# Patient Record
Sex: FEMALE | Race: WHITE | NOT HISPANIC OR LATINO | Employment: OTHER | URBAN - METROPOLITAN AREA
[De-identification: names, ages, dates, MRNs, and addresses within clinical notes are randomized per-mention and may not be internally consistent; named-entity substitution may affect disease eponyms.]

---

## 2017-11-16 ENCOUNTER — CONVERSION ENCOUNTER (OUTPATIENT)
Dept: GENERAL RADIOLOGY | Facility: HOSPITAL | Age: 82
End: 2017-11-16

## 2018-01-25 ENCOUNTER — PROCEDURE VISIT CONVERTED (OUTPATIENT)
Dept: PODIATRY | Facility: CLINIC | Age: 83
End: 2018-01-25
Attending: PODIATRIST

## 2018-03-15 ENCOUNTER — PROCEDURE VISIT CONVERTED (OUTPATIENT)
Dept: UROLOGY | Facility: CLINIC | Age: 83
End: 2018-03-15
Attending: UROLOGY

## 2018-04-12 ENCOUNTER — OFFICE VISIT CONVERTED (OUTPATIENT)
Dept: CARDIOLOGY | Facility: CLINIC | Age: 83
End: 2018-04-12
Attending: INTERNAL MEDICINE

## 2018-07-18 ENCOUNTER — PROCEDURE VISIT CONVERTED (OUTPATIENT)
Dept: UROLOGY | Facility: CLINIC | Age: 83
End: 2018-07-18
Attending: UROLOGY

## 2018-10-18 ENCOUNTER — CONVERSION ENCOUNTER (OUTPATIENT)
Dept: CARDIOLOGY | Facility: CLINIC | Age: 83
End: 2018-10-18
Attending: INTERNAL MEDICINE

## 2018-10-31 ENCOUNTER — OFFICE VISIT CONVERTED (OUTPATIENT)
Dept: UROLOGY | Facility: CLINIC | Age: 83
End: 2018-10-31
Attending: UROLOGY

## 2018-10-31 ENCOUNTER — CONVERSION ENCOUNTER (OUTPATIENT)
Dept: SURGERY | Facility: CLINIC | Age: 83
End: 2018-10-31

## 2019-01-16 ENCOUNTER — HOSPITAL ENCOUNTER (OUTPATIENT)
Dept: GENERAL RADIOLOGY | Facility: HOSPITAL | Age: 84
Discharge: HOME | End: 2019-01-16
Attending: INTERNAL MEDICINE

## 2019-03-13 ENCOUNTER — HOSPITAL ENCOUNTER (OUTPATIENT)
Dept: OTHER | Facility: HOSPITAL | Age: 84
Discharge: HOME | End: 2019-03-13
Attending: INTERNAL MEDICINE

## 2019-03-13 ENCOUNTER — OFFICE VISIT CONVERTED (OUTPATIENT)
Dept: OTHER | Facility: HOSPITAL | Age: 84
End: 2019-03-13
Attending: INTERNAL MEDICINE

## 2019-03-13 LAB
ALBUMIN SERPL-MCNC: 3.5 G/DL (ref 3.5–5)
ALBUMIN/GLOB SERPL: 0.9 {RATIO} (ref 1.4–2.6)
ALP SERPL-CCNC: 84 U/L (ref 43–160)
ALT SERPL-CCNC: 19 U/L (ref 10–40)
ANION GAP SERPL CALC-SCNC: 15 MMOL/L (ref 8–19)
AST SERPL-CCNC: 31 U/L (ref 15–50)
BASOPHILS # BLD AUTO: 0.03 10*3/UL (ref 0–0.2)
BASOPHILS NFR BLD AUTO: 0.5 % (ref 0–3)
BILIRUB SERPL-MCNC: 0.44 MG/DL (ref 0.2–1.3)
BUN SERPL-MCNC: 23 MG/DL (ref 5–25)
BUN/CREAT SERPL: 28 {RATIO} (ref 6–20)
CALCIUM SERPL-MCNC: 10.1 MG/DL (ref 8.7–10.4)
CHLORIDE SERPL-SCNC: 96 MMOL/L (ref 99–111)
CONV ABS IMM GRAN: 0.03 10*3/UL (ref 0–0.2)
CONV CO2: 31 MMOL/L (ref 22–32)
CONV IMMATURE GRAN: 0.5 % (ref 0–1.8)
CONV TOTAL PROTEIN: 7.3 G/DL (ref 6.3–8.2)
CREAT UR-MCNC: 0.83 MG/DL (ref 0.5–0.9)
DEPRECATED RDW RBC AUTO: 51.9 FL (ref 36.4–46.3)
EOSINOPHIL # BLD AUTO: 0.07 10*3/UL (ref 0–0.7)
EOSINOPHIL # BLD AUTO: 1.1 % (ref 0–7)
ERYTHROCYTE [DISTWIDTH] IN BLOOD BY AUTOMATED COUNT: 14.5 % (ref 11.7–14.4)
ERYTHROCYTE [SEDIMENTATION RATE] IN BLOOD: 44 MM/H (ref 0–30)
FOLATE SERPL-MCNC: >20 NG/ML (ref 4.8–20)
GFR SERPLBLD BASED ON 1.73 SQ M-ARVRAT: >60 ML/MIN/{1.73_M2}
GLOBULIN UR ELPH-MCNC: 3.8 G/DL (ref 2–3.5)
GLUCOSE SERPL-MCNC: 105 MG/DL (ref 65–99)
HBA1C MFR BLD: 11.2 G/DL (ref 12–16)
HCT VFR BLD AUTO: 35.3 % (ref 37–47)
IRON SATN MFR SERPL: 23 % (ref 20–55)
IRON SERPL-MCNC: 79 UG/DL (ref 60–170)
LDH SERPL-CCNC: 250 U/L (ref 120–240)
LYMPHOCYTES # BLD AUTO: 1.42 10*3/UL (ref 1–5)
MCH RBC QN AUTO: 31.1 PG (ref 27–31)
MCHC RBC AUTO-ENTMCNC: 31.7 G/DL (ref 33–37)
MCV RBC AUTO: 98.1 FL (ref 81–99)
MONOCYTES # BLD AUTO: 0.8 10*3/UL (ref 0.2–1.2)
MONOCYTES NFR BLD AUTO: 12.1 % (ref 3–10)
NEUTROPHILS # BLD AUTO: 4.27 10*3/UL (ref 2–8)
NEUTROPHILS NFR BLD AUTO: 64.3 % (ref 30–85)
NRBC CBCN: 0 % (ref 0–0.7)
OSMOLALITY SERPL CALC.SUM OF ELEC: 290 MOSM/KG (ref 273–304)
PLATELET # BLD AUTO: 274 10*3/UL (ref 130–400)
PMV BLD AUTO: 10.8 FL (ref 9.4–12.3)
POTASSIUM SERPL-SCNC: 3.7 MMOL/L (ref 3.5–5.3)
RBC # BLD AUTO: 3.6 10*6/UL (ref 4.2–5.4)
SODIUM SERPL-SCNC: 138 MMOL/L (ref 135–147)
TIBC SERPL-MCNC: 342 UG/DL (ref 245–450)
TRANSFERRIN SERPL-MCNC: 239 MG/DL (ref 250–380)
VARIANT LYMPHS NFR BLD MANUAL: 21.5 % (ref 20–45)
VIT B12 SERPL-MCNC: 1228 PG/ML (ref 211–911)
WBC # BLD AUTO: 6.62 10*3/UL (ref 4.8–10.8)

## 2019-03-15 ENCOUNTER — PROCEDURE VISIT CONVERTED (OUTPATIENT)
Dept: UROLOGY | Facility: CLINIC | Age: 84
End: 2019-03-15
Attending: UROLOGY

## 2019-03-22 ENCOUNTER — HOSPITAL ENCOUNTER (OUTPATIENT)
Dept: PET IMAGING | Facility: HOSPITAL | Age: 84
Discharge: HOME | End: 2019-03-22
Attending: INTERNAL MEDICINE

## 2019-03-27 ENCOUNTER — OFFICE VISIT CONVERTED (OUTPATIENT)
Dept: OTHER | Facility: HOSPITAL | Age: 84
End: 2019-03-27
Attending: INTERNAL MEDICINE

## 2019-04-25 ENCOUNTER — OFFICE VISIT CONVERTED (OUTPATIENT)
Dept: CARDIOLOGY | Facility: CLINIC | Age: 84
End: 2019-04-25
Attending: INTERNAL MEDICINE

## 2019-05-02 ENCOUNTER — HOSPITAL ENCOUNTER (OUTPATIENT)
Dept: OTHER | Facility: HOSPITAL | Age: 84
Discharge: HOME | End: 2019-05-02
Attending: INTERNAL MEDICINE

## 2019-05-02 LAB
25(OH)D3 SERPL-MCNC: 43.8 NG/ML (ref 30–100)
ALBUMIN SERPL-MCNC: 3.9 G/DL (ref 3.5–5)
ALBUMIN/GLOB SERPL: 1.1 {RATIO} (ref 1.4–2.6)
ALP SERPL-CCNC: 58 U/L (ref 43–160)
ALT SERPL-CCNC: 14 U/L (ref 10–40)
ANION GAP SERPL CALC-SCNC: 15 MMOL/L (ref 8–19)
AST SERPL-CCNC: 27 U/L (ref 15–50)
BASOPHILS # BLD AUTO: 0.03 10*3/UL (ref 0–0.2)
BASOPHILS NFR BLD AUTO: 0.5 % (ref 0–3)
BILIRUB SERPL-MCNC: 0.7 MG/DL (ref 0.2–1.3)
BNP SERPL-MCNC: 777 PG/ML (ref 0–1800)
BUN SERPL-MCNC: 22 MG/DL (ref 5–25)
BUN/CREAT SERPL: 23 {RATIO} (ref 6–20)
CALCIUM SERPL-MCNC: 9.7 MG/DL (ref 8.7–10.4)
CHLORIDE SERPL-SCNC: 98 MMOL/L (ref 99–111)
CHOLEST SERPL-MCNC: 135 MG/DL (ref 107–200)
CHOLEST/HDLC SERPL: 2 {RATIO} (ref 3–6)
CONV ABS IMM GRAN: 0.01 10*3/UL (ref 0–0.2)
CONV CO2: 30 MMOL/L (ref 22–32)
CONV IMMATURE GRAN: 0.2 % (ref 0–1.8)
CONV TOTAL PROTEIN: 7.3 G/DL (ref 6.3–8.2)
CREAT UR-MCNC: 0.94 MG/DL (ref 0.5–0.9)
DEPRECATED RDW RBC AUTO: 50.4 FL (ref 36.4–46.3)
EOSINOPHIL # BLD AUTO: 0.07 10*3/UL (ref 0–0.7)
EOSINOPHIL # BLD AUTO: 1.3 % (ref 0–7)
ERYTHROCYTE [DISTWIDTH] IN BLOOD BY AUTOMATED COUNT: 14.2 % (ref 11.7–14.4)
FOLATE SERPL-MCNC: >20 NG/ML (ref 4.8–20)
GFR SERPLBLD BASED ON 1.73 SQ M-ARVRAT: 53 ML/MIN/{1.73_M2}
GLOBULIN UR ELPH-MCNC: 3.4 G/DL (ref 2–3.5)
GLUCOSE SERPL-MCNC: 83 MG/DL (ref 65–99)
HBA1C MFR BLD: 12.5 G/DL (ref 12–16)
HCT VFR BLD AUTO: 38.6 % (ref 37–47)
HDLC SERPL-MCNC: 69 MG/DL (ref 40–60)
LDLC SERPL CALC-MCNC: 57 MG/DL (ref 70–100)
LYMPHOCYTES # BLD AUTO: 1.68 10*3/UL (ref 1–5)
MAGNESIUM SERPL-MCNC: 2.1 MG/DL (ref 1.6–2.3)
MCH RBC QN AUTO: 31.3 PG (ref 27–31)
MCHC RBC AUTO-ENTMCNC: 32.4 G/DL (ref 33–37)
MCV RBC AUTO: 96.7 FL (ref 81–99)
MONOCYTES # BLD AUTO: 0.55 10*3/UL (ref 0.2–1.2)
MONOCYTES NFR BLD AUTO: 9.8 % (ref 3–10)
NEUTROPHILS # BLD AUTO: 3.25 10*3/UL (ref 2–8)
NEUTROPHILS NFR BLD AUTO: 58.1 % (ref 30–85)
NRBC CBCN: 0 % (ref 0–0.7)
OSMOLALITY SERPL CALC.SUM OF ELEC: 290 MOSM/KG (ref 273–304)
PLATELET # BLD AUTO: 219 10*3/UL (ref 130–400)
PMV BLD AUTO: 10.8 FL (ref 9.4–12.3)
POTASSIUM SERPL-SCNC: 3.8 MMOL/L (ref 3.5–5.3)
RBC # BLD AUTO: 3.99 10*6/UL (ref 4.2–5.4)
SODIUM SERPL-SCNC: 139 MMOL/L (ref 135–147)
T4 FREE SERPL-MCNC: 1.1 NG/DL (ref 0.9–1.8)
TRIGL SERPL-MCNC: 46 MG/DL (ref 40–150)
TSH SERPL-ACNC: 1.33 M[IU]/L (ref 0.27–4.2)
VARIANT LYMPHS NFR BLD MANUAL: 30.1 % (ref 20–45)
VIT B12 SERPL-MCNC: 985 PG/ML (ref 211–911)
VLDLC SERPL-MCNC: 9 MG/DL (ref 5–37)
WBC # BLD AUTO: 5.59 10*3/UL (ref 4.8–10.8)

## 2019-06-20 ENCOUNTER — HOSPITAL ENCOUNTER (OUTPATIENT)
Dept: INFUSION THERAPY | Facility: HOSPITAL | Age: 84
Discharge: HOME | End: 2019-06-20
Attending: INTERNAL MEDICINE

## 2019-06-20 LAB
ALBUMIN SERPL-MCNC: 3.7 G/DL (ref 3.5–5)
ALBUMIN/GLOB SERPL: 1.1 {RATIO} (ref 1.4–2.6)
ALP SERPL-CCNC: 60 U/L (ref 43–160)
ALT SERPL-CCNC: 15 U/L (ref 10–40)
ANION GAP SERPL CALC-SCNC: 10 MMOL/L (ref 8–19)
AST SERPL-CCNC: 28 U/L (ref 15–50)
BASOPHILS # BLD AUTO: 0.04 10*3/UL (ref 0–0.2)
BASOPHILS NFR BLD AUTO: 0.7 % (ref 0–3)
BILIRUB SERPL-MCNC: 0.59 MG/DL (ref 0.2–1.3)
BUN SERPL-MCNC: 26 MG/DL (ref 5–25)
BUN/CREAT SERPL: 26 {RATIO} (ref 6–20)
CALCIUM SERPL-MCNC: 9.3 MG/DL (ref 8.7–10.4)
CHLORIDE SERPL-SCNC: 100 MMOL/L (ref 99–111)
CONV ABS IMM GRAN: 0.01 10*3/UL (ref 0–0.2)
CONV CO2: 33 MMOL/L (ref 22–32)
CONV IMMATURE GRAN: 0.2 % (ref 0–1.8)
CONV TOTAL PROTEIN: 7.1 G/DL (ref 6.3–8.2)
CREAT UR-MCNC: 1.01 MG/DL (ref 0.5–0.9)
DEPRECATED RDW RBC AUTO: 47.8 FL (ref 36.4–46.3)
EOSINOPHIL # BLD AUTO: 0.08 10*3/UL (ref 0–0.7)
EOSINOPHIL # BLD AUTO: 1.5 % (ref 0–7)
ERYTHROCYTE [DISTWIDTH] IN BLOOD BY AUTOMATED COUNT: 13.6 % (ref 11.7–14.4)
GFR SERPLBLD BASED ON 1.73 SQ M-ARVRAT: 49 ML/MIN/{1.73_M2}
GLOBULIN UR ELPH-MCNC: 3.4 G/DL (ref 2–3.5)
GLUCOSE SERPL-MCNC: 118 MG/DL (ref 65–99)
HBA1C MFR BLD: 12.1 G/DL (ref 12–16)
HCT VFR BLD AUTO: 38.1 % (ref 37–47)
LDH SERPL-CCNC: 255 U/L (ref 120–240)
LYMPHOCYTES # BLD AUTO: 1.22 10*3/UL (ref 1–5)
MCH RBC QN AUTO: 30.9 PG (ref 27–31)
MCHC RBC AUTO-ENTMCNC: 31.8 G/DL (ref 33–37)
MCV RBC AUTO: 97.4 FL (ref 81–99)
MONOCYTES # BLD AUTO: 0.69 10*3/UL (ref 0.2–1.2)
MONOCYTES NFR BLD AUTO: 12.5 % (ref 3–10)
NEUTROPHILS # BLD AUTO: 3.47 10*3/UL (ref 2–8)
NEUTROPHILS NFR BLD AUTO: 63 % (ref 30–85)
NRBC CBCN: 0 % (ref 0–0.7)
OSMOLALITY SERPL CALC.SUM OF ELEC: 294 MOSM/KG (ref 273–304)
PLATELET # BLD AUTO: 214 10*3/UL (ref 130–400)
PMV BLD AUTO: 10.1 FL (ref 9.4–12.3)
POTASSIUM SERPL-SCNC: 4 MMOL/L (ref 3.5–5.3)
RBC # BLD AUTO: 3.91 10*6/UL (ref 4.2–5.4)
SODIUM SERPL-SCNC: 139 MMOL/L (ref 135–147)
VARIANT LYMPHS NFR BLD MANUAL: 22.1 % (ref 20–45)
WBC # BLD AUTO: 5.51 10*3/UL (ref 4.8–10.8)

## 2019-06-26 ENCOUNTER — OFFICE VISIT CONVERTED (OUTPATIENT)
Dept: OTHER | Facility: HOSPITAL | Age: 84
End: 2019-06-26
Attending: INTERNAL MEDICINE

## 2019-07-25 ENCOUNTER — HOSPITAL ENCOUNTER (OUTPATIENT)
Dept: OTHER | Facility: HOSPITAL | Age: 84
Discharge: HOME | End: 2019-07-25
Attending: INTERNAL MEDICINE

## 2019-07-25 LAB
25(OH)D3 SERPL-MCNC: 46.7 NG/ML (ref 30–100)
ALBUMIN SERPL-MCNC: 4 G/DL (ref 3.5–5)
ALBUMIN/GLOB SERPL: 1.2 {RATIO} (ref 1.4–2.6)
ALP SERPL-CCNC: 61 U/L (ref 43–160)
ALT SERPL-CCNC: 18 U/L (ref 10–40)
ANION GAP SERPL CALC-SCNC: 17 MMOL/L (ref 8–19)
AST SERPL-CCNC: 30 U/L (ref 15–50)
BASOPHILS # BLD AUTO: 0.04 10*3/UL (ref 0–0.2)
BASOPHILS NFR BLD AUTO: 0.7 % (ref 0–3)
BILIRUB SERPL-MCNC: 0.71 MG/DL (ref 0.2–1.3)
BUN SERPL-MCNC: 27 MG/DL (ref 5–25)
BUN/CREAT SERPL: 29 {RATIO} (ref 6–20)
CALCIUM SERPL-MCNC: 9.6 MG/DL (ref 8.7–10.4)
CHLORIDE SERPL-SCNC: 101 MMOL/L (ref 99–111)
CHOLEST SERPL-MCNC: 136 MG/DL (ref 107–200)
CHOLEST/HDLC SERPL: 1.8 {RATIO} (ref 3–6)
CONV ABS IMM GRAN: 0.02 10*3/UL (ref 0–0.2)
CONV CO2: 28 MMOL/L (ref 22–32)
CONV IMMATURE GRAN: 0.3 % (ref 0–1.8)
CONV TOTAL PROTEIN: 7.4 G/DL (ref 6.3–8.2)
CREAT UR-MCNC: 0.94 MG/DL (ref 0.5–0.9)
DEPRECATED RDW RBC AUTO: 47.5 FL (ref 36.4–46.3)
EOSINOPHIL # BLD AUTO: 0.09 10*3/UL (ref 0–0.7)
EOSINOPHIL # BLD AUTO: 1.6 % (ref 0–7)
ERYTHROCYTE [DISTWIDTH] IN BLOOD BY AUTOMATED COUNT: 13.5 % (ref 11.7–14.4)
FOLATE SERPL-MCNC: >20 NG/ML (ref 4.8–20)
GFR SERPLBLD BASED ON 1.73 SQ M-ARVRAT: 53 ML/MIN/{1.73_M2}
GLOBULIN UR ELPH-MCNC: 3.4 G/DL (ref 2–3.5)
GLUCOSE SERPL-MCNC: 92 MG/DL (ref 65–99)
HBA1C MFR BLD: 12.6 G/DL (ref 12–16)
HCT VFR BLD AUTO: 38.9 % (ref 37–47)
HDLC SERPL-MCNC: 75 MG/DL (ref 40–60)
IRON SATN MFR SERPL: 22 % (ref 20–55)
IRON SERPL-MCNC: 82 UG/DL (ref 60–170)
LDLC SERPL CALC-MCNC: 52 MG/DL (ref 70–100)
LYMPHOCYTES # BLD AUTO: 1.47 10*3/UL (ref 1–5)
MAGNESIUM SERPL-MCNC: 2.13 MG/DL (ref 1.6–2.3)
MCH RBC QN AUTO: 30.8 PG (ref 27–31)
MCHC RBC AUTO-ENTMCNC: 32.4 G/DL (ref 33–37)
MCV RBC AUTO: 95.1 FL (ref 81–99)
MONOCYTES # BLD AUTO: 0.64 10*3/UL (ref 0.2–1.2)
MONOCYTES NFR BLD AUTO: 11.2 % (ref 3–10)
NEUTROPHILS # BLD AUTO: 3.46 10*3/UL (ref 2–8)
NEUTROPHILS NFR BLD AUTO: 60.5 % (ref 30–85)
NRBC CBCN: 0 % (ref 0–0.7)
OSMOLALITY SERPL CALC.SUM OF ELEC: 299 MOSM/KG (ref 273–304)
PLATELET # BLD AUTO: 223 10*3/UL (ref 130–400)
PMV BLD AUTO: 10.6 FL (ref 9.4–12.3)
POTASSIUM SERPL-SCNC: 4.2 MMOL/L (ref 3.5–5.3)
RBC # BLD AUTO: 4.09 10*6/UL (ref 4.2–5.4)
SODIUM SERPL-SCNC: 142 MMOL/L (ref 135–147)
T4 FREE SERPL-MCNC: 1.2 NG/DL (ref 0.9–1.8)
TIBC SERPL-MCNC: 376 UG/DL (ref 245–450)
TRANSFERRIN SERPL-MCNC: 263 MG/DL (ref 250–380)
TRIGL SERPL-MCNC: 45 MG/DL (ref 40–150)
TSH SERPL-ACNC: 1.36 M[IU]/L (ref 0.27–4.2)
VARIANT LYMPHS NFR BLD MANUAL: 25.7 % (ref 20–45)
VIT B12 SERPL-MCNC: 962 PG/ML (ref 211–911)
VLDLC SERPL-MCNC: 9 MG/DL (ref 5–37)
WBC # BLD AUTO: 5.72 10*3/UL (ref 4.8–10.8)

## 2019-09-12 ENCOUNTER — CONVERSION ENCOUNTER (OUTPATIENT)
Dept: SURGERY | Facility: CLINIC | Age: 84
End: 2019-09-12

## 2019-09-12 ENCOUNTER — PROCEDURE VISIT CONVERTED (OUTPATIENT)
Dept: UROLOGY | Facility: CLINIC | Age: 84
End: 2019-09-12
Attending: UROLOGY

## 2019-09-17 ENCOUNTER — HOSPITAL ENCOUNTER (OUTPATIENT)
Dept: PET IMAGING | Facility: HOSPITAL | Age: 84
Discharge: HOME | End: 2019-09-17
Attending: INTERNAL MEDICINE

## 2019-09-27 ENCOUNTER — HOSPITAL ENCOUNTER (OUTPATIENT)
Dept: OTHER | Facility: HOSPITAL | Age: 84
Discharge: HOME | End: 2019-09-27
Attending: INTERNAL MEDICINE

## 2019-09-27 LAB
ALBUMIN SERPL-MCNC: 4 G/DL (ref 3.5–5)
ALBUMIN/GLOB SERPL: 1.2 {RATIO} (ref 1.4–2.6)
ALP SERPL-CCNC: 55 U/L (ref 38–185)
ALT SERPL-CCNC: 18 U/L (ref 10–40)
ANION GAP SERPL CALC-SCNC: 18 MMOL/L (ref 8–19)
AST SERPL-CCNC: 29 U/L (ref 15–50)
BASOPHILS # BLD AUTO: 0.03 10*3/UL (ref 0–0.2)
BASOPHILS NFR BLD AUTO: 0.4 % (ref 0–3)
BILIRUB SERPL-MCNC: 0.75 MG/DL (ref 0.2–1.3)
BUN SERPL-MCNC: 26 MG/DL (ref 5–25)
BUN/CREAT SERPL: 27 {RATIO} (ref 6–20)
CALCIUM SERPL-MCNC: 9.6 MG/DL (ref 8.7–10.4)
CHLORIDE SERPL-SCNC: 104 MMOL/L (ref 99–111)
CONV ABS IMM GRAN: 0.01 10*3/UL (ref 0–0.2)
CONV CO2: 25 MMOL/L (ref 22–32)
CONV IMMATURE GRAN: 0.1 % (ref 0–1.8)
CONV TOTAL PROTEIN: 7.3 G/DL (ref 6.3–8.2)
CREAT UR-MCNC: 0.95 MG/DL (ref 0.5–0.9)
DEPRECATED RDW RBC AUTO: 51.1 FL (ref 36.4–46.3)
EOSINOPHIL # BLD AUTO: 0.08 10*3/UL (ref 0–0.7)
EOSINOPHIL # BLD AUTO: 1.1 % (ref 0–7)
ERYTHROCYTE [DISTWIDTH] IN BLOOD BY AUTOMATED COUNT: 14.1 % (ref 11.7–14.4)
ERYTHROCYTE [SEDIMENTATION RATE] IN BLOOD: 22 MM/H (ref 0–30)
GFR SERPLBLD BASED ON 1.73 SQ M-ARVRAT: 52 ML/MIN/{1.73_M2}
GLOBULIN UR ELPH-MCNC: 3.3 G/DL (ref 2–3.5)
GLUCOSE SERPL-MCNC: 90 MG/DL (ref 65–99)
HCT VFR BLD AUTO: 39.1 % (ref 37–47)
HGB BLD-MCNC: 12.7 G/DL (ref 12–16)
LDH SERPL-CCNC: 263 U/L (ref 120–240)
LYMPHOCYTES # BLD AUTO: 1.65 10*3/UL (ref 1–5)
LYMPHOCYTES NFR BLD AUTO: 23.6 % (ref 20–45)
MCH RBC QN AUTO: 31.8 PG (ref 27–31)
MCHC RBC AUTO-ENTMCNC: 32.5 G/DL (ref 33–37)
MCV RBC AUTO: 98 FL (ref 81–99)
MONOCYTES # BLD AUTO: 0.75 10*3/UL (ref 0.2–1.2)
MONOCYTES NFR BLD AUTO: 10.7 % (ref 3–10)
NEUTROPHILS # BLD AUTO: 4.47 10*3/UL (ref 2–8)
NEUTROPHILS NFR BLD AUTO: 64.1 % (ref 30–85)
NRBC CBCN: 0 % (ref 0–0.7)
OSMOLALITY SERPL CALC.SUM OF ELEC: 300 MOSM/KG (ref 273–304)
PLATELET # BLD AUTO: 202 10*3/UL (ref 130–400)
PMV BLD AUTO: 10.2 FL (ref 9.4–12.3)
POTASSIUM SERPL-SCNC: 4.2 MMOL/L (ref 3.5–5.3)
RBC # BLD AUTO: 3.99 10*6/UL (ref 4.2–5.4)
SODIUM SERPL-SCNC: 143 MMOL/L (ref 135–147)
WBC # BLD AUTO: 6.99 10*3/UL (ref 4.8–10.8)

## 2019-10-02 ENCOUNTER — OFFICE VISIT CONVERTED (OUTPATIENT)
Dept: OTHER | Facility: HOSPITAL | Age: 84
End: 2019-10-02
Attending: INTERNAL MEDICINE

## 2019-10-07 ENCOUNTER — HOSPITAL ENCOUNTER (OUTPATIENT)
Dept: OTHER | Facility: HOSPITAL | Age: 84
Setting detail: RECURRING SERIES
Discharge: HOME | End: 2019-10-31
Attending: INTERNAL MEDICINE

## 2019-10-07 ENCOUNTER — OFFICE VISIT CONVERTED (OUTPATIENT)
Dept: OTHER | Facility: HOSPITAL | Age: 84
End: 2019-10-07
Attending: INTERNAL MEDICINE

## 2019-10-07 LAB
ALBUMIN SERPL-MCNC: 4.1 G/DL (ref 3.5–5)
ALBUMIN/GLOB SERPL: 1.2 {RATIO} (ref 1.4–2.6)
ALP SERPL-CCNC: 58 U/L (ref 38–185)
ALT SERPL-CCNC: 19 U/L (ref 10–40)
ANION GAP SERPL CALC-SCNC: 16 MMOL/L (ref 8–19)
AST SERPL-CCNC: 31 U/L (ref 15–50)
BASOPHILS # BLD AUTO: 0.04 10*3/UL (ref 0–0.2)
BASOPHILS NFR BLD AUTO: 0.7 % (ref 0–3)
BILIRUB SERPL-MCNC: 0.66 MG/DL (ref 0.2–1.3)
BUN SERPL-MCNC: 27 MG/DL (ref 5–25)
BUN/CREAT SERPL: 24 {RATIO} (ref 6–20)
CALCIUM SERPL-MCNC: 10.3 MG/DL (ref 8.7–10.4)
CHLORIDE SERPL-SCNC: 100 MMOL/L (ref 99–111)
CONV ABS IMM GRAN: 0.01 10*3/UL (ref 0–0.2)
CONV CO2: 29 MMOL/L (ref 22–32)
CONV IMMATURE GRAN: 0.2 % (ref 0–1.8)
CONV TOTAL PROTEIN: 7.6 G/DL (ref 6.3–8.2)
CREAT UR-MCNC: 1.13 MG/DL (ref 0.5–0.9)
DEPRECATED RDW RBC AUTO: 49.6 FL (ref 36.4–46.3)
EOSINOPHIL # BLD AUTO: 0.04 10*3/UL (ref 0–0.7)
EOSINOPHIL # BLD AUTO: 0.7 % (ref 0–7)
ERYTHROCYTE [DISTWIDTH] IN BLOOD BY AUTOMATED COUNT: 13.9 % (ref 11.7–14.4)
GFR SERPLBLD BASED ON 1.73 SQ M-ARVRAT: 42 ML/MIN/{1.73_M2}
GLOBULIN UR ELPH-MCNC: 3.5 G/DL (ref 2–3.5)
GLUCOSE SERPL-MCNC: 120 MG/DL (ref 65–99)
HCT VFR BLD AUTO: 39.8 % (ref 37–47)
HGB BLD-MCNC: 12.8 G/DL (ref 12–16)
LDH SERPL-CCNC: 268 U/L (ref 120–240)
LYMPHOCYTES # BLD AUTO: 0.92 10*3/UL (ref 1–5)
LYMPHOCYTES NFR BLD AUTO: 15.4 % (ref 20–45)
MCH RBC QN AUTO: 31.1 PG (ref 27–31)
MCHC RBC AUTO-ENTMCNC: 32.2 G/DL (ref 33–37)
MCV RBC AUTO: 96.6 FL (ref 81–99)
MONOCYTES # BLD AUTO: 0.56 10*3/UL (ref 0.2–1.2)
MONOCYTES NFR BLD AUTO: 9.3 % (ref 3–10)
NEUTROPHILS # BLD AUTO: 4.42 10*3/UL (ref 2–8)
NEUTROPHILS NFR BLD AUTO: 73.7 % (ref 30–85)
NRBC CBCN: 0 % (ref 0–0.7)
OSMOLALITY SERPL CALC.SUM OF ELEC: 298 MOSM/KG (ref 273–304)
PLATELET # BLD AUTO: 222 10*3/UL (ref 130–400)
PMV BLD AUTO: 10.7 FL (ref 9.4–12.3)
POTASSIUM SERPL-SCNC: 4 MMOL/L (ref 3.5–5.3)
RBC # BLD AUTO: 4.12 10*6/UL (ref 4.2–5.4)
SODIUM SERPL-SCNC: 141 MMOL/L (ref 135–147)
URATE SERPL-MCNC: 6.6 MG/DL (ref 2.5–7.5)
WBC # BLD AUTO: 5.99 10*3/UL (ref 4.8–10.8)

## 2019-10-08 LAB
ALBUMIN SERPL-MCNC: 3.5 G/DL (ref 2.9–4.4)
ALBUMIN/GLOB SERPL: 1 {RATIO} (ref 0.7–1.7)
ALPHA2 GLOB SERPL ELPH-MCNC: 0.8 G/DL (ref 0.4–1)
BETA GLOBULIN: 1 G/DL (ref 0.7–1.3)
CONV ALPHA-1-GLOBULIN: 0.2 G/DL (ref 0–0.4)
CONV IMMUNOGLOBULIN G (IGG): 1324 MG/DL (ref 700–1600)
CONV IMMUNOGLOBULIN M (IGM): 181 MG/DL (ref 26–217)
CONV PE INTERPRETATION: NORMAL
CONV PE NOTE: NORMAL
CONV TOTAL PROTEIN: 6.9 G/DL (ref 6–8.5)
GAMMA GLOB SERPL ELPH-MCNC: 1.4 G/DL (ref 0.4–1.8)
GLOBULIN UR ELPH-MCNC: 3.4 G/DL (ref 2.2–3.9)
HAV IGM SERPL QL IA: NEGATIVE
HBV CORE IGM SERPL QL IA: NEGATIVE
HBV SURFACE AG SERPL QL IA: NEGATIVE
IGA SERPL-MCNC: 298 MG/DL (ref 64–422)
M-SPIKE: NORMAL G/DL
PROT PATTERN SERPL IFE-IMP: NORMAL

## 2019-10-16 ENCOUNTER — OFFICE VISIT CONVERTED (OUTPATIENT)
Dept: OTHER | Facility: HOSPITAL | Age: 84
End: 2019-10-16
Attending: INTERNAL MEDICINE

## 2019-10-16 ENCOUNTER — HOSPITAL ENCOUNTER (OUTPATIENT)
Dept: OTHER | Facility: HOSPITAL | Age: 84
Discharge: HOME | End: 2019-10-16
Attending: INTERNAL MEDICINE

## 2019-10-16 LAB
BASOPHILS # BLD AUTO: 0.04 10*3/UL (ref 0–0.2)
BASOPHILS NFR BLD AUTO: 0.6 % (ref 0–3)
CONV ABS IMM GRAN: 0.02 10*3/UL (ref 0–0.2)
CONV IMMATURE GRAN: 0.3 % (ref 0–1.8)
DEPRECATED RDW RBC AUTO: 48.4 FL (ref 36.4–46.3)
EOSINOPHIL # BLD AUTO: 0.07 10*3/UL (ref 0–0.7)
EOSINOPHIL # BLD AUTO: 1 % (ref 0–7)
ERYTHROCYTE [DISTWIDTH] IN BLOOD BY AUTOMATED COUNT: 13.6 % (ref 11.7–14.4)
HCT VFR BLD AUTO: 38.9 % (ref 37–47)
HGB BLD-MCNC: 12.7 G/DL (ref 12–16)
LYMPHOCYTES # BLD AUTO: 1.2 10*3/UL (ref 1–5)
LYMPHOCYTES NFR BLD AUTO: 17.4 % (ref 20–45)
MCH RBC QN AUTO: 31.4 PG (ref 27–31)
MCHC RBC AUTO-ENTMCNC: 32.6 G/DL (ref 33–37)
MCV RBC AUTO: 96.3 FL (ref 81–99)
MONOCYTES # BLD AUTO: 0.7 10*3/UL (ref 0.2–1.2)
MONOCYTES NFR BLD AUTO: 10.2 % (ref 3–10)
NEUTROPHILS # BLD AUTO: 4.86 10*3/UL (ref 2–8)
NEUTROPHILS NFR BLD AUTO: 70.5 % (ref 30–85)
NRBC CBCN: 0 % (ref 0–0.7)
PLATELET # BLD AUTO: 224 10*3/UL (ref 130–400)
PMV BLD AUTO: 10.9 FL (ref 9.4–12.3)
RBC # BLD AUTO: 4.04 10*6/UL (ref 4.2–5.4)
WBC # BLD AUTO: 6.89 10*3/UL (ref 4.8–10.8)

## 2019-10-23 ENCOUNTER — OFFICE VISIT CONVERTED (OUTPATIENT)
Dept: OTHER | Facility: HOSPITAL | Age: 84
End: 2019-10-23
Attending: INTERNAL MEDICINE

## 2019-10-23 ENCOUNTER — HOSPITAL ENCOUNTER (OUTPATIENT)
Dept: OTHER | Facility: HOSPITAL | Age: 84
Discharge: HOME | End: 2019-10-23
Attending: INTERNAL MEDICINE

## 2019-10-23 LAB
ALBUMIN SERPL-MCNC: 3.9 G/DL (ref 3.5–5)
ALBUMIN/GLOB SERPL: 1.2 {RATIO} (ref 1.4–2.6)
ALP SERPL-CCNC: 59 U/L (ref 38–185)
ALT SERPL-CCNC: 17 U/L (ref 10–40)
ANION GAP SERPL CALC-SCNC: 17 MMOL/L (ref 8–19)
AST SERPL-CCNC: 27 U/L (ref 15–50)
BASOPHILS # BLD AUTO: 0.04 10*3/UL (ref 0–0.2)
BASOPHILS NFR BLD AUTO: 0.6 % (ref 0–3)
BILIRUB SERPL-MCNC: 0.66 MG/DL (ref 0.2–1.3)
BUN SERPL-MCNC: 26 MG/DL (ref 5–25)
BUN/CREAT SERPL: 28 {RATIO} (ref 6–20)
CALCIUM SERPL-MCNC: 10.1 MG/DL (ref 8.7–10.4)
CHLORIDE SERPL-SCNC: 100 MMOL/L (ref 99–111)
CONV ABS IMM GRAN: 0.02 10*3/UL (ref 0–0.2)
CONV CO2: 28 MMOL/L (ref 22–32)
CONV IMMATURE GRAN: 0.3 % (ref 0–1.8)
CONV TOTAL PROTEIN: 7.2 G/DL (ref 6.3–8.2)
CREAT UR-MCNC: 0.93 MG/DL (ref 0.5–0.9)
DEPRECATED RDW RBC AUTO: 48.9 FL (ref 36.4–46.3)
EOSINOPHIL # BLD AUTO: 0.05 10*3/UL (ref 0–0.7)
EOSINOPHIL # BLD AUTO: 0.8 % (ref 0–7)
ERYTHROCYTE [DISTWIDTH] IN BLOOD BY AUTOMATED COUNT: 13.7 % (ref 11.7–14.4)
GFR SERPLBLD BASED ON 1.73 SQ M-ARVRAT: 54 ML/MIN/{1.73_M2}
GLOBULIN UR ELPH-MCNC: 3.3 G/DL (ref 2–3.5)
GLUCOSE SERPL-MCNC: 141 MG/DL (ref 65–99)
HCT VFR BLD AUTO: 39.9 % (ref 37–47)
HGB BLD-MCNC: 12.8 G/DL (ref 12–16)
LYMPHOCYTES # BLD AUTO: 1.15 10*3/UL (ref 1–5)
LYMPHOCYTES NFR BLD AUTO: 17.3 % (ref 20–45)
MCH RBC QN AUTO: 31 PG (ref 27–31)
MCHC RBC AUTO-ENTMCNC: 32.1 G/DL (ref 33–37)
MCV RBC AUTO: 96.6 FL (ref 81–99)
MONOCYTES # BLD AUTO: 0.7 10*3/UL (ref 0.2–1.2)
MONOCYTES NFR BLD AUTO: 10.5 % (ref 3–10)
NEUTROPHILS # BLD AUTO: 4.69 10*3/UL (ref 2–8)
NEUTROPHILS NFR BLD AUTO: 70.5 % (ref 30–85)
NRBC CBCN: 0 % (ref 0–0.7)
OSMOLALITY SERPL CALC.SUM OF ELEC: 299 MOSM/KG (ref 273–304)
PLATELET # BLD AUTO: 219 10*3/UL (ref 130–400)
PMV BLD AUTO: 11 FL (ref 9.4–12.3)
POTASSIUM SERPL-SCNC: 3.9 MMOL/L (ref 3.5–5.3)
RBC # BLD AUTO: 4.13 10*6/UL (ref 4.2–5.4)
SODIUM SERPL-SCNC: 141 MMOL/L (ref 135–147)
WBC # BLD AUTO: 6.65 10*3/UL (ref 4.8–10.8)

## 2019-10-30 ENCOUNTER — OFFICE VISIT CONVERTED (OUTPATIENT)
Dept: OTHER | Facility: HOSPITAL | Age: 84
End: 2019-10-30
Attending: INTERNAL MEDICINE

## 2019-10-30 ENCOUNTER — HOSPITAL ENCOUNTER (OUTPATIENT)
Dept: OTHER | Facility: HOSPITAL | Age: 84
Discharge: HOME | End: 2019-10-30
Attending: INTERNAL MEDICINE

## 2019-10-30 LAB
ALBUMIN SERPL-MCNC: 4.1 G/DL (ref 3.5–5)
ALBUMIN/GLOB SERPL: 1.2 {RATIO} (ref 1.4–2.6)
ALP SERPL-CCNC: 61 U/L (ref 38–185)
ALT SERPL-CCNC: 18 U/L (ref 10–40)
ANION GAP SERPL CALC-SCNC: 16 MMOL/L (ref 8–19)
AST SERPL-CCNC: 29 U/L (ref 15–50)
BASOPHILS # BLD AUTO: 0.03 10*3/UL (ref 0–0.2)
BASOPHILS NFR BLD AUTO: 0.6 % (ref 0–3)
BILIRUB SERPL-MCNC: 0.68 MG/DL (ref 0.2–1.3)
BUN SERPL-MCNC: 24 MG/DL (ref 5–25)
BUN/CREAT SERPL: 24 {RATIO} (ref 6–20)
CALCIUM SERPL-MCNC: 9.9 MG/DL (ref 8.7–10.4)
CHLORIDE SERPL-SCNC: 100 MMOL/L (ref 99–111)
CONV ABS IMM GRAN: 0.01 10*3/UL (ref 0–0.2)
CONV CO2: 30 MMOL/L (ref 22–32)
CONV IMMATURE GRAN: 0.2 % (ref 0–1.8)
CONV TOTAL PROTEIN: 7.6 G/DL (ref 6.3–8.2)
CREAT UR-MCNC: 1.02 MG/DL (ref 0.5–0.9)
DEPRECATED RDW RBC AUTO: 48.3 FL (ref 36.4–46.3)
EOSINOPHIL # BLD AUTO: 0.07 10*3/UL (ref 0–0.7)
EOSINOPHIL # BLD AUTO: 1.3 % (ref 0–7)
ERYTHROCYTE [DISTWIDTH] IN BLOOD BY AUTOMATED COUNT: 13.6 % (ref 11.7–14.4)
GFR SERPLBLD BASED ON 1.73 SQ M-ARVRAT: 48 ML/MIN/{1.73_M2}
GLOBULIN UR ELPH-MCNC: 3.5 G/DL (ref 2–3.5)
GLUCOSE SERPL-MCNC: 110 MG/DL (ref 65–99)
HCT VFR BLD AUTO: 39.9 % (ref 37–47)
HGB BLD-MCNC: 13.1 G/DL (ref 12–16)
LYMPHOCYTES # BLD AUTO: 1.03 10*3/UL (ref 1–5)
LYMPHOCYTES NFR BLD AUTO: 19.3 % (ref 20–45)
MCH RBC QN AUTO: 31.7 PG (ref 27–31)
MCHC RBC AUTO-ENTMCNC: 32.8 G/DL (ref 33–37)
MCV RBC AUTO: 96.6 FL (ref 81–99)
MONOCYTES # BLD AUTO: 0.49 10*3/UL (ref 0.2–1.2)
MONOCYTES NFR BLD AUTO: 9.2 % (ref 3–10)
NEUTROPHILS # BLD AUTO: 3.71 10*3/UL (ref 2–8)
NEUTROPHILS NFR BLD AUTO: 69.4 % (ref 30–85)
NRBC CBCN: 0 % (ref 0–0.7)
OSMOLALITY SERPL CALC.SUM OF ELEC: 299 MOSM/KG (ref 273–304)
PLATELET # BLD AUTO: 219 10*3/UL (ref 130–400)
PMV BLD AUTO: 10.8 FL (ref 9.4–12.3)
POTASSIUM SERPL-SCNC: 3.9 MMOL/L (ref 3.5–5.3)
RBC # BLD AUTO: 4.13 10*6/UL (ref 4.2–5.4)
SODIUM SERPL-SCNC: 142 MMOL/L (ref 135–147)
WBC # BLD AUTO: 5.34 10*3/UL (ref 4.8–10.8)

## 2019-11-14 ENCOUNTER — HOSPITAL ENCOUNTER (OUTPATIENT)
Dept: INFUSION THERAPY | Facility: HOSPITAL | Age: 84
Discharge: HOME | End: 2019-11-14
Attending: INTERNAL MEDICINE

## 2019-11-14 LAB
ALBUMIN SERPL-MCNC: 3.9 G/DL (ref 3.5–5)
ALBUMIN/GLOB SERPL: 1.1 {RATIO} (ref 1.4–2.6)
ALP SERPL-CCNC: 67 U/L (ref 38–185)
ALT SERPL-CCNC: 18 U/L (ref 10–40)
ANION GAP SERPL CALC-SCNC: 16 MMOL/L (ref 8–19)
APPEARANCE UR: CLEAR
AST SERPL-CCNC: 31 U/L (ref 15–50)
BASOPHILS # BLD AUTO: 0.04 10*3/UL (ref 0–0.2)
BASOPHILS NFR BLD AUTO: 0.6 % (ref 0–3)
BILIRUB SERPL-MCNC: 0.68 MG/DL (ref 0.2–1.3)
BILIRUB UR QL: NEGATIVE
BNP SERPL-MCNC: 785 PG/ML (ref 0–1800)
BUN SERPL-MCNC: 23 MG/DL (ref 5–25)
BUN/CREAT SERPL: 27 {RATIO} (ref 6–20)
CALCIUM SERPL-MCNC: 9.7 MG/DL (ref 8.7–10.4)
CHLORIDE SERPL-SCNC: 100 MMOL/L (ref 99–111)
CHOLEST SERPL-MCNC: 152 MG/DL (ref 107–200)
CHOLEST/HDLC SERPL: 2 {RATIO} (ref 3–6)
COLOR UR: YELLOW
CONV ABS IMM GRAN: 0.02 10*3/UL (ref 0–0.2)
CONV BACTERIA: NEGATIVE
CONV CO2: 28 MMOL/L (ref 22–32)
CONV COLLECTION SOURCE (UA): ABNORMAL
CONV IMMATURE GRAN: 0.3 % (ref 0–1.8)
CONV TOTAL PROTEIN: 7.5 G/DL (ref 6.3–8.2)
CONV UROBILINOGEN IN URINE BY AUTOMATED TEST STRIP: 0.2 {EHRLICHU}/DL (ref 0.1–1)
CREAT UR-MCNC: 0.85 MG/DL (ref 0.5–0.9)
DEPRECATED RDW RBC AUTO: 48.2 FL (ref 36.4–46.3)
EOSINOPHIL # BLD AUTO: 0.11 10*3/UL (ref 0–0.7)
EOSINOPHIL # BLD AUTO: 1.6 % (ref 0–7)
ERYTHROCYTE [DISTWIDTH] IN BLOOD BY AUTOMATED COUNT: 13.6 % (ref 11.7–14.4)
FOLATE SERPL-MCNC: >20 NG/ML (ref 4.8–20)
GFR SERPLBLD BASED ON 1.73 SQ M-ARVRAT: 60 ML/MIN/{1.73_M2}
GLOBULIN UR ELPH-MCNC: 3.6 G/DL (ref 2–3.5)
GLUCOSE SERPL-MCNC: 82 MG/DL (ref 65–99)
GLUCOSE UR QL: NEGATIVE MG/DL
HCT VFR BLD AUTO: 38.3 % (ref 37–47)
HDLC SERPL-MCNC: 75 MG/DL (ref 40–60)
HGB BLD-MCNC: 12.7 G/DL (ref 12–16)
HGB UR QL STRIP: NEGATIVE
IRON SATN MFR SERPL: 22 % (ref 20–55)
IRON SERPL-MCNC: 81 UG/DL (ref 60–170)
KETONES UR QL STRIP: NEGATIVE MG/DL
LDLC SERPL CALC-MCNC: 67 MG/DL (ref 70–100)
LEUKOCYTE ESTERASE UR QL STRIP: ABNORMAL
LYMPHOCYTES # BLD AUTO: 1.7 10*3/UL (ref 1–5)
LYMPHOCYTES NFR BLD AUTO: 24.3 % (ref 20–45)
MAGNESIUM SERPL-MCNC: 2.06 MG/DL (ref 1.6–2.3)
MCH RBC QN AUTO: 31.7 PG (ref 27–31)
MCHC RBC AUTO-ENTMCNC: 33.2 G/DL (ref 33–37)
MCV RBC AUTO: 95.5 FL (ref 81–99)
MONOCYTES # BLD AUTO: 0.74 10*3/UL (ref 0.2–1.2)
MONOCYTES NFR BLD AUTO: 10.6 % (ref 3–10)
NEUTROPHILS # BLD AUTO: 4.4 10*3/UL (ref 2–8)
NEUTROPHILS NFR BLD AUTO: 62.6 % (ref 30–85)
NITRITE UR QL STRIP: NEGATIVE
NRBC CBCN: 0 % (ref 0–0.7)
OSMOLALITY SERPL CALC.SUM OF ELEC: 293 MOSM/KG (ref 273–304)
PH UR STRIP.AUTO: 7 [PH] (ref 5–8)
PLATELET # BLD AUTO: 204 10*3/UL (ref 130–400)
PMV BLD AUTO: 10 FL (ref 9.4–12.3)
POTASSIUM SERPL-SCNC: 3.9 MMOL/L (ref 3.5–5.3)
PROT UR QL: NEGATIVE MG/DL
RBC # BLD AUTO: 4.01 10*6/UL (ref 4.2–5.4)
RBC #/AREA URNS HPF: ABNORMAL /[HPF]
SODIUM SERPL-SCNC: 140 MMOL/L (ref 135–147)
SP GR UR: 1.02 (ref 1–1.03)
T4 FREE SERPL-MCNC: 1.1 NG/DL (ref 0.9–1.8)
TIBC SERPL-MCNC: 372 UG/DL (ref 245–450)
TRANSFERRIN SERPL-MCNC: 260 MG/DL (ref 250–380)
TRIGL SERPL-MCNC: 50 MG/DL (ref 40–150)
TSH SERPL-ACNC: 1.23 M[IU]/L (ref 0.27–4.2)
VIT B12 SERPL-MCNC: 1081 PG/ML (ref 211–911)
VLDLC SERPL-MCNC: 10 MG/DL (ref 5–37)
WBC # BLD AUTO: 7.01 10*3/UL (ref 4.8–10.8)
WBC #/AREA URNS HPF: ABNORMAL /[HPF]

## 2019-11-27 ENCOUNTER — CONVERSION ENCOUNTER (OUTPATIENT)
Dept: OTHER | Facility: HOSPITAL | Age: 84
End: 2019-11-27

## 2019-11-27 ENCOUNTER — OFFICE VISIT CONVERTED (OUTPATIENT)
Dept: CARDIOLOGY | Facility: CLINIC | Age: 84
End: 2019-11-27
Attending: INTERNAL MEDICINE

## 2019-12-11 ENCOUNTER — HOSPITAL ENCOUNTER (OUTPATIENT)
Dept: PET IMAGING | Facility: HOSPITAL | Age: 84
Discharge: HOME | End: 2019-12-11
Attending: INTERNAL MEDICINE

## 2019-12-18 ENCOUNTER — OFFICE VISIT CONVERTED (OUTPATIENT)
Dept: OTHER | Facility: HOSPITAL | Age: 84
End: 2019-12-18
Attending: INTERNAL MEDICINE

## 2019-12-18 ENCOUNTER — HOSPITAL ENCOUNTER (OUTPATIENT)
Dept: OTHER | Facility: HOSPITAL | Age: 84
Discharge: HOME | End: 2019-12-18
Attending: INTERNAL MEDICINE

## 2019-12-18 LAB
ALBUMIN SERPL-MCNC: 3.9 G/DL (ref 3.5–5)
ALBUMIN/GLOB SERPL: 1.1 {RATIO} (ref 1.4–2.6)
ALP SERPL-CCNC: 63 U/L (ref 38–185)
ALT SERPL-CCNC: 20 U/L (ref 10–40)
ANION GAP SERPL CALC-SCNC: 15 MMOL/L (ref 8–19)
AST SERPL-CCNC: 31 U/L (ref 15–50)
BASOPHILS # BLD AUTO: 0.03 10*3/UL (ref 0–0.2)
BASOPHILS NFR BLD AUTO: 0.5 % (ref 0–3)
BILIRUB SERPL-MCNC: 0.75 MG/DL (ref 0.2–1.3)
BUN SERPL-MCNC: 29 MG/DL (ref 5–25)
BUN/CREAT SERPL: 28 {RATIO} (ref 6–20)
CALCIUM SERPL-MCNC: 9.8 MG/DL (ref 8.7–10.4)
CHLORIDE SERPL-SCNC: 98 MMOL/L (ref 99–111)
CONV ABS IMM GRAN: 0.02 10*3/UL (ref 0–0.2)
CONV CO2: 31 MMOL/L (ref 22–32)
CONV IMMATURE GRAN: 0.3 % (ref 0–1.8)
CONV TOTAL PROTEIN: 7.3 G/DL (ref 6.3–8.2)
CREAT UR-MCNC: 1.05 MG/DL (ref 0.5–0.9)
DEPRECATED RDW RBC AUTO: 49.1 FL (ref 36.4–46.3)
EOSINOPHIL # BLD AUTO: 0.06 10*3/UL (ref 0–0.7)
EOSINOPHIL # BLD AUTO: 0.9 % (ref 0–7)
ERYTHROCYTE [DISTWIDTH] IN BLOOD BY AUTOMATED COUNT: 13.6 % (ref 11.7–14.4)
GFR SERPLBLD BASED ON 1.73 SQ M-ARVRAT: 46 ML/MIN/{1.73_M2}
GLOBULIN UR ELPH-MCNC: 3.4 G/DL (ref 2–3.5)
GLUCOSE SERPL-MCNC: 97 MG/DL (ref 65–99)
HCT VFR BLD AUTO: 40.8 % (ref 37–47)
HGB BLD-MCNC: 13.1 G/DL (ref 12–16)
LDH SERPL-CCNC: 259 U/L (ref 120–240)
LYMPHOCYTES # BLD AUTO: 1.11 10*3/UL (ref 1–5)
LYMPHOCYTES NFR BLD AUTO: 17.5 % (ref 20–45)
MCH RBC QN AUTO: 31.3 PG (ref 27–31)
MCHC RBC AUTO-ENTMCNC: 32.1 G/DL (ref 33–37)
MCV RBC AUTO: 97.6 FL (ref 81–99)
MONOCYTES # BLD AUTO: 0.62 10*3/UL (ref 0.2–1.2)
MONOCYTES NFR BLD AUTO: 9.8 % (ref 3–10)
NEUTROPHILS # BLD AUTO: 4.5 10*3/UL (ref 2–8)
NEUTROPHILS NFR BLD AUTO: 71 % (ref 30–85)
NRBC CBCN: 0 % (ref 0–0.7)
OSMOLALITY SERPL CALC.SUM OF ELEC: 296 MOSM/KG (ref 273–304)
PLATELET # BLD AUTO: 222 10*3/UL (ref 130–400)
PMV BLD AUTO: 10.8 FL (ref 9.4–12.3)
POTASSIUM SERPL-SCNC: 3.8 MMOL/L (ref 3.5–5.3)
RBC # BLD AUTO: 4.18 10*6/UL (ref 4.2–5.4)
SODIUM SERPL-SCNC: 140 MMOL/L (ref 135–147)
WBC # BLD AUTO: 6.34 10*3/UL (ref 4.8–10.8)

## 2020-02-19 ENCOUNTER — HOSPITAL ENCOUNTER (OUTPATIENT)
Dept: INFUSION THERAPY | Facility: HOSPITAL | Age: 85
Discharge: HOME | End: 2020-02-19
Attending: INTERNAL MEDICINE

## 2020-02-19 LAB
25(OH)D3 SERPL-MCNC: 40.8 NG/ML (ref 30–100)
ALBUMIN SERPL-MCNC: 3.8 G/DL (ref 3.5–5)
ALBUMIN/GLOB SERPL: 1.1 {RATIO} (ref 1.4–2.6)
ALP SERPL-CCNC: 64 U/L (ref 38–185)
ALT SERPL-CCNC: 21 U/L (ref 10–40)
ANION GAP SERPL CALC-SCNC: 17 MMOL/L (ref 8–19)
AST SERPL-CCNC: 34 U/L (ref 15–50)
BASOPHILS # BLD AUTO: 0.03 10*3/UL (ref 0–0.2)
BASOPHILS NFR BLD AUTO: 0.5 % (ref 0–3)
BILIRUB SERPL-MCNC: 0.72 MG/DL (ref 0.2–1.3)
BUN SERPL-MCNC: 26 MG/DL (ref 5–25)
BUN/CREAT SERPL: 30 {RATIO} (ref 6–20)
CALCIUM SERPL-MCNC: 9.8 MG/DL (ref 8.7–10.4)
CHLORIDE SERPL-SCNC: 100 MMOL/L (ref 99–111)
CHOLEST SERPL-MCNC: 153 MG/DL (ref 107–200)
CHOLEST/HDLC SERPL: 2 {RATIO} (ref 3–6)
CONV ABS IMM GRAN: 0.02 10*3/UL (ref 0–0.2)
CONV CO2: 28 MMOL/L (ref 22–32)
CONV IMMATURE GRAN: 0.3 % (ref 0–1.8)
CONV TOTAL PROTEIN: 7.2 G/DL (ref 6.3–8.2)
CREAT UR-MCNC: 0.88 MG/DL (ref 0.5–0.9)
DEPRECATED RDW RBC AUTO: 47.6 FL (ref 36.4–46.3)
EOSINOPHIL # BLD AUTO: 0.09 10*3/UL (ref 0–0.7)
EOSINOPHIL # BLD AUTO: 1.6 % (ref 0–7)
ERYTHROCYTE [DISTWIDTH] IN BLOOD BY AUTOMATED COUNT: 13.4 % (ref 11.7–14.4)
FERRITIN SERPL-MCNC: 94 NG/ML (ref 10–200)
FOLATE SERPL-MCNC: >20 NG/ML (ref 4.8–20)
GFR SERPLBLD BASED ON 1.73 SQ M-ARVRAT: 57 ML/MIN/{1.73_M2}
GLOBULIN UR ELPH-MCNC: 3.4 G/DL (ref 2–3.5)
GLUCOSE SERPL-MCNC: 91 MG/DL (ref 65–99)
HCT VFR BLD AUTO: 39.9 % (ref 37–47)
HDLC SERPL-MCNC: 77 MG/DL (ref 40–60)
HGB BLD-MCNC: 12.8 G/DL (ref 12–16)
IRON SATN MFR SERPL: 27 % (ref 20–55)
IRON SERPL-MCNC: 105 UG/DL (ref 60–170)
LDLC SERPL CALC-MCNC: 64 MG/DL (ref 70–100)
LYMPHOCYTES # BLD AUTO: 1.4 10*3/UL (ref 1–5)
LYMPHOCYTES NFR BLD AUTO: 24.1 % (ref 20–45)
MAGNESIUM SERPL-MCNC: 2.16 MG/DL (ref 1.6–2.3)
MCH RBC QN AUTO: 30.8 PG (ref 27–31)
MCHC RBC AUTO-ENTMCNC: 32.1 G/DL (ref 33–37)
MCV RBC AUTO: 96.1 FL (ref 81–99)
MONOCYTES # BLD AUTO: 0.6 10*3/UL (ref 0.2–1.2)
MONOCYTES NFR BLD AUTO: 10.3 % (ref 3–10)
NEUTROPHILS # BLD AUTO: 3.66 10*3/UL (ref 2–8)
NEUTROPHILS NFR BLD AUTO: 63.2 % (ref 30–85)
NRBC CBCN: 0 % (ref 0–0.7)
OSMOLALITY SERPL CALC.SUM OF ELEC: 296 MOSM/KG (ref 273–304)
PLATELET # BLD AUTO: 214 10*3/UL (ref 130–400)
PMV BLD AUTO: 10.3 FL (ref 9.4–12.3)
POTASSIUM SERPL-SCNC: 4 MMOL/L (ref 3.5–5.3)
RBC # BLD AUTO: 4.15 10*6/UL (ref 4.2–5.4)
SODIUM SERPL-SCNC: 141 MMOL/L (ref 135–147)
T4 FREE SERPL-MCNC: 1 NG/DL (ref 0.9–1.8)
TIBC SERPL-MCNC: 389 UG/DL (ref 245–450)
TRANSFERRIN SERPL-MCNC: 272 MG/DL (ref 250–380)
TRIGL SERPL-MCNC: 58 MG/DL (ref 40–150)
TSH SERPL-ACNC: 1.56 M[IU]/L (ref 0.27–4.2)
VIT B12 SERPL-MCNC: 1049 PG/ML (ref 211–911)
VLDLC SERPL-MCNC: 12 MG/DL (ref 5–37)
WBC # BLD AUTO: 5.8 10*3/UL (ref 4.8–10.8)

## 2020-03-11 ENCOUNTER — HOSPITAL ENCOUNTER (OUTPATIENT)
Dept: LAB | Facility: HOSPITAL | Age: 85
Discharge: HOME | End: 2020-03-11
Attending: INTERNAL MEDICINE

## 2020-03-11 LAB
ALBUMIN SERPL-MCNC: 4.1 G/DL (ref 3.5–5)
ALBUMIN/GLOB SERPL: 1.3 {RATIO} (ref 1.4–2.6)
ALP SERPL-CCNC: 64 U/L (ref 38–185)
ALT SERPL-CCNC: 19 U/L (ref 10–40)
ANION GAP SERPL CALC-SCNC: 16 MMOL/L (ref 8–19)
AST SERPL-CCNC: 32 U/L (ref 15–50)
BASOPHILS # BLD AUTO: 0.04 10*3/UL (ref 0–0.2)
BASOPHILS NFR BLD AUTO: 0.6 % (ref 0–3)
BILIRUB SERPL-MCNC: 0.63 MG/DL (ref 0.2–1.3)
BUN SERPL-MCNC: 27 MG/DL (ref 5–25)
BUN/CREAT SERPL: 25 {RATIO} (ref 6–20)
CALCIUM SERPL-MCNC: 10 MG/DL (ref 8.7–10.4)
CHLORIDE SERPL-SCNC: 99 MMOL/L (ref 99–111)
CONV ABS IMM GRAN: 0.02 10*3/UL (ref 0–0.2)
CONV CO2: 30 MMOL/L (ref 22–32)
CONV IMMATURE GRAN: 0.3 % (ref 0–1.8)
CONV TOTAL PROTEIN: 7.3 G/DL (ref 6.3–8.2)
CREAT UR-MCNC: 1.07 MG/DL (ref 0.5–0.9)
DEPRECATED RDW RBC AUTO: 49.5 FL (ref 36.4–46.3)
EOSINOPHIL # BLD AUTO: 0.06 10*3/UL (ref 0–0.7)
EOSINOPHIL # BLD AUTO: 0.8 % (ref 0–7)
ERYTHROCYTE [DISTWIDTH] IN BLOOD BY AUTOMATED COUNT: 13.6 % (ref 11.7–14.4)
GFR SERPLBLD BASED ON 1.73 SQ M-ARVRAT: 45 ML/MIN/{1.73_M2}
GLOBULIN UR ELPH-MCNC: 3.2 G/DL (ref 2–3.5)
GLUCOSE SERPL-MCNC: 105 MG/DL (ref 65–99)
HCT VFR BLD AUTO: 40.6 % (ref 37–47)
HGB BLD-MCNC: 12.9 G/DL (ref 12–16)
LDH SERPL-CCNC: 271 U/L (ref 120–240)
LYMPHOCYTES # BLD AUTO: 1.54 10*3/UL (ref 1–5)
LYMPHOCYTES NFR BLD AUTO: 21.8 % (ref 20–45)
MCH RBC QN AUTO: 31.5 PG (ref 27–31)
MCHC RBC AUTO-ENTMCNC: 31.8 G/DL (ref 33–37)
MCV RBC AUTO: 99 FL (ref 81–99)
MONOCYTES # BLD AUTO: 0.69 10*3/UL (ref 0.2–1.2)
MONOCYTES NFR BLD AUTO: 9.7 % (ref 3–10)
NEUTROPHILS # BLD AUTO: 4.73 10*3/UL (ref 2–8)
NEUTROPHILS NFR BLD AUTO: 66.8 % (ref 30–85)
NRBC CBCN: 0 % (ref 0–0.7)
OSMOLALITY SERPL CALC.SUM OF ELEC: 297 MOSM/KG (ref 273–304)
PLATELET # BLD AUTO: 234 10*3/UL (ref 130–400)
PMV BLD AUTO: 10.9 FL (ref 9.4–12.3)
POTASSIUM SERPL-SCNC: 4.2 MMOL/L (ref 3.5–5.3)
RBC # BLD AUTO: 4.1 10*6/UL (ref 4.2–5.4)
SODIUM SERPL-SCNC: 141 MMOL/L (ref 135–147)
WBC # BLD AUTO: 7.08 10*3/UL (ref 4.8–10.8)

## 2020-06-11 ENCOUNTER — HOSPITAL ENCOUNTER (OUTPATIENT)
Dept: INFUSION THERAPY | Facility: HOSPITAL | Age: 85
Discharge: HOME | End: 2020-06-11
Attending: INTERNAL MEDICINE

## 2020-06-11 LAB
25(OH)D3 SERPL-MCNC: 44 NG/ML (ref 30–100)
ALBUMIN SERPL-MCNC: 4 G/DL (ref 3.5–5)
ALBUMIN/GLOB SERPL: 1.3 {RATIO} (ref 1.4–2.6)
ALP SERPL-CCNC: 61 U/L (ref 38–185)
ALT SERPL-CCNC: 18 U/L (ref 10–40)
ANION GAP SERPL CALC-SCNC: 18 MMOL/L (ref 8–19)
AST SERPL-CCNC: 30 U/L (ref 15–50)
BASOPHILS # BLD AUTO: 0.03 10*3/UL (ref 0–0.2)
BASOPHILS NFR BLD AUTO: 0.4 % (ref 0–3)
BILIRUB SERPL-MCNC: 0.8 MG/DL (ref 0.2–1.3)
BUN SERPL-MCNC: 27 MG/DL (ref 5–25)
BUN/CREAT SERPL: 28 {RATIO} (ref 6–20)
CALCIUM SERPL-MCNC: 9.4 MG/DL (ref 8.7–10.4)
CHLORIDE SERPL-SCNC: 106 MMOL/L (ref 99–111)
CHOLEST SERPL-MCNC: 139 MG/DL (ref 107–200)
CHOLEST/HDLC SERPL: 2 {RATIO} (ref 3–6)
CONV ABS IMM GRAN: 0.01 10*3/UL (ref 0–0.2)
CONV CO2: 24 MMOL/L (ref 22–32)
CONV IMMATURE GRAN: 0.1 % (ref 0–1.8)
CONV TOTAL PROTEIN: 7 G/DL (ref 6.3–8.2)
CREAT UR-MCNC: 0.95 MG/DL (ref 0.5–0.9)
DEPRECATED RDW RBC AUTO: 49.6 FL (ref 36.4–46.3)
EOSINOPHIL # BLD AUTO: 0.14 10*3/UL (ref 0–0.7)
EOSINOPHIL # BLD AUTO: 2 % (ref 0–7)
ERYTHROCYTE [DISTWIDTH] IN BLOOD BY AUTOMATED COUNT: 13.7 % (ref 11.7–14.4)
FOLATE SERPL-MCNC: >20 NG/ML (ref 4.8–20)
GFR SERPLBLD BASED ON 1.73 SQ M-ARVRAT: 52 ML/MIN/{1.73_M2}
GLOBULIN UR ELPH-MCNC: 3 G/DL (ref 2–3.5)
GLUCOSE SERPL-MCNC: 87 MG/DL (ref 65–99)
HCT VFR BLD AUTO: 37.3 % (ref 37–47)
HDLC SERPL-MCNC: 70 MG/DL (ref 40–60)
HGB BLD-MCNC: 12.1 G/DL (ref 12–16)
LDLC SERPL CALC-MCNC: 59 MG/DL (ref 70–100)
LYMPHOCYTES # BLD AUTO: 1.52 10*3/UL (ref 1–5)
LYMPHOCYTES NFR BLD AUTO: 21.6 % (ref 20–45)
MAGNESIUM SERPL-MCNC: 2.11 MG/DL (ref 1.6–2.3)
MCH RBC QN AUTO: 31.8 PG (ref 27–31)
MCHC RBC AUTO-ENTMCNC: 32.4 G/DL (ref 33–37)
MCV RBC AUTO: 98.2 FL (ref 81–99)
MONOCYTES # BLD AUTO: 0.79 10*3/UL (ref 0.2–1.2)
MONOCYTES NFR BLD AUTO: 11.2 % (ref 3–10)
NEUTROPHILS # BLD AUTO: 4.54 10*3/UL (ref 2–8)
NEUTROPHILS NFR BLD AUTO: 64.7 % (ref 30–85)
NRBC CBCN: 0 % (ref 0–0.7)
OSMOLALITY SERPL CALC.SUM OF ELEC: 302 MOSM/KG (ref 273–304)
PLATELET # BLD AUTO: 187 10*3/UL (ref 130–400)
PMV BLD AUTO: 10.3 FL (ref 9.4–12.3)
POTASSIUM SERPL-SCNC: 4 MMOL/L (ref 3.5–5.3)
RBC # BLD AUTO: 3.8 10*6/UL (ref 4.2–5.4)
SODIUM SERPL-SCNC: 144 MMOL/L (ref 135–147)
T4 FREE SERPL-MCNC: 1.1 NG/DL (ref 0.9–1.8)
TRIGL SERPL-MCNC: 51 MG/DL (ref 40–150)
TSH SERPL-ACNC: 1.46 M[IU]/L (ref 0.27–4.2)
VIT B12 SERPL-MCNC: 1317 PG/ML (ref 211–911)
VLDLC SERPL-MCNC: 10 MG/DL (ref 5–37)
WBC # BLD AUTO: 7.03 10*3/UL (ref 4.8–10.8)

## 2020-06-23 ENCOUNTER — OFFICE VISIT CONVERTED (OUTPATIENT)
Dept: OTHER | Facility: HOSPITAL | Age: 85
End: 2020-06-23
Attending: INTERNAL MEDICINE

## 2020-07-01 ENCOUNTER — HOSPITAL ENCOUNTER (OUTPATIENT)
Dept: PET IMAGING | Facility: HOSPITAL | Age: 85
Discharge: HOME | End: 2020-07-01
Attending: INTERNAL MEDICINE

## 2020-07-08 ENCOUNTER — OFFICE VISIT CONVERTED (OUTPATIENT)
Dept: OTHER | Facility: HOSPITAL | Age: 85
End: 2020-07-08
Attending: INTERNAL MEDICINE

## 2020-07-23 ENCOUNTER — OFFICE VISIT CONVERTED (OUTPATIENT)
Dept: CARDIOLOGY | Facility: CLINIC | Age: 85
End: 2020-07-23
Attending: INTERNAL MEDICINE

## 2020-07-23 ENCOUNTER — CONVERSION ENCOUNTER (OUTPATIENT)
Dept: SURGERY | Facility: CLINIC | Age: 85
End: 2020-07-23

## 2020-07-23 ENCOUNTER — PROCEDURE VISIT CONVERTED (OUTPATIENT)
Dept: UROLOGY | Facility: CLINIC | Age: 85
End: 2020-07-23
Attending: UROLOGY

## 2020-08-04 ENCOUNTER — HOSPITAL ENCOUNTER (OUTPATIENT)
Dept: OTHER | Facility: HOSPITAL | Age: 85
Discharge: HOME | End: 2020-08-04
Attending: INTERNAL MEDICINE

## 2020-08-04 ENCOUNTER — OFFICE VISIT CONVERTED (OUTPATIENT)
Dept: OTHER | Facility: HOSPITAL | Age: 85
End: 2020-08-04
Attending: INTERNAL MEDICINE

## 2020-08-04 LAB
ALBUMIN SERPL-MCNC: 3.8 G/DL (ref 3.5–5)
ALBUMIN/GLOB SERPL: 1.1 {RATIO} (ref 1.4–2.6)
ALP SERPL-CCNC: 71 U/L (ref 38–185)
ALT SERPL-CCNC: 24 U/L (ref 10–40)
ANION GAP SERPL CALC-SCNC: 18 MMOL/L (ref 8–19)
AST SERPL-CCNC: 37 U/L (ref 15–50)
BASOPHILS # BLD AUTO: 0.04 10*3/UL (ref 0–0.2)
BASOPHILS NFR BLD AUTO: 0.6 % (ref 0–3)
BILIRUB SERPL-MCNC: 0.56 MG/DL (ref 0.2–1.3)
BUN SERPL-MCNC: 26 MG/DL (ref 5–25)
BUN/CREAT SERPL: 24 {RATIO} (ref 6–20)
CALCIUM SERPL-MCNC: 10.5 MG/DL (ref 8.7–10.4)
CHLORIDE SERPL-SCNC: 99 MMOL/L (ref 99–111)
CONV ABS IMM GRAN: 0.02 10*3/UL (ref 0–0.2)
CONV CO2: 27 MMOL/L (ref 22–32)
CONV IMMATURE GRAN: 0.3 % (ref 0–1.8)
CONV TOTAL PROTEIN: 7.3 G/DL (ref 6.3–8.2)
CREAT UR-MCNC: 1.09 MG/DL (ref 0.5–0.9)
DEPRECATED RDW RBC AUTO: 46.7 FL (ref 36.4–46.3)
EOSINOPHIL # BLD AUTO: 0.73 10*3/UL (ref 0–0.7)
EOSINOPHIL # BLD AUTO: 10.2 % (ref 0–7)
ERYTHROCYTE [DISTWIDTH] IN BLOOD BY AUTOMATED COUNT: 13.1 % (ref 11.7–14.4)
GFR SERPLBLD BASED ON 1.73 SQ M-ARVRAT: 44 ML/MIN/{1.73_M2}
GLOBULIN UR ELPH-MCNC: 3.5 G/DL (ref 2–3.5)
GLUCOSE SERPL-MCNC: 124 MG/DL (ref 65–99)
HCT VFR BLD AUTO: 39.3 % (ref 37–47)
HGB BLD-MCNC: 12.5 G/DL (ref 12–16)
LYMPHOCYTES # BLD AUTO: 1.09 10*3/UL (ref 1–5)
LYMPHOCYTES NFR BLD AUTO: 15.3 % (ref 20–45)
MCH RBC QN AUTO: 30.8 PG (ref 27–31)
MCHC RBC AUTO-ENTMCNC: 31.8 G/DL (ref 33–37)
MCV RBC AUTO: 96.8 FL (ref 81–99)
MONOCYTES # BLD AUTO: 0.78 10*3/UL (ref 0.2–1.2)
MONOCYTES NFR BLD AUTO: 10.9 % (ref 3–10)
NEUTROPHILS # BLD AUTO: 4.47 10*3/UL (ref 2–8)
NEUTROPHILS NFR BLD AUTO: 62.7 % (ref 30–85)
NRBC CBCN: 0 % (ref 0–0.7)
OSMOLALITY SERPL CALC.SUM OF ELEC: 296 MOSM/KG (ref 273–304)
PLATELET # BLD AUTO: 241 10*3/UL (ref 130–400)
PMV BLD AUTO: 10.3 FL (ref 9.4–12.3)
POTASSIUM SERPL-SCNC: 3.9 MMOL/L (ref 3.5–5.3)
RBC # BLD AUTO: 4.06 10*6/UL (ref 4.2–5.4)
SODIUM SERPL-SCNC: 140 MMOL/L (ref 135–147)
WBC # BLD AUTO: 7.13 10*3/UL (ref 4.8–10.8)

## 2020-08-05 ENCOUNTER — HOSPITAL ENCOUNTER (OUTPATIENT)
Dept: OTHER | Facility: HOSPITAL | Age: 85
Setting detail: RECURRING SERIES
Discharge: STILL ADMITTED OR ADMINISTRATIVE | End: 2020-11-02
Attending: INTERNAL MEDICINE

## 2020-08-11 ENCOUNTER — HOSPITAL ENCOUNTER (OUTPATIENT)
Dept: OTHER | Facility: HOSPITAL | Age: 85
Discharge: HOME | End: 2020-08-11
Attending: INTERNAL MEDICINE

## 2020-08-11 LAB
ALBUMIN SERPL-MCNC: 3.9 G/DL (ref 3.5–5)
ALBUMIN/GLOB SERPL: 1.1 {RATIO} (ref 1.4–2.6)
ALP SERPL-CCNC: 75 U/L (ref 38–185)
ALT SERPL-CCNC: 23 U/L (ref 10–40)
ANION GAP SERPL CALC-SCNC: 20 MMOL/L (ref 8–19)
AST SERPL-CCNC: 34 U/L (ref 15–50)
BASOPHILS # BLD AUTO: 0.06 10*3/UL (ref 0–0.2)
BASOPHILS NFR BLD AUTO: 0.7 % (ref 0–3)
BILIRUB SERPL-MCNC: 0.63 MG/DL (ref 0.2–1.3)
BUN SERPL-MCNC: 31 MG/DL (ref 5–25)
BUN/CREAT SERPL: 29 {RATIO} (ref 6–20)
CALCIUM SERPL-MCNC: 10.8 MG/DL (ref 8.7–10.4)
CHLORIDE SERPL-SCNC: 99 MMOL/L (ref 99–111)
CONV ABS IMM GRAN: 0.03 10*3/UL (ref 0–0.2)
CONV CO2: 26 MMOL/L (ref 22–32)
CONV IMMATURE GRAN: 0.4 % (ref 0–1.8)
CONV TOTAL PROTEIN: 7.4 G/DL (ref 6.3–8.2)
CREAT UR-MCNC: 1.07 MG/DL (ref 0.5–0.9)
DEPRECATED RDW RBC AUTO: 46.9 FL (ref 36.4–46.3)
EOSINOPHIL # BLD AUTO: 0.59 10*3/UL (ref 0–0.7)
EOSINOPHIL # BLD AUTO: 7.4 % (ref 0–7)
ERYTHROCYTE [DISTWIDTH] IN BLOOD BY AUTOMATED COUNT: 13.2 % (ref 11.7–14.4)
GFR SERPLBLD BASED ON 1.73 SQ M-ARVRAT: 45 ML/MIN/{1.73_M2}
GLOBULIN UR ELPH-MCNC: 3.5 G/DL (ref 2–3.5)
GLUCOSE SERPL-MCNC: 102 MG/DL (ref 65–99)
HCT VFR BLD AUTO: 40.3 % (ref 37–47)
HGB BLD-MCNC: 12.7 G/DL (ref 12–16)
LYMPHOCYTES # BLD AUTO: 1.19 10*3/UL (ref 1–5)
LYMPHOCYTES NFR BLD AUTO: 14.9 % (ref 20–45)
MCH RBC QN AUTO: 30.8 PG (ref 27–31)
MCHC RBC AUTO-ENTMCNC: 31.5 G/DL (ref 33–37)
MCV RBC AUTO: 97.6 FL (ref 81–99)
MONOCYTES # BLD AUTO: 0.74 10*3/UL (ref 0.2–1.2)
MONOCYTES NFR BLD AUTO: 9.2 % (ref 3–10)
NEUTROPHILS # BLD AUTO: 5.4 10*3/UL (ref 2–8)
NEUTROPHILS NFR BLD AUTO: 67.4 % (ref 30–85)
NRBC CBCN: 0 % (ref 0–0.7)
OSMOLALITY SERPL CALC.SUM OF ELEC: 299 MOSM/KG (ref 273–304)
PLATELET # BLD AUTO: 244 10*3/UL (ref 130–400)
PMV BLD AUTO: 10.7 FL (ref 9.4–12.3)
POTASSIUM SERPL-SCNC: 3.9 MMOL/L (ref 3.5–5.3)
RBC # BLD AUTO: 4.13 10*6/UL (ref 4.2–5.4)
SODIUM SERPL-SCNC: 141 MMOL/L (ref 135–147)
WBC # BLD AUTO: 8.01 10*3/UL (ref 4.8–10.8)

## 2020-08-18 ENCOUNTER — HOSPITAL ENCOUNTER (OUTPATIENT)
Dept: OTHER | Facility: HOSPITAL | Age: 85
Discharge: HOME | End: 2020-08-18
Attending: INTERNAL MEDICINE

## 2020-08-18 ENCOUNTER — OFFICE VISIT CONVERTED (OUTPATIENT)
Dept: OTHER | Facility: HOSPITAL | Age: 85
End: 2020-08-18
Attending: INTERNAL MEDICINE

## 2020-08-18 LAB
BASOPHILS # BLD AUTO: 0.05 10*3/UL (ref 0–0.2)
BASOPHILS NFR BLD AUTO: 0.7 % (ref 0–3)
CONV ABS IMM GRAN: 0.02 10*3/UL (ref 0–0.2)
CONV IMMATURE GRAN: 0.3 % (ref 0–1.8)
DEPRECATED RDW RBC AUTO: 48.5 FL (ref 36.4–46.3)
EOSINOPHIL # BLD AUTO: 0.49 10*3/UL (ref 0–0.7)
EOSINOPHIL # BLD AUTO: 6.4 % (ref 0–7)
ERYTHROCYTE [DISTWIDTH] IN BLOOD BY AUTOMATED COUNT: 13.4 % (ref 11.7–14.4)
HCT VFR BLD AUTO: 39.2 % (ref 37–47)
HGB BLD-MCNC: 12.2 G/DL (ref 12–16)
LYMPHOCYTES # BLD AUTO: 1.27 10*3/UL (ref 1–5)
LYMPHOCYTES NFR BLD AUTO: 16.5 % (ref 20–45)
MCH RBC QN AUTO: 30.5 PG (ref 27–31)
MCHC RBC AUTO-ENTMCNC: 31.1 G/DL (ref 33–37)
MCV RBC AUTO: 98 FL (ref 81–99)
MONOCYTES # BLD AUTO: 0.92 10*3/UL (ref 0.2–1.2)
MONOCYTES NFR BLD AUTO: 12 % (ref 3–10)
NEUTROPHILS # BLD AUTO: 4.93 10*3/UL (ref 2–8)
NEUTROPHILS NFR BLD AUTO: 64.1 % (ref 30–85)
NRBC CBCN: 0 % (ref 0–0.7)
PLATELET # BLD AUTO: 248 10*3/UL (ref 130–400)
PMV BLD AUTO: 10.9 FL (ref 9.4–12.3)
RBC # BLD AUTO: 4 10*6/UL (ref 4.2–5.4)
WBC # BLD AUTO: 7.68 10*3/UL (ref 4.8–10.8)

## 2020-08-19 LAB
ALBUMIN SERPL-MCNC: 4 G/DL (ref 3.5–5)
ALBUMIN/GLOB SERPL: 1.3 {RATIO} (ref 1.4–2.6)
ALP SERPL-CCNC: 70 U/L (ref 38–185)
ALT SERPL-CCNC: 18 U/L (ref 10–40)
ANION GAP SERPL CALC-SCNC: 15 MMOL/L (ref 8–19)
AST SERPL-CCNC: 30 U/L (ref 15–50)
BILIRUB SERPL-MCNC: 0.55 MG/DL (ref 0.2–1.3)
BUN SERPL-MCNC: 29 MG/DL (ref 5–25)
BUN/CREAT SERPL: 27 {RATIO} (ref 6–20)
CALCIUM SERPL-MCNC: 10.6 MG/DL (ref 8.7–10.4)
CHLORIDE SERPL-SCNC: 99 MMOL/L (ref 99–111)
CONV CO2: 30 MMOL/L (ref 22–32)
CONV TOTAL PROTEIN: 7.2 G/DL (ref 6.3–8.2)
CREAT UR-MCNC: 1.09 MG/DL (ref 0.5–0.9)
GFR SERPLBLD BASED ON 1.73 SQ M-ARVRAT: 44 ML/MIN/{1.73_M2}
GLOBULIN UR ELPH-MCNC: 3.2 G/DL (ref 2–3.5)
GLUCOSE SERPL-MCNC: 96 MG/DL (ref 65–99)
OSMOLALITY SERPL CALC.SUM OF ELEC: 296 MOSM/KG (ref 273–304)
POTASSIUM SERPL-SCNC: 4.2 MMOL/L (ref 3.5–5.3)
SODIUM SERPL-SCNC: 140 MMOL/L (ref 135–147)

## 2020-08-25 ENCOUNTER — HOSPITAL ENCOUNTER (OUTPATIENT)
Dept: OTHER | Facility: HOSPITAL | Age: 85
Discharge: HOME | End: 2020-08-25
Attending: INTERNAL MEDICINE

## 2020-08-25 ENCOUNTER — OFFICE VISIT CONVERTED (OUTPATIENT)
Dept: OTHER | Facility: HOSPITAL | Age: 85
End: 2020-08-25
Attending: INTERNAL MEDICINE

## 2020-08-25 LAB
ALBUMIN SERPL-MCNC: 4 G/DL (ref 3.5–5)
ALBUMIN/GLOB SERPL: 1.3 {RATIO} (ref 1.4–2.6)
ALP SERPL-CCNC: 69 U/L (ref 38–185)
ALT SERPL-CCNC: 20 U/L (ref 10–40)
ANION GAP SERPL CALC-SCNC: 17 MMOL/L (ref 8–19)
AST SERPL-CCNC: 32 U/L (ref 15–50)
BASOPHILS # BLD AUTO: 0.04 10*3/UL (ref 0–0.2)
BASOPHILS NFR BLD AUTO: 0.6 % (ref 0–3)
BILIRUB SERPL-MCNC: 0.68 MG/DL (ref 0.2–1.3)
BUN SERPL-MCNC: 30 MG/DL (ref 5–25)
BUN/CREAT SERPL: 26 {RATIO} (ref 6–20)
CALCIUM SERPL-MCNC: 11 MG/DL (ref 8.7–10.4)
CHLORIDE SERPL-SCNC: 96 MMOL/L (ref 99–111)
CONV ABS IMM GRAN: 0.02 10*3/UL (ref 0–0.2)
CONV CO2: 31 MMOL/L (ref 22–32)
CONV IMMATURE GRAN: 0.3 % (ref 0–1.8)
CONV TOTAL PROTEIN: 7.2 G/DL (ref 6.3–8.2)
CREAT UR-MCNC: 1.15 MG/DL (ref 0.5–0.9)
DEPRECATED RDW RBC AUTO: 48.2 FL (ref 36.4–46.3)
EOSINOPHIL # BLD AUTO: 0.26 10*3/UL (ref 0–0.7)
EOSINOPHIL # BLD AUTO: 3.7 % (ref 0–7)
ERYTHROCYTE [DISTWIDTH] IN BLOOD BY AUTOMATED COUNT: 13.5 % (ref 11.7–14.4)
GFR SERPLBLD BASED ON 1.73 SQ M-ARVRAT: 41 ML/MIN/{1.73_M2}
GLOBULIN UR ELPH-MCNC: 3.2 G/DL (ref 2–3.5)
GLUCOSE SERPL-MCNC: 117 MG/DL (ref 65–99)
HCT VFR BLD AUTO: 38.8 % (ref 37–47)
HGB BLD-MCNC: 12.3 G/DL (ref 12–16)
LYMPHOCYTES # BLD AUTO: 1.23 10*3/UL (ref 1–5)
LYMPHOCYTES NFR BLD AUTO: 17.6 % (ref 20–45)
MCH RBC QN AUTO: 30.8 PG (ref 27–31)
MCHC RBC AUTO-ENTMCNC: 31.7 G/DL (ref 33–37)
MCV RBC AUTO: 97.2 FL (ref 81–99)
MONOCYTES # BLD AUTO: 0.74 10*3/UL (ref 0.2–1.2)
MONOCYTES NFR BLD AUTO: 10.6 % (ref 3–10)
NEUTROPHILS # BLD AUTO: 4.68 10*3/UL (ref 2–8)
NEUTROPHILS NFR BLD AUTO: 67.2 % (ref 30–85)
NRBC CBCN: 0 % (ref 0–0.7)
OSMOLALITY SERPL CALC.SUM OF ELEC: 297 MOSM/KG (ref 273–304)
PLATELET # BLD AUTO: 246 10*3/UL (ref 130–400)
PMV BLD AUTO: 10.4 FL (ref 9.4–12.3)
POTASSIUM SERPL-SCNC: 4.4 MMOL/L (ref 3.5–5.3)
RBC # BLD AUTO: 3.99 10*6/UL (ref 4.2–5.4)
SODIUM SERPL-SCNC: 140 MMOL/L (ref 135–147)
WBC # BLD AUTO: 6.97 10*3/UL (ref 4.8–10.8)

## 2020-08-26 ENCOUNTER — HOSPITAL ENCOUNTER (OUTPATIENT)
Dept: OTHER | Facility: HOSPITAL | Age: 85
Discharge: HOME | End: 2020-08-26
Attending: INTERNAL MEDICINE

## 2020-08-26 LAB
CHOLEST SERPL-MCNC: 158 MG/DL (ref 107–200)
CHOLEST/HDLC SERPL: 2.4 {RATIO} (ref 3–6)
FERRITIN SERPL-MCNC: 112 NG/ML (ref 10–200)
HDLC SERPL-MCNC: 67 MG/DL (ref 40–60)
IRON SATN MFR SERPL: 24 % (ref 20–55)
IRON SERPL-MCNC: 89 UG/DL (ref 60–170)
LDLC SERPL CALC-MCNC: 78 MG/DL (ref 70–100)
MAGNESIUM SERPL-MCNC: 2.92 MG/DL (ref 1.6–2.3)
TIBC SERPL-MCNC: 369 UG/DL (ref 245–450)
TRANSFERRIN SERPL-MCNC: 258 MG/DL (ref 250–380)
TRIGL SERPL-MCNC: 63 MG/DL (ref 40–150)
VIT B12 SERPL-MCNC: 1535 PG/ML (ref 211–911)
VLDLC SERPL-MCNC: 13 MG/DL (ref 5–37)

## 2020-08-28 LAB — 1,25(OH)2D3 SERPL-MCNC: 39.7 PG/ML (ref 19.9–79.3)

## 2020-10-20 ENCOUNTER — HOSPITAL ENCOUNTER (OUTPATIENT)
Dept: OTHER | Facility: HOSPITAL | Age: 85
Discharge: HOME | End: 2020-10-20
Attending: INTERNAL MEDICINE

## 2020-10-20 ENCOUNTER — OFFICE VISIT CONVERTED (OUTPATIENT)
Dept: ONCOLOGY | Facility: HOSPITAL | Age: 85
End: 2020-10-20
Attending: INTERNAL MEDICINE

## 2020-10-27 ENCOUNTER — HOSPITAL ENCOUNTER (OUTPATIENT)
Dept: OTHER | Facility: HOSPITAL | Age: 85
Setting detail: RECURRING SERIES
Discharge: HOME | End: 2021-01-25
Attending: INTERNAL MEDICINE

## 2020-10-27 LAB
ALBUMIN SERPL-MCNC: 3.8 G/DL (ref 3.5–5)
ALBUMIN/GLOB SERPL: 1.4 {RATIO} (ref 1.4–2.6)
ALP SERPL-CCNC: 64 U/L (ref 38–185)
ALT SERPL-CCNC: 17 U/L (ref 10–40)
ANION GAP SERPL CALC-SCNC: 11 MMOL/L (ref 8–19)
AST SERPL-CCNC: 26 U/L (ref 15–50)
BASOPHILS # BLD AUTO: 0.03 10*3/UL (ref 0–0.2)
BASOPHILS NFR BLD AUTO: 0.4 % (ref 0–3)
BILIRUB SERPL-MCNC: 0.51 MG/DL (ref 0.2–1.3)
BUN SERPL-MCNC: 25 MG/DL (ref 5–25)
BUN/CREAT SERPL: 27 {RATIO} (ref 6–20)
CALCIUM SERPL-MCNC: 9.6 MG/DL (ref 8.7–10.4)
CHLORIDE SERPL-SCNC: 101 MMOL/L (ref 99–111)
CONV ABS IMM GRAN: 0.01 10*3/UL (ref 0–0.54)
CONV CO2: 31 MMOL/L (ref 22–32)
CONV EOSINOPHILS PERCENT BY MANUAL COUNT: 2.6 % (ref 0–7)
CONV IMMATURE GRAN: 0.1 % (ref 0–0.4)
CONV TOTAL PROTEIN: 6.6 G/DL (ref 6.3–8.2)
CREAT UR-MCNC: 0.94 MG/DL (ref 0.5–0.9)
EOSINOPHIL # BLD MANUAL: 0.18 10*3/UL (ref 0–0.7)
ERYTHROCYTE [DISTWIDTH] IN BLOOD BY AUTOMATED COUNT: 13.9 % (ref 11.5–14.5)
ERYTHROCYTE [DISTWIDTH] IN BLOOD BY AUTOMATED COUNT: 49.7 FL
GFR SERPLBLD BASED ON 1.73 SQ M-ARVRAT: 53 ML/MIN/{1.73_M2}
GLOBULIN UR ELPH-MCNC: 2.8 G/DL (ref 2–3.5)
GLUCOSE SERPL-MCNC: 157 MG/DL (ref 65–99)
HBA1C MFR BLD: 12.1 G/DL (ref 12–16)
HCT VFR BLD AUTO: 37.8 % (ref 37–47)
LYMPHOCYTES # BLD AUTO: 1.23 10*3/UL (ref 1–5)
LYMPHOCYTES NFR BLD AUTO: 17.5 % (ref 20–45)
MCH RBC QN AUTO: 31.1 PG (ref 27–31)
MCHC RBC AUTO-ENTMCNC: 32 G/DL (ref 33–37)
MCV RBC AUTO: 97.2 FL (ref 81–99)
MONOCYTES # BLD AUTO: 0.81 10*3/UL (ref 0.2–1.2)
MONOCYTES NFR BLD MANUAL: 11.6 % (ref 3–10)
NEUTROPHILS # BLD AUTO: 4.75 10*3/UL (ref 2–8)
NEUTROPHILS NFR BLD MANUAL: 67.8 % (ref 30–85)
OSMOLALITY SERPL CALC.SUM OF ELEC: 296 MOSM/KG (ref 273–304)
PLATELET # BLD AUTO: 217 10*3/UL (ref 130–400)
PMV BLD AUTO: 9.9 FL (ref 7.4–10.4)
POTASSIUM SERPL-SCNC: 4.1 MMOL/L (ref 3.5–5.3)
RBC MORPH BLD: 3.89 10*6/UL (ref 4.2–5.4)
SODIUM SERPL-SCNC: 139 MMOL/L (ref 135–147)
WBC # BLD AUTO: 7.01 10*3/UL (ref 4.8–10.8)

## 2020-12-03 ENCOUNTER — HOSPITAL ENCOUNTER (OUTPATIENT)
Dept: LAB | Facility: HOSPITAL | Age: 85
Discharge: HOME | End: 2020-12-03
Attending: INTERNAL MEDICINE

## 2020-12-03 LAB
ALBUMIN SERPL-MCNC: 4.2 G/DL (ref 3.5–5)
ALBUMIN/GLOB SERPL: 1.2 {RATIO} (ref 1.4–2.6)
ALP SERPL-CCNC: 81 U/L (ref 38–185)
ALT SERPL-CCNC: 18 U/L (ref 10–40)
ANION GAP SERPL CALC-SCNC: 14 MMOL/L (ref 8–19)
AST SERPL-CCNC: 38 U/L (ref 15–50)
BASOPHILS # BLD AUTO: 0.05 10*3/UL (ref 0–0.2)
BASOPHILS NFR BLD AUTO: 0.7 % (ref 0–3)
BILIRUB SERPL-MCNC: 0.63 MG/DL (ref 0.2–1.3)
BUN SERPL-MCNC: 29 MG/DL (ref 5–25)
BUN/CREAT SERPL: 25 {RATIO} (ref 6–20)
CALCIUM SERPL-MCNC: 10.1 MG/DL (ref 8.7–10.4)
CHLORIDE SERPL-SCNC: 103 MMOL/L (ref 99–111)
CHOLEST SERPL-MCNC: 160 MG/DL (ref 107–200)
CHOLEST/HDLC SERPL: 1.9 {RATIO} (ref 3–6)
CONV ABS IMM GRAN: 0.01 10*3/UL (ref 0–0.2)
CONV CO2: 27 MMOL/L (ref 22–32)
CONV IMMATURE GRAN: 0.1 % (ref 0–1.8)
CONV TOTAL PROTEIN: 7.6 G/DL (ref 6.3–8.2)
CREAT UR-MCNC: 1.14 MG/DL (ref 0.5–0.9)
DEPRECATED RDW RBC AUTO: 49.7 FL (ref 36.4–46.3)
EOSINOPHIL # BLD AUTO: 0.12 10*3/UL (ref 0–0.7)
EOSINOPHIL # BLD AUTO: 1.6 % (ref 0–7)
ERYTHROCYTE [DISTWIDTH] IN BLOOD BY AUTOMATED COUNT: 13.9 % (ref 11.7–14.4)
GFR SERPLBLD BASED ON 1.73 SQ M-ARVRAT: 42 ML/MIN/{1.73_M2}
GLOBULIN UR ELPH-MCNC: 3.4 G/DL (ref 2–3.5)
GLUCOSE SERPL-MCNC: 92 MG/DL (ref 65–99)
HCT VFR BLD AUTO: 41.1 % (ref 37–47)
HDLC SERPL-MCNC: 84 MG/DL (ref 40–60)
HGB BLD-MCNC: 12.8 G/DL (ref 12–16)
LDLC SERPL CALC-MCNC: 64 MG/DL (ref 70–100)
LYMPHOCYTES # BLD AUTO: 2.39 10*3/UL (ref 1–5)
LYMPHOCYTES NFR BLD AUTO: 32.6 % (ref 20–45)
MAGNESIUM SERPL-MCNC: 2.14 MG/DL (ref 1.6–2.3)
MCH RBC QN AUTO: 29.9 PG (ref 27–31)
MCHC RBC AUTO-ENTMCNC: 31.1 G/DL (ref 33–37)
MCV RBC AUTO: 96 FL (ref 81–99)
MONOCYTES # BLD AUTO: 0.68 10*3/UL (ref 0.2–1.2)
MONOCYTES NFR BLD AUTO: 9.3 % (ref 3–10)
NEUTROPHILS # BLD AUTO: 4.09 10*3/UL (ref 2–8)
NEUTROPHILS NFR BLD AUTO: 55.7 % (ref 30–85)
NRBC CBCN: 0 % (ref 0–0.7)
OSMOLALITY SERPL CALC.SUM OF ELEC: 295 MOSM/KG (ref 273–304)
PLATELET # BLD AUTO: 250 10*3/UL (ref 130–400)
PMV BLD AUTO: 10.7 FL (ref 9.4–12.3)
POTASSIUM SERPL-SCNC: 4.3 MMOL/L (ref 3.5–5.3)
RBC # BLD AUTO: 4.28 10*6/UL (ref 4.2–5.4)
SODIUM SERPL-SCNC: 140 MMOL/L (ref 135–147)
TRIGL SERPL-MCNC: 59 MG/DL (ref 40–150)
VLDLC SERPL-MCNC: 12 MG/DL (ref 5–37)
WBC # BLD AUTO: 7.34 10*3/UL (ref 4.8–10.8)

## 2020-12-03 PROCEDURE — 82746 ASSAY OF FOLIC ACID SERUM: CPT

## 2020-12-03 PROCEDURE — 82607 VITAMIN B-12: CPT

## 2020-12-04 ENCOUNTER — LAB REQUISITION (OUTPATIENT)
Dept: LAB | Facility: HOSPITAL | Age: 85
End: 2020-12-04

## 2020-12-04 DIAGNOSIS — Z00.00 ROUTINE GENERAL MEDICAL EXAMINATION AT A HEALTH CARE FACILITY: ICD-10-CM

## 2020-12-04 LAB
25(OH)D3 SERPL-MCNC: 47.2 NG/ML (ref 30–100)
FERRITIN SERPL-MCNC: 85 NG/ML (ref 10–200)
FOLATE SERPL-MCNC: >20 NG/ML (ref 4.78–24.2)
IRON SATN MFR SERPL: 20 % (ref 20–55)
IRON SERPL-MCNC: 87 UG/DL (ref 60–170)
T4 FREE SERPL-MCNC: 1 NG/DL (ref 0.9–1.8)
TIBC SERPL-MCNC: 433 UG/DL (ref 245–450)
TRANSFERRIN SERPL-MCNC: 303 MG/DL (ref 250–380)
TSH SERPL-ACNC: 1.63 M[IU]/L (ref 0.27–4.2)
VIT B12 BLD-MCNC: 1504 PG/ML (ref 211–946)

## 2020-12-16 ENCOUNTER — HOSPITAL ENCOUNTER (OUTPATIENT)
Dept: GENERAL RADIOLOGY | Facility: HOSPITAL | Age: 85
Discharge: HOME | End: 2020-12-16
Attending: INTERNAL MEDICINE

## 2020-12-29 ENCOUNTER — OFFICE VISIT CONVERTED (OUTPATIENT)
Dept: ONCOLOGY | Facility: HOSPITAL | Age: 85
End: 2020-12-29
Attending: INTERNAL MEDICINE

## 2020-12-29 ENCOUNTER — HOSPITAL ENCOUNTER (OUTPATIENT)
Dept: OTHER | Facility: HOSPITAL | Age: 85
Discharge: HOME | End: 2020-12-29
Attending: INTERNAL MEDICINE

## 2020-12-31 LAB
ALBUMIN SERPL-MCNC: 3.8 G/DL (ref 3.5–5)
ALBUMIN/GLOB SERPL: 1.2 {RATIO} (ref 1.4–2.6)
ALP SERPL-CCNC: 69 U/L (ref 38–185)
ALT SERPL-CCNC: 18 U/L (ref 10–40)
ANION GAP SERPL CALC-SCNC: 9 MMOL/L (ref 8–19)
AST SERPL-CCNC: 28 U/L (ref 15–50)
BASOPHILS # BLD AUTO: 0.02 10*3/UL (ref 0–0.2)
BASOPHILS NFR BLD AUTO: 0.3 % (ref 0–3)
BILIRUB SERPL-MCNC: 0.6 MG/DL (ref 0.2–1.3)
BUN SERPL-MCNC: 29 MG/DL (ref 5–25)
BUN/CREAT SERPL: 31 {RATIO} (ref 6–20)
CALCIUM SERPL-MCNC: 9.8 MG/DL (ref 8.7–10.4)
CHLORIDE SERPL-SCNC: 101 MMOL/L (ref 99–111)
CONV ABS IMM GRAN: 0.01 10*3/UL (ref 0–0.54)
CONV CO2: 32 MMOL/L (ref 22–32)
CONV EOSINOPHILS PERCENT BY MANUAL COUNT: 2.2 % (ref 0–7)
CONV IMMATURE GRAN: 0.1 % (ref 0–0.4)
CONV TOTAL PROTEIN: 6.9 G/DL (ref 6.3–8.2)
CREAT UR-MCNC: 0.93 MG/DL (ref 0.5–0.9)
EOSINOPHIL # BLD MANUAL: 0.16 10*3/UL (ref 0–0.7)
ERYTHROCYTE [DISTWIDTH] IN BLOOD BY AUTOMATED COUNT: 13.7 % (ref 11.5–14.5)
ERYTHROCYTE [DISTWIDTH] IN BLOOD BY AUTOMATED COUNT: 48.7 FL
GFR SERPLBLD BASED ON 1.73 SQ M-ARVRAT: 53 ML/MIN/{1.73_M2}
GLOBULIN UR ELPH-MCNC: 3.1 G/DL (ref 2–3.5)
GLUCOSE SERPL-MCNC: 108 MG/DL (ref 65–99)
HBA1C MFR BLD: 12.3 G/DL (ref 12–16)
HCT VFR BLD AUTO: 39.1 % (ref 37–47)
LYMPHOCYTES # BLD AUTO: 1.76 10*3/UL (ref 1–5)
LYMPHOCYTES NFR BLD AUTO: 24.1 % (ref 20–45)
MCH RBC QN AUTO: 30.4 PG (ref 27–31)
MCHC RBC AUTO-ENTMCNC: 31.5 G/DL (ref 33–37)
MCV RBC AUTO: 96.5 FL (ref 81–99)
MONOCYTES # BLD AUTO: 0.72 10*3/UL (ref 0.2–1.2)
MONOCYTES NFR BLD MANUAL: 9.9 % (ref 3–10)
NEUTROPHILS # BLD AUTO: 4.62 10*3/UL (ref 2–8)
NEUTROPHILS NFR BLD MANUAL: 63.4 % (ref 30–85)
OSMOLALITY SERPL CALC.SUM OF ELEC: 292 MOSM/KG (ref 273–304)
PLATELET # BLD AUTO: 234 10*3/UL (ref 130–400)
PMV BLD AUTO: 9.5 FL (ref 7.4–10.4)
POTASSIUM SERPL-SCNC: 3.8 MMOL/L (ref 3.5–5.3)
RBC MORPH BLD: 4.05 10*6/UL (ref 4.2–5.4)
SODIUM SERPL-SCNC: 138 MMOL/L (ref 135–147)
WBC # BLD AUTO: 7.29 10*3/UL (ref 4.8–10.8)

## 2021-02-22 ENCOUNTER — OFFICE VISIT CONVERTED (OUTPATIENT)
Dept: ONCOLOGY | Facility: HOSPITAL | Age: 86
End: 2021-02-22
Attending: INTERNAL MEDICINE

## 2021-02-22 ENCOUNTER — HOSPITAL ENCOUNTER (OUTPATIENT)
Dept: OTHER | Facility: HOSPITAL | Age: 86
Discharge: HOME | End: 2021-02-22
Attending: INTERNAL MEDICINE

## 2021-02-22 LAB
ALBUMIN SERPL-MCNC: 4 G/DL (ref 3.5–5)
ALBUMIN/GLOB SERPL: 1.2 {RATIO} (ref 1.4–2.6)
ALP SERPL-CCNC: 82 U/L (ref 38–185)
ALT SERPL-CCNC: 22 U/L (ref 10–40)
ANION GAP SERPL CALC-SCNC: 14 MMOL/L (ref 8–19)
AST SERPL-CCNC: 39 U/L (ref 15–50)
BASOPHILS # BLD AUTO: 0.04 10*3/UL (ref 0–0.2)
BASOPHILS NFR BLD AUTO: 0.5 % (ref 0–3)
BILIRUB SERPL-MCNC: 0.6 MG/DL (ref 0.2–1.3)
BUN SERPL-MCNC: 32 MG/DL (ref 5–25)
BUN/CREAT SERPL: 27 {RATIO} (ref 6–20)
CALCIUM SERPL-MCNC: 9.4 MG/DL (ref 8.7–10.4)
CHLORIDE SERPL-SCNC: 100 MMOL/L (ref 99–111)
CONV ABS IMM GRAN: 0.01 10*3/UL (ref 0–0.2)
CONV CO2: 31 MMOL/L (ref 22–32)
CONV IMMATURE GRAN: 0.1 % (ref 0–1.8)
CONV TOTAL PROTEIN: 7.3 G/DL (ref 6.3–8.2)
CREAT UR-MCNC: 1.18 MG/DL (ref 0.5–0.9)
DEPRECATED RDW RBC AUTO: 49 FL (ref 36.4–46.3)
EOSINOPHIL # BLD AUTO: 0.16 10*3/UL (ref 0–0.7)
EOSINOPHIL # BLD AUTO: 2 % (ref 0–7)
ERYTHROCYTE [DISTWIDTH] IN BLOOD BY AUTOMATED COUNT: 13.8 % (ref 11.7–14.4)
GFR SERPLBLD BASED ON 1.73 SQ M-ARVRAT: 40 ML/MIN/{1.73_M2}
GLOBULIN UR ELPH-MCNC: 3.3 G/DL (ref 2–3.5)
GLUCOSE SERPL-MCNC: 131 MG/DL (ref 65–99)
HCT VFR BLD AUTO: 37.9 % (ref 37–47)
HGB BLD-MCNC: 12.2 G/DL (ref 12–16)
LYMPHOCYTES # BLD AUTO: 2.34 10*3/UL (ref 1–5)
LYMPHOCYTES NFR BLD AUTO: 28.8 % (ref 20–45)
MCH RBC QN AUTO: 30.9 PG (ref 27–31)
MCHC RBC AUTO-ENTMCNC: 32.2 G/DL (ref 33–37)
MCV RBC AUTO: 95.9 FL (ref 81–99)
MONOCYTES # BLD AUTO: 1.05 10*3/UL (ref 0.2–1.2)
MONOCYTES NFR BLD AUTO: 12.9 % (ref 3–10)
NEUTROPHILS # BLD AUTO: 4.53 10*3/UL (ref 2–8)
NEUTROPHILS NFR BLD AUTO: 55.7 % (ref 30–85)
NRBC CBCN: 0 % (ref 0–0.7)
OSMOLALITY SERPL CALC.SUM OF ELEC: 301 MOSM/KG (ref 273–304)
PLATELET # BLD AUTO: 213 10*3/UL (ref 130–400)
PMV BLD AUTO: 10.4 FL (ref 9.4–12.3)
POTASSIUM SERPL-SCNC: 4.1 MMOL/L (ref 3.5–5.3)
RBC # BLD AUTO: 3.95 10*6/UL (ref 4.2–5.4)
SODIUM SERPL-SCNC: 141 MMOL/L (ref 135–147)
WBC # BLD AUTO: 8.13 10*3/UL (ref 4.8–10.8)

## 2021-02-25 ENCOUNTER — HOSPITAL ENCOUNTER (OUTPATIENT)
Dept: OTHER | Facility: HOSPITAL | Age: 86
Setting detail: RECURRING SERIES
Discharge: HOME | End: 2021-02-25
Attending: INTERNAL MEDICINE

## 2021-03-03 ENCOUNTER — HOSPITAL ENCOUNTER (OUTPATIENT)
Dept: LAB | Facility: HOSPITAL | Age: 86
Discharge: HOME | End: 2021-03-03
Attending: INTERNAL MEDICINE

## 2021-03-03 LAB
ALBUMIN SERPL-MCNC: 4.3 G/DL (ref 3.5–5)
ALBUMIN/GLOB SERPL: 1.5 {RATIO} (ref 1.4–2.6)
ALP SERPL-CCNC: 72 U/L (ref 38–185)
ALT SERPL-CCNC: 23 U/L (ref 10–40)
ANION GAP SERPL CALC-SCNC: 16 MMOL/L (ref 8–19)
APPEARANCE UR: ABNORMAL
AST SERPL-CCNC: 38 U/L (ref 15–50)
BASOPHILS # BLD AUTO: 0.04 10*3/UL (ref 0–0.2)
BASOPHILS NFR BLD AUTO: 0.5 % (ref 0–3)
BILIRUB SERPL-MCNC: 0.8 MG/DL (ref 0.2–1.3)
BILIRUB UR QL: NEGATIVE
BUN SERPL-MCNC: 25 MG/DL (ref 5–25)
BUN/CREAT SERPL: 24 {RATIO} (ref 6–20)
CALCIUM SERPL-MCNC: 10 MG/DL (ref 8.7–10.4)
CHLORIDE SERPL-SCNC: 102 MMOL/L (ref 99–111)
COLOR UR: YELLOW
CONV ABS IMM GRAN: 0.02 10*3/UL (ref 0–0.2)
CONV BACTERIA: ABNORMAL
CONV CO2: 27 MMOL/L (ref 22–32)
CONV COLLECTION SOURCE (UA): ABNORMAL
CONV IMMATURE GRAN: 0.2 % (ref 0–1.8)
CONV TOTAL PROTEIN: 7.1 G/DL (ref 6.3–8.2)
CONV UROBILINOGEN IN URINE BY AUTOMATED TEST STRIP: 0.2 {EHRLICHU}/DL (ref 0.1–1)
CREAT UR-MCNC: 1.04 MG/DL (ref 0.5–0.9)
DEPRECATED RDW RBC AUTO: 48.3 FL (ref 36.4–46.3)
EOSINOPHIL # BLD AUTO: 0.08 10*3/UL (ref 0–0.7)
EOSINOPHIL # BLD AUTO: 1 % (ref 0–7)
ERYTHROCYTE [DISTWIDTH] IN BLOOD BY AUTOMATED COUNT: 13.8 % (ref 11.7–14.4)
FOLATE SERPL-MCNC: >20 NG/ML (ref 4.8–20)
GFR SERPLBLD BASED ON 1.73 SQ M-ARVRAT: 46 ML/MIN/{1.73_M2}
GLOBULIN UR ELPH-MCNC: 2.8 G/DL (ref 2–3.5)
GLUCOSE SERPL-MCNC: 86 MG/DL (ref 65–99)
GLUCOSE UR QL: NEGATIVE MG/DL
HCT VFR BLD AUTO: 40.4 % (ref 37–47)
HGB BLD-MCNC: 13 G/DL (ref 12–16)
HGB UR QL STRIP: ABNORMAL
KETONES UR QL STRIP: NEGATIVE MG/DL
LEUKOCYTE ESTERASE UR QL STRIP: ABNORMAL
LYMPHOCYTES # BLD AUTO: 2.45 10*3/UL (ref 1–5)
LYMPHOCYTES NFR BLD AUTO: 29.9 % (ref 20–45)
MAGNESIUM SERPL-MCNC: 2.17 MG/DL (ref 1.6–2.3)
MCH RBC QN AUTO: 30.4 PG (ref 27–31)
MCHC RBC AUTO-ENTMCNC: 32.2 G/DL (ref 33–37)
MCV RBC AUTO: 94.6 FL (ref 81–99)
MONOCYTES # BLD AUTO: 0.66 10*3/UL (ref 0.2–1.2)
MONOCYTES NFR BLD AUTO: 8 % (ref 3–10)
NEUTROPHILS # BLD AUTO: 4.95 10*3/UL (ref 2–8)
NEUTROPHILS NFR BLD AUTO: 60.4 % (ref 30–85)
NITRITE UR QL STRIP: NEGATIVE
NRBC CBCN: 0 % (ref 0–0.7)
OSMOLALITY SERPL CALC.SUM OF ELEC: 296 MOSM/KG (ref 273–304)
PH UR STRIP.AUTO: 7 [PH] (ref 5–8)
PLATELET # BLD AUTO: 218 10*3/UL (ref 130–400)
PMV BLD AUTO: 10.7 FL (ref 9.4–12.3)
POTASSIUM SERPL-SCNC: 4 MMOL/L (ref 3.5–5.3)
PROT UR QL: 30 MG/DL
RBC # BLD AUTO: 4.27 10*6/UL (ref 4.2–5.4)
RBC #/AREA URNS HPF: ABNORMAL /[HPF]
SODIUM SERPL-SCNC: 141 MMOL/L (ref 135–147)
SP GR UR: 1.02 (ref 1–1.03)
T4 FREE SERPL-MCNC: 1.1 NG/DL (ref 0.9–1.8)
TSH SERPL-ACNC: 1.35 M[IU]/L (ref 0.27–4.2)
VIT B12 SERPL-MCNC: 1758 PG/ML (ref 211–911)
WBC # BLD AUTO: 8.2 10*3/UL (ref 4.8–10.8)
WBC #/AREA URNS HPF: ABNORMAL /[HPF]

## 2021-03-04 LAB
25(OH)D3 SERPL-MCNC: 38.3 NG/ML (ref 30–100)
IRON SATN MFR SERPL: 19 % (ref 20–55)
IRON SERPL-MCNC: 80 UG/DL (ref 60–170)
TIBC SERPL-MCNC: 425 UG/DL (ref 245–450)
TRANSFERRIN SERPL-MCNC: 297 MG/DL (ref 250–380)

## 2021-03-05 ENCOUNTER — HOSPITAL ENCOUNTER (OUTPATIENT)
Dept: PREADMISSION TESTING | Facility: HOSPITAL | Age: 86
Discharge: HOME | End: 2021-03-05
Attending: UROLOGY

## 2021-03-06 LAB — SARS-COV-2 RNA SPEC QL NAA+PROBE: NOT DETECTED

## 2021-03-11 ENCOUNTER — HOSPITAL ENCOUNTER (OUTPATIENT)
Dept: PERIOP | Facility: HOSPITAL | Age: 86
Setting detail: HOSPITAL OUTPATIENT SURGERY
Discharge: HOME | End: 2021-03-11
Attending: UROLOGY

## 2021-03-11 LAB
GLUCOSE BLD-MCNC: 111 MG/DL (ref 65–99)
GLUCOSE BLD-MCNC: 99 MG/DL (ref 65–99)

## 2021-03-17 ENCOUNTER — TELEPHONE CONVERTED (OUTPATIENT)
Dept: UROLOGY | Facility: CLINIC | Age: 86
End: 2021-03-17
Attending: UROLOGY

## 2021-03-23 ENCOUNTER — HOSPITAL ENCOUNTER (OUTPATIENT)
Dept: VACCINE CLINIC | Facility: HOSPITAL | Age: 86
Discharge: HOME | End: 2021-03-23
Attending: INTERNAL MEDICINE

## 2021-04-13 ENCOUNTER — HOSPITAL ENCOUNTER (OUTPATIENT)
Dept: VACCINE CLINIC | Facility: HOSPITAL | Age: 86
Discharge: HOME | End: 2021-04-13
Attending: INTERNAL MEDICINE

## 2021-04-22 ENCOUNTER — HOSPITAL ENCOUNTER (OUTPATIENT)
Dept: OTHER | Facility: HOSPITAL | Age: 86
Setting detail: RECURRING SERIES
Discharge: HOME | End: 2021-04-22
Attending: INTERNAL MEDICINE

## 2021-04-22 ENCOUNTER — CONVERSION ENCOUNTER (OUTPATIENT)
Dept: ONCOLOGY | Facility: HOSPITAL | Age: 86
End: 2021-04-22

## 2021-04-22 LAB
ALBUMIN SERPL-MCNC: 4 G/DL (ref 3.5–5)
ALBUMIN/GLOB SERPL: 1.3 {RATIO} (ref 1.4–2.6)
ALP SERPL-CCNC: 72 U/L (ref 38–185)
ALT SERPL-CCNC: 16 U/L (ref 10–40)
ANION GAP SERPL CALC-SCNC: 9 MMOL/L (ref 8–19)
AST SERPL-CCNC: 28 U/L (ref 15–50)
BASOPHILS # BLD AUTO: 0.02 10*3/UL (ref 0–0.2)
BASOPHILS NFR BLD AUTO: 0.3 % (ref 0–3)
BILIRUB SERPL-MCNC: 0.74 MG/DL (ref 0.2–1.3)
BUN SERPL-MCNC: 34 MG/DL (ref 5–25)
BUN/CREAT SERPL: 35 {RATIO} (ref 6–20)
CALCIUM SERPL-MCNC: 9.6 MG/DL (ref 8.7–10.4)
CHLORIDE SERPL-SCNC: 103 MMOL/L (ref 99–111)
CONV ABS IMM GRAN: 0 10*3/UL (ref 0–0.54)
CONV CO2: 31 MMOL/L (ref 22–32)
CONV EOSINOPHILS PERCENT BY MANUAL COUNT: 3 % (ref 0–7)
CONV IMMATURE GRAN: 0 % (ref 0–0.4)
CONV TOTAL PROTEIN: 7 G/DL (ref 6.3–8.2)
CREAT UR-MCNC: 0.96 MG/DL (ref 0.5–0.9)
EOSINOPHIL # BLD MANUAL: 0.18 10*3/UL (ref 0–0.7)
ERYTHROCYTE [DISTWIDTH] IN BLOOD BY AUTOMATED COUNT: 13.7 % (ref 11.5–14.5)
ERYTHROCYTE [DISTWIDTH] IN BLOOD BY AUTOMATED COUNT: 48.4 FL
GFR SERPLBLD BASED ON 1.73 SQ M-ARVRAT: 51 ML/MIN/{1.73_M2}
GLOBULIN UR ELPH-MCNC: 3 G/DL (ref 2–3.5)
GLUCOSE SERPL-MCNC: 98 MG/DL (ref 65–99)
HBA1C MFR BLD: 12.6 G/DL (ref 12–16)
HCT VFR BLD AUTO: 39.2 % (ref 37–47)
LYMPHOCYTES # BLD AUTO: 1.97 10*3/UL (ref 1–5)
LYMPHOCYTES NFR BLD AUTO: 32.4 % (ref 20–45)
MCH RBC QN AUTO: 30.7 PG (ref 27–31)
MCHC RBC AUTO-ENTMCNC: 32.1 G/DL (ref 33–37)
MCV RBC AUTO: 95.6 FL (ref 81–99)
MONOCYTES # BLD AUTO: 0.67 10*3/UL (ref 0.2–1.2)
MONOCYTES NFR BLD MANUAL: 11 % (ref 3–10)
NEUTROPHILS # BLD AUTO: 3.24 10*3/UL (ref 2–8)
NEUTROPHILS NFR BLD MANUAL: 53.3 % (ref 30–85)
OSMOLALITY SERPL CALC.SUM OF ELEC: 296 MOSM/KG (ref 273–304)
PLATELET # BLD AUTO: 186 10*3/UL (ref 130–400)
PMV BLD AUTO: 9.7 FL (ref 7.4–10.4)
POTASSIUM SERPL-SCNC: 4.2 MMOL/L (ref 3.5–5.3)
RBC MORPH BLD: 4.1 10*6/UL (ref 4.2–5.4)
SODIUM SERPL-SCNC: 139 MMOL/L (ref 135–147)
WBC # BLD AUTO: 6.08 10*3/UL (ref 4.8–10.8)

## 2021-05-05 ENCOUNTER — HOSPITAL ENCOUNTER (OUTPATIENT)
Dept: PET IMAGING | Facility: HOSPITAL | Age: 86
Discharge: HOME | End: 2021-05-05
Attending: INTERNAL MEDICINE

## 2021-05-10 NOTE — PROCEDURES
Procedure Note      Patient Name: Radha Aparicio   Patient ID: [id number]   Sex: Female   YOB: 1928    Primary Care Provider: Gala Rao MD   Referring Provider: Gala Rao MD    Visit Date: July 23, 2020    Provider: Dariana Grubbs MD   Location[facility]   Location Address: [address] [place] [address]  [id number]   Location Phone: [phone]          The patient presents for follow-up of superficial bladder cancer.   The patient was last seen six months ago . The patient is scheduled for repeat cysto and u/a . Cystoscopy on the last visit showed normal findings.   Pathology:  The original tumor pathology was papillary transitional cell carcinoma, Grade II/III, Stage 0 is: Tis N0 M0 diagnosed in Feb 2017. 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   Cystoscopy Procedure:  PROCEDURE: Flexible cystoscope was passed per urethra into the bladder without difficulty after proper consent. The bladder was inspected in a systematic meridian fashion. She does have a large wide mouthed bladder diverticulum. Of note, there was no increased vascularity as well. Both ureteral orifices were identified and were normal in appearance. The flexible cystoscope was removed. The patient tolerated the procedure well.           Assessment  · Cancer of overlapping sites of bladder     188.8/C67.8  · History of bladder cancer     V10.51/Z85.51      Plan  · Orders  o Cystoscopy (43729) - 188.8/C67.8, V10.51/Z85.51 - 07/23/2020  · Medications  o Medications have been Reconciled  o Transition of Care or Provider Policy  · Instructions  o Make a 6 month follow-up for an in cystoscopy and bilateral retrogrades for continued screening.  o TIME OUT PROCEDURE: Correct patient and birth date; Correct procedure; Correct Physician; Consent signed            Electronically Signed by: Dariana Grubbs MD -Author on July 23, 2020  05:23:21 PM

## 2021-05-13 NOTE — PROGRESS NOTES
Progress Note      Patient Name: Radha Aparicio   Patient ID: [id number]   Sex: Female   YOB: 1928    Primary Care Provider: Gala Rao MD   Referring Provider: Gala Rao MD    Visit Date: July 23, 2020    Provider: Paulie Harden MD   Location: [facility]   Location Address: [address] [place] [address] A   [place], [place]  [id number]   Location Phone: [phone]          Chief Complaint     Atrial fibrillation.       History Of Present Illness  REFERRING CARE PROVIDER: Gala Rao MD   Radha Aparicio is a 89 year old /White female with a history of chronic atrial fibrillation, diabetes, hypertension, dyslipidemia, and severe aortic stenosis who has had some chronic lower extremity edema issues and stable shortness of breath. She does not report any anginal symptoms and no syncope. The patient has not had any limiting issues. She does report some issues with her memory, though, at times and becoming more forgetful.   PAST MEDICAL HISTORY: Aortic stenosis; Chronic atrial fibrillation; Diabetes mellitus; Hyperlipidemia; Hypertension.   FAMILY HISTORY: Positive for diabetes mellitus and hypertension. Negative for heart disease.   PSYCHOSOCIAL HISTORY: Denies alcohol or tobacco use.   CURRENT MEDICATIONS: Pradaxa 75 mg b.i.d.; atorvastatin 40 mg daily; carvedilol 25 mg b.i.d.; calcium 600 mg b.i.d.; aspirin 81 mg daily; spironolactone 25 mg 1/2 daily; raloxifene 60 mg daily; poly iron 150 mg daily; losartan 25 mg daily; furosemide 40 mg 4x a week and 60 mg 3x a week alternating; montelukast 10 mg daily; vitamin D 50,000 units every other week.       Review of Systems  · Cardiovascular  o Admits  o : swelling (feet, ankles, hands), shortness of breath while walking or lying flat, chest pain or angina pectoris   o Denies  o : palpitations (fast, fluttering, or skipping beats)  · Respiratory  o Admits  o : asthma or wheezing  o Denies  o : chronic or frequent  "cough      Vitals  Date Time BP Position Site L\R Cuff Size HR RR TEMP (F) WT  HT  BMI kg/m2 BSA m2 O2 Sat HC       07/23/2020 02:21 /48 Sitting    52 - R   149lbs 0oz 5'  4\" 25.58 1.75     07/23/2020 04:04 /46 Sitting       148lbs 8oz 5'  4\" 25.49 1.74           Physical Examination  · Constitutional  o Appearance  o : Awake, alert, in no acute distress.   · Eyes  o Conjunctivae  o : Normal.  · Ears, Nose, Mouth and Throat  o Oral Cavity  o :   § Oral Mucosa  § : Normal.  · Neck  o Inspection/Palpation  o : No JVD. Good carotid upstroke. No thyromegaly.  · Respiratory  o Respiratory  o : Good respiratory effort. Clear to percussion and auscultation.  · Cardiovascular  o Heart  o :   § Auscultation of Heart  § : Irregularly irregular with a late-peaking systolic murmur.  o Peripheral Vascular System  o :   § Extremities  § : +1 lower extremity edema.  · Gastrointestinal  o Abdominal Examination  o : Soft. No tenderness or masses felt. No hepatosplenomegaly. Abdominal aorta is not palpable.          Assessment     ASSESSMENT & PLAN:    1.  Aortic stenosis, severe.  Discussed with her again the possibility of percutaneous valve replacement.  The        patient feels that she, symptom-wise, is doing fine and given her age, is not interested in pursuing, just        prefers conservative medical management.    2.  Atrial fibrillation, chronic, rate controlled.  Continue with Pradaxa 75 mg b.i.d., renally-adjusted.  3.  Hypertension.                   Electronically Signed by: Payton Hernandez-, Other -Author on July 28, 2020 06:22:37 AM  Electronically Co-signed by: Paulie Harden MD -Reviewer on August 10, 2020 02:20:38 PM  "

## 2021-05-15 VITALS
DIASTOLIC BLOOD PRESSURE: 48 MMHG | BODY MASS INDEX: 25.44 KG/M2 | WEIGHT: 149 LBS | HEIGHT: 64 IN | SYSTOLIC BLOOD PRESSURE: 132 MMHG | HEART RATE: 52 BPM

## 2021-05-15 VITALS
HEIGHT: 64 IN | HEART RATE: 66 BPM | SYSTOLIC BLOOD PRESSURE: 122 MMHG | WEIGHT: 146 LBS | DIASTOLIC BLOOD PRESSURE: 76 MMHG | BODY MASS INDEX: 24.92 KG/M2

## 2021-05-15 VITALS
SYSTOLIC BLOOD PRESSURE: 118 MMHG | HEIGHT: 64 IN | DIASTOLIC BLOOD PRESSURE: 46 MMHG | WEIGHT: 148.5 LBS | BODY MASS INDEX: 25.35 KG/M2

## 2021-05-15 VITALS
BODY MASS INDEX: 24.75 KG/M2 | SYSTOLIC BLOOD PRESSURE: 115 MMHG | DIASTOLIC BLOOD PRESSURE: 66 MMHG | WEIGHT: 145 LBS | HEIGHT: 64 IN

## 2021-05-15 VITALS
BODY MASS INDEX: 25.1 KG/M2 | WEIGHT: 147 LBS | SYSTOLIC BLOOD PRESSURE: 124 MMHG | HEART RATE: 70 BPM | DIASTOLIC BLOOD PRESSURE: 70 MMHG | HEIGHT: 64 IN

## 2021-05-16 VITALS
BODY MASS INDEX: 25.95 KG/M2 | HEIGHT: 64 IN | WEIGHT: 152 LBS | SYSTOLIC BLOOD PRESSURE: 120 MMHG | HEART RATE: 70 BPM | DIASTOLIC BLOOD PRESSURE: 66 MMHG

## 2021-05-16 VITALS — HEIGHT: 64 IN | WEIGHT: 147 LBS | BODY MASS INDEX: 25.1 KG/M2 | OXYGEN SATURATION: 98 % | HEART RATE: 77 BPM

## 2021-05-16 VITALS
BODY MASS INDEX: 25.95 KG/M2 | SYSTOLIC BLOOD PRESSURE: 108 MMHG | HEART RATE: 52 BPM | WEIGHT: 152 LBS | HEIGHT: 64 IN | DIASTOLIC BLOOD PRESSURE: 50 MMHG

## 2021-05-16 VITALS — RESPIRATION RATE: 12 BRPM | HEIGHT: 64 IN | BODY MASS INDEX: 25.78 KG/M2 | WEIGHT: 151 LBS

## 2021-05-19 VITALS — HEIGHT: 62 IN | BODY MASS INDEX: 27.3 KG/M2 | WEIGHT: 148.37 LBS

## 2021-05-19 PROBLEM — C82.00 FOLLICULAR LYMPHOMA GRADE I: Status: ACTIVE | Noted: 2021-05-19

## 2021-05-22 ENCOUNTER — TRANSCRIBE ORDERS (OUTPATIENT)
Dept: ADMINISTRATIVE | Facility: HOSPITAL | Age: 86
End: 2021-05-22

## 2021-05-22 DIAGNOSIS — Z78.0 POST-MENOPAUSAL: Primary | ICD-10-CM

## 2021-05-22 DIAGNOSIS — Z12.31 VISIT FOR SCREENING MAMMOGRAM: Primary | ICD-10-CM

## 2021-05-28 VITALS
OXYGEN SATURATION: 90 % | HEART RATE: 60 BPM | BODY MASS INDEX: 24.89 KG/M2 | WEIGHT: 145.8 LBS | RESPIRATION RATE: 24 BRPM | DIASTOLIC BLOOD PRESSURE: 48 MMHG | HEIGHT: 62 IN | RESPIRATION RATE: 18 BRPM | RESPIRATION RATE: 18 BRPM | BODY MASS INDEX: 26.57 KG/M2 | HEART RATE: 73 BPM | WEIGHT: 149 LBS | DIASTOLIC BLOOD PRESSURE: 76 MMHG | BODY MASS INDEX: 24.6 KG/M2 | HEART RATE: 52 BPM | TEMPERATURE: 97.8 F | HEART RATE: 52 BPM | OXYGEN SATURATION: 96 % | TEMPERATURE: 97.8 F | DIASTOLIC BLOOD PRESSURE: 44 MMHG | SYSTOLIC BLOOD PRESSURE: 117 MMHG | HEART RATE: 71 BPM | HEIGHT: 62 IN | OXYGEN SATURATION: 97 % | HEIGHT: 64 IN | TEMPERATURE: 97.1 F | BODY MASS INDEX: 27.31 KG/M2 | WEIGHT: 152.2 LBS | WEIGHT: 145.8 LBS | DIASTOLIC BLOOD PRESSURE: 49 MMHG | WEIGHT: 151.6 LBS | RESPIRATION RATE: 12 BRPM | SYSTOLIC BLOOD PRESSURE: 116 MMHG | OXYGEN SATURATION: 96 % | HEIGHT: 64 IN | SYSTOLIC BLOOD PRESSURE: 122 MMHG | WEIGHT: 145.8 LBS | TEMPERATURE: 97.8 F | HEIGHT: 62 IN | HEART RATE: 42 BPM | DIASTOLIC BLOOD PRESSURE: 48 MMHG | BODY MASS INDEX: 26.83 KG/M2 | BODY MASS INDEX: 26.83 KG/M2 | RESPIRATION RATE: 18 BRPM | SYSTOLIC BLOOD PRESSURE: 126 MMHG | BODY MASS INDEX: 27.34 KG/M2 | SYSTOLIC BLOOD PRESSURE: 118 MMHG | RESPIRATION RATE: 15 BRPM | RESPIRATION RATE: 16 BRPM | HEIGHT: 62 IN | TEMPERATURE: 97.9 F | WEIGHT: 145.8 LBS | BODY MASS INDEX: 27.42 KG/M2 | DIASTOLIC BLOOD PRESSURE: 53 MMHG | WEIGHT: 145.6 LBS | SYSTOLIC BLOOD PRESSURE: 123 MMHG | SYSTOLIC BLOOD PRESSURE: 119 MMHG | OXYGEN SATURATION: 97 % | OXYGEN SATURATION: 93 % | BODY MASS INDEX: 27.9 KG/M2 | OXYGEN SATURATION: 95 % | OXYGEN SATURATION: 95 % | BODY MASS INDEX: 27.53 KG/M2 | OXYGEN SATURATION: 93 % | WEIGHT: 144.12 LBS | DIASTOLIC BLOOD PRESSURE: 48 MMHG | TEMPERATURE: 98 F | DIASTOLIC BLOOD PRESSURE: 50 MMHG | SYSTOLIC BLOOD PRESSURE: 120 MMHG | HEART RATE: 59 BPM | WEIGHT: 148.4 LBS | HEART RATE: 57 BPM | HEIGHT: 62 IN | OXYGEN SATURATION: 98 % | BODY MASS INDEX: 28.01 KG/M2 | BODY MASS INDEX: 25.27 KG/M2 | TEMPERATURE: 97.7 F | SYSTOLIC BLOOD PRESSURE: 125 MMHG | TEMPERATURE: 97.8 F | TEMPERATURE: 98.2 F | HEART RATE: 62 BPM | BODY MASS INDEX: 26.79 KG/M2 | HEIGHT: 62 IN | DIASTOLIC BLOOD PRESSURE: 47 MMHG | DIASTOLIC BLOOD PRESSURE: 65 MMHG | RESPIRATION RATE: 18 BRPM | HEIGHT: 62 IN | HEIGHT: 64 IN | HEART RATE: 61 BPM | SYSTOLIC BLOOD PRESSURE: 126 MMHG | HEART RATE: 62 BPM | HEART RATE: 48 BPM | WEIGHT: 148.6 LBS | OXYGEN SATURATION: 96 % | OXYGEN SATURATION: 97 % | HEIGHT: 62 IN | DIASTOLIC BLOOD PRESSURE: 52 MMHG | TEMPERATURE: 98.1 F | OXYGEN SATURATION: 95 % | SYSTOLIC BLOOD PRESSURE: 112 MMHG | WEIGHT: 144.4 LBS | BODY MASS INDEX: 26.83 KG/M2 | SYSTOLIC BLOOD PRESSURE: 129 MMHG | HEIGHT: 62 IN | TEMPERATURE: 98.2 F | HEIGHT: 62 IN | WEIGHT: 149.6 LBS | DIASTOLIC BLOOD PRESSURE: 60 MMHG | SYSTOLIC BLOOD PRESSURE: 117 MMHG | DIASTOLIC BLOOD PRESSURE: 51 MMHG | SYSTOLIC BLOOD PRESSURE: 116 MMHG | DIASTOLIC BLOOD PRESSURE: 85 MMHG | WEIGHT: 148 LBS | TEMPERATURE: 98 F | TEMPERATURE: 98.2 F | HEIGHT: 62 IN | HEART RATE: 60 BPM | HEART RATE: 64 BPM | TEMPERATURE: 98.3 F

## 2021-05-28 VITALS
TEMPERATURE: 97 F | HEIGHT: 63 IN | SYSTOLIC BLOOD PRESSURE: 137 MMHG | DIASTOLIC BLOOD PRESSURE: 53 MMHG | TEMPERATURE: 97.2 F | WEIGHT: 151.24 LBS | WEIGHT: 153.22 LBS | DIASTOLIC BLOOD PRESSURE: 44 MMHG | SYSTOLIC BLOOD PRESSURE: 134 MMHG | OXYGEN SATURATION: 96 % | BODY MASS INDEX: 26.64 KG/M2 | RESPIRATION RATE: 20 BRPM | BODY MASS INDEX: 27.49 KG/M2 | WEIGHT: 150.35 LBS | TEMPERATURE: 98.6 F | OXYGEN SATURATION: 98 % | HEART RATE: 67 BPM | DIASTOLIC BLOOD PRESSURE: 94 MMHG | BODY MASS INDEX: 26.62 KG/M2 | RESPIRATION RATE: 18 BRPM | DIASTOLIC BLOOD PRESSURE: 40 MMHG | WEIGHT: 148.37 LBS | SYSTOLIC BLOOD PRESSURE: 132 MMHG | OXYGEN SATURATION: 97 % | RESPIRATION RATE: 20 BRPM | RESPIRATION RATE: 20 BRPM | HEART RATE: 77 BPM | SYSTOLIC BLOOD PRESSURE: 133 MMHG | TEMPERATURE: 98.3 F | BODY MASS INDEX: 27.14 KG/M2 | OXYGEN SATURATION: 96 % | HEART RATE: 92 BPM | HEART RATE: 64 BPM

## 2021-05-28 NOTE — PROGRESS NOTES
Patient: QUEENIE MURRIETA     Acct: [id number]     Report: #[id number]  UNIT #: [id number]     : 1928    Encounter Date:2020  PRIMARY CARE: Gala Rao  ***Signed***  --------------------------------------------------------------------------------------------------------------------  Progress Note      DATE: 20      Primary Care Provider:  Gala Rao      Referring Provider:  Gala Rao      Chief Complaint      FU NHL, RIC            Subjective      89-year-old white female with history of follicular lymphoma diagnosed in 2019.     She had been complaining of abdominal pain and a CAT scan of the abdomen and     pelvis that was done in 2019 showed retroperitoneal adenopathy     concerning for lymphoma or metastatic disease.  She also has new small to     moderate right and small left pleural effusions with partial bilateral lower     lobe atelectasis.  A retroperitoneal core biopsy was obtained which showed     follicular lymphoma.,  FLIPI 2, stage III.  Patient was observed and on     2019 patient had a repeat PET CT scan that showed interval progression    of disease with increasing size in the lymph nodes in the left lower neck/left     axilla and periaortic spaces.  The left periaortic lymph node mass is     compressing not only the left renal vein but resulting in mild left     hydronephrosis.  Ptient received  Rituxan monotherapy x4 weeks last October.      Repeat PET CT scan showed improvement from previous study the areas of     hypermetabolic lymphadenopathy supraclavicular on the left and left paraortic h    ave diminished both in size and degree of metabolic activity.            Patient did not offer for maintenance Rituxan.      On 2020 repeat PET scan on this patient showed enlargement of left     infraclavicular's/subpectoral lymph node measuring up to 1.8 cm with maximum SUV    of 5.7.  Slight increase in size and metabolic activity of the left  axillary     lymph node.  Small left pleural effusion slightly larger.  Left para-aortic     lymph node conglomerate slightly large and slight increase in metabolic activity    compared to previous exam.            Patient opted to get Rituxan again.            Past Med/Surg History            Past Med/Surg History:   Hypertension             Diabetes Mellitus             Heart Disease             No Blood Clots             Cancer (Diffuse large B-cell lymphoma in 2008)             No Lung Disease             No Kidney Disease             No Other            Social History      Social History:  No Tobacco Use, No Alcohol Use, No Recreational Drug use, No     Other            Allergies      Coded Allergies:             IODINATED CONTRAST MEDIA (Verified  Allergy, Unknown, HIVES,SNEEZING,ITCHY     EYES, 6/11/20)           PENICILLINS (Verified  Allergy, Unknown, RASH, 6/11/20)           SPINACH (Verified  Allergy, Unknown, 6/11/20)                  SHOWED UP ON ALLERGY TESTING                    SULFA (SULFONAMIDE ANTIBIOTICS) (Verified  Allergy, Unknown, RASH, 6/11/20)            Medications      Medications    Last Reconciled on 8/4/20 18:34 by RAHEL ZARAGOZA MD      Lactobacillus Acidophilus (Florajen) 460 Mg Capsule      460 MG PO QDAY, CAP         Reported         8/4/20       Carvedilol (Carvedilol) 6.25 Mg Tablet      6.25 MG PO BID for 30 Days, #60 TAB         Prov: Rhoda Zaragoza         8/4/20       Montelukast Sodium (Singulair*) 10 Mg Tab      10 MG PO QDAY, #30 TAB 0 Refills         Reported         12/18/19       Aspirin Chew (Aspirin Baby) 81 Mg Tab.chew      81 MG PO QDAY, #30 TAB.CHEW 0 Refills         Reported         3/13/19       Furosemide* (Lasix*) 40 Mg Tablet      60 MG PO MOWEFR, #30 TAB 0 Refills         Reported         3/13/19       Calcium Carb/Vit D3 (CALCIUM 600-VIT D3 400 TABLET) 1 Each Tablet      1 EACH PO BID, #120 TAB 0 Refills         Reported         3/13/19       Losartan Potassium  (Losartan*) 25 Mg Tablet      25 MG PO QDAY, #30 TAB 0 Refills         Reported         2/21/19       Dabigatran (Pradaxa) 75 Mg Capsule      75 MG PO BID, #60 CAP         Reported         2/21/19       Raloxifene HCl (Evista) 60 Mg Tablet      60 MG PO QDAY, TAB         Reported         2/21/19       Atorvastatin (Atorvastatin) 40 Mg Tablet      40 MG PO HS, #30 TAB 0 Refills         Reported         2/21/19       Ergocalciferol (Vitamin D2) (Vitamin D2) 50,000 Units Capsule      78525 UNITS PO Q14D, #4 CAP 0 Refills         Reported         10/22/18       Spironolactone (Aldactone) 25 Mg Tablet      12.5 MG PO QDAY, #30 TAB 0 Refills         Reported         8/12/14       Furosemide (Furosemide) 40 Mg Tablet      40 MG PO, #30 TAB 0 Refills         Reported         8/12/14       Polysaccharide Iron Complex (Niferex-150*) 150 Mg Cap      150 MG PO QDAY         Reported         1/28/09      Current Medications      Current Medications Reviewed 8/4/20            Pain Assessment      Pain Intensity:  0      Description:  None            Review of Systems      General:  No Anxiety; Fatigue Scale: (4), Pain Scale: (0)      HEENT:  No Dysphagia, No Hearing Changes      Respiratory:  No Cough      Cardiovascular:  No Chest Pain; Pedal Edema      Gastrointestinal:  No Nausea, No Vomiting; Appetite Good (Good)      Genitourinary:  No Nocturia      Musculoskeletal:  No Joint Effusions      Endocrine:  No Heat Intolerance, No Cold Intolerance      Hematologic:  No Bleeding      Allergic/Immunologic:  No Hives; Nasal drip (Sinus drainage)      Psychological:  No Anxiety, No Depression      Neurological:  No Headaches; Weakness      Skin:  No Rash, No Open Wounds            Vitals      Height 5 ft 1.75 in / 156.85 cm      Weight 149 lbs 9.6 oz / 67.916876 kg      BSA 1.68 m2      BMI 27.6 kg/m2      Temperature 97.1 F / 36.17 C      Pulse 42      Respirations 18      Blood Pressure 125/52      Pulse Oximetry 97%            Exam       Constitutional:  No acute distress, Conversant, Pleasant, Other (Friend present     since the patient cannot hear)      Eyes:  Anicteric sclerae, Palpebral Conjunctivae (Pink), WILLIE      HENT:  Other (Hard of hearing)      Cardiovascular:  RRR; No Murmurs; Normal PMI; No Peripheral Edema      Lungs:  Clear to Ausculation, Normal Respiratory Effort      Abdomen:  Soft, NABS; No Masses, No Tenderness      Chest:  Other (Symmetrical and equal)      Lymphatic:  No Neck, No Axillae      Extremities:  No digital cyanosis, No digital ischemia, Normal gait, Other (No     deformity)      Neurological:  Cranial Nerve II-XII (Intact)      Psychological:  Appropriate affect, Appropriate mood, Intact judgement, Alert      Skin:  Normal temperature, Other (No dermatome)            Lab Results      Laboratory Tests      8/4/20 13:40            Laboratory Tests            Test       4/20/20      21:56 6/11/20      13:38 8/4/20      13:40             Lab Scanned Report       LAB FINAL      CUMULATIVES -                           Magnesium Level              2.11 mg/dL      (1.60-2.30)                    Triglycerides Level              51 mg/dL      ()                    Cholesterol Level              139 mg/dL      (107-200)                    LDL Cholesterol              59 mg/dL      ()                    VLDL Cholesterol              10 mg/dL      (5-37)                    HDL Cholesterol              70 mg/dL      (40-60)                    Cholesterol/HDL Ratio              2.0 NA      (3.0-6.0)                    Vitamin B12 Level              1317 pg/mL      (211-911)                    Vitamin D 25-Hydroxy              44.0 ng/mL      (30.0-100.0)                    Folate              >20.0 ng/mL      (4.8-20.0)                    Thyroid Stimulating Hormone      (TSH)        1.460 m(iU)/L      (0.270-4.200)                    Free Thyroxine              1.1 ng/dL      (0.9-1.8)                    White  Blood Count              7.13 10*3/uL      (4.80-10.80)             Red Blood Count              4.06 10*6/uL      (4.20-5.40)             Hemoglobin              12.5 g/dL      (12.0-16.0)             Hematocrit              39.3 %      (37.0-47.0)             Mean Corpuscular Volume              96.8 fL      (81.0-99.0)             Mean Corpuscular Hemoglobin              30.8 pg      (27.0-31.0)             Mean Corpuscular Hemoglobin      Concent               31.8      (33.0-37.0)             Red Cell Distribution Width              13.1 %      (11.7-14.4)             RDW Standard Deviation              46.7 fL      (36.4-46.3)             Platelet Count              241 10*3/uL      (130-400)             Mean Platelet Volume              10.3 fL      (9.4-12.3)             Granulocytes (%)              62.7 %      (30.0-85.0)             Granulocytes #              4.47 10*3/uL      (2.00-8.00)             Lymphocytes # (Auto)              1.09 10*3/uL      (1.00-5.00)             Monocytes # (Auto)              0.78 10*3/uL      (0.20-1.20)             Eosinophils # (Auto)              0.73 10*3/uL      (0.00-0.70)             Basophils # (Auto)              0.04 10*3/uL      (0.00-0.20)             Immature Granulocytes %              0.3 %      (0.0-1.8)             Lymphocytes %              15.3 %      (20.0-45.0)             Monocytes %              10.9 %      (3.0-10.0)             Eosinophils %              10.2 %      (0.0-7.0)             Basophils %              0.6 %      (0.0-3.0)             Nucleated Red Blood Cells %              0.00 %      (0.00-0.70)             Immature Granulocytes #              0.02 10*3/uL      (0.00-0.20)             Sodium Level              140 mmol/L      (135-147)             Potassium Level              3.9 mmol/L      (3.5-5.3)             Chloride Level              99 mmol/L      ()             Carbon Dioxide Level              27 mmol/L      (22-32)              Anion Gap              18 mmol/L      (8-19)             Blood Urea Nitrogen              26 mg/dL      (5-25)             Creatinine              1.09 mg/dL      (0.50-0.90)             Glomerular Filtration Rate      Calc               44      mL/min/{1.73_m2}             BUN/Creatinine Ratio              24 {ratio}      (6-20)             Glucose Level              124 mg/dL      (65-99)             Serum Osmolality   296 (273-304)              Calcium Level              10.5 mg/dL      (8.7-10.4)             Total Bilirubin              0.56 mg/dL      (0.20-1.30)             Aspartate Amino Transf      (AST/SGOT)               37 U/L (15-50)                    Alanine Aminotransferase      (ALT/SGPT)               24 U/L (10-40)                    Alkaline Phosphatase              71 U/L      ()             Total Protein              7.3 g/dL      (6.3-8.2)             Albumin              3.8 g/dL      (3.5-5.0)             Globulin              3.5 g/dL      (2.0-3.5)             Albumin/Globulin Ratio              1.1 {ratio}      (1.4-2.6)            Impression/Problem List      Follicular lymphoma grade 1 stage III improved with 4 weekly courses of Rituxan.      Recent PET CT scan showed increase in size and metabolic activity of some of     her lymph nodes.      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      History of bladder cancer followed by Dr. Grubbs      Aortic stenosis      History of diastolic congestive heart failure      Atrial fibrillation      History of hypertension      Osteoporosis      Lung nodule right lower lobe      Notes      New Medications      * Lactobacillus Acidophilus (Florajen) 460 MG CAPSULE: 460 MG PO QDAY         Instructions: 1 CAP      Changed Medications      * Carvedilol 6.25 MG TABLET:         From: 12.5 MG PO BID 30 Days #120         To: 6.25 MG PO BID 30 Days #60      New  Diagnostics      * Comp Metabolic Panel, Stat         Dx: NHL (non-Hodgkin's lymphoma) - C85.90      * CBC With Auto Diff, Stat         Dx: NHL (non-Hodgkin's lymphoma) - C85.90      Problems:        (1) Essential hypertension      (2) Diabetes mellitus type 2 in nonobese      (3) Follicular non-Hodgkin's lymphoma      (4) History of diffuse large B-cell lymphoma            Plan      Patient agreed to get weekly Rituxan x4.      Hold mammogram and bone density test.  Patient is 89 years old.  Patient     requested to do breast self-exam.      Blood work next week see me in 2 weeks.            IV INVASIVE LINE CARE      IV ASSESSMENT      IV Line Location:  Chest      IV Location Modifier:  Right      IV Line Type:  Port-A-Cath      Insertion Date:  Jul 25, 2019      IV Catheter Gauge:  20      Site Observation:  Asymptomatic      IV Care:  Blood Return Present, Flushes Easily, Heparin Flush, Maintained per     Policy, Saline Flush, Site Care, Start per Policy      IV Discontinued Reason:  Patient Discharged      IV Infusion Comment:  Routine VAD Maintenance            POST INFUSION      Pt Tolerance to Procedure:  Good      Instructed on care of VAD/IV:  Yes            Patient Education:        DI for Non-Hodgkin's Lymphoma            PREVENTION      Hx Influenza Vaccination:  Yes      Date Influenza Vaccine Given:  Nov 11, 2019      Influenza Vaccine Declined:  No      2 or More Falls in Past Year?:  No      Fall Past Year with Injury?:  No      Hx Pneumococcal Vaccination:  No      Encouraged to follow-up with:  PCP regarding preventative exams.      Chart initiated by      TAI Anderson MA            Electronically signed by Rhoda Zaragoza  08/04/2020 18:34       Disclaimer: Converted document may not contain table formatting or lab diagrams. Please see [employer] for the authenticated document.

## 2021-05-28 NOTE — PROGRESS NOTES
Patient: QUEENIE MURRIETA     Acct: [id number]     Report: #[id number]  UNIT #: [id number]     : 1928    Encounter Date:2019  PRIMARY CARE: Gala Rao  ***Signed***  --------------------------------------------------------------------------------------------------------------------  Progress Note      DATE: 3/13/19      Primary Care Provider:  Gala Rao      Chief Complaint      New consult-follicular lymphoma            Subjective      89-year-old white female was referred for evaluation and treatment of follicular    lymphoma that was diagnosed on 3/19/2019.  Patient had CT-guided needle biopsy     of a retroperitoneal lymph node.  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      Review of her records revealed that in 2019 patient had CAT scan of     the abdomen and pelvis because of right lower quadrant pain.  At that time     periaortic adenopathy was noted.  There is a confluent mackenzie mass at the level     of the left kidney measuring 4 cm x 2 cm.  Same CAT scan showed a few stable 2-3    mm noncalcified nodules in the lower lobes.  Unfortunately the patient was     admitted in the hospital on 2019 because of acute influenza     complicated by acute respiratory failure requiring ventilator support.  Patient     was discharged on .  During that hospitalization patient had lymph     node biopsy.            Patient denies any loss of weight except that from the hospital, no fevers, no     night sweats, no pruritus and fatigue is 2 out of 10.  Her main complaint is     swelling in her legs.            Patient is here for evaluation and treatment.            It should be noted that this patient was treated by me in  for non-Hodgkin's    lymphoma large cell type presenting with left axillary and bone marrow     involvement.  Patient was in complete response and continued follow-up until     .  Sayre       Patient gives a history of bladder cancer diagnosed 2 years ago and followed up     by Dr. Grubbs.            Past Med/Surg History            Past Med/Surg History:   Hypertension             Diabetes Mellitus             Heart Disease             No Blood Clots             Cancer             No Lung Disease             No Kidney Disease             No Other            Social History      Social History:  No Tobacco Use, No Alcohol Use, No Recreational Drug use, No     Other            Allergies      Coded Allergies:             IODINATED CONTRAST- ORAL AND IV DYE (Verified  Allergy, Unknown,     HIVES,SNEEZING,ITCHY EYES, 2/20/19)           PENICILLINS (Verified  Allergy, Unknown, RASH, 2/20/19)           SPINACH (Verified  Allergy, Unknown, 2/20/19)                  SHOWED UP ON ALLERGY TESTING                    SULFA (SULFONAMIDE ANTIBIOTICS) (Verified  Allergy, Unknown, RASH, 2/20/19)            Medications      Medications    Last Reconciled on 3/13/19 19:34 by RAHEL WESLEY MD      Aspirin Chew (Aspirin Baby) 81 Mg Tab.chew      81 MG PO QDAY, #30 TAB.CHEW 0 Refills         Reported         3/13/19       Furosemide* (Lasix*) 40 Mg Tablet      60 MG PO MOWEFR, #30 TAB 0 Refills         Reported         3/13/19       Calcium Carb/Vit D3 (CALCIUM 600-VIT D3 400 TABLET) 1 Each Tablet      1 EACH PO BID, #120 TAB 0 Refills         Reported         3/13/19       Montelukast Sodium (Montelukast*) 10 Mg Tablet      10 MG PO HS for 30 Days, #30 TAB         Prov: Gala Rao         3/1/19       Losartan Potassium (Losartan*) 25 Mg Tablet      25 MG PO QDAY, #30 TAB 0 Refills         Reported         2/21/19       Dabigatran (Pradaxa) 75 Mg Capsule      75 MG PO BID, #60 CAP         Reported         2/21/19       Raloxifene HCl (Evista) 60 Mg Tablet      60 MG PO QDAY, TAB         Reported         2/21/19       Atorvastatin (Atorvastatin) 40 Mg Tablet      40 MG PO HS, #30 TAB 0 Refills         Reported          2/21/19       Carvedilol (Carvedilol) 6.25 Mg Tablet      12.5 MG PO BID for 30 Days, #120 TAB         Prov: Gala Rao         2/19/19       Ergocalciferol (Vitamin D2*) 50,000 Units Capsule      14280 UNITS PO Q14D, #4 CAP 0 Refills         Reported         10/22/18       Spironolactone (Aldactone) 25 Mg Tablet      12.5 MG PO QDAY, #30 TAB 0 Refills         Reported         8/12/14       Furosemide (Furosemide) 40 Mg Tablet      40 MG PO, #30 TAB 0 Refills         Reported         8/12/14       Polysaccharide Iron Complex (Niferex-150*) 150 Mg Cap      150 MG PO QDAY         Reported         1/28/09      Current Medications      Current Medications Reviewed 3/13/19            Review of Systems      General:  No Anxiety; Fatigue Scale: (2), Pain Scale: (0)      HEENT:  No Dysphagia, No Hearing Changes      Respiratory:  Cough, Shortness of Air      Cardiovascular:  No Chest Pain      Gastrointestinal:  No Nausea, No Vomiting; Constipation, Appetite Fair (fair)      Genitourinary:  Nocturia (2x); No Dysuria      Musculoskeletal:  No Joint Effusions      Endocrine:  No Heat Intolerance, No Cold Intolerance      Hematologic:  No Bleeding      Allergic/Immunologic:  No Hives, No Throat closing off      Psychological:  No Anxiety      Neurological:  No Headaches, No Dizziness      Skin:  No Rash, No Open Wounds            Vitals      Height 5 ft 4 in / 162.56 cm      Weight 145 lbs 12.8 oz / 66.393646 kg      BSA 1.71 m2      BMI 25.0 kg/m2      Temperature 98.2 F / 36.78 C      Pulse 52      Respirations 24      Blood Pressure 116/85      Pulse Oximetry 95%            Exam      Constitutional:  No acute distress, Conversant, Pleasant; No Weakness      Eyes:  Anicteric sclerae, Palpebral Conjunctivae (Pink), WILLIE      HENT:  Oropharynx clear; No Erythema, No Tonsils; Buccal mucosae (Pink)      Neck:  Supple, Full Range of Motion; No Masses      Cardiovascular:  RRR, Normal PMI, Peripheral Edema      Lungs:  Clear to  Ausculation, Normal Respiratory Effort; No Crackles, No Wheezes      Abdomen:  Soft, NABS; No Masses, No Tenderness      Chest:  Other (Symmetrical and equal)      Lymphatic:  No Neck, No Axillae      Extremities:  No digital cyanosis, No digital ischemia, Normal gait, Other (No     deformity)      Neurological:  Cranial Nerve II-XII; No Focal Sensory deficits      Psychological:  Appropriate affect, Appropriate mood, Intact judgement, Alert      Skin:  Other (No dermatosis)            Lab Results      Laboratory Tests      3/13/19 17:53            Laboratory Tests            Test       2/9/19      09:37 2/9/19      10:38 2/9/19      10:41 2/9/19      12:43             Lactic Acid Level       1.9 mmol/L      (0.7-2.1)                           Troponin T       <0.01 ng/mL      (0.00-0.10)                           Procalcitonin       0.22 ug/L      (0.00-4.00)                           Urine RBC              MANY(21-50)      /(HPF)                           Urine WBC              SMALL(6-10)      /(HPF)                           Urine Epithelial Cells  2-5 /(HPF)                Urine Bacteria  NEGATIVE NA                Urine Hyaline Casts  2-5 NA                Sodium (Blood Gas)              134.4 mmol/L      (136.0-146.0)                    Potassium (Blood Gas)              3.9 mmol/L      (3.5-5.0)                    Chloride (Blood Gas)              98 mmol/L      ()                    Glucose (Blood Gas)              196.00 mg/dL      (65.00-99.00)                    Ionized Calcium (Blood Gas)              1.16 mmol/L      (1.13-1.32)                    Lactic Acid (Blood Gas)              1.57 mmol/L      (0.50-2.00)                    Blood Gas Respiration Rate    18 NA              Blood Gas Tidal Volume    450 NA              Blood Gas PEEP    10              Test       2/9/19      13:50 2/9/19      16:59 2/9/19      20:32 2/10/19      04:51             Mycoplasma pneumoniae IgM      Antibody  NEGATIVE NA                           Bronchial Fluid Comment  Small NA                Bronchoalveo Lavage      Neutrophil Vol        99 NA                           Bronchoalveolar Lavage      Lymphocytes        1 NA                           Bronchoalveolar Lavage      Macrophages        0 /100{WBCs}                           Broncho Lavage Squamous      Epithelial        0 NA                           Atypical Lymphocytes %   1 % (0-5)               Anisocytosis   SLIGHT NA               Rouleau   SLIGHT NA               Segmented Neutrophils %    70 % (45-70)              Band Neutrophils %    23 % (1-5)              Lymphocytes % (Manual)    4 % (20-45)              Monocytes % (Manual)    3 % (3-10)              Eosinophils % (Manual)    0 % (0-7)              Basophils % (Manual)    0 % (0-3)              Nucleated Red Blood Cells    0 /100{WBCs}              Platelet Estimate    Adequate NA              Platelet Morphology Comment    NORMAL NA              Ovalocytes    SLIGHT NA              Madisyn Cells    SLIGHT NA              Test       2/11/19      03:10 2/17/19      08:14 2/18/19      08:23 2/19/19      09:55             Conjugated Bilirubin       <0.2 mg/dL      (0.0-0.6)                           Unconjugated Bilirubin       0.2 mg/dL      (0.0-1.1)                           Random Vancomycin Level 13 ug/mL                 Phosphorus Level              4.0 mg/dL      (2.4-4.5)                           Blood Gas Puncture Site   LEFT Radial NA               Blood Gas pH              7.50 NA      (7.35-7.45)                    Blood Gas PCO2              38.8 mm(Hg)      (35.0-45.0)                    Blood Gas PO2              65.9 mm(Hg)      (80.0-100.0)                    Blood Gas HCO3              29.3      (22.0-26.0)                    Blood Gas Base Excess   5.7 NA (+/- 2)               Blood Gas Oxygen Saturation              92.3 NA      (95.0-99.0)                    O2 Saturation #2               94.00 mm(Hg)      (21..00)                    Arterial Blood PO2/FiO2 Ratio   206 NA (0-500)               Bill Test   ACCEPTED NA               Hemoglobin Oxygen Saturation              92.1 %      (94.0-98.0)                    Carboxyhemoglobin              0.1 %      (0.0-1.5)                    Methemoglobin              0.1 %      (0.0-1.5)                    Reduced Hemoglobin              7.7 %      (0.0-5.0)                    Total Hemoglobin              12.3      (11.7-14.6)                    Oxygen Delivery Device              O2/nasal      Cannul NA                    Oxygen Liters/Minute   3.0 L/min               FiO2              32.00 %      (21..00)                    Blood Gas Comments   NONE NA               Prothrombin Time              10.4 s      (9.4-12.0)             International Ratio (Anticoag      Ther)               1.00 NA      (2.00-3.00)             Activated Partial      Thromboplast Time               22.0      (22.2-34.2)             Test       2/19/19      11:09 2/28/19      07:11 3/2/19      07:27 3/2/19      11:48             Lymphoma Panel SEE BELOW NA                 Magnesium Level              2.21 mg/dL      (1.60-2.30)                           NT-Pro-B-Type Natriuretic      Peptide               1123 pg/mL      (0-1800)                    Capillary Blood Glucose (Chem)              112 mg/dL      (65-99)             Test       3/2/19      12:45 3/2/19      21:56 3/13/19      17:53                    Urine Collection Type Clean Catch NA                 Urine Color YELLOW NA                 Urine Appearance CLEAR NA                 Urine pH       6.5 (pH)      (5.0-8.0)                           Urine Specific Gravity       1.016 NA      (1.005-1.030)                           Urine Protein       NEGATIVE mg/dL      (NEGATIVE)                           Urine Glucose (UA)       NEGATIVE mg/dL      (NEGATIVE)                           Urine Ketones        NEGATIVE mg/dL      (NEGATIVE)                           Urine Occult Blood       NEGATIVE NA      (NEGATIVE)                           Urine Nitrite       NEGATIVE NA      (NEGATIVE)                           Urine Bilirubin       NEGATIVE NA      (NEGATIVE)                           Urine Urobilinogen       0.2      {EhrlichU}/dL                           Urine Leukocyte Esterase       NEGATIVE NA      (NEGATIVE)                           Lab Scanned Report              LAB FINAL      CUMULATIVES -                           White Blood Count              6.62 10*3/uL      (4.80-10.80)                    Red Blood Count              3.60 10*6/uL      (4.20-5.40)                    Hemoglobin              11.20 g/dL      (12.00-16.00)                    Hematocrit              35.3 %      (37.0-47.0)                    Mean Corpuscular Volume              98.1 fL      (81.0-99.0)                    Mean Corpuscular Hemoglobin              31.1 pg      (27.0-31.0)                    Mean Corpuscular Hemoglobin      Concent               31.7 %      (33.0-37.0)                    Red Cell Distribution Width              14.5 %      (11.7-14.4)                    RDW Standard Deviation              51.9 fL      (36.4-46.3)                    Platelet Count              274.00 10*3/uL      (130..00)                    Mean Platelet Volume              10.8 fL      (9.4-12.3)                    Granulocytes (%)              64.3 %      (30.0-85.0)                    Lymphocytes (%) (Auto)              21.50 %      (20.00-45.00)                    Monocytes (%) (Auto)              12.10 %      (3.00-10.00)                    Eosinophils (%) (Auto)              1.10 %      (0.00-7.00)                    Basophils (%) (Auto)              0.50 %      (0.00-3.00)                    Granulocytes #              4.27 10*3/uL      (2.00-8.00)                    Lymphocytes # (Auto)              1.42 10*3/uL      (1.00-5.00)                     Monocytes # (Auto)              0.80 10*3/uL      (0.20-1.20)                    Eosinophils # (Auto)              0.07 10*3/uL      (0.00-0.70)                    Basophils # (Auto)              0.03 10*3/uL      (0.00-0.20)                    Immature Granulocytes %              0.5 %      (0.0-1.8)                    Nucleated Red Blood Cells %              0.00 %      (0.00-0.70)                    Immature Granulocytes #              0.03 10*3/uL      (0.00-0.20)                    Erythrocyte Sedimentation Rate   44 mm/h (0-30)               Sodium Level              138 mmol/L      (135-147)                    Potassium Level              3.7 mmol/L      (3.5-5.3)                    Chloride Level              96 mmol/L      ()                    Carbon Dioxide Level              31 mmol/L      (22-32)                    Anion Gap              15 mmol/L      (8-19)                    Blood Urea Nitrogen              23 mg/dL      (5-25)                    Creatinine              0.83 mg/dL      (0.50-0.90)                    Glomerular Filtration Rate      Calc               >60      mL/min/{1.73_m2}                    BUN/Creatinine Ratio              28 {ratio}      (6-20)                    Glucose Level              105 mg/dL      (65-99)                    Serum Osmolality   290 (273-304)               Calcium Level              10.1 mg/dL      (8.7-10.4)                    Iron Level              79 ug/dL      ()                    Total Iron Binding Capacity              342 ug/dL      (245-450)                    Percent Iron Saturation   23 % (20-55)               Transferrin              239.00 mg/dL      (250..00)                    Total Bilirubin              0.44 mg/dL      (0.20-1.30)                    Aspartate Amino Transf      (AST/SGOT)               31 U/L (15-50)                           Alanine Aminotransferase      (ALT/SGPT)               19 U/L  (10-40)                           Alkaline Phosphatase              84 U/L      ()                    Lactate Dehydrogenase              250 U/L      (120-240)                    Total Protein              7.3 g/dL      (6.3-8.2)                    Albumin              3.5 g/dL      (3.5-5.0)                    Globulin              3.8 g/dL      (2.0-3.5)                    Albumin/Globulin Ratio              0.9 {ratio}      (1.4-2.6)                    Vitamin B12 Level              1228 pg/mL      (211-911)                    Folate              >20.0 ng/mL      (4.8-20.0)             Radiology Impressions      Dictated in history            Impression/Problem List      Follicular lymphoma grade 1 percent he has left periaortic mass with some mildly     enlarged retrocrural lymph node.      History of large B-cell lymphoma status post chemotherapy in 2008 with complete     response.      History of bladder cancer followed by Dr. Grubbs      Aortic stenosis      History of diastolic congestive heart failure      Atrial fibrillation      History of hypertension      Osteoporosis      Anemia, mild      Notes      New Medications      * Calcium Carb/Vit D3 (CALCIUM 600-VIT D3 400 TABLET) 1 EACH TABLET: 1 EACH PO       BID #120      * Furosemide* (Lasix*) 40 MG TABLET: 60 MG PO MOWEFR #30      * Aspirin Chew (Aspirin Baby) 81 MG TAB.CHEW: 81 MG PO QDAY #30      Discontinued Medications      * Benzonatate 100 MG CAPSULE: 200 MG PO TID 30 Days #180      New Office Procedures      * VAD Maintenance, Routine            Plan      PET/CT scans order for staging      CBC, CMP, iron profile,      Sed rate, B12, folate      LDH            Discussed with the patient and her niece was present about her diagnosis.      Treatment if needed will be discussed after the staging procedures done.            IV INVASIVE LINE CARE      IV ASSESSMENT      IV Line Location:  Chest      IV Location Modifier:  Right      IV Line Type:   Port-A-Cath      Insertion Date:  Jan 31, 2019      IV Catheter Gauge:  20      Site Observation:  Asymptomatic      IV Care:  Blood Return Present, Flushes Easily, Heparin Flush, Maintained per     Policy, Saline Flush, Site Care, Start per Policy      IV Discontinued Reason:  Patient Discharged      IV Infusion Comment:  Routine VAD Maintenance            POST INFUSION      Pt Tolerance to Procedure:  Good      Instructed on care of VAD/IV:  Yes            Patient Education:        DI for Non-Hodgkin's Lymphoma            PREVENTION      Influenza Vaccine Declined:  No      2 or More Falls Past Year?:  No      Fall Past Year with Injury?:  No      Chart initiated by      TAI English MA                 Disclaimer: Converted document may not contain table formatting or lab diagrams. Please see [employer] [facility] for the authenticated document.

## 2021-05-28 NOTE — PROGRESS NOTES
Patient: QUEENIE MURRIETA     Acct: [id number]     Report: #[id number]  UNIT #: [id number]     : 1928    Encounter Date:10/07/2019  PRIMARY CARE: Gala Rao  ***Signed***  --------------------------------------------------------------------------------------------------------------------  Progress Note      DATE: 10/7/19      Primary Care Provider:  Gala Rao      Referring Provider:  Gala Rao      Chief Complaint      FU/NHL RIC            Subjective      89-year-old white female came in prior to her initial course of Rituxan.      Patient was recently diagnosed with follicular lymphoma from a biopsy of a retro    peritoneal lymph node.  CT scan of the abdomen was done because of the patient's    complaint of right lower quadrant pain and this showed retroperitoneal     lymphadenopathy.  In 3 months the patient's lymphadenopathy has enlarged and it     caused compression of the left renal vein with resulting mild left h    ydronephrosis.            Patient has no weight loss, no night sweats, nor fevers.            Past Med/Surg History            Past Med/Surg History:   Hypertension             Diabetes Mellitus             Heart Disease             No Blood Clots             Cancer (Diffuse large B-cell lymphoma in )             No Lung Disease             No Kidney Disease             No Other            Social History      Social History:  No Tobacco Use, No Alcohol Use, No Recreational Drug use, No     Other            Allergies      Coded Allergies:             IODINATED CONTRAST MEDIA (Verified  Allergy, Unknown, HIVES,SNEEZING,ITCHY     EYES, 19)           PENICILLINS (Verified  Allergy, Unknown, RASH, 19)           SPINACH (Verified  Allergy, Unknown, 19)                  SHOWED UP ON ALLERGY TESTING                    SULFA (SULFONAMIDE ANTIBIOTICS) (Verified  Allergy, Unknown, RASH, 19)            Medications      Medications    Last Reconciled on 10/7/19  16:48 by RAHEL ZARAGOZA MD      Ondansetron Hcl (ONDANSETRON HCL) 4 Mg Tablet      4 MG PO Q4HR PRN for NAUSEA AND/OR VOMITING, #60 TAB 1 Refill         Prov: Rhoda Zaragoza         10/7/19       Aspirin Chew (Aspirin Baby) 81 Mg Tab.chew      81 MG PO QDAY, #30 TAB.CHEW 0 Refills         Reported         3/13/19       Furosemide* (Lasix*) 40 Mg Tablet      60 MG PO MOWEFR, #30 TAB 0 Refills         Reported         3/13/19       Calcium Carb/Vit D3 (CALCIUM 600-VIT D3 400 TABLET) 1 Each Tablet      1 EACH PO BID, #120 TAB 0 Refills         Reported         3/13/19       Losartan Potassium (Losartan*) 25 Mg Tablet      25 MG PO QDAY, #30 TAB 0 Refills         Reported         2/21/19       Dabigatran (Pradaxa) 75 Mg Capsule      75 MG PO BID, #60 CAP         Reported         2/21/19       Raloxifene HCl (Evista) 60 Mg Tablet      60 MG PO QDAY, TAB         Reported         2/21/19       Atorvastatin (Atorvastatin) 40 Mg Tablet      40 MG PO HS, #30 TAB 0 Refills         Reported         2/21/19       Carvedilol (Carvedilol) 6.25 Mg Tablet      12.5 MG PO BID for 30 Days, #120 TAB         Prov: Gala Rao         2/19/19       Ergocalciferol (Vitamin D2*) 50,000 Units Capsule      30238 UNITS PO Q14D, #4 CAP 0 Refills         Reported         10/22/18       Spironolactone (Aldactone) 25 Mg Tablet      12.5 MG PO QDAY, #30 TAB 0 Refills         Reported         8/12/14       Furosemide (Furosemide) 40 Mg Tablet      40 MG PO, #30 TAB 0 Refills         Reported         8/12/14       Polysaccharide Iron Complex (Niferex-150*) 150 Mg Cap      150 MG PO QDAY         Reported         1/28/09      Current Medications      Current Medications Reviewed 10/7/19            Pain Assessment      Pain Intensity:  0      Description:  None            Review of Systems      General:  No Anxiety; Fatigue Scale: (3), Pain Scale: (0)      HEENT:  Hearing Changes (SLIGHT DECLINE BOTH EARS), Visual Changes (SLIGHT     DECLINE BOTH EYES); No  Nasal Congestion      Respiratory:  No Cough; Shortness of Air; No Wheezing, No Hemoptysis      Cardiovascular:  No Chest Pain; Pedal Edema; No Orthopnea, No Palpitations, No     Dizziness      Gastrointestinal:  Nausea (BEFORE TAKING MED AND BEFORE EATING); No Vomiting, No    Constipation, No Diarrhea; Appetite Good, Appetite Fair      Genitourinary:  No Nocturia, No Dysuria      Musculoskeletal:  No Joint Tenderness, No Joint Stiffness; Aches (FEET), Pains     (FEET)      Endocrine:  No Heat Intolerance, No Cold Intolerance; Fatigue      Hematologic:  No Bruising, No Swollen Glands      Allergic/Immunologic:  No Throat closing off, No Nasal drip, No Itchy eyes      Psychological:  No Depression      Neurological:  No Headaches; Weakness      Skin:  No Open Wounds; Edema            Vitals      Height 5 ft 1.75 in / 156.85 cm      Weight 144 lbs 6.4 oz / 65.810112 kg      BSA 1.66 m2      BMI 26.6 kg/m2      Temperature 98.3 F / 36.83 C      Pulse 60      Blood Pressure 116/51      Pulse Oximetry 93%            Exam      Constitutional:  No acute distress, Conversant, Pleasant      Eyes:  Anicteric sclerae, Palpebral Conjunctivae (Pink), WILLIE      HENT:  Oropharynx clear; No Erythema, No Tonsils; Buccal mucosae (Pink)      Neck:  Supple, Full Range of Motion      Cardiovascular:  RRR; No Murmurs; Normal PMI; No Peripheral Edema      Lungs:  Clear to Ausculation, Normal Respiratory Effort      Abdomen:  Soft, NABS; No Tenderness      Chest:  Other (Symmetrical)      Lymphatic:  No Neck, No Axillae      Extremities:  No digital cyanosis, No digital ischemia, Normal gait, Other (No     deformity)      Neurological:  Cranial Nerve II-XII (Intact); No Focal Sensory deficits      Psychological:  Appropriate affect, Appropriate mood, Intact judgement, Alert      Skin:  Other (No dermatosis)            Lab Results      Laboratory Tests      10/7/19 14:15            Laboratory Tests            Test       7/25/19      13:30  9/27/19      14:26 9/29/19      21:57 10/7/19      14:15             Lymphocytes (%) (Auto)       25.70 %      (20.00-45.00)                           Monocytes (%) (Auto)       11.20 %      (3.00-10.00)                           Eosinophils (%) (Auto)       1.60 %      (0.00-7.00)                           Basophils (%) (Auto)       0.70 %      (0.00-3.00)                           Magnesium Level       2.13 mg/dL      (1.60-2.30)                           Iron Level       82 ug/dL      ()                           Total Iron Binding Capacity       376 ug/dL      (245-450)                           Percent Iron Saturation 22 % (20-55)                 Transferrin       263.00 mg/dL      (250..00)                           Triglycerides Level       45 mg/dL      ()                           Cholesterol Level       136 mg/dL      (107-200)                           LDL Cholesterol       52 mg/dL      ()                           VLDL Cholesterol 9 mg/dL (5-37)                 HDL Cholesterol       75 mg/dL      (40-60)                           Cholesterol/HDL Ratio       1.8 NA      (3.0-6.0)                           Vitamin B12 Level       962 pg/mL      (211-911)                           Vitamin D 25-Hydroxy       46.7 ng/mL      (30.0-100.0)                           Folate       >20.0 ng/mL      (4.8-20.0)                           Thyroid Stimulating Hormone      (TSH) 1.360 m(iU)/L      (0.270-4.200)                           Free Thyroxine       1.2 ng/dL      (0.9-1.8)                           Erythrocyte Sedimentation Rate  22 mm/h (0-30)                Lab Scanned Report              LAB FINAL      CUMULATIVES -                    White Blood Count              5.99 10*3/uL      (4.80-10.80)             Red Blood Count              4.12 10*6/uL      (4.20-5.40)             Hemoglobin              12.8 g/dL      (12.0-16.0)             Hematocrit              39.8 %       (37.0-47.0)             Mean Corpuscular Volume              96.6 fL      (81.0-99.0)             Mean Corpuscular Hemoglobin              31.1 pg      (27.0-31.0)             Mean Corpuscular Hemoglobin      Concent               32.2      (33.0-37.0)             Red Cell Distribution Width              13.9 %      (11.7-14.4)             RDW Standard Deviation              49.6 fL      (36.4-46.3)             Platelet Count              222 10*3/uL      (130-400)             Mean Platelet Volume              10.7 fL      (9.4-12.3)             Granulocytes (%)              73.7 %      (30.0-85.0)             Granulocytes #              4.42 10*3/uL      (2.00-8.00)             Lymphocytes # (Auto)              0.92 10*3/uL      (1.00-5.00)             Monocytes # (Auto)              0.56 10*3/uL      (0.20-1.20)             Eosinophils # (Auto)              0.04 10*3/uL      (0.00-0.70)             Basophils # (Auto)              0.04 10*3/uL      (0.00-0.20)             Immature Granulocytes %              0.2 %      (0.0-1.8)             Lymphocytes %              15.4 %      (20.0-45.0)             Monocytes %              9.3 %      (3.0-10.0)             Eosinophils %              0.7 %      (0.0-7.0)             Basophils %              0.7 %      (0.0-3.0)             Nucleated Red Blood Cells %              0.00 %      (0.00-0.70)             Immature Granulocytes #              0.01 10*3/uL      (0.00-0.20)             Sodium Level              141 mmol/L      (135-147)             Potassium Level              4.0 mmol/L      (3.5-5.3)             Chloride Level              100 mmol/L      ()             Carbon Dioxide Level              29 mmol/L      (22-32)             Anion Gap              16 mmol/L      (8-19)             Blood Urea Nitrogen              27 mg/dL      (5-25)             Creatinine              1.13 mg/dL      (0.50-0.90)             Glomerular Filtration Rate      Calc                42      mL/min/{1.73_m2}             BUN/Creatinine Ratio              24 {ratio}      (6-20)             Glucose Level              120 mg/dL      (65-99)             Serum Osmolality    298 (273-304)              Uric Acid              6.6 mg/dL      (2.5-7.5)             Calcium Level              10.3 mg/dL      (8.7-10.4)             Total Bilirubin              0.66 mg/dL      (0.20-1.30)             Aspartate Amino Transf      (AST/SGOT)               31 U/L (15-50)                    Alanine Aminotransferase      (ALT/SGPT)               19 U/L (10-40)                    Alkaline Phosphatase              58 U/L      ()             Lactate Dehydrogenase              268 U/L      (120-240)             Total Protein              7.6 g/dL      (6.3-8.2)             Albumin              4.1 g/dL      (3.5-5.0)             Globulin              3.5 g/dL      (2.0-3.5)             Albumin/Globulin Ratio              1.2 {ratio}      (1.4-2.6)            Impression/Problem List      Follicular lymphoma grade 1 stage III      Cardiomegaly      History of large B-cell lymphoma status post chemotherapy in 2008 with complete     response.      History of bladder cancer followed by Dr. 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      Zofran 4 mg p.o. every 4-6 hours as needed.            Patient Education:        DI for Non-Hodgkin's Lymphoma            PREVENTION      Hx Influenza  Vaccination:  Yes      Influenza Vaccine Declined:  No      2 or More Falls Past Year?:  No      Fall Past Year with Injury?:  No      Encouraged to follow-up with:  PCP regarding preventative exams.      Chart initiated by      JOSÉ MANUEL JEAN                 Disclaimer: Converted document may not contain table formatting or lab diagrams. Please see [employer] [facility] for the authenticated document.

## 2021-05-28 NOTE — PROGRESS NOTES
Patient: QUEENIE MRURIETA     Acct: [id number]     Report: #[id number]  UNIT #: [id number]     : 1928    Encounter Date:2019  PRIMARY CARE: Gala Rao  ***Signed***  --------------------------------------------------------------------------------------------------------------------  Progress Note      DATE: 19      Primary Care Provider:  Gala Rao      Referring Provider:  Gala Rao      Chief Complaint      FU NHL, RIC            Subjective      89-year-old white female has a history of large cell non-Hodgkin's lymphoma in      treated with 6 cycles of R-CHOP with complete response.            Patient had been referred back to us 2019 because of a recent diagnosis of    follicular lymphoma obtained through a CT-guided needle biopsy of a     retroperitoneal lymph node.  Apparently patient had right lower quadrant pain     and a CAT scan of the abdomen and pelvis was performed which showed periaortic     adenopathy.  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      Patient denies any fevers night sweats pruritus fatigue weight loss.  She would     occasionally have epistaxis from blowing her nose.  Patient is on aspirin and     Pradaxa.      Patient is here for follow-up.            Past Med/Surg History            Past Med/Surg History:   Hypertension             Diabetes Mellitus             Heart Disease             No Blood Clots             Cancer             No Lung Disease             No Kidney Disease             No Other            Social History      Social History:  No Tobacco Use, No Alcohol Use, No Recreational Drug use, No     Other            Allergies      Coded Allergies:             IODINATED CONTRAST- ORAL AND IV DYE (Verified  Allergy, Unknown,     HIVES,SNEEZING,ITCHY EYES, 19)           PENICILLINS (Verified  Allergy, Unknown, RASH, 19)           SPINACH (Verified  Allergy, Unknown, 19)                   SHOWED UP ON ALLERGY TESTING                    SULFA (SULFONAMIDE ANTIBIOTICS) (Verified  Allergy, Unknown, RASH, 2/20/19)            Medications      Medications    Last Reconciled on 6/26/19 17:53 by RAHEL WESLEY MD      Aspirin Chew (Aspirin Baby) 81 Mg Tab.chew      81 MG PO QDAY, #30 TAB.CHEW 0 Refills         Reported         3/13/19       Furosemide* (Lasix*) 40 Mg Tablet      60 MG PO MOWEFR, #30 TAB 0 Refills         Reported         3/13/19       Calcium Carb/Vit D3 (CALCIUM 600-VIT D3 400 TABLET) 1 Each Tablet      1 EACH PO BID, #120 TAB 0 Refills         Reported         3/13/19       Losartan Potassium (Losartan*) 25 Mg Tablet      25 MG PO QDAY, #30 TAB 0 Refills         Reported         2/21/19       Dabigatran (Pradaxa) 75 Mg Capsule      75 MG PO BID, #60 CAP         Reported         2/21/19       Raloxifene HCl (Evista) 60 Mg Tablet      60 MG PO QDAY, TAB         Reported         2/21/19       Atorvastatin (Atorvastatin) 40 Mg Tablet      40 MG PO HS, #30 TAB 0 Refills         Reported         2/21/19       Carvedilol (Carvedilol) 6.25 Mg Tablet      12.5 MG PO BID for 30 Days, #120 TAB         Prov: Gabby Raoian         2/19/19       Ergocalciferol (Vitamin D2*) 50,000 Units Capsule      67517 UNITS PO Q14D, #4 CAP 0 Refills         Reported         10/22/18       Spironolactone (Aldactone) 25 Mg Tablet      12.5 MG PO QDAY, #30 TAB 0 Refills         Reported         8/12/14       Furosemide (Furosemide) 40 Mg Tablet      40 MG PO, #30 TAB 0 Refills         Reported         8/12/14       Polysaccharide Iron Complex (Niferex-150*) 150 Mg Cap      150 MG PO QDAY         Reported         1/28/09      Current Medications      Current Medications Reviewed 6/26/19            Pain Assessment      Pain Intensity:  0      Description:  None            Review of Systems      General:  No Anxiety; Fatigue Scale: (4), Pain Scale: (0)      HEENT:  No Dysphagia; Epistaxis (3 nose bleeds in the  past two weeks)      Respiratory:  No Cough; Shortness of Air      Cardiovascular:  No Chest Pain      Gastrointestinal:  No Nausea, No Vomiting; Appetite Good (good)      Genitourinary:  No Nocturia      Musculoskeletal:  No Joint Effusions      Endocrine:  No Heat Intolerance      Hematologic:  No Bleeding, No Bruising      Allergic/Immunologic:  No Hives      Psychological:  No Anxiety      Neurological:  No Headaches, No Dizziness      Skin:  No Rash            Vitals      Height 5 ft 4 in / 162.56 cm      Weight 148 lbs 0 oz / 67.944951 kg      BSA 1.72 m2      BMI 25.4 kg/m2      Temperature 97.9 F / 36.61 C      Pulse 64      Respirations 15      Blood Pressure 118/50      Pulse Oximetry 93%            Exam      Constitutional:  No acute distress, Conversant, Pleasant      Eyes:  Anicteric sclerae, Palpebral Conjunctivae (Pink), WILLIE      HENT:  Oropharynx clear; No Erythema, No Tonsils; Buccal mucosae (Pink)      Neck:  Supple, Full Range of Motion; No Masses      Cardiovascular:  RRR; No Murmurs; Normal PMI; No Peripheral Edema      Lungs:  Clear to Ausculation, Normal Respiratory Effort      Abdomen:  Soft, NABS; No Masses, No Tenderness      Chest:  Other (Symmetrical)      Lymphatic:  No Neck, No Axillae      Extremities:  No digital cyanosis, No digital ischemia, Normal gait, Other (No     deformity)      Neurological:  Cranial Nerve II-XII; No Focal Sensory deficits      Psychological:  Appropriate affect, Appropriate mood, Intact judgement, Alert      Skin:  Other (No dermatomes)            Lab Results      Laboratory Tests      6/20/19 14:40            Laboratory Tests            Test       3/2/19      11:48 3/2/19      12:45 3/13/19      17:53 5/2/19      13:50             Capillary Blood Glucose (Chem)       112 mg/dL      (65-99)                           Urine Collection Type  Clean Catch NA                Urine Color  YELLOW NA                Urine Appearance  CLEAR NA                Urine pH               6.5 (pH)      (5.0-8.0)                           Urine Specific Gravity              1.016 NA      (1.005-1.030)                           Urine Protein              NEGATIVE mg/dL      (NEGATIVE)                           Urine Glucose (UA)              NEGATIVE mg/dL      (NEGATIVE)                           Urine Ketones              NEGATIVE mg/dL      (NEGATIVE)                           Urine Occult Blood              NEGATIVE NA      (NEGATIVE)                           Urine Nitrite              NEGATIVE NA      (NEGATIVE)                           Urine Bilirubin              NEGATIVE NA      (NEGATIVE)                           Urine Urobilinogen              0.2      {EhrlichU}/dL                           Urine Leukocyte Esterase              NEGATIVE NA      (NEGATIVE)                           Erythrocyte Sedimentation Rate   44 mm/h (0-30)               Iron Level              79 ug/dL      ()                    Total Iron Binding Capacity              342 ug/dL      (245-450)                    Percent Iron Saturation   23 % (20-55)               Transferrin              239.00 mg/dL      (250..00)                    Magnesium Level              2.10 mg/dL      (1.60-2.30)             NT-Pro-B-Type Natriuretic      Peptide               777 pg/mL      (0-1800)             Triglycerides Level              46 mg/dL      ()             Cholesterol Level              135 mg/dL      (107-200)             LDL Cholesterol              57 mg/dL      ()             VLDL Cholesterol    9 mg/dL (5-37)              HDL Cholesterol              69 mg/dL      (40-60)             Cholesterol/HDL Ratio              2.0 NA      (3.0-6.0)             Vitamin B12 Level              985 pg/mL      (211-911)             Vitamin D 25-Hydroxy              43.8 ng/mL      (30.0-100.0)             Folate              >20.0 ng/mL      (4.8-20.0)             Thyroid Stimulating Hormone       (TSH)               1.330 m(iU)/L      (0.270-4.200)             Free Thyroxine              1.1 ng/dL      (0.9-1.8)             Test       5/4/19      21:57 6/20/19      14:40                           Lab Scanned Report       LAB FINAL      CUMULATIVES -                           White Blood Count              5.51 10*3/uL      (4.80-10.80)                           Red Blood Count              3.91 10*6/uL      (4.20-5.40)                           Hemoglobin              12.10 g/dL      (12.00-16.00)                           Hematocrit              38.1 %      (37.0-47.0)                           Mean Corpuscular Volume              97.4 fL      (81.0-99.0)                           Mean Corpuscular Hemoglobin              30.9 pg      (27.0-31.0)                           Mean Corpuscular Hemoglobin      Concent        31.8      (33.0-37.0)                           Red Cell Distribution Width              13.6 %      (11.7-14.4)                           RDW Standard Deviation              47.8 fL      (36.4-46.3)                           Platelet Count              214.00 10*3/uL      (130..00)                           Mean Platelet Volume              10.1 fL      (9.4-12.3)                           Granulocytes (%)              63.0 %      (30.0-85.0)                           Lymphocytes (%) (Auto)              22.10 %      (20.00-45.00)                           Monocytes (%) (Auto)              12.50 %      (3.00-10.00)                           Eosinophils (%) (Auto)              1.50 %      (0.00-7.00)                           Basophils (%) (Auto)              0.70 %      (0.00-3.00)                           Granulocytes #              3.47 10*3/uL      (2.00-8.00)                           Lymphocytes # (Auto)              1.22 10*3/uL      (1.00-5.00)                           Monocytes # (Auto)              0.69 10*3/uL      (0.20-1.20)                           Eosinophils # (Auto)               0.08 10*3/uL      (0.00-0.70)                           Basophils # (Auto)              0.04 10*3/uL      (0.00-0.20)                           Immature Granulocytes %              0.2 %      (0.0-1.8)                           Nucleated Red Blood Cells %              0.00 %      (0.00-0.70)                           Immature Granulocytes #              0.01 10*3/uL      (0.00-0.20)                           Sodium Level              139 mmol/L      (135-147)                           Potassium Level              4.0 mmol/L      (3.5-5.3)                           Chloride Level              100 mmol/L      ()                           Carbon Dioxide Level              33 mmol/L      (22-32)                           Anion Gap              10 mmol/L      (8-19)                           Blood Urea Nitrogen              26 mg/dL      (5-25)                           Creatinine              1.01 mg/dL      (0.50-0.90)                           Glomerular Filtration Rate      Calc        49      mL/min/{1.73_m2}                           BUN/Creatinine Ratio              26 {ratio}      (6-20)                           Glucose Level              118 mg/dL      (65-99)                           Serum Osmolality  294 (273-304)                Calcium Level              9.3 mg/dL      (8.7-10.4)                           Total Bilirubin              0.59 mg/dL      (0.20-1.30)                           Aspartate Amino Transf      (AST/SGOT)        28 U/L (15-50)                           Alanine Aminotransferase      (ALT/SGPT)        15 U/L (10-40)                           Alkaline Phosphatase              60 U/L      ()                           Lactate Dehydrogenase              255 U/L      (120-240)                           Total Protein              7.1 g/dL      (6.3-8.2)                           Albumin              3.7 g/dL      (3.5-5.0)                           Globulin               3.4 g/dL      (2.0-3.5)                           Albumin/Globulin Ratio              1.1 {ratio}      (1.4-2.6)                    Impression/Problem List      Follicular lymphoma grade 1 stage III      Cardiomegaly      History of large B-cell lymphoma status post chemotherapy in 2008 with complete     response.      History of bladder cancer followed by Dr. 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  Close observation since the patient     has no B symptoms and is of advanced age.  Patient agreed to plan of treatment.      Daughter present      Repeat PET CT scan in September      Return in 3 months with a CBC, CMP, LDH level.      Copy of PET scan from March 2019 was given to daughter per request.            IV INVASIVE LINE CARE      IV ASSESSMENT      IV Line Location:  Chest      IV Location Modifier:  Right      IV Line Type:  Port-A-Cath      Insertion Date:  May 2, 2019      IV Catheter Gauge:  20      Site Observation:  Asymptomatic      IV Care:  Blood Return Present, Flushes Easily, Heparin Flush, Maintained per     Policy, Saline Flush, Site Care, Start per Policy      IV Discontinued Reason:  Patient Discharged      IV Infusion Comment:  Routine VAD Maintenance            POST INFUSION      Pt Tolerance to Procedure:  Good      Instructed on care of VAD/IV:  Yes            Patient Education:        DI for Non-Hodgkin's Lymphoma            PREVENTION      Hx Influenza Vaccination:  Yes      Influenza Vaccine Declined:  No      2 or More Falls Past Year?:  No      Fall Past Year with  Injury?:  No      Encouraged to follow-up with:  PCP regarding preventative exams.      Chart initiated by      TAI English MA                 Disclaimer: Converted document may not contain table formatting or lab diagrams. Please see [employer] [facility] for the authenticated document.

## 2021-05-28 NOTE — PROGRESS NOTES
Patient: QUEENIE MURRIETA     Acct: [id number]     Report: #[id number]  UNIT #: [id number]     : 1928    Encounter Date:10/02/2019  PRIMARY CARE: Gala Rao  ***Signed***  --------------------------------------------------------------------------------------------------------------------  Progress Note      DATE: 10/2/19      Primary Care Provider:  Gala Rao      Referring Provider:  Gala Rao      Chief Complaint      FU/ NHL, RIC            Subjective      89-year-old white female has history of large cell non-Hodgkin's lymphoma     treated in  with chemotherapy.  In 2019 patient had a CT-guided needle    biopsy of retroperitoneal lymph node which came back positive for follicular     lymphoma.  This CAT scan was done because the patient complained of right lower     quadrant pain.  Patient did not have any B symptoms.  Initial conference with     patient and daughter consisted of observation as her treatments and she did not     have any B symptoms.  We agreed to have a repeat PET scan this month and the     patient and daughter is here for results.            Past Med/Surg History            Past Med/Surg History:   Hypertension             Diabetes Mellitus             Heart Disease             No Blood Clots             Cancer (Diffuse large B-cell lymphoma in )             No Lung Disease             No Kidney Disease             No Other            Social History      Social History:  No Tobacco Use, No Alcohol Use, No Recreational Drug use, No     Other            Allergies      Coded Allergies:             IODINATED CONTRAST MEDIA (Verified  Allergy, Unknown, HIVES,SNEEZING,ITCHY     EYES, 19)           PENICILLINS (Verified  Allergy, Unknown, RASH, 19)           SPINACH (Verified  Allergy, Unknown, 19)                  SHOWED UP ON ALLERGY TESTING                    SULFA (SULFONAMIDE ANTIBIOTICS) (Verified  Allergy, Unknown, RASH, 19)             Medications      Medications    Last Reconciled on 10/2/19 17:45 by RAHEL WESLEY MD      Aspirin Chew (Aspirin Baby) 81 Mg Tab.chew      81 MG PO QDAY, #30 TAB.CHEW 0 Refills         Reported         3/13/19       Furosemide* (Lasix*) 40 Mg Tablet      60 MG PO MOWEFR, #30 TAB 0 Refills         Reported         3/13/19       Calcium Carb/Vit D3 (CALCIUM 600-VIT D3 400 TABLET) 1 Each Tablet      1 EACH PO BID, #120 TAB 0 Refills         Reported         3/13/19       Losartan Potassium (Losartan*) 25 Mg Tablet      25 MG PO QDAY, #30 TAB 0 Refills         Reported         2/21/19       Dabigatran (Pradaxa) 75 Mg Capsule      75 MG PO BID, #60 CAP         Reported         2/21/19       Raloxifene HCl (Evista) 60 Mg Tablet      60 MG PO QDAY, TAB         Reported         2/21/19       Atorvastatin (Atorvastatin) 40 Mg Tablet      40 MG PO HS, #30 TAB 0 Refills         Reported         2/21/19       Carvedilol (Carvedilol) 6.25 Mg Tablet      12.5 MG PO BID for 30 Days, #120 TAB         Prov: Gala Rao         2/19/19       Ergocalciferol (Vitamin D2*) 50,000 Units Capsule      35558 UNITS PO Q14D, #4 CAP 0 Refills         Reported         10/22/18       Spironolactone (Aldactone) 25 Mg Tablet      12.5 MG PO QDAY, #30 TAB 0 Refills         Reported         8/12/14       Furosemide (Furosemide) 40 Mg Tablet      40 MG PO, #30 TAB 0 Refills         Reported         8/12/14       Polysaccharide Iron Complex (Niferex-150*) 150 Mg Cap      150 MG PO QDAY         Reported         1/28/09      Current Medications      Current Medications Reviewed 10/2/19            Pain Assessment      Pain Intensity:  0      Description:  None            Review of Systems      General:  No Anxiety; Fatigue Scale: (3), Pain Scale: (0)      HEENT:  No Hearing Changes; Visual Changes (SLIGHT); No Nasal Congestion      Respiratory:  No Cough; Shortness of Air; No Wheezing, No Congestion      Cardiovascular:  Pedal Edema (BOTH FEET); No  Palpitations, No Chest Pressure      Gastrointestinal:  No Nausea, No Vomiting, No Constipation; Appetite Good      Genitourinary:  No Dysuria      Musculoskeletal:  No Joint Effusions, No Joint Stiffness; Aches (FEET), Pains     (FEET)      Endocrine:  No Heat Intolerance, No Cold Intolerance      Hematologic:  No Bruising, No Swollen Glands      Allergic/Immunologic:  No Throat closing off, No Itchy eyes      Psychological:  No Anxiety, No Depression      Neurological:  No Dizziness; Weakness      Skin:  No Open Wounds; Edema            Vitals      Height 5 ft 1.75 in / 156.85 cm      Weight 148 lbs 6.4 oz / 67.795636 kg      BSA 1.68 m2      BMI 27.4 kg/m2      Temperature 97.7 F / 36.5 C      Pulse 61      Blood Pressure 122/76      Pulse Oximetry 95%            Exam      Constitutional:  No acute distress, Conversant, Pleasant      Cardiovascular:  RRR; No Murmurs; Normal PMI; No Peripheral Edema      Lungs:  Clear to Ausculation, Normal Respiratory Effort      Psychological:  Appropriate affect, Appropriate mood, Intact judgement, Alert            Lab Results      Laboratory Tests      9/27/19 14:26            Laboratory Tests            Test       7/25/19      13:30 9/27/19      14:26 9/29/19      21:57             Lymphocytes (%) (Auto)       25.70 %      (20.00-45.00)                           Monocytes (%) (Auto)       11.20 %      (3.00-10.00)                           Eosinophils (%) (Auto)       1.60 %      (0.00-7.00)                           Basophils (%) (Auto)       0.70 %      (0.00-3.00)                           Magnesium Level       2.13 mg/dL      (1.60-2.30)                           Iron Level       82 ug/dL      ()                           Total Iron Binding Capacity       376 ug/dL      (245-450)                           Percent Iron Saturation 22 % (20-55)                Transferrin       263.00 mg/dL      (250..00)                           Triglycerides Level       45  mg/dL      ()                           Cholesterol Level       136 mg/dL      (107-200)                           LDL Cholesterol       52 mg/dL      ()                           VLDL Cholesterol 9 mg/dL (5-37)                HDL Cholesterol       75 mg/dL      (40-60)                           Cholesterol/HDL Ratio       1.8 NA      (3.0-6.0)                           Vitamin B12 Level       962 pg/mL      (211-911)                           Vitamin D 25-Hydroxy       46.7 ng/mL      (30.0-100.0)                           Folate       >20.0 ng/mL      (4.8-20.0)                           Thyroid Stimulating Hormone      (TSH) 1.360 m(iU)/L      (0.270-4.200)                           Free Thyroxine       1.2 ng/dL      (0.9-1.8)                           White Blood Count              6.99 10*3/uL      (4.80-10.80)                    Red Blood Count              3.99 10*6/uL      (4.20-5.40)                    Hemoglobin              12.7 g/dL      (12.0-16.0)                    Hematocrit              39.1 %      (37.0-47.0)                    Mean Corpuscular Volume              98.0 fL      (81.0-99.0)                    Mean Corpuscular Hemoglobin              31.8 pg      (27.0-31.0)                    Mean Corpuscular Hemoglobin      Concent        32.5      (33.0-37.0)                    Red Cell Distribution Width              14.1 %      (11.7-14.4)                    RDW Standard Deviation              51.1 fL      (36.4-46.3)                    Platelet Count              202 10*3/uL      (130-400)                    Mean Platelet Volume              10.2 fL      (9.4-12.3)                    Granulocytes (%)              64.1 %      (30.0-85.0)                    Granulocytes #              4.47 10*3/uL      (2.00-8.00)                    Lymphocytes # (Auto)              1.65 10*3/uL      (1.00-5.00)                    Monocytes # (Auto)              0.75 10*3/uL      (0.20-1.20)                     Eosinophils # (Auto)              0.08 10*3/uL      (0.00-0.70)                    Basophils # (Auto)              0.03 10*3/uL      (0.00-0.20)                    Immature Granulocytes %              0.1 %      (0.0-1.8)                    Lymphocytes %              23.6 %      (20.0-45.0)                    Monocytes %              10.7 %      (3.0-10.0)                    Eosinophils %              1.1 %      (0.0-7.0)                    Basophils %              0.4 %      (0.0-3.0)                    Nucleated Red Blood Cells %              0.00 %      (0.00-0.70)                    Immature Granulocytes #              0.01 10*3/uL      (0.00-0.20)                    Erythrocyte Sedimentation Rate  22 mm/h (0-30)               Sodium Level              143 mmol/L      (135-147)                    Potassium Level              4.2 mmol/L      (3.5-5.3)                    Chloride Level              104 mmol/L      ()                    Carbon Dioxide Level              25 mmol/L      (22-32)                    Anion Gap              18 mmol/L      (8-19)                    Blood Urea Nitrogen              26 mg/dL      (5-25)                    Creatinine              0.95 mg/dL      (0.50-0.90)                    Glomerular Filtration Rate      Calc        52      mL/min/{1.73_m2}                    BUN/Creatinine Ratio              27 {ratio}      (6-20)                    Glucose Level              90 mg/dL      (65-99)                    Serum Osmolality  300 (273-304)               Calcium Level              9.6 mg/dL      (8.7-10.4)                    Total Bilirubin              0.75 mg/dL      (0.20-1.30)                    Aspartate Amino Transf      (AST/SGOT)        29 U/L (15-50)                           Alanine Aminotransferase      (ALT/SGPT)        18 U/L (10-40)                           Alkaline Phosphatase              55 U/L      ()                    Lactate Dehydrogenase               263 U/L      (120-240)                    Total Protein              7.3 g/dL      (6.3-8.2)                    Albumin              4.0 g/dL      (3.5-5.0)                    Globulin              3.3 g/dL      (2.0-3.5)                    Albumin/Globulin Ratio              1.2 {ratio}      (1.4-2.6)                    Lab Scanned Report              LAB FINAL      CUMULATIVES -            Radiology Impressions      PET CT scan done on September 17, 2019 compared to the one done on March 22, 2019 showed interval progression of disease where patient's left infraclavicular     lymph node has increased in size and SUV.  Left axillary lymph node increased     in size and SUV.  A left periaortic lymph node is larger and possibly     compressing on the left renal vein associated with left hydronephrosis.            Impression/Problem List      Follicular lymphoma grade 1 stage III      Cardiomegaly      History of large B-cell lymphoma status post chemotherapy in 2008 with complete     response.      History of bladder cancer followed by Dr. 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  Patient and daughter     agreed to receive Rituxan.  Benefits and side effects were discussed.            Patient Education:        DI for Iron Deficiency Anemia-Adult            PREVENTION      Hx Influenza Vaccination:  Yes      Influenza Vaccine Declined:  No      2 or More Falls Past Year?:  No      Fall Past Year with Injury?:  No      Encouraged to follow-up with:  PCP regarding preventative exams.      Chart initiated by      JOSÉ MANUEL JEAN                 Disclaimer: Converted document may not contain table formatting or lab diagrams. Please see [facility] for the authenticated document.

## 2021-05-28 NOTE — PROGRESS NOTES
Patient: QUEENIE MURRIETA     Acct: [id number]     Report: #[id number]  UNIT #: [id number]     : 1928    Encounter Date:10/23/2019  PRIMARY CARE: Gala Rao  ***Signed***  --------------------------------------------------------------------------------------------------------------------  Progress Note      DATE: 10/23/19      Primary Care Provider:  Gala Rao      Referring Provider:  Gala Rao      Chief Complaint      FU/ NHL RIC            Subjective      89-year-old white female is here for week 3 of his Rituxan for recently     diagnosed follicular lymphoma.  Patient denies any untoward side effects from Ri    tuxan.            Patient's CAT scan had shown increasing lymphadenopathy with compression and     resulting mild left hydronephrosis.            Patient apparently picked up medications for Singulair and digoxin and did not     understand why this was done since she has not taken this medications for     several years.            Past Med/Surg History            Past Med/Surg History:   Hypertension             Diabetes Mellitus             Heart Disease             No Blood Clots             Cancer (Diffuse large B-cell lymphoma in )             No Lung Disease             No Kidney Disease             No Other            Social History      Social History:  No Tobacco Use, No Alcohol Use, No Recreational Drug use, No     Other            Allergies      Coded Allergies:             IODINATED CONTRAST MEDIA (Verified  Allergy, Unknown, HIVES,SNEEZING,ITCHY     EYES, 10/10/19)           PENICILLINS (Verified  Allergy, Unknown, RASH, 10/10/19)           SPINACH (Verified  Allergy, Unknown, 10/10/19)                  SHOWED UP ON ALLERGY TESTING                    SULFA (SULFONAMIDE ANTIBIOTICS) (Verified  Allergy, Unknown, RASH,     10/10/19)            Medications      Medications    Last Reconciled on 10/23/19 18:44 by RAHEL WESLEY MD      Ondansetron Hcl (ONDANSETRON HCL) 4 Mg  Tablet      4 MG PO Q4HR PRN for NAUSEA AND/OR VOMITING, #60 TAB 1 Refill         Prov: Rhoda Zaargoza         10/7/19       Aspirin Chew (Aspirin Baby) 81 Mg Tab.chew      81 MG PO QDAY, #30 TAB.CHEW 0 Refills         Reported         3/13/19       Furosemide* (Lasix*) 40 Mg Tablet      60 MG PO MOWEFR, #30 TAB 0 Refills         Reported         3/13/19       Calcium Carb/Vit D3 (CALCIUM 600-VIT D3 400 TABLET) 1 Each Tablet      1 EACH PO BID, #120 TAB 0 Refills         Reported         3/13/19       Losartan Potassium (Losartan*) 25 Mg Tablet      25 MG PO QDAY, #30 TAB 0 Refills         Reported         2/21/19       Dabigatran (Pradaxa) 75 Mg Capsule      75 MG PO BID, #60 CAP         Reported         2/21/19       Raloxifene HCl (Evista) 60 Mg Tablet      60 MG PO QDAY, TAB         Reported         2/21/19       Atorvastatin (Atorvastatin) 40 Mg Tablet      40 MG PO HS, #30 TAB 0 Refills         Reported         2/21/19       Carvedilol (Carvedilol) 6.25 Mg Tablet      12.5 MG PO BID for 30 Days, #120 TAB         Prov: Gala Rao         2/19/19       Ergocalciferol (Vitamin D2*) 50,000 Units Capsule      14008 UNITS PO Q14D, #4 CAP 0 Refills         Reported         10/22/18       Spironolactone (Aldactone) 25 Mg Tablet      12.5 MG PO QDAY, #30 TAB 0 Refills         Reported         8/12/14       Furosemide (Furosemide) 40 Mg Tablet      40 MG PO, #30 TAB 0 Refills         Reported         8/12/14       Polysaccharide Iron Complex (Niferex-150*) 150 Mg Cap      150 MG PO QDAY         Reported         1/28/09      Current Medications      Current Medications Reviewed 10/23/19            Pain Assessment      Pain Intensity:  0      Description:  None            Review of Systems      General:  No Anxiety; Fatigue Scale: (3), Pain Scale: (0)      HEENT:  No Hearing Changes, No Rhinorrhea, No Visual Changes      Respiratory:  Cough (ALLERGIES); No Shortness of Air, No Sputum Changes, No     Wheezing       Cardiovascular:  Pedal Edema (ANKLES); No Palpitations, No Chest Pressure      Gastrointestinal:  No Nausea, No Vomiting, No Constipation, No Diarrhea      Genitourinary:  No Dysuria      Musculoskeletal:  No Joint Effusions, No Joint Stiffness; Aches (R FOOT CALLUS)      Endocrine:  No Cold Intolerance; Fatigue; No Blood Sugar Control      Hematologic:  No Bruising, No Swollen Glands      Allergic/Immunologic:  No Throat closing off, No Nasal drip, No Itchy eyes      Psychological:  No Depression      Neurological:  No Headaches, No Dizziness, No Weakness      Skin:  No Open Wounds; Edema            Vitals      Height 5 ft 1.75 in / 156.85 cm      Weight 145 lbs 12.8 oz / 66.945899 kg      BSA 1.67 m2      BMI 26.9 kg/m2      Temperature 98.2 F / 36.78 C      Pulse 62      Blood Pressure 123/60            Exam      Constitutional:  No acute distress, Conversant, Pleasant      Eyes:  Anicteric sclerae, Palpebral Conjunctivae (Pink)      HENT:  Oropharynx clear; No Erythema; Buccal mucosae (Pink)      Neck:  Supple, Full Range of Motion      Cardiovascular:  RRR, Normal PMI; No Peripheral Edema      Lungs:  Clear to Ausculation, Normal Respiratory Effort      Abdomen:  Soft, NABS; No Masses, No Tenderness      Chest:  Other (Symmetrical and equal)      Lymphatic:  No Neck      Extremities:  No digital cyanosis, No digital ischemia, Normal gait, Other (No     deformity)      Neurological:  Cranial Nerve II-XII (Intact); No Focal Sensory deficits      Psychological:  Appropriate affect, Appropriate mood, Intact judgement, Alert      Skin:  Normal temperature, Other (No dermatoses)            Lab Results      Laboratory Tests      10/23/19 13:50            Laboratory Tests            Test       7/25/19      13:30 9/27/19      14:26 10/7/19      14:15 10/18/19      21:57             Lymphocytes (%) (Auto)       25.70 %      (20.00-45.00)                           Monocytes (%) (Auto)       11.20 %      (3.00-10.00)                            Eosinophils (%) (Auto)       1.60 %      (0.00-7.00)                           Basophils (%) (Auto)       0.70 %      (0.00-3.00)                           Magnesium Level       2.13 mg/dL      (1.60-2.30)                           Iron Level       82 ug/dL      ()                           Total Iron Binding Capacity       376 ug/dL      (245-450)                           Percent Iron Saturation 22 % (20-55)                 Transferrin       263.00 mg/dL      (250..00)                           Triglycerides Level       45 mg/dL      ()                           Cholesterol Level       136 mg/dL      (107-200)                           LDL Cholesterol       52 mg/dL      ()                           VLDL Cholesterol 9 mg/dL (5-37)                 HDL Cholesterol       75 mg/dL      (40-60)                           Cholesterol/HDL Ratio       1.8 NA      (3.0-6.0)                           Vitamin B12 Level       962 pg/mL      (211-911)                           Vitamin D 25-Hydroxy       46.7 ng/mL      (30.0-100.0)                           Folate       >20.0 ng/mL      (4.8-20.0)                           Thyroid Stimulating Hormone      (TSH) 1.360 m(iU)/L      (0.270-4.200)                           Free Thyroxine       1.2 ng/dL      (0.9-1.8)                           Erythrocyte Sedimentation Rate  22 mm/h (0-30)                Uric Acid              6.6 mg/dL      (2.5-7.5)                    Lactate Dehydrogenase              268 U/L      (120-240)                    Serum Total Protein              6.9 g/dL      (6.0-8.5)                    Alpha-1-Globulins              0.2 g/dL      (0.0-0.4)                    Alpha-2-Globulins              0.8 g/dL      (0.4-1.0)                    Beta Globulins              1.0 g/dL      (0.7-1.3)                    Gamma Globulins              1.4 g/dL      (0.4-1.8)                    Protein Electrophoresis Note    Comment NA               Protein Electrophoresis      Interpret               Comment NA                           Immunoglobulin G              1324 mg/dL      (700-1600)                    Immunoglobulin A              298 mg/dL      ()                    Immunoglobulin M              181 mg/dL      ()                    Serum Immunofixation   Comment NA               M-Prashanth (TOMI)              Not Observed      g/dL (Not                    Hepatitis A IgM Antibody              Negative NA      (Negative)                    Hepatitis B Surface Antigen              Negative NA      (Negative)                    Hepatitis B Core IgM Antibody              Negative NA      (Negative)                    Lab Scanned Report              LAB FINAL      CUMULATIVES -             Test       10/23/19      13:50                           White Blood Count       6.65 10*3/uL      (4.80-10.80)                           Red Blood Count       4.13 10*6/uL      (4.20-5.40)                           Hemoglobin       12.8 g/dL      (12.0-16.0)                           Hematocrit       39.9 %      (37.0-47.0)                           Mean Corpuscular Volume       96.6 fL      (81.0-99.0)                           Mean Corpuscular Hemoglobin       31.0 pg      (27.0-31.0)                           Mean Corpuscular Hemoglobin      Concent 32.1      (33.0-37.0)                           Red Cell Distribution Width       13.7 %      (11.7-14.4)                           RDW Standard Deviation       48.9 fL      (36.4-46.3)                           Platelet Count       219 10*3/uL      (130-400)                           Mean Platelet Volume       11.0 fL      (9.4-12.3)                           Granulocytes (%)       70.5 %      (30.0-85.0)                           Granulocytes #       4.69 10*3/uL      (2.00-8.00)                           Lymphocytes # (Auto)       1.15 10*3/uL      (1.00-5.00)                            Monocytes # (Auto)       0.70 10*3/uL      (0.20-1.20)                           Eosinophils # (Auto)       0.05 10*3/uL      (0.00-0.70)                           Basophils # (Auto)       0.04 10*3/uL      (0.00-0.20)                           Immature Granulocytes %       0.3 %      (0.0-1.8)                           Lymphocytes %       17.3 %      (20.0-45.0)                           Monocytes %       10.5 %      (3.0-10.0)                           Eosinophils %       0.8 %      (0.0-7.0)                           Basophils %       0.6 %      (0.0-3.0)                           Nucleated Red Blood Cells %       0.00 %      (0.00-0.70)                           Immature Granulocytes #       0.02 10*3/uL      (0.00-0.20)                           Sodium Level       141 mmol/L      (135-147)                           Potassium Level       3.9 mmol/L      (3.5-5.3)                           Chloride Level       100 mmol/L      ()                           Carbon Dioxide Level       28 mmol/L      (22-32)                           Anion Gap       17 mmol/L      (8-19)                           Blood Urea Nitrogen       26 mg/dL      (5-25)                           Creatinine       0.93 mg/dL      (0.50-0.90)                           Glomerular Filtration Rate      Calc 54      mL/min/{1.73_m2}                           BUN/Creatinine Ratio       28 {ratio}      (6-20)                           Glucose Level       141 mg/dL      (65-99)                           Serum Osmolality 299 (273-304)                 Calcium Level       10.1 mg/dL      (8.7-10.4)                           Total Bilirubin       0.66 mg/dL      (0.20-1.30)                           Aspartate Amino Transf      (AST/SGOT) 27 U/L (15-50)                           Alanine Aminotransferase      (ALT/SGPT) 17 U/L (10-40)                           Alkaline Phosphatase       59 U/L      ()                           Total Protein        7.2 g/dL      (6.3-8.2)                           Albumin       3.9 g/dL      (3.5-5.0)                           Globulin       3.3 g/dL      (2.0-3.5)                           Albumin/Globulin Ratio       1.2 {ratio}      (1.4-2.6)                    Impression/Problem List      Follicular lymphoma grade 1 stage III      Cardiomegaly      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      History of bladder cancer followed by Dr. 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            Patient Education:        DI for Non-Hodgkin's Lymphoma            PREVENTION      Hx Influenza Vaccination:  No      Influenza Vaccine Declined:  No      2 or More Falls Past Year?:  No      Fall Past Year with Injury?:  No      Hx Pneumococcal Vaccination:  No      Encouraged to follow-up with:  PCP regarding preventative exams.      Chart initiated by      JOSÉ MANUEL JEAN.            Electronically signed by Rhoda Zaragoza  10/23/2019 18:44       Disclaimer: Converted document may not contain table formatting or lab diagrams. Please see [facility] for the authenticated document.

## 2021-05-28 NOTE — PROGRESS NOTES
Patient: QUEENIE MURRIETA     Acct: [id number]     Report: #[id number]  UNIT #: [id number]     : 1928    Encounter Date:10/30/2019  PRIMARY CARE: Gala Rao  ***Signed***  --------------------------------------------------------------------------------------------------------------------  Progress Note      DATE: 10/30/19      Primary Care Provider:  Gala Rao      Referring Provider:  Gala Rao      Chief Complaint      FU/ NHL, RIC            Subjective      89-year-old white female has been diagnosed with follicular lymphoma this past     February.  Patient had started that Rituxan because of left hydronephrosis     secondary to the lymphadenopathy.  Patient is here for her fourth course.  She     has not had any side effects from treatment            Past Med/Surg History            Past Med/Surg History:   Hypertension             Diabetes Mellitus             Heart Disease             No Blood Clots             Cancer (Diffuse large B-cell lymphoma in )             No Lung Disease             No Kidney Disease             No Other            Social History      Social History:  No Tobacco Use, No Alcohol Use, No Recreational Drug use, No     Other            Allergies      Coded Allergies:             IODINATED CONTRAST MEDIA (Verified  Allergy, Unknown, HIVES,SNEEZING,ITCHY     EYES, 10/10/19)           PENICILLINS (Verified  Allergy, Unknown, RASH, 10/10/19)           SPINACH (Verified  Allergy, Unknown, 10/10/19)                  SHOWED UP ON ALLERGY TESTING                    SULFA (SULFONAMIDE ANTIBIOTICS) (Verified  Allergy, Unknown, RASH,     10/10/19)            Medications      Medications    Last Reconciled on 10/30/19 14:31 by RAHEL ZARAGOZA MD      Ondansetron Hcl (ONDANSETRON HCL) 4 Mg Tablet      4 MG PO Q4HR PRN for NAUSEA AND/OR VOMITING, #60 TAB 1 Refill         Prov: Rhoda Zaragoza         10/7/19       Aspirin Chew (Aspirin Baby) 81 Mg Tab.chew      81 MG PO QDAY, #30  TAB.CHEW 0 Refills         Reported         3/13/19       Furosemide* (Lasix*) 40 Mg Tablet      60 MG PO MOWEFR, #30 TAB 0 Refills         Reported         3/13/19       Calcium Carb/Vit D3 (CALCIUM 600-VIT D3 400 TABLET) 1 Each Tablet      1 EACH PO BID, #120 TAB 0 Refills         Reported         3/13/19       Losartan Potassium (Losartan*) 25 Mg Tablet      25 MG PO QDAY, #30 TAB 0 Refills         Reported         2/21/19       Dabigatran (Pradaxa) 75 Mg Capsule      75 MG PO BID, #60 CAP         Reported         2/21/19       Raloxifene HCl (Evista) 60 Mg Tablet      60 MG PO QDAY, TAB         Reported         2/21/19       Atorvastatin (Atorvastatin) 40 Mg Tablet      40 MG PO HS, #30 TAB 0 Refills         Reported         2/21/19       Carvedilol (Carvedilol) 6.25 Mg Tablet      12.5 MG PO BID for 30 Days, #120 TAB         Prov: Gala Rao         2/19/19       Ergocalciferol (Vitamin D2*) 50,000 Units Capsule      19688 UNITS PO Q14D, #4 CAP 0 Refills         Reported         10/22/18       Spironolactone (Aldactone) 25 Mg Tablet      12.5 MG PO QDAY, #30 TAB 0 Refills         Reported         8/12/14       Furosemide (Furosemide) 40 Mg Tablet      40 MG PO, #30 TAB 0 Refills         Reported         8/12/14       Polysaccharide Iron Complex (Niferex-150*) 150 Mg Cap      150 MG PO QDAY         Reported         1/28/09      Current Medications      Current Medications Reviewed 10/30/19            Pain Assessment      Pain Intensity:  0      Description:  None            Review of Systems      General:  Fatigue Scale: (3), Pain Scale: (0)      HEENT:  No Hearing Changes, No Rhinorrhea, No Visual Changes      Respiratory:  Shortness of Air; No Wheezing, No Congestion      Cardiovascular:  Pedal Edema; No Palpitations, No Chest Pressure      Gastrointestinal:  No Nausea, No Vomiting, No Constipation; Appetite Good,     Appetite Fair (PT SAYS SOME DAYS APPETITE GOOD AND SOME DAYS ITS FAIR)      Genitourinary:   No Dysuria      Musculoskeletal:  No Joint Tenderness, No Joint Stiffness; Aches (R FOOT CALLUS)      Endocrine:  No Cold Intolerance; Fatigue, Blood Sugar Control (PT SAID BLOOD     SUGAR UP AND DOWN)      Hematologic:  No Bruising, No Swollen Glands      Allergic/Immunologic:  No Throat closing off, No Nasal drip      Psychological:  No Depression      Neurological:  No Headaches, No Dizziness, No Weakness      Skin:  No Open Wounds; Edema (ANKLES)            Vitals      Height 5 ft 1.75 in / 156.85 cm      Weight 145 lbs 12.800 oz / 66.431645 kg      BSA 1.67 m2      BMI 26.9 kg/m2      Temperature 98.1 F / 36.72 C      Pulse 73      Blood Pressure 129/48 Sitting      Pulse Oximetry 96%            Exam      Constitutional:  No acute distress, Conversant, Pleasant      Eyes:  Anicteric sclerae, Palpebral Conjunctivae (Pink), WILLIE      HENT:  Oropharynx clear, Buccal mucosae (Pink)      Neck:  Supple, Full Range of Motion      Cardiovascular:  RRR, Murmurs, Normal PMI; No Peripheral Edema      Lungs:  Clear to Ausculation, Normal Respiratory Effort      Abdomen:  Soft, NABS; No Tenderness      Chest:  Other (Symmetrical and equal)      Lymphatic:  No Neck      Extremities:  No digital cyanosis, No digital ischemia, Normal gait, Other (No     deformity)      Neurological:  Cranial Nerve II-XII (Intact); No Focal Sensory deficits      Psychological:  Appropriate affect, Appropriate mood, Intact judgement, Alert      Skin:  Other (No dermatosis)            Lab Results      Laboratory Tests      10/23/19 13:50            Laboratory Tests            Test       7/25/19      13:30 9/27/19      14:26 10/7/19      14:15 10/23/19      13:50             Lymphocytes (%) (Auto)       25.70 %      (20.00-45.00)                           Monocytes (%) (Auto)       11.20 %      (3.00-10.00)                           Eosinophils (%) (Auto)       1.60 %      (0.00-7.00)                           Basophils (%) (Auto)       0.70 %       (0.00-3.00)                           Magnesium Level       2.13 mg/dL      (1.60-2.30)                           Iron Level       82 ug/dL      ()                           Total Iron Binding Capacity       376 ug/dL      (245-450)                           Percent Iron Saturation 22 % (20-55)                 Transferrin       263.00 mg/dL      (250..00)                           Triglycerides Level       45 mg/dL      ()                           Cholesterol Level       136 mg/dL      (107-200)                           LDL Cholesterol       52 mg/dL      ()                           VLDL Cholesterol 9 mg/dL (5-37)                 HDL Cholesterol       75 mg/dL      (40-60)                           Cholesterol/HDL Ratio       1.8 NA      (3.0-6.0)                           Vitamin B12 Level       962 pg/mL      (211-911)                           Vitamin D 25-Hydroxy       46.7 ng/mL      (30.0-100.0)                           Folate       >20.0 ng/mL      (4.8-20.0)                           Thyroid Stimulating Hormone      (TSH) 1.360 m(iU)/L      (0.270-4.200)                           Free Thyroxine       1.2 ng/dL      (0.9-1.8)                           Erythrocyte Sedimentation Rate  22 mm/h (0-30)                Uric Acid              6.6 mg/dL      (2.5-7.5)                    Lactate Dehydrogenase              268 U/L      (120-240)                    Serum Total Protein              6.9 g/dL      (6.0-8.5)                    Alpha-1-Globulins              0.2 g/dL      (0.0-0.4)                    Alpha-2-Globulins              0.8 g/dL      (0.4-1.0)                    Beta Globulins              1.0 g/dL      (0.7-1.3)                    Gamma Globulins              1.4 g/dL      (0.4-1.8)                    Protein Electrophoresis Note   Comment NA               Protein Electrophoresis      Interpret               Comment NA                           Immunoglobulin G               1324 mg/dL      (700-1600)                    Immunoglobulin A              298 mg/dL      ()                    Immunoglobulin M              181 mg/dL      ()                    Serum Immunofixation   Comment NA               M-Prashanth (TOMI)              Not Observed      g/dL (Not                    Hepatitis A IgM Antibody              Negative NA      (Negative)                    Hepatitis B Surface Antigen              Negative NA      (Negative)                    Hepatitis B Core IgM Antibody              Negative NA      (Negative)                    White Blood Count              6.65 10*3/uL      (4.80-10.80)             Red Blood Count              4.13 10*6/uL      (4.20-5.40)             Hemoglobin              12.8 g/dL      (12.0-16.0)             Hematocrit              39.9 %      (37.0-47.0)             Mean Corpuscular Volume              96.6 fL      (81.0-99.0)             Mean Corpuscular Hemoglobin              31.0 pg      (27.0-31.0)             Mean Corpuscular Hemoglobin      Concent               32.1      (33.0-37.0)             Red Cell Distribution Width              13.7 %      (11.7-14.4)             RDW Standard Deviation              48.9 fL      (36.4-46.3)             Platelet Count              219 10*3/uL      (130-400)             Mean Platelet Volume              11.0 fL      (9.4-12.3)             Granulocytes (%)              70.5 %      (30.0-85.0)             Granulocytes #              4.69 10*3/uL      (2.00-8.00)             Lymphocytes # (Auto)              1.15 10*3/uL      (1.00-5.00)             Monocytes # (Auto)              0.70 10*3/uL      (0.20-1.20)             Eosinophils # (Auto)              0.05 10*3/uL      (0.00-0.70)             Basophils # (Auto)              0.04 10*3/uL      (0.00-0.20)             Immature Granulocytes %              0.3 %      (0.0-1.8)             Lymphocytes %              17.3 %      (20.0-45.0)              Monocytes %              10.5 %      (3.0-10.0)             Eosinophils %              0.8 %      (0.0-7.0)             Basophils %              0.6 %      (0.0-3.0)             Nucleated Red Blood Cells %              0.00 %      (0.00-0.70)             Immature Granulocytes #              0.02 10*3/uL      (0.00-0.20)             Sodium Level              141 mmol/L      (135-147)             Potassium Level              3.9 mmol/L      (3.5-5.3)             Chloride Level              100 mmol/L      ()             Carbon Dioxide Level              28 mmol/L      (22-32)             Anion Gap              17 mmol/L      (8-19)             Blood Urea Nitrogen              26 mg/dL      (5-25)             Creatinine              0.93 mg/dL      (0.50-0.90)             Glomerular Filtration Rate      Calc               54      mL/min/{1.73_m2}             BUN/Creatinine Ratio              28 {ratio}      (6-20)             Glucose Level              141 mg/dL      (65-99)             Serum Osmolality    299 (273-304)              Calcium Level              10.1 mg/dL      (8.7-10.4)             Total Bilirubin              0.66 mg/dL      (0.20-1.30)             Aspartate Amino Transf      (AST/SGOT)               27 U/L (15-50)                    Alanine Aminotransferase      (ALT/SGPT)               17 U/L (10-40)                    Alkaline Phosphatase              59 U/L      ()             Total Protein              7.2 g/dL      (6.3-8.2)             Albumin              3.9 g/dL      (3.5-5.0)             Globulin              3.3 g/dL      (2.0-3.5)             Albumin/Globulin Ratio              1.2 {ratio}      (1.4-2.6)             Test       10/25/19      21:57                           Lab Scanned Report       LAB FINAL      CUMULATIVES -                    Impression/Problem List      Follicular lymphoma grade 1 stage III      Cardiomegaly      History of large B-cell lymphoma stage IV  status post chemotherapy in 2008 with     complete response.      History of bladder cancer followed by Dr. Grubbs      Aortic stenosis      History of diastolic congestive heart failure      Atrial fibrillation      History of hypertension      Osteoporosis      Lung nodule right lower lobe      Problems:        (1) Essential hypertension      (2) Diabetes mellitus type 2 in nonobese      (3) History of diffuse large B-cell lymphoma      (4) Follicular non-Hodgkin's lymphoma            Plan            Rituxan week 4.      PET CT scan in 2 months to check for response.            CBC, CMP, LDH on next visit.            Patient Education:        DI for Diabetes Type 1 -- Adult            PREVENTION      Hx Influenza Vaccination:  No      Influenza Vaccine Declined:  No      2 or More Falls Past Year?:  No      Fall Past Year with Injury?:  No      Hx Pneumococcal Vaccination:  No      Encouraged to follow-up with:  PCP regarding preventative exams.      Chart initiated by      JOSÉ MANUEL JEAN.            Electronically signed by Rhoda Zaragoza  10/30/2019 14:31       Disclaimer: Converted document may not contain table formatting or lab diagrams. Please see [employer] for the authenticated document.

## 2021-05-28 NOTE — PROGRESS NOTES
Patient: QUEENIE MURRIETA     Acct: [id number]     Report: #[id number]  UNIT #: [id number]     : 1928    Encounter Date:2020  PRIMARY CARE: Gala Rao  ***Signed***  --------------------------------------------------------------------------------------------------------------------  Progress Note      DATE: 20      Primary Care Provider:  Gala Rao      Referring Provider:  Gala Rao      Chief Complaint      FU NHL, RIC            Subjective      89-year-old white female with history of follicular lymphoma diagnosed in 2019.     Patient had 1 course of Rituxan weekly x4 which ended up in 2019.      Patient had for deferred maintenance treatment and I requested for repeat PET     scan.  Patient has been given treatment because she had obstructive nephropathy     secondary to her retroperitoneal lymphadenopathy.            She complains of fatigue but this was not related to her lymphoma.      She is accompanied by her daughter to get results of her PET CT scan.            Past Med/Surg History            Past Med/Surg History:   Hypertension             Diabetes Mellitus             Heart Disease             No Blood Clots             Cancer (Diffuse large B-cell lymphoma in )             No Lung Disease             No Kidney Disease             No Other            Social History      Social History:  No Tobacco Use, No Alcohol Use, No Recreational Drug use, No     Other            Allergies      Coded Allergies:             IODINATED CONTRAST MEDIA (Verified  Allergy, Unknown, HIVES,SNEEZING,ITCHY     EYES, 20)           PENICILLINS (Verified  Allergy, Unknown, RASH, 20)           SPINACH (Verified  Allergy, Unknown, 20)                  SHOWED UP ON ALLERGY TESTING                    SULFA (SULFONAMIDE ANTIBIOTICS) (Verified  Allergy, Unknown, RASH, 20)            Medications      Medications    Last Reconciled on 20 16:37 by RAHEL WESLEY MD       Montelukast Sodium (Singulair*) 10 Mg Tab      10 MG PO QDAY, #30 TAB 0 Refills         Reported         12/18/19       Aspirin Chew (Aspirin Baby) 81 Mg Tab.chew      81 MG PO QDAY, #30 TAB.CHEW 0 Refills         Reported         3/13/19       Furosemide* (Lasix*) 40 Mg Tablet      60 MG PO MOWEFR, #30 TAB 0 Refills         Reported         3/13/19       Calcium Carb/Vit D3 (CALCIUM 600-VIT D3 400 TABLET) 1 Each Tablet      1 EACH PO BID, #120 TAB 0 Refills         Reported         3/13/19       Losartan Potassium (Losartan*) 25 Mg Tablet      25 MG PO QDAY, #30 TAB 0 Refills         Reported         2/21/19       Dabigatran (Pradaxa) 75 Mg Capsule      75 MG PO BID, #60 CAP         Reported         2/21/19       Raloxifene HCl (Evista) 60 Mg Tablet      60 MG PO QDAY, TAB         Reported         2/21/19       Atorvastatin (Atorvastatin) 40 Mg Tablet      40 MG PO HS, #30 TAB 0 Refills         Reported         2/21/19       Carvedilol (Carvedilol) 6.25 Mg Tablet      12.5 MG PO BID for 30 Days, #120 TAB         Prov: Gala Rao         2/19/19       Ergocalciferol (Vitamin D2) (Vitamin D2) 50,000 Units Capsule      [address]12 UNITS PO Q14D, #4 CAP 0 Refills         Reported         10/22/18       Spironolactone (Aldactone) 25 Mg Tablet      12.5 MG PO QDAY, #30 TAB 0 Refills         Reported         8/12/14       Furosemide (Furosemide) 40 Mg Tablet      40 MG PO, #30 TAB 0 Refills         Reported         8/12/14       Polysaccharide Iron Complex (Niferex-150*) 150 Mg Cap      150 MG PO QDAY         Reported         1/28/09      Current Medications      Current Medications Reviewed 7/8/20            Pain Assessment      Pain Intensity:  0      Description:  None            Review of Systems      General:  Anxiety, Fatigue Scale: (4), Pain Scale: (0)      HEENT:  No Dysphagia      Respiratory:  No Cough; Shortness of Air      Cardiovascular:  No Chest Pain      Gastrointestinal:  No Nausea, No Vomiting; Appetite  Good (Good)      Genitourinary:  No Nocturia      Musculoskeletal:  No Joint Effusions, No Joint Tenderness      Endocrine:  No Heat Intolerance      Hematologic:  No Bleeding      Allergic/Immunologic:  No Hives      Psychological:  Anxiety; No Depression      Neurological:  No Headaches      Skin:  No Rash, No Open Wounds            Vitals      Height 5 ft 1.75 in / 156.85 cm      Weight 151 lbs 9.6 oz / 68.177714 kg      BSA 1.69 m2      BMI 28.0 kg/m2      Temperature 97.8 F / 36.56 C      Pulse 48      Respirations 12      Blood Pressure 119/53      Pulse Oximetry 96%            Exam      Constitutional:  No acute distress, Conversant, Pleasant      Psychological:  Appropriate affect, Appropriate mood, Intact judgement, Alert            Lab Results      Laboratory Tests      6/11/20 13:38            Laboratory Tests            Test       3/11/20      13:29 4/20/20      21:56 6/11/20      13:38             Lactate Dehydrogenase       271 U/L      (120-240)                           Lab Scanned Report              LAB FINAL      CUMULATIVES -                    White Blood Count              7.03 10*3/uL      (4.80-10.80)             Red Blood Count              3.80 10*6/uL      (4.20-5.40)             Hemoglobin              12.1 g/dL      (12.0-16.0)             Hematocrit              37.3 %      (37.0-47.0)             Mean Corpuscular Volume              98.2 fL      (81.0-99.0)             Mean Corpuscular Hemoglobin              31.8 pg      (27.0-31.0)             Mean Corpuscular Hemoglobin      Concent               32.4      (33.0-37.0)             Red Cell Distribution Width              13.7 %      (11.7-14.4)             RDW Standard Deviation              49.6 fL      (36.4-46.3)             Platelet Count              187 10*3/uL      (130-400)             Mean Platelet Volume              10.3 fL      (9.4-12.3)             Granulocytes (%)              64.7 %      (30.0-85.0)              Granulocytes #              4.54 10*3/uL      (2.00-8.00)             Lymphocytes # (Auto)              1.52 10*3/uL      (1.00-5.00)             Monocytes # (Auto)              0.79 10*3/uL      (0.20-1.20)             Eosinophils # (Auto)              0.14 10*3/uL      (0.00-0.70)             Basophils # (Auto)              0.03 10*3/uL      (0.00-0.20)             Immature Granulocytes %              0.1 %      (0.0-1.8)             Lymphocytes %              21.6 %      (20.0-45.0)             Monocytes %              11.2 %      (3.0-10.0)             Eosinophils %              2.0 %      (0.0-7.0)             Basophils %              0.4 %      (0.0-3.0)             Nucleated Red Blood Cells %              0.00 %      (0.00-0.70)             Immature Granulocytes #              0.01 10*3/uL      (0.00-0.20)             Sodium Level              144 mmol/L      (135-147)             Potassium Level              4.0 mmol/L      (3.5-5.3)             Chloride Level              106 mmol/L      ()             Carbon Dioxide Level              24 mmol/L      (22-32)             Anion Gap              18 mmol/L      (8-19)             Blood Urea Nitrogen              27 mg/dL      (5-25)             Creatinine              0.95 mg/dL      (0.50-0.90)             Glomerular Filtration Rate      Calc               52      mL/min/{1.73_m2}             BUN/Creatinine Ratio              28 {ratio}      (6-20)             Glucose Level              87 mg/dL      (65-99)             Serum Osmolality   302 (273-304)              Calcium Level              9.4 mg/dL      (8.7-10.4)             Magnesium Level              2.11 mg/dL      (1.60-2.30)             Total Bilirubin              0.80 mg/dL      (0.20-1.30)             Aspartate Amino Transf      (AST/SGOT)               30 U/L (15-50)                    Alanine Aminotransferase      (ALT/SGPT)               18 U/L (10-40)                    Alkaline  Phosphatase              61 U/L      ()             Total Protein              7.0 g/dL      (6.3-8.2)             Albumin              4.0 g/dL      (3.5-5.0)             Globulin              3.0 g/dL      (2.0-3.5)             Albumin/Globulin Ratio              1.3 {ratio}      (1.4-2.6)             Triglycerides Level              51 mg/dL      ()             Cholesterol Level              139 mg/dL      (107-200)             LDL Cholesterol              59 mg/dL      ()             VLDL Cholesterol              10 mg/dL      (5-37)             HDL Cholesterol              70 mg/dL      (40-60)             Cholesterol/HDL Ratio              2.0 NA      (3.0-6.0)             Vitamin B12 Level              1317 pg/mL      (211-911)             Vitamin D 25-Hydroxy              44.0 ng/mL      (30.0-100.0)             Folate              >20.0 ng/mL      (4.8-20.0)             Thyroid Stimulating Hormone      (TSH)               1.460 m(iU)/L      (0.270-4.200)             Free Thyroxine              1.1 ng/dL      (0.9-1.8)            Radiology Impressions      PET CT scan showed enlargement of the left infraclavicular subpectoral lymph     node measuring up to 1.8 cm previously 1 cm with increased metabolic activity of     5.7      Slight increased size and metabolic activity of left axial lymph node      Small left pleural effusion slightly larger than prior C PET CT      Left periaortic lymph node conglomerate left slightly larger slight increase in     metabolic activity compared to previous exam there is activity anterior to the     right common iliac artery which could reflect ureteral or vascular activity     which is similar in size and activity to previous examination.            Impression/Problem List      Follicular lymphoma grade 1 stage III improved with 4 weekly courses of Rituxan.      Recent PET CT scan showed increase in size and metabolic activity of some of     her lymph  nodes.      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      History of bladder cancer followed by Dr. Grubbs      Aortic stenosis      History of diastolic congestive heart failure      Atrial fibrillation      History of hypertension      Osteoporosis      Lung nodule right lower lobe      Problems:        (1) Essential hypertension      (2) Diabetes mellitus type 2 in nonobese      (3) Follicular non-Hodgkin's lymphoma      (4) History of diffuse large B-cell lymphoma            Plan      Patient was given choice of no treatment and just observation versus getting     another course of 4 weekly Rituxan versus Rituxan every 2 months.  Patient     wanted to try Rituxan every 2 months.            IV INVASIVE LINE CARE      IV ASSESSMENT      IV Line Location:  Chest      IV Location Modifier:  Right      IV Line Type:  Port-A-Cath      Insertion Date:  Jul 25, 2019      IV Catheter Gauge:  20      Site Observation:  Asymptomatic      IV Care:  Blood Return Present, Flushes Easily, Heparin Flush, Maintained per     Policy, Saline Flush, Site Care, Start per Policy      IV Discontinued Reason:  Patient Discharged      IV Infusion Comment:  Routine VAD Maintenance            POST INFUSION      Pt Tolerance to Procedure:  Good      Instructed on care of VAD/IV:  Yes            Patient Education:        DI for Non-Hodgkin's Lymphoma      Good Food Sources of Iron            PREVENTION      Hx Influenza Vaccination:  Yes      Date Influenza Vaccine Given:  Nov 11, 2019      Influenza Vaccine Declined:  No      2 or More Falls in Past Year?:  No      Fall Past Year with Injury?:  No (Pt fell on 07/04/2020, no injury)      Hx Pneumococcal Vaccination:  No      Encouraged to follow-up with:  PCP regarding preventative exams.      Chart initiated by      TAI Anderson MA            Electronically signed by Rhoda Zaragoza  07/08/2020 16:37       Disclaimer:  Converted document may not contain table formatting or lab diagrams. Please see [facility] for the authenticated document.

## 2021-05-28 NOTE — PROGRESS NOTES
Patient: QUEENIE MURRIETA     Acct: [id number]     Report: #[id number]  UNIT #: [id number]     : 1928    Encounter Date:2021  PRIMARY CARE: Gala Rao  ***Signed***  --------------------------------------------------------------------------------------------------------------------  NURSE INTAKE      Visit Type      Established Patient Visit            Chief Complaint      FOLLICULAR LYMPHOMA            Referring Provider/Copies To      Primary Care Provider:  Gala Rao      Copies To:   Gala Rao            History and Present Illness      Past Oncology Illness History      Ms. Queenie Murrieta is a pleasant 89 year old female who presents with NHL,     follicular type diagnosed in 2019.  Ms. Murrieta has been receiving her oncological     care with Dr. Jenn Zaragoza who recently retired.  Ms. Murrieta initially presented RLQ     pain to her PCP, Dr. Gala Rao.  In light of her bladder cancer, CT of     Abdomen and Pelvis was obtained on 2019.  Findings indicated     retroperitoneal adenopathy measuring up to 4 cm x 2cm, thought either to be     lymphoma or metastatic disease.  Incidently mild bilateral hydronephrosis was     noted without urolithiasis.             CT guided biopsy of the retroperitoneal lymph node was obtained.  Pathology     (S-) indicated non-hodgkins's b-cell lymphoma; Follicular lymphoma (grade    1).  Flow cytometryrevealed diffuse positivity with CD 20, BCL2, CD10 and patchy    decoration with BCL-6.  A few days after biopsy she was admitted to hospital     with acute respiratory hypoxemia secondary to influenza.  Repeat imaging with CT    Abdomen and Pelvis with heterogeneous hyperdense mass in the anterior abdominal     wass musculature 8 cm x 12.6 cm consistent wtih rectus sheath hematoma.      Retroperitoneal adenopathy is redemonstrated.  Index left periaortic mass     measured 3.1 x 2.2 cm, previously 3.9 x 2.4. Mildly enlarged retrocrural lymph      nodes.  She was discharged from the hospital on 2/20/2019.             Ms. Aparicio was evaluated by Dr. Jenn Zaragoza on March 13, 2019. PET CT was ordered     and obtained.  It indicated metabolic activity within the lymph node at the base    of upper chest and neck (SUV 3.85)  as well as the left axilla (SUV 5.4) in     addition to 2 lymph nodes within the retroperitoneal area with SUV (8.76). LDH     250.  Staging for her follicular carcinoma grade 1 is a clinical stage III with     no B symptoms no significant laboratory abnormalities. At this time it was dec    ided pursue active surveillance and close follow.             On October 17, 2019, PETCT  indicated interval progression of disease with p    revious noted lymph adenopathy larger and more metabolic. The left periaortic     lymph node mass noted to be compressing not only the left renal vein but     resulting in mild left hydronephrosis which is new. .   Single agent     RITUXAN was initiated on October 9, 2019 and completed 4 weekly doses of     RITUXAN.  Ms. Aparicio continues to have no B-symptoms.             Repeat PETCT obtained December 11, 2019 indicated overall improvement from     previous study.  The areas of hypermetabolic lymphadenopathy supraclavicular on     the left and left paraaortic have diminshed both in size and degree of metabolic    activity with no new areas noted. .              PETCT done on July 2, 2020 indicated enlargement of left infraclavicular /     subpectoral lynph node measuring up to 1.8 cm with SUV 5.7, increase in size and    SUV of left axillary lymph node, small left pleural effusion slightly larger     than prior PET, left paraaortic lymph node conglomerate slightly larger and     slightly increased in SUV.  .  It was decided at that time to restart RI    TUXAN every 8 weeks.  Ms. Aparicio continues to have no B-symptoms.             Maintenance rituximab started November 2020, every 8 weeks.            CT  CAP on 12/16/2020 showed decrease in the size of left retropectoral lymph     nodes and left periaortic lymph nodes.  No new or enlarging lymph nodes in the     chest, abdomen, or pelvis.  There are stable subcentimeter noncalcified     pulmonary nodules as well.            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  Pathology S-.             History of Bladder Cancer in 2018 followed by Dr. Grubbs.          02/23/2017 - Pathology S- Bladder transurethral resection, low grade     papillary present at muscularis propria, noninvasive         07/16/2017 - Pathology S- Bladder biopsy negative         11/30/2017 - Pathology [id number] Bladder biopsy with low grade papillary     present at muscularis propria, noninvasive         10/26/2018 - Pathology [id number] Bladder biopsy negative            HPI - Oncology Interim      Patient comes in prior to her next cycle of maintenance Rituxan.  She says that     she has tripped and fallen twice recently.  This was 2 days in a row on January 22 and January 23.  She says she tripped over her own feet and did not have any     dizziness or other associated symptoms.            We discussed the plan of future imaging and treatment.  Patient stated that she     previously had a small cluster of hives after previous CT contrast.            Clinical Staging      Stage III            Treatments      Chemotherapy      NO      Radiation Therapy      NO            Clinical Trial Participant      No            ECOG Performance Status      1            PAST, FAMILY   Past Medical History      Past Medical History:  Arthritis, Bleeding Condition (DUE TO BLOOD THINNERS),     Diabetes Type 2, High Cholesterol, Hypertension      Other PMH:        Acute congestive heart failure improved.      Moderately severe  aortic stenosis.  Patient declines to have transcatheter      aortic valve replacement.      Non-Hodgkin's lymphoma 2008 treated 2008 2009.      Biopsy of retroperitoneal lymph node or mass 2 days ago.  Results pending.      Chronic atrial fibrillation.      Right rectus sheath hematoma, off anticoagulation because of the hematoma.      Hematology/Oncology (F):  Bladder Cancer, Lymphoma, Skin Cancer      Genetic/Metabolic:  None            Past Surgical History      Biopsy, Bladder Surgery, Cataract Surgery, Skin Cancer Removal, VAD Placement            TONSILLECTOMY, D        Family History      Family History:  Stomach Cancer (PATERNAL GREAT-GRANDFATHER)            PATERNAL            Social History      Marital Status:        Lives independently:  No      Number of Children:  2      Occupation:  RETIRED            Tobacco Use      Tobacco status:  Never smoker      Currently Vaping:  No            Alcohol Use      Alcohol intake:  None            Substance Use      Substance use:  Denies use            REVIEW OF SYSTEMS      General:  Admits: Fatigue;          Denies: Appetite Change, Fever, Night Sweats, Weight Gain, Weight Loss      Eye:  Denies Blurred Vision, Denies Corrective Lenses, Denies Diplopia, Denies     Vision Changes      ENT:  Denies Headache, Denies Hearing Loss, Denies Hoarseness, Denies Sore     Throat      Cardiovascular:  Denies Chest Pain, Denies Palpitations      Respiratory:  Denies: Cough, Coughing Blood, Productive Cough, Shortness of Air,    Wheezing      Gastrointestinal:  Denies Bloody Stools, Denies Constipation, Denies Diarrhea,     Denies Nausea/Vomiting, Denies Problem Swallowing, Denies Unable to Control     Bowels      Genitourinary:  Denies Blood in Urine, Denies Incontinence, Denies Painful     Urination      Musculoskeletal:  Denies Back Pain, Denies Muscle Pain, Denies Painful Joints      Other      MUSCLE ACHES, BACK ACHES      Integumentary:  Denies Itching, Denies  "Lesions, Denies Rash      Neurologic:  Denies Dizziness, Denies Numbness\Tingling, Denies Seizures      Psychiatric:  Denies Anxiety, Denies Depression      Endocrine:  Denies Cold Intolerance, Denies Heat Intolerance      Hematologic/Lymphatic:  Denies Bruising, Denies Bleeding, Denies Enlarged Lymph     Nodes      Reproductive:  Denies: Menopause, Heavy Periods, Pregnant, Still Menstruating            VITAL SIGNS AND SCORES      Vitals      Weight 151 lbs 3.769 oz / 68.6 kg      Temperature 98.6 F / 37 C - Temporal      Pulse 77      Respirations 20      Blood Pressure 137/40 Sitting, Left Arm      Pulse Oximetry 97%, RM AIR            Pain Score      Experiencing any pain?:  Yes      Pain Scale Used:  Numerical      Pain Intensity:  3      Pain Comment:        \"ACHEY\"            Fatigue Score      Experiencing any fatigue?:  Yes      Fatigue (0-10 scale):  5            EXAM      General: Alert, cooperative, no acute distress, well appearing      Eyes: Anicteric sclera, PERRLA      Respiratory: CTAB, normal respiratory effort      Abdomen: Normal active bowel sounds, no tenderness, no distention      Cardiovascular: RRR, no murmur, no lower extremity edema      Skin: Normal tone, no rash, no lesions      Psychiatric: Appropriate affect, intact judgment      Neurologic: No focal sensory or motor deficits, no weakness, numbness, dizziness      Musculoskeletal: Normal muscle strength and tone      Extremities: No clubbing, cyanosis, or deformities            PREVENTION      Hx Influenza Vaccination:  Yes      Date Influenza Vaccine Given:  Nov 2, 2020      Influenza Vaccine Declined:  No      2 or More Falls in Past Year?:  Yes      Fall Past Year with Injury?:  Yes      Hx Pneumococcal Vaccination:  No      Encouraged to follow-up with:  PCP regarding preventative exams.      Chart initiated by      HARLAN REA MA            ALLERGY/MEDS      Allergies      Coded Allergies:             IODINATED CONTRAST MEDIA " (Verified  Allergy, Unknown, HIVES,SNEEZING,ITCHY     EYES, 2/22/21)           PENICILLINS (Verified  Allergy, Unknown, RASH, 2/22/21)           SPINACH (Verified  Allergy, Unknown, 2/22/21)                  SHOWED UP ON ALLERGY TESTING                    SULFA (SULFONAMIDE ANTIBIOTICS) (Verified  Allergy, Unknown, RASH, 2/22/21)            Medications      Last Reconciled on 2/22/21 22:24 by ADEEL RUANO      Lactobacillus Acidophilus (Florajen) 460 Mg Capsule      460 MG PO QDAY, CAP         Reported         10/20/20       Carvedilol (Carvedilol) 6.25 Mg Tablet      6.25 MG PO BID for 30 Days, #60 TAB         Prov: Veza,Rhoda         8/4/20       Montelukast Sodium (Singulair*) 10 Mg Tab      10 MG PO QDAY, #30 TAB 0 Refills         Reported         12/18/19       Aspirin Chew (Aspirin Baby) 81 Mg Tab.chew      81 MG PO QDAY, #30 TAB.CHEW 0 Refills         Reported         3/13/19       Furosemide (Lasix) 40 Mg Tablet      60 MG PO MOWEFR, #30 TAB 0 Refills         Reported         3/13/19       Calcium Carbonate/Vitamin D3 (Calcium 600-Vit D3 400 Tablet) 1 Each Tablet      1 EACH PO BID, #120 TAB 0 Refills         Reported         3/13/19       Losartan Potassium (Losartan*) 25 Mg Tablet      25 MG PO QDAY, #30 TAB 0 Refills         Reported         2/21/19       Dabigatran (Pradaxa) 75 Mg Capsule      75 MG PO BID, #60 CAP         Reported         2/21/19       Raloxifene HCl (Evista) 60 Mg Tablet      60 MG PO QDAY, TAB         Reported         2/21/19       Atorvastatin (Atorvastatin) 40 Mg Tablet      40 MG PO HS, #30 TAB 0 Refills         Reported         2/21/19       Ergocalciferol (Vitamin D2) (Vitamin D2) 50,000 Units Capsule      78865 UNITS PO Q14D, #4 CAP 0 Refills         Reported         10/22/18       Spironolactone (Aldactone) 25 Mg Tablet      12.5 MG PO QDAY, #30 TAB 0 Refills         Reported         8/12/14       Furosemide (Furosemide) 40 Mg Tablet      40 MG PO, #30 TAB 0 Refills          Reported         8/12/14       Polysaccharide Iron Complex (Niferex-150*) 150 Mg Cap      150 MG PO QDAY         Reported         1/28/09      Medications Reviewed:  No Changes made to meds            IMPRESSION/PLAN      Diagnosis      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  Initially treated with 4     weekly cycles of rituximab with partial response.  The disease quickly prog    ressed and she resumed treatment in July with Dr. Zaragoza. CT CAP with contrast     showed a good response. Cycle 3 of maintenance rituximab in 2 days and follow-up    with me in 8 weeks for her next cycle.  I will plan for repeat PET scan prior to    next cycle.  We will plan for labs today including CBC and CMP.            Patient Education      Patient Education Provided:  Yes            Electronically signed by ADEEL RUANO  02/22/2021 22:24       Disclaimer: Converted document may not contain table formatting or lab diagrams. Please see [employer] [facility] for the authenticated document.

## 2021-05-28 NOTE — PROGRESS NOTES
Patient: QUEENIE MURRIETA     Acct: [id number]     Report: #[id number]  UNIT #: [id number]     : 1928    Encounter Date:10/16/2019  PRIMARY CARE: Gala Rao  ***Signed***  --------------------------------------------------------------------------------------------------------------------  Progress Note      DATE: 10/16/19      Primary Care Provider:  Gala Rao      Referring Provider:  Gala Rao      Chief Complaint      FU/ NHL, RIC            Subjective      89-year-old white female was recently diagnosed with follicular lymphoma biopsy-    proven.  Patient had right lower quadrant pain for which a CAT scan of the     abdomen and pelvis was done.  X-ray showed increasing lymphadenopathy with     compression and resulting mild left hydronephrosis.      Patient had her first Rituxan last week without any untoward side effects.            Past Med/Surg History            Past Med/Surg History:   Hypertension             Diabetes Mellitus             Heart Disease             No Blood Clots             Cancer (Diffuse large B-cell lymphoma in )             No Lung Disease             No Kidney Disease             No Other            Social History      Social History:  No Tobacco Use, No Alcohol Use, No Recreational Drug use, No     Other            Allergies      Coded Allergies:             IODINATED CONTRAST MEDIA (Verified  Allergy, Unknown, HIVES,SNEEZING,ITCHY     EYES, 10/10/19)           PENICILLINS (Verified  Allergy, Unknown, RASH, 10/10/19)           SPINACH (Verified  Allergy, Unknown, 10/10/19)                  SHOWED UP ON ALLERGY TESTING                    SULFA (SULFONAMIDE ANTIBIOTICS) (Verified  Allergy, Unknown, RASH,     10/10/19)            Medications      Medications    Last Reconciled on 10/16/19 18:59 by RAHEL ZARAGOZA MD      Ondansetron Hcl (ONDANSETRON HCL) 4 Mg Tablet      4 MG PO Q4HR PRN for NAUSEA AND/OR VOMITING, #60 TAB 1 Refill         Prov: Rhoda Zaragoza          10/7/19       Aspirin Chew (Aspirin Baby) 81 Mg Tab.chew      81 MG PO QDAY, #30 TAB.CHEW 0 Refills         Reported         3/13/19       Furosemide* (Lasix*) 40 Mg Tablet      60 MG PO MOWEFR, #30 TAB 0 Refills         Reported         3/13/19       Calcium Carb/Vit D3 (CALCIUM 600-VIT D3 400 TABLET) 1 Each Tablet      1 EACH PO BID, #120 TAB 0 Refills         Reported         3/13/19       Losartan Potassium (Losartan*) 25 Mg Tablet      25 MG PO QDAY, #30 TAB 0 Refills         Reported         2/21/19       Dabigatran (Pradaxa) 75 Mg Capsule      75 MG PO BID, #60 CAP         Reported         2/21/19       Raloxifene HCl (Evista) 60 Mg Tablet      60 MG PO QDAY, TAB         Reported         2/21/19       Atorvastatin (Atorvastatin) 40 Mg Tablet      40 MG PO HS, #30 TAB 0 Refills         Reported         2/21/19       Carvedilol (Carvedilol) 6.25 Mg Tablet      12.5 MG PO BID for 30 Days, #120 TAB         Prov: Gala Rao         2/19/19       Ergocalciferol (Vitamin D2*) 50,000 Units Capsule      15925 UNITS PO Q14D, #4 CAP 0 Refills         Reported         10/22/18       Spironolactone (Aldactone) 25 Mg Tablet      12.5 MG PO QDAY, #30 TAB 0 Refills         Reported         8/12/14       Furosemide (Furosemide) 40 Mg Tablet      40 MG PO, #30 TAB 0 Refills         Reported         8/12/14       Polysaccharide Iron Complex (Niferex-150*) 150 Mg Cap      150 MG PO QDAY         Reported         1/28/09      Current Medications      Current Medications Reviewed 10/16/19            Pain Assessment      Pain Intensity:  0      Description:  None            Review of Systems      General:  Fatigue Scale: (3), Pain Scale: (0)      HEENT:  No Hearing Changes, No Rhinorrhea, No Visual Changes      Respiratory:  Shortness of Air; No Sputum Changes, No Wheezing      Cardiovascular:  No Chest Pain; Pedal Edema (ANKLES); No Palpitations, No Chest     Pressure      Gastrointestinal:  No Nausea, No Vomiting, No  Constipation, No Diarrhea;     Appetite Fair      Genitourinary:  No Nocturia, No Dysuria      Musculoskeletal:  No Joint Tenderness, No Joint Stiffness, No Aches      Endocrine:  No Heat Intolerance, No Cold Intolerance; Fatigue      Hematologic:  No Bleeding, No Swollen Glands      Allergic/Immunologic:  No Throat closing off, No Nasal drip, No Itchy eyes      Psychological:  No Anxiety, No Depression      Neurological:  No Headaches, No Dizziness; Weakness      Skin:  No Open Wounds; Edema            Vitals      Height 5 ft 1.75 in / 156.85 cm      Weight 145 lbs 12.8 oz / 66.824174 kg      BSA 1.67 m2      BMI 26.9 kg/m2      Temperature 98.2 F / 36.78 C      Pulse 62      Blood Pressure 120/47      Pulse Oximetry 90%            Exam      Constitutional:  No acute distress, Conversant, Pleasant      Eyes:  Anicteric sclerae, Palpebral Conjunctivae (Pink), WILLIE      HENT:  Oropharynx clear; No Erythema; Buccal mucosae (Pain)      Neck:  Supple, Full Range of Motion      Cardiovascular:  RRR; No Murmurs; Normal PMI; No Peripheral Edema      Lungs:  Clear to Ausculation, Normal Respiratory Effort      Abdomen:  Soft, NABS; No Splenomegaly      Chest:  Other (Symmetrical)      Lymphatic:  No Neck, No Axillae      Extremities:  No digital cyanosis, No digital ischemia, Normal gait, Other (No     deformity)      Neurological:  Cranial Nerve II-XII (Intact); No Focal Sensory deficits      Psychological:  Appropriate affect, Appropriate mood, Intact judgement, Alert      Skin:  Other (No dermatosis)            Lab Results      Laboratory Tests      10/7/19 14:15            10/16/19 14:10            Laboratory Tests            Test       7/25/19      13:30 9/27/19      14:26 10/7/19      14:15 10/11/19      21:57             Lymphocytes (%) (Auto)       25.70 %      (20.00-45.00)                           Monocytes (%) (Auto)       11.20 %      (3.00-10.00)                           Eosinophils (%) (Auto)       1.60 %       (0.00-7.00)                           Basophils (%) (Auto)       0.70 %      (0.00-3.00)                           Magnesium Level       2.13 mg/dL      (1.60-2.30)                           Iron Level       82 ug/dL      ()                           Total Iron Binding Capacity       376 ug/dL      (245-450)                           Percent Iron Saturation 22 % (20-55)                 Transferrin       263.00 mg/dL      (250..00)                           Triglycerides Level       45 mg/dL      ()                           Cholesterol Level       136 mg/dL      (107-200)                           LDL Cholesterol       52 mg/dL      ()                           VLDL Cholesterol 9 mg/dL (5-37)                 HDL Cholesterol       75 mg/dL      (40-60)                           Cholesterol/HDL Ratio       1.8 NA      (3.0-6.0)                           Vitamin B12 Level       962 pg/mL      (211-911)                           Vitamin D 25-Hydroxy       46.7 ng/mL      (30.0-100.0)                           Folate       >20.0 ng/mL      (4.8-20.0)                           Thyroid Stimulating Hormone      (TSH) 1.360 m(iU)/L      (0.270-4.200)                           Free Thyroxine       1.2 ng/dL      (0.9-1.8)                           Erythrocyte Sedimentation Rate  22 mm/h (0-30)                Sodium Level              141 mmol/L      (135-147)                    Potassium Level              4.0 mmol/L      (3.5-5.3)                    Chloride Level              100 mmol/L      ()                    Carbon Dioxide Level              29 mmol/L      (22-32)                    Anion Gap              16 mmol/L      (8-19)                    Blood Urea Nitrogen              27 mg/dL      (5-25)                    Creatinine              1.13 mg/dL      (0.50-0.90)                    Glomerular Filtration Rate      Calc               42      mL/min/{1.73_m2}                     BUN/Creatinine Ratio              24 {ratio}      (6-20)                    Glucose Level              120 mg/dL      (65-99)                    Serum Osmolality   298 (273-304)               Uric Acid              6.6 mg/dL      (2.5-7.5)                    Calcium Level              10.3 mg/dL      (8.7-10.4)                    Total Bilirubin              0.66 mg/dL      (0.20-1.30)                    Aspartate Amino Transf      (AST/SGOT)               31 U/L (15-50)                           Alanine Aminotransferase      (ALT/SGPT)               19 U/L (10-40)                           Alkaline Phosphatase              58 U/L      ()                    Lactate Dehydrogenase              268 U/L      (120-240)                    Serum Total Protein              6.9 g/dL      (6.0-8.5)                    Total Protein              7.6 g/dL      (6.3-8.2)                    Albumin              3.5 g/dL      (2.9-4.4)                    Globulin              3.4 g/dL      (2.2-3.9)                    Albumin/Globulin Ratio              1.0 NA      (0.7-1.7)                    Alpha-1-Globulins              0.2 g/dL      (0.0-0.4)                    Alpha-2-Globulins              0.8 g/dL      (0.4-1.0)                    Beta Globulins              1.0 g/dL      (0.7-1.3)                    Gamma Globulins              1.4 g/dL      (0.4-1.8)                    Protein Electrophoresis Note   Comment NA               Protein Electrophoresis      Interpret               Comment NA                           Immunoglobulin G              1324 mg/dL      (700-1600)                    Immunoglobulin A              298 mg/dL      ()                    Immunoglobulin M              181 mg/dL      ()                    Serum Immunofixation   Comment NA               M-Prashanth (TOMI)              Not Observed      g/dL (Not                    Hepatitis A IgM Antibody              Negative NA      (Negative)                     Hepatitis B Surface Antigen              Negative NA      (Negative)                    Hepatitis B Core IgM Antibody              Negative NA      (Negative)                    Lab Scanned Report              LAB FINAL      CUMULATIVES -             Test       10/16/19      14:10                           White Blood Count       6.89 10*3/uL      (4.80-10.80)                           Red Blood Count       4.04 10*6/uL      (4.20-5.40)                           Hemoglobin       12.7 g/dL      (12.0-16.0)                           Hematocrit       38.9 %      (37.0-47.0)                           Mean Corpuscular Volume       96.3 fL      (81.0-99.0)                           Mean Corpuscular Hemoglobin       31.4 pg      (27.0-31.0)                           Mean Corpuscular Hemoglobin      Concent 32.6      (33.0-37.0)                           Red Cell Distribution Width       13.6 %      (11.7-14.4)                           RDW Standard Deviation       48.4 fL      (36.4-46.3)                           Platelet Count       224 10*3/uL      (130-400)                           Mean Platelet Volume       10.9 fL      (9.4-12.3)                           Granulocytes (%)       70.5 %      (30.0-85.0)                           Granulocytes #       4.86 10*3/uL      (2.00-8.00)                           Lymphocytes # (Auto)       1.20 10*3/uL      (1.00-5.00)                           Monocytes # (Auto)       0.70 10*3/uL      (0.20-1.20)                           Eosinophils # (Auto)       0.07 10*3/uL      (0.00-0.70)                           Basophils # (Auto)       0.04 10*3/uL      (0.00-0.20)                           Immature Granulocytes %       0.3 %      (0.0-1.8)                           Lymphocytes %       17.4 %      (20.0-45.0)                           Monocytes %       10.2 %      (3.0-10.0)                           Eosinophils %       1.0 %      (0.0-7.0)                            Basophils %       0.6 %      (0.0-3.0)                           Nucleated Red Blood Cells %       0.00 %      (0.00-0.70)                           Immature Granulocytes #       0.02 10*3/uL      (0.00-0.20)                    Impression/Problem List      Follicular lymphoma grade 1 stage III      Cardiomegaly      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      History of bladder cancer followed by Dr. 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       Rituxan week 2            Patient Education:        DI for Iron Deficiency Anemia-Adult            PREVENTION      Hx Influenza Vaccination:  No      Influenza Vaccine Declined:  No      Fall Past Year with Injury?:  Yes      Hx Pneumococcal Vaccination:  No      Encouraged to follow-up with:  PCP regarding preventative exams.      Chart initiated by      JOSÉ MANUEL JEAN                 Disclaimer: Converted document may not contain table formatting or lab diagrams. Please see [employer] [facility] for the authenticated document.

## 2021-05-28 NOTE — PROGRESS NOTES
Patient: QUEENIE MURRIETA     Acct: [id number]     Report: #[id number]  UNIT #: [id number]     : 1928    Encounter Date:2020  PRIMARY CARE: Gala Rao  ***Signed***  --------------------------------------------------------------------------------------------------------------------  NURSE INTAKE      Visit Type      Established Patient Visit            Chief Complaint      FOLLICULAR LYMPHOMA            Referring Provider/Copies To      Primary Care Provider:  Gala Rao      Copies To:   Gala Rao            History and Present Illness      Past Oncology Illness History      Ms. Queenie Murrieta is a pleasant 89 year old female who presents with NHL,     follicular type diagnosed in 2019.  Ms. Murrieta has been receiving her oncological     care with Dr. Jenn Zaragoza who recently retired.  Ms. Murrieta initially presented RLQ     pain to her PCP, Dr. Gala Rao.  In light of her bladder cancer, CT of     Abdomen and Pelvis was obtained on 2019.  Findings indicated     retroperitoneal adenopathy measuring up to 4 cm x 2cm, thought either to be     lymphoma or metastatic disease.  Incidently mild bilateral hydronephrosis was     noted without urolithiasis.             CT guided biopsy of the retroperitoneal lymph node was obtained.  Pathology     (S-) indicated non-hodgkins's b-cell lymphoma; Follicular lymphoma (grade    1).  Flow cytometryrevealed diffuse positivity with CD 20, BCL2, CD10 and patchy    decoration with BCL-6.  A few days after biopsy she was admitted to hospital     with acute respiratory hypoxemia secondary to influenza.  Repeat imaging with CT    Abdomen and Pelvis with heterogeneous hyperdense mass in the anterior abdominal     wass musculature 8 cm x 12.6 cm consistent wtih rectus sheath hematoma.      Retroperitoneal adenopathy is redemonstrated.  Index left periaortic mass     measured 3.1 x 2.2 cm, previously 3.9 x 2.4. Mildly enlarged retrocrural lymph      nodes.  She was discharged from the hospital on 2/20/2019.             Ms. Aparicio was evaluated by Dr. Jenn Zaragoza on March 13, 2019. PET CT was ordered     and obtained.  It indicated metabolic activity within the lymph node at the base    of upper chest and neck (SUV 3.85)  as well as the left axilla (SUV 5.4) in     addition to 2 lymph nodes within the retroperitoneal area with SUV (8.76). LDH     250.  Staging for her follicular carcinoma grade 1 is a clinical stage III with     no B symptoms no significant laboratory abnormalities. At this time it was dec    ided pursue active surveillance and close follow.             On October 17, 2019, PETCT  indicated interval progression of disease with p    revious noted lymph adenopathy larger and more metabolic. The left periaortic     lymph node mass noted to be compressing not only the left renal vein but     resulting in mild left hydronephrosis which is new. .   Single agent     RITUXAN was initiated on October 9, 2019 and completed 4 weekly doses of     RITUXAN.  Ms. Aparicio continues to have no B-symptoms.             Repeat PETCT obtained December 11, 2019 indicated overall improvement from     previous study.  The areas of hypermetabolic lymphadenopathy supraclavicular on     the left and left paraaortic have diminshed both in size and degree of metabolic    activity with no new areas noted. .              PETCT done on July 2, 2020 indicated enlargement of left infraclavicular /     subpectoral lynph node measuring up to 1.8 cm with SUV 5.7, increase in size and    SUV of left axillary lymph node, small left pleural effusion slightly larger     than prior PET, left paraaortic lymph node conglomerate slightly larger and     slightly increased in SUV.  .  It was decided at that time to restart RI    TUXAN every 8 weeks.  Ms. Aparicio continues to have no B-symptoms.             Maintenance rituximab started November 2020, every 8 weeks.            CT  CAP on 12/16/2020 showed decrease in the size of left retropectoral lymph     nodes and left periaortic lymph nodes.  No new or enlarging lymph nodes in the     chest, abdomen, or pelvis.  There are stable subcentimeter noncalcified     pulmonary nodules as well.            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  Pathology S-.             History of Bladder Cancer in 2018 followed by Dr. Grubbs.          02/23/2017 - Pathology S- Bladder transurethral resection, low grade     papillary present at muscularis propria, noninvasive         07/16/2017 - Pathology S- Bladder biopsy negative         11/30/2017 - Pathology [id number] Bladder biopsy with low grade papillary     present at muscularis propria, noninvasive         10/26/2018 - Pathology [id number] Bladder biopsy negative            HPI - Oncology Interim      Patient comes in today for her second cycle of maintenance rituximab.  Her only     complaint is that she has shortness of breath and fatigue that is worsening.      She sees Dr. Harden in cardiology for about heart valve.  She was told many years     ago that she should have valve replacement surgery but declined this option due     to difficulty with anesthesia in the past.  The patient understands the severity    of her heart condition but recognizes that she will die from something someday     and says that this is not a bad weight ago.  Other than the shortness of breath     and fatigue she generally feels well.  She has had no reactions or concerns     since starting rituximab.  Her weight has been stable and she denies fevers or     night sweats.            Clinical Staging      Stage III            Treatments      Chemotherapy      NO      Radiation Therapy      NO            Clinical Trial Participant      No             ECOG Performance Status      1            PAST, FAMILY   Past Medical History      Past Medical History:  Arthritis, Bleeding Condition (DUE TO BLOOD THINNERS),     Diabetes Type 2, High Cholesterol, Hypertension      Other PMH:        Acute congestive heart failure improved.      Moderately severe aortic stenosis.  Patient declines to have transcatheter      aortic valve replacement.      Non-Hodgkin's lymphoma 2008 treated 2008 2009.      Biopsy of retroperitoneal lymph node or mass 2 days ago.  Results pending.      Chronic atrial fibrillation.      Right rectus sheath hematoma, off anticoagulation because of the hematoma.      Hematology/Oncology (F):  Bladder Cancer, Lymphoma, Skin Cancer      Genetic/Metabolic:  None            Past Surgical History      Biopsy, Bladder Surgery, Cataract Surgery, Skin Cancer Removal, VAD Placement            TONSILLECTOMY, D        Family History      Family History:  Stomach Cancer (PATERNAL GREAT-GRANDFATHER)            PATERNAL            Social History      Marital Status:        Lives independently:  No      Number of Children:  2      Occupation:  RETIRED            Tobacco Use      Tobacco status:  Never smoker      Currently Vaping:  No            Alcohol Use      Alcohol intake:  None            Substance Use      Substance use:  Denies use            REVIEW OF SYSTEMS      General:  Admits: Fatigue;          Denies: Appetite Change, Fever, Night Sweats, Weight Gain, Weight Loss      Eye:  Denies Blurred Vision, Denies Corrective Lenses, Denies Diplopia, Denies     Vision Changes      ENT:  Denies Headache, Denies Hearing Loss, Denies Hoarseness, Denies Sore     Throat      Cardiovascular:  Denies Chest Pain, Denies Palpitations      Respiratory:  Admits: Shortness of Air;          Denies: Cough, Coughing Blood, Productive Cough, Wheezing      Gastrointestinal:  Denies Bloody Stools, Denies Constipation, Denies Diarrhea,     Denies Nausea/Vomiting, Denies  Problem Swallowing, Denies Unable to Control     Bowels      Genitourinary:  Denies Blood in Urine, Denies Incontinence, Denies Painful     Urination      Musculoskeletal:  Denies Back Pain, Denies Muscle Pain, Denies Painful Joints      Other      ACHES AND PAINS      Integumentary:  Denies Itching, Denies Lesions, Denies Rash      Neurologic:  Denies Dizziness, Denies Numbness\Tingling, Denies Seizures      Psychiatric:  Denies Anxiety, Denies Depression      Endocrine:  Denies Cold Intolerance, Denies Heat Intolerance      Hematologic/Lymphatic:  Admits Bruising, Admits Bleeding; Denies Enlarged Lymph     Nodes      Reproductive:  Denies: Menopause, Heavy Periods, Pregnant, Still Menstruating            VITAL SIGNS AND SCORES      Vitals      Weight 153 lbs 3.515 oz / 69.5 kg      Temperature 98.3 F / 36.83 C - Temporal      Pulse 92      Respirations 18      Blood Pressure 134/44 Sitting, Left Arm      Pulse Oximetry 96%, RM AIR            Pain Score      Experiencing any pain?:  Yes      Pain Scale Used:  Numerical      Pain Intensity:  2      Pain Comment:        ACHES            Fatigue Score      Experiencing any fatigue?:  Yes      Fatigue (0-10 scale):  5            EXAM      General: Alert, cooperative, no acute distress      Eyes: Anicteric sclera, PERRLA      Respiratory: CTAB, normal respiratory effort      Abdomen: Normal active bowel sounds, no tenderness, no distention      Cardiovascular: RRR, no murmur, no lower extremity edema      Skin: Normal tone, no rash, no lesions      Psychiatric: Appropriate affect, intact judgment      Neurologic: No focal sensory or motor deficits, no weakness, numbness, dizziness      Musculoskeletal: Normal muscle strength and tone      Extremities: No clubbing, cyanosis, or deformities      Lymphatics: No cervical or axillary adenopathy            PREVENTION      Hx Influenza Vaccination:  Yes      Date Influenza Vaccine Given:  Nov 2, 2020      Influenza Vaccine  Declined:  No      2 or More Falls in Past Year?:  No      Fall Past Year with Injury?:  No      Hx Pneumococcal Vaccination:  No      Encouraged to follow-up with:  PCP regarding preventative exams.      Chart initiated by      HARLAN REA MA            ALLERGY/MEDS      Allergies      Coded Allergies:             IODINATED CONTRAST MEDIA (Verified  Allergy, Unknown, HIVES,SNEEZING,ITCHY     EYES, 12/29/20)           PENICILLINS (Verified  Allergy, Unknown, RASH, 12/29/20)           SPINACH (Verified  Allergy, Unknown, 12/29/20)                  SHOWED UP ON ALLERGY TESTING                    SULFA (SULFONAMIDE ANTIBIOTICS) (Verified  Allergy, Unknown, RASH,     12/29/20)            Medications      Last Reconciled on 12/29/20 12:07 by ADEEL RUANO      Lactobacillus Acidophilus (Florajen) 460 Mg Capsule      460 MG PO QDAY, CAP         Reported         10/20/20       Carvedilol (Carvedilol) 6.25 Mg Tablet      6.25 MG PO BID for 30 Days, #60 TAB         Prov: [place]         8/4/20       Montelukast Sodium (Singulair*) 10 Mg Tab      10 MG PO QDAY, #30 TAB 0 Refills         Reported         12/18/19       Aspirin Chew (Aspirin Baby) 81 Mg Tab.chew      81 MG PO QDAY, #30 TAB.CHEW 0 Refills         Reported         3/13/19       Furosemide* (Lasix*) 40 Mg Tablet      60 MG PO MOWEFR, #30 TAB 0 Refills         Reported         3/13/19       Calcium Carb/Vit D3 (CALCIUM 600-VIT D3 400 TABLET) 1 Each Tablet      1 EACH PO BID, #120 TAB 0 Refills         Reported         3/13/19       Losartan Potassium (Losartan*) 25 Mg Tablet      25 MG PO QDAY, #30 TAB 0 Refills         Reported         2/21/19       Dabigatran (Pradaxa) 75 Mg Capsule      75 MG PO BID, #60 CAP         Reported         2/21/19       Raloxifene HCl (Evista) 60 Mg Tablet      60 MG PO QDAY, TAB         Reported         2/21/19       Atorvastatin (Atorvastatin) 40 Mg Tablet      40 MG PO HS, #30 TAB 0 Refills         Reported         2/21/19        Ergocalciferol (Vitamin D2) (Vitamin D2) 50,000 Units Capsule      79828 UNITS PO Q14D, #4 CAP 0 Refills         Reported         10/22/18       Spironolactone (Aldactone) 25 Mg Tablet      12.5 MG PO QDAY, #30 TAB 0 Refills         Reported         8/12/14       Furosemide (Furosemide) 40 Mg Tablet      40 MG PO, #30 TAB 0 Refills         Reported         8/12/14       Polysaccharide Iron Complex (Niferex-150*) 150 Mg Cap      150 MG PO QDAY         Reported         1/28/09      Medications Reviewed:  No Changes made to meds            IMPRESSION/PLAN      Plan      Stage III follicular lymphoma:  Diagnosed 10/2019.  Initially treated with 4     weekly cycles of rituximab with partial response.  The disease quickly     progressed and she resumed treatment in July with Dr. Zaragoza. CT CAP with contrast    showed a good response. She will not need a PET scan unless there are notable     abnormalities on CT or exam.  She will get her second cycle of maintenance     rituximab in 2 days and follow-up with me in 8 weeks for her next cycle.  I will    plan a repeat CT CAP with contrast 4 months from now.            Patient Education      Patient Education Provided:  Yes            Electronically signed by ADEEL RUANO  12/29/2020 12:07       Disclaimer: Converted document may not contain table formatting or lab diagrams. Please see [employer] for the authenticated document.

## 2021-05-28 NOTE — PROGRESS NOTES
Patient: QUEENIE MURRIETA     Acct: [id number]     Report: #[id number]  UNIT #: [id number]     : 1928    Encounter Date:2020  PRIMARY CARE: Gala Rao  ***Signed***  --------------------------------------------------------------------------------------------------------------------  Progress Note      DATE: 20      Primary Care Provider:  Gala Rao      Referring Provider:  Gala Rao      Chief Complaint      FU NHL, RIC            Subjective      89-year-old white female is here for fourth week of Rituxan.  Patient was     diagnosed with follicular lymphoma last year after she presented with abdominal     pains.   she had evidence of mild left hydronephrosis on PET CT scan secondary     to lymphadenopathy.  She also had bilateral pleural effusion.      She had 4 weeks of Rituxan last year which resulted in partial improvement.  She    did not have any maintenance Rituxan.            This last July patient's PET CT scan showed progression of disease and patient     had resumed her weekly Rituxan.            She does not offer any complaints.            Past Med/Surg History            Past Med/Surg History:   Hypertension             Diabetes Mellitus             Heart Disease             No Blood Clots             Cancer (Diffuse large B-cell lymphoma in )             No Lung Disease             No Kidney Disease             No Other            Social History      Social History:  No Tobacco Use, No Alcohol Use, No Recreational Drug use, No     Other            Allergies      Coded Allergies:             IODINATED CONTRAST MEDIA (Verified  Allergy, Unknown, HIVES,SNEEZING,ITCHY     EYES, 20)           PENICILLINS (Verified  Allergy, Unknown, RASH, 20)           SPINACH (Verified  Allergy, Unknown, 20)                  SHOWED UP ON ALLERGY TESTING                    SULFA (SULFONAMIDE ANTIBIOTICS) (Verified  Allergy, Unknown, RASH, 20)            Medications       Medications    Last Reconciled on 8/28/20 19:47 by RAHEL ZARAGOZA MD      Lactobacillus Acidophilus (Florajen) 460 Mg Capsule      460 MG PO QDAY, CAP         Reported         8/4/20       Carvedilol (Carvedilol) 6.25 Mg Tablet      6.25 MG PO BID for 30 Days, #60 TAB         Prov: Rhoda Zaragoza         8/4/20       Montelukast Sodium (Singulair*) 10 Mg Tab      10 MG PO QDAY, #30 TAB 0 Refills         Reported         12/18/19       Aspirin Chew (Aspirin Baby) 81 Mg Tab.chew      81 MG PO QDAY, #30 TAB.CHEW 0 Refills         Reported         3/13/19       Furosemide* (Lasix*) 40 Mg Tablet      60 MG PO MOWEFR, #30 TAB 0 Refills         Reported         3/13/19       Calcium Carb/Vit D3 (CALCIUM 600-VIT D3 400 TABLET) 1 Each Tablet      1 EACH PO BID, #120 TAB 0 Refills         Reported         3/13/19       Losartan Potassium (Losartan*) 25 Mg Tablet      25 MG PO QDAY, #30 TAB 0 Refills         Reported         2/21/19       Dabigatran (Pradaxa) 75 Mg Capsule      75 MG PO BID, #60 CAP         Reported         2/21/19       Raloxifene HCl (Evista) 60 Mg Tablet      60 MG PO QDAY, TAB         Reported         2/21/19       Atorvastatin (Atorvastatin) 40 Mg Tablet      40 MG PO HS, #30 TAB 0 Refills         Reported         2/21/19       Ergocalciferol (Vitamin D2) (Vitamin D2) 50,000 Units Capsule      09288 UNITS PO Q14D, #4 CAP 0 Refills         Reported         10/22/18       Spironolactone (Aldactone) 25 Mg Tablet      12.5 MG PO QDAY, #30 TAB 0 Refills         Reported         8/12/14       Furosemide (Furosemide) 40 Mg Tablet      40 MG PO, #30 TAB 0 Refills         Reported         8/12/14       Polysaccharide Iron Complex (Niferex-150*) 150 Mg Cap      150 MG PO QDAY         Reported         1/28/09      Current Medications      Current Medications Reviewed 8/25/20            Pain Assessment      Pain Intensity:  0      Description:  None            Review of Systems      General:  No Anxiety; Fatigue Scale:  (4), Pain Scale: (0)      HEENT:  No Dysphagia, No Hearing Changes      Respiratory:  No Cough; Shortness of Air      Cardiovascular:  No Chest Pain; Pedal Edema      Gastrointestinal:  No Nausea, No Vomiting; Appetite Good (Good)      Genitourinary:  No Nocturia      Musculoskeletal:  No Joint Effusions, No Joint Tenderness      Endocrine:  No Heat Intolerance      Hematologic:  No Bleeding, No Bruising      Allergic/Immunologic:  No Hives      Psychological:  No Anxiety, No Depression      Neurological:  No Headaches      Skin:  No Rash, No Open Wounds            Vitals      Height 5 ft 1.75 in / 156.85 cm      Weight 148 lbs 9.6 oz / 67.152867 kg      BSA 1.68 m2      BMI 27.4 kg/m2      Temperature 97.8 F / 36.56 C      Pulse 59      Respirations 18      Blood Pressure 126/49      Pulse Oximetry 98%            Exam      Constitutional:  No acute distress, Conversant, Pleasant      Eyes:  Anicteric sclerae (Unknown), Palpebral Conjunctivae (Pink), WILLIE      Neck:  Supple      Cardiovascular:  RRR; No Murmurs; Normal PMI, Peripheral Edema (2+ bilateral)      Lungs:  Clear to Ausculation, Normal Respiratory Effort; No Crackles, No Wheezes      Abdomen:  Soft; No Masses, No Tenderness      Chest:  Other (Symmetrical and equal)      Lymphatic:  No Neck, No Axillae      Extremities:  No digital cyanosis, No digital ischemia, Normal gait, Other     (Cane)      Neurological:  Cranial Nerve II-XII (Intact); No Focal Sensory deficits      Psychological:  Appropriate affect, Appropriate mood, Intact judgement, Alert      Skin:  Other (No dermatoses)            Lab Results      Laboratory Tests      8/25/20 13:49            Laboratory Tests            Test       6/11/20      13:38 8/25/20      13:49 8/26/20      10:54 8/27/20      21:57             Vitamin D 25-Hydroxy       44.0 ng/mL      (30.0-100.0)                           Folate       >20.0 ng/mL      (4.8-20.0)                           Thyroid Stimulating Hormone       (TSH) 1.460 m(iU)/L      (0.270-4.200)                           Free Thyroxine       1.1 ng/dL      (0.9-1.8)                           White Blood Count              6.97 10*3/uL      (4.80-10.80)                           Red Blood Count              3.99 10*6/uL      (4.20-5.40)                           Hemoglobin              12.3 g/dL      (12.0-16.0)                           Hematocrit              38.8 %      (37.0-47.0)                           Mean Corpuscular Volume              97.2 fL      (81.0-99.0)                           Mean Corpuscular Hemoglobin              30.8 pg      (27.0-31.0)                           Mean Corpuscular Hemoglobin      Concent        31.7      (33.0-37.0)                           Red Cell Distribution Width              13.5 %      (11.7-14.4)                           RDW Standard Deviation              48.2 fL      (36.4-46.3)                           Platelet Count              246 10*3/uL      (130-400)                           Mean Platelet Volume              10.4 fL      (9.4-12.3)                           Granulocytes (%)              67.2 %      (30.0-85.0)                           Granulocytes #              4.68 10*3/uL      (2.00-8.00)                           Lymphocytes # (Auto)              1.23 10*3/uL      (1.00-5.00)                           Monocytes # (Auto)              0.74 10*3/uL      (0.20-1.20)                           Eosinophils # (Auto)              0.26 10*3/uL      (0.00-0.70)                           Basophils # (Auto)              0.04 10*3/uL      (0.00-0.20)                           Immature Granulocytes %              0.3 %      (0.0-1.8)                           Lymphocytes %              17.6 %      (20.0-45.0)                           Monocytes %              10.6 %      (3.0-10.0)                           Eosinophils %              3.7 %      (0.0-7.0)                           Basophils %              0.6 %       (0.0-3.0)                           Nucleated Red Blood Cells %              0.00 %      (0.00-0.70)                           Immature Granulocytes #              0.02 10*3/uL      (0.00-0.20)                           Sodium Level              140 mmol/L      (135-147)                           Potassium Level              4.4 mmol/L      (3.5-5.3)                           Chloride Level              96 mmol/L      ()                           Carbon Dioxide Level              31 mmol/L      (22-32)                           Anion Gap              17 mmol/L      (8-19)                           Blood Urea Nitrogen              30 mg/dL      (5-25)                           Creatinine              1.15 mg/dL      (0.50-0.90)                           Glomerular Filtration Rate      Calc        41      mL/min/{1.73_m2}                           BUN/Creatinine Ratio              26 {ratio}      (6-20)                           Glucose Level              117 mg/dL      (65-99)                           Serum Osmolality  297 (273-304)                Calcium Level              11.0 mg/dL      (8.7-10.4)                           Total Bilirubin              0.68 mg/dL      (0.20-1.30)                           Aspartate Amino Transf      (AST/SGOT)        32 U/L (15-50)                           Alanine Aminotransferase      (ALT/SGPT)        20 U/L (10-40)                           Alkaline Phosphatase              69 U/L      ()                           Total Protein              7.2 g/dL      (6.3-8.2)                           Albumin              4.0 g/dL      (3.5-5.0)                           Globulin              3.2 g/dL      (2.0-3.5)                           Albumin/Globulin Ratio              1.3 {ratio}      (1.4-2.6)                           Magnesium Level              2.92 mg/dL      (1.60-2.30)                    Iron Level              89 ug/dL      ()                    Total  Iron Binding Capacity              369 ug/dL      (245-450)                    Percent Iron Saturation   24 % (20-55)               Transferrin              258.00 mg/dL      (250..00)                    Ferritin              112 ng/mL      ()                    Triglycerides Level              63 mg/dL      ()                    Cholesterol Level              158 mg/dL      (107-200)                    LDL Cholesterol              78 mg/dL      ()                    VLDL Cholesterol              13 mg/dL      (5-37)                    HDL Cholesterol              67 mg/dL      (40-60)                    Cholesterol/HDL Ratio              2.4 NA      (3.0-6.0)                    Vitamin B12 Level              1535 pg/mL      (211-911)                    Vitamin D 1,25-Dihydroxy              39.7 pg/mL      (19.9-79.3)                    Lab Scanned Report              LAB FINAL      CUMULATIVES -            Impression/Problem List      Follicular lymphoma grade 1 stage III improved with 4 weekly courses of Rituxan.      Recent PET CT scan showed increase in size and metabolic activity of some of     her lymph nodes.      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      History of bladder cancer followed by Dr. Grubbs      Aortic stenosis      History of diastolic congestive heart failure      Atrial fibrillation      History of hypertension      Osteoporosis      Lung nodule right lower lobe      Notes      New Diagnostics      * Comp Metabolic Panel, Stat         Dx: NHL (non-Hodgkin's lymphoma) - C85.90      * CBC With Auto Diff, Stat         Dx: NHL (non-Hodgkin's lymphoma) - C85.90      Problems:        (1) Essential hypertension      (2) Diabetes mellitus type 2 in nonobese      (3) Follicular non-Hodgkin's lymphoma      (4) History of diffuse large B-cell lymphoma            Plan      40-week of Rituxan  monotherapy.      Return in 2 months with CBC, CMP, LDH and sed rate.  Patient to be followed up     by Dr. Byrd.            IV INVASIVE LINE CARE      IV ASSESSMENT      IV Line Location:  Chest      IV Location Modifier:  Right      IV Line Type:  Port-A-Cath      Insertion Date:  Jul 25, 2019      IV Catheter Gauge:  20      Site Observation:  Asymptomatic      IV Care:  Blood Return Present, Flushes Easily, Heparin Flush, Maintained per     Policy, Saline Flush, Site Care, Start per Policy      IV Discontinued Reason:  Patient Discharged      IV Infusion Comment:  Routine VAD Maintenance            POST INFUSION      Pt Tolerance to Procedure:  Good      Instructed on care of VAD/IV:  Yes            Patient Education:        DI for Non-Hodgkin's Lymphoma            PREVENTION      Hx Influenza Vaccination:  Yes      Date Influenza Vaccine Given:  Nov 11, 2019      Influenza Vaccine Declined:  No      2 or More Falls in Past Year?:  No      Fall Past Year with Injury?:  No      Hx Pneumococcal Vaccination:  No      Encouraged to follow-up with:  PCP regarding preventative exams.      Chart initiated by      TAI Anderson MA            Electronically signed by Rhoda Zaragoza  08/28/2020 19:47       Disclaimer: Converted document may not contain table formatting or lab diagrams. Please see [employer] for the authenticated document.

## 2021-05-28 NOTE — PROGRESS NOTES
Patient: QUEENIE MURRIETA     Acct: [id number]     Report: #[id number]  UNIT #: [id number]     : 1928    Encounter Date:2019  PRIMARY CARE: Gala Rao  ***Signed***  --------------------------------------------------------------------------------------------------------------------  Progress Note      DATE: 3/27/19      Primary Care Provider:  Gala Rao      Referring Provider:  Gala Rao      Chief Complaint      NHL, RIC Follow-up            Subjective      89-year-old white female was referred for evaluation and treatment of her     recently diagnosed follicular lymphoma.  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            Patient denies any B symptoms.  She had a recent PET/CT scan for staging.  She     comes in today accompanied by her niece.            Patient had a history of large cell non-Hodgkin's lymphoma in  treated with     6 cycles of R CHOP.   Patient had complete response and was followed up until     2015.            Past Med/Surg History            Past Med/Surg History:   Hypertension             Diabetes Mellitus             Heart Disease             No Blood Clots             Cancer             No Lung Disease             No Kidney Disease             No Other            Social History      Social History:  No Tobacco Use, No Alcohol Use, No Recreational Drug use, No     Other            Allergies      Coded Allergies:             IODINATED CONTRAST- ORAL AND IV DYE (Verified  Allergy, Unknown,     HIVES,SNEEZING,ITCHY EYES, 19)           PENICILLINS (Verified  Allergy, Unknown, RASH, 19)           SPINACH (Verified  Allergy, Unknown, 19)                  SHOWED UP ON ALLERGY TESTING                    SULFA (SULFONAMIDE ANTIBIOTICS) (Verified  Allergy, Unknown, RASH, 19)            Medications      Medications    Last  Reconciled on 3/27/19 18:54 by RAHEL WESLEY MD      Aspirin Chew (Aspirin Baby) 81 Mg Tab.chew      81 MG PO QDAY, #30 TAB.CHEW 0 Refills         Reported         3/13/19       Furosemide* (Lasix*) 40 Mg Tablet      60 MG PO MOWEFR, #30 TAB 0 Refills         Reported         3/13/19       Calcium Carb/Vit D3 (CALCIUM 600-VIT D3 400 TABLET) 1 Each Tablet      1 EACH PO BID, #120 TAB 0 Refills         Reported         3/13/19       Montelukast Sodium (Montelukast*) 10 Mg Tablet      10 MG PO HS for 30 Days, #30 TAB         Prov: Gala Rao         3/1/19       Losartan Potassium (Losartan*) 25 Mg Tablet      25 MG PO QDAY, #30 TAB 0 Refills         Reported         2/21/19       Dabigatran (Pradaxa) 75 Mg Capsule      75 MG PO BID, #60 CAP         Reported         2/21/19       Raloxifene HCl (Evista) 60 Mg Tablet      60 MG PO QDAY, TAB         Reported         2/21/19       Atorvastatin (Atorvastatin) 40 Mg Tablet      40 MG PO HS, #30 TAB 0 Refills         Reported         2/21/19       Carvedilol (Carvedilol) 6.25 Mg Tablet      12.5 MG PO BID for 30 Days, #120 TAB         Prov: Gala Rao         2/19/19       Ergocalciferol (Vitamin D2*) 50,000 Units Capsule      39984 UNITS PO Q14D, #4 CAP 0 Refills         Reported         10/22/18       Spironolactone (Aldactone) 25 Mg Tablet      12.5 MG PO QDAY, #30 TAB 0 Refills         Reported         8/12/14       Furosemide (Furosemide) 40 Mg Tablet      40 MG PO, #30 TAB 0 Refills         Reported         8/12/14       Polysaccharide Iron Complex (Niferex-150*) 150 Mg Cap      150 MG PO QDAY         Reported         1/28/09      Current Medications      Current Medications Reviewed 3/27/19            Pain Assessment      Pain Intensity:  0      Description:  None            Review of Systems      General:  Fatigue Scale: (2); No Pain Scale:      HEENT:  No Dysphagia, No Hearing Changes, No Visual Changes      Respiratory:  Cough, Shortness of Air, Wheezing       Cardiovascular:  No Chest Pain, No Palpitations      Gastrointestinal:  No Nausea, No Vomiting, No Constipation, No Diarrhea;     Appetite Good      Genitourinary:  No Nocturia, No Dysuria      Musculoskeletal:  No Aches, No Pains      Endocrine:  No Heat Intolerance; Fatigue      Hematologic:  No Bleeding, No Bruising      Allergic/Immunologic:  No Hives, No Nasal drip      Psychological:  No Anxiety, No Depression      Neurological:  No Headaches, No Dizziness      Skin:  No Rash, No Edema            Vitals      Height 5 ft 4 in / 162.56 cm      Weight 144 lbs 2 oz / 65.809401 kg      BSA 1.70 m2      BMI 24.7 kg/m2      Temperature 98.0 F / 36.67 C      Pulse 71      Respirations 16      Blood Pressure 112/65      Pulse Oximetry 97%            Exam      Constitutional:  No acute distress, Conversant, Pleasant      HENT:  Other (Hard of hearing)      Cardiovascular:  RRR; No Murmurs; Normal PMI, Peripheral Edema      Lungs:  Clear to Ausculation, Normal Respiratory Effort      Neurological:  Cranial Nerve II-XII (Intact); No Focal Sensory deficits      Psychological:  Appropriate affect, Appropriate mood, Intact judgement, Alert            Lab Results      Laboratory Tests      3/13/19 17:53            Laboratory Tests            Test       2/9/19      09:37 2/9/19      10:38 2/9/19      10:41 2/9/19      12:43             Lactic Acid Level       1.9 mmol/L      (0.7-2.1)                           Troponin T       <0.01 ng/mL      (0.00-0.10)                           Procalcitonin       0.22 ug/L      (0.00-4.00)                           Urine RBC              MANY(21-50)      /(HPF)                           Urine WBC              SMALL(6-10)      /(HPF)                           Urine Epithelial Cells  2-5 /(HPF)                Urine Bacteria  NEGATIVE NA                Urine Hyaline Casts  2-5 NA                Sodium (Blood Gas)              134.4 mmol/L      (136.0-146.0)                    Potassium  (Blood Gas)              3.9 mmol/L      (3.5-5.0)                    Chloride (Blood Gas)              98 mmol/L      ()                    Glucose (Blood Gas)              196.00 mg/dL      (65.00-99.00)                    Ionized Calcium (Blood Gas)              1.16 mmol/L      (1.13-1.32)                    Lactic Acid (Blood Gas)              1.57 mmol/L      (0.50-2.00)                    Blood Gas Respiration Rate    18 NA              Blood Gas Tidal Volume    450 NA              Blood Gas PEEP    10              Test       2/9/19      13:50 2/9/19      16:59 2/9/19      20:32 2/10/19      04:51             Mycoplasma pneumoniae IgM      Antibody NEGATIVE NA                           Bronchial Fluid Comment  Small NA                Bronchoalveo Lavage      Neutrophil Vol        99 NA                           Bronchoalveolar Lavage      Lymphocytes        1 NA                           Bronchoalveolar Lavage      Macrophages        0 /100{WBCs}                           Broncho Lavage Squamous      Epithelial        0 NA                           Atypical Lymphocytes %   1 % (0-5)               Anisocytosis   SLIGHT NA               [place]   SLIGHT NA               Segmented Neutrophils %    70 % (45-70)              Band Neutrophils %    23 % (1-5)              Lymphocytes % (Manual)    4 % (20-45)              Monocytes % (Manual)    3 % (3-10)              Eosinophils % (Manual)    0 % (0-7)              Basophils % (Manual)    0 % (0-3)              Nucleated Red Blood Cells    0 /100{WBCs}              Platelet Estimate    Adequate NA              Platelet Morphology Comment    NORMAL NA              Ovalocytes    SLIGHT NA              [place] Cells    SLIGHT NA              Test       2/11/19      03:10 2/17/19      08:14 2/18/19      08:23 2/19/19      09:55             Conjugated Bilirubin       <0.2 mg/dL      (0.0-0.6)                           Unconjugated Bilirubin       0.2 mg/dL       (0.0-1.1)                           Random Vancomycin Level 13 ug/mL                 Phosphorus Level              4.0 mg/dL      (2.4-4.5)                           Blood Gas Puncture Site   LEFT Radial NA               Blood Gas pH              7.50 NA      (7.35-7.45)                    Blood Gas PCO2              38.8 mm(Hg)      (35.0-45.0)                    Blood Gas PO2              65.9 mm(Hg)      (80.0-100.0)                    Blood Gas HCO3              29.3      (22.0-26.0)                    Blood Gas Base Excess   5.7 NA (+/- 2)               Blood Gas Oxygen Saturation              92.3 NA      (95.0-99.0)                    O2 Saturation #2              94.00 mm(Hg)      (21..00)                    Arterial Blood PO2/FiO2 Ratio   206 NA (0-500)               Bill Test   ACCEPTED NA               Hemoglobin Oxygen Saturation              92.1 %      (94.0-98.0)                    Carboxyhemoglobin              0.1 %      (0.0-1.5)                    Methemoglobin              0.1 %      (0.0-1.5)                    Reduced Hemoglobin              7.7 %      (0.0-5.0)                    Total Hemoglobin              12.3      (11.7-14.6)                    Oxygen Delivery Device              O2/nasal      Cannul NA                    Oxygen Liters/Minute   3.0 L/min               FiO2              32.00 %      (21..00)                    Blood Gas Comments   NONE NA               Prothrombin Time              10.4 s      (9.4-12.0)             International Ratio (Anticoag      Ther)               1.00 NA      (2.00-3.00)             Activated Partial      Thromboplast Time               22.0      (22.2-34.2)             Test       2/19/19      11:09 2/28/19      07:11 3/2/19      07:27 3/2/19      11:48             Lymphoma Panel SEE BELOW NA                 Magnesium Level              2.21 mg/dL      (1.60-2.30)                           NT-Pro-B-Type Natriuretic      Peptide                1123 pg/mL      (0-1800)                    Capillary Blood Glucose (Chem)              112 mg/dL      (65-99)             Test       3/2/19      12:45 3/13/19      17:53 3/15/19      21:57                    Urine Collection Type Clean Catch NA                 Urine Color YELLOW NA                 Urine Appearance CLEAR NA                 Urine pH       6.5 (pH)      (5.0-8.0)                           Urine Specific Gravity       1.016 NA      (1.005-1.030)                           Urine Protein       NEGATIVE mg/dL      (NEGATIVE)                           Urine Glucose (UA)       NEGATIVE mg/dL      (NEGATIVE)                           Urine Ketones       NEGATIVE mg/dL      (NEGATIVE)                           Urine Occult Blood       NEGATIVE NA      (NEGATIVE)                           Urine Nitrite       NEGATIVE NA      (NEGATIVE)                           Urine Bilirubin       NEGATIVE NA      (NEGATIVE)                           Urine Urobilinogen       0.2      {EhrlichU}/dL                           Urine Leukocyte Esterase       NEGATIVE NA      (NEGATIVE)                           White Blood Count              6.62 10*3/uL      (4.80-10.80)                           Red Blood Count              3.60 10*6/uL      (4.20-5.40)                           Hemoglobin              11.20 g/dL      (12.00-16.00)                           Hematocrit              35.3 %      (37.0-47.0)                           Mean Corpuscular Volume              98.1 fL      (81.0-99.0)                           Mean Corpuscular Hemoglobin              31.1 pg      (27.0-31.0)                           Mean Corpuscular Hemoglobin      Concent        31.7 %      (33.0-37.0)                           Red Cell Distribution Width              14.5 %      (11.7-14.4)                           RDW Standard Deviation              51.9 fL      (36.4-46.3)                           Platelet Count              274.00 10*3/uL       (130..00)                           Mean Platelet Volume              10.8 fL      (9.4-12.3)                           Granulocytes (%)              64.3 %      (30.0-85.0)                           Lymphocytes (%) (Auto)              21.50 %      (20.00-45.00)                           Monocytes (%) (Auto)              12.10 %      (3.00-10.00)                           Eosinophils (%) (Auto)              1.10 %      (0.00-7.00)                           Basophils (%) (Auto)              0.50 %      (0.00-3.00)                           Granulocytes #              4.27 10*3/uL      (2.00-8.00)                           Lymphocytes # (Auto)              1.42 10*3/uL      (1.00-5.00)                           Monocytes # (Auto)              0.80 10*3/uL      (0.20-1.20)                           Eosinophils # (Auto)              0.07 10*3/uL      (0.00-0.70)                           Basophils # (Auto)              0.03 10*3/uL      (0.00-0.20)                           Immature Granulocytes %              0.5 %      (0.0-1.8)                           Nucleated Red Blood Cells %              0.00 %      (0.00-0.70)                           Immature Granulocytes #              0.03 10*3/uL      (0.00-0.20)                           Erythrocyte Sedimentation Rate  44 mm/h (0-30)                Sodium Level              138 mmol/L      (135-147)                           Potassium Level              3.7 mmol/L      (3.5-5.3)                           Chloride Level              96 mmol/L      ()                           Carbon Dioxide Level              31 mmol/L      (22-32)                           Anion Gap              15 mmol/L      (8-19)                           Blood Urea Nitrogen              23 mg/dL      (5-25)                           Creatinine              0.83 mg/dL      (0.50-0.90)                           Glomerular Filtration Rate      Calc        >60      mL/min/{1.73_m2}                            BUN/Creatinine Ratio              28 {ratio}      (6-20)                           Glucose Level              105 mg/dL      (65-99)                           Serum Osmolality  290 (273-304)                Calcium Level              10.1 mg/dL      (8.7-10.4)                           Iron Level              79 ug/dL      ()                           Total Iron Binding Capacity              342 ug/dL      (245-450)                           Percent Iron Saturation  23 % (20-55)                Transferrin              239.00 mg/dL      (250..00)                           Total Bilirubin              0.44 mg/dL      (0.20-1.30)                           Aspartate Amino Transf      (AST/SGOT)        31 U/L (15-50)                           Alanine Aminotransferase      (ALT/SGPT)        19 U/L (10-40)                           Alkaline Phosphatase              84 U/L      ()                           Lactate Dehydrogenase              250 U/L      (120-240)                           Total Protein              7.3 g/dL      (6.3-8.2)                           Albumin              3.5 g/dL      (3.5-5.0)                           Globulin              3.8 g/dL      (2.0-3.5)                           Albumin/Globulin Ratio              0.9 {ratio}      (1.4-2.6)                           Vitamin B12 Level              1228 pg/mL      (211-911)                           Folate              >20.0 ng/mL      (4.8-20.0)                           Lab Scanned Report              LAB FINAL      CUMULATIVES -             Radiology Impressions      PET/CT scan done on March 22, 2019 showed 0.9 cm lymph node at the base of the     neck/upper chest with a maximum SUV of 3.85.  2.1 cm prominent lymph node in the     left axilla with an SUV of 5.4.  Small residual left pleural effusion as well     as some bibasilar atelectasis.      Small noncalcified pulmonary nodule measuring 0.28 cm in the right  lower lobe     too small to adequately assess on this exam.      Abnormal pathologic lymph node in the left periaortic area at the level of the     left renal vein measuring 2.5 cm x 2 cm with an SUV of 5.9.  Second     retroperitoneal lymph node adjacent to the lower abdominal aorta on the left     measuring 1.8 x 1.1 cm maximum SUV of 8.76.      Vascular calcification noted including coronary artery calcification            Impression/Problem List      Follicular lymphoma grade 1       History of large B-cell lymphoma status post chemotherapy in 2008 with complete     response.      History of bladder cancer followed by Dr. 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  Offered close observation since the     patient has no B symptoms and is of advanced age.  Patient agreed to plan of     treatment per      Patient and niece was made aware of the noncalcified pulmonary nodule which is     too small to characterize      Return in 3 months with a CBC, CMP, LDH level.            Patient Education:        How to Manage Shortness of Breath            PREVENTION      Hx Influenza Vaccination:  Yes      Influenza Vaccine Declined:  No      2 or More Falls Past Year?:  No      Fall Past Year with Injury?:  No      Chart initiated by      JUSTIN Atkinson RN                 Disclaimer: Converted document may not contain table formatting or lab diagrams. Please see [facility] for the authenticated document.

## 2021-05-28 NOTE — PROGRESS NOTES
Patient: QUEENIE MURRIETA     Acct: [id number]     Report: #[id number]  UNIT #: [id number]     : 1928    Encounter Date:2020  PRIMARY CARE: Gala Rao  ***Signed***  --------------------------------------------------------------------------------------------------------------------  Progress Note      DATE: 20      Primary Care Provider:  Gala Rao      Referring Provider:  Gala Rao      Chief Complaint      FU NHL, RIC            Subjective      89-year-old white female is here for follow-up of her follicular lymphoma     diagnosed in 2019.  Patient was given Rituxan monotherapy weekly x4.      Patient had left hydronephrosis secondary to her retroperitoneal     lymphadenopathy.            Patient is doing very well she has no B symptoms.            Patient had a history of large cell non-Hodgkin's lymphoma in 2008 involving the    bone marrow and the left axilla.  Patient had complete response with 6 courses     of CHOP.            Past Med/Surg History            Past Med/Surg History:   Hypertension             Diabetes Mellitus             Heart Disease             No Blood Clots             Cancer (Diffuse large B-cell lymphoma in )             No Lung Disease             No Kidney Disease             No Other            Social History      Social History:  No Tobacco Use, No Alcohol Use, No Recreational Drug use, No     Other            Allergies      Coded Allergies:             IODINATED CONTRAST MEDIA (Verified  Allergy, Unknown, HIVES,SNEEZING,ITCHY     EYES, 20)           PENICILLINS (Verified  Allergy, Unknown, RASH, 20)           SPINACH (Verified  Allergy, Unknown, 20)                  SHOWED UP ON ALLERGY TESTING                    SULFA (SULFONAMIDE ANTIBIOTICS) (Verified  Allergy, Unknown, RASH, 20)            Medications      Medications    Last Reconciled on 20 18:49 by RAHEL WESLEY MD      Montelukast Sodium (Singulair*) 10 Mg  Tab      10 MG PO QDAY, #30 TAB 0 Refills         Reported         12/18/19       Aspirin Chew (Aspirin Baby) 81 Mg Tab.chew      81 MG PO QDAY, #30 TAB.CHEW 0 Refills         Reported         3/13/19       Furosemide* (Lasix*) 40 Mg Tablet      60 MG PO MOWEFR, #30 TAB 0 Refills         Reported         3/13/19       Calcium Carb/Vit D3 (CALCIUM 600-VIT D3 400 TABLET) 1 Each Tablet      1 EACH PO BID, #120 TAB 0 Refills         Reported         3/13/19       Losartan Potassium (Losartan*) 25 Mg Tablet      25 MG PO QDAY, #30 TAB 0 Refills         Reported         2/21/19       Dabigatran (Pradaxa) 75 Mg Capsule      75 MG PO BID, #60 CAP         Reported         2/21/19       Raloxifene HCl (Evista) 60 Mg Tablet      60 MG PO QDAY, TAB         Reported         2/21/19       Atorvastatin (Atorvastatin) 40 Mg Tablet      40 MG PO HS, #30 TAB 0 Refills         Reported         2/21/19       Carvedilol (Carvedilol) 6.25 Mg Tablet      12.5 MG PO BID for 30 Days, #120 TAB         Prov: Gala Rao         2/19/19       Ergocalciferol (Vitamin D2) (Vitamin D2) 50,000 Units Capsule      70638 UNITS PO Q14D, #4 CAP 0 Refills         Reported         10/22/18       Spironolactone (Aldactone) 25 Mg Tablet      12.5 MG PO QDAY, #30 TAB 0 Refills         Reported         8/12/14       Furosemide (Furosemide) 40 Mg Tablet      40 MG PO, #30 TAB 0 Refills         Reported         8/12/14       Polysaccharide Iron Complex (Niferex-150*) 150 Mg Cap      150 MG PO QDAY         Reported         1/28/09      Current Medications      Current Medications Reviewed 6/23/20            Pain Assessment      Pain Intensity:  0      Description:  None            Review of Systems      General:  No Anxiety; Fatigue Scale: (4), Pain Scale: (0)      HEENT:  No Dysphagia      Respiratory:  Cough, Shortness of Air, Sputum Changes (Clear)      Cardiovascular:  Chest Pain, Pedal Edema; No Orthopnea      Gastrointestinal:  No Nausea, No Vomiting;  Appetite Good (Good)      Genitourinary:  No Nocturia      Musculoskeletal:  No Joint Effusions, No Joint Tenderness      Endocrine:  No Heat Intolerance      Hematologic:  No Bleeding      Allergic/Immunologic:  No Hives      Psychological:  No Anxiety, No Depression      Neurological:  No Headaches; Weakness      Skin:  No Rash, No Open Wounds            Vitals      Height 5 ft 1.75 in / 156.85 cm      Weight 152 lbs 3.2 oz / 69.451198 kg      BSA 1.70 m2      BMI 28.1 kg/m2      Temperature 97.8 F / 36.56 C      Pulse 52      Respirations 18      Blood Pressure 117/48      Pulse Oximetry 95%            Exam      Constitutional:  No acute distress, Conversant, Pleasant      Eyes:  Anicteric sclerae, Palpebral Conjunctivae, WILLIE      Neck:  Supple      Cardiovascular:  RRR, Murmurs (Right and left base of the heart with radiation     to the carotids), Normal PMI; No Peripheral Edema      Lungs:  Clear to Ausculation, Normal Respiratory Effort      Abdomen:  Soft; No Masses, No Tenderness      Chest:  Other (Symmetric)      Lymphatic:  No Neck, No Axillae      Extremities:  No digital cyanosis, No digital ischemia, Normal gait, Other (No     deformity)      Neurological:  Cranial Nerve II-XII (Intact); No Focal Sensory deficits      Psychological:  Appropriate affect, Appropriate mood, Intact judgement, Alert      Skin:  Other (No dermatosis)            Lab Results      Laboratory Tests      6/11/20 13:38            Laboratory Tests            Test       3/11/20      13:29 4/20/20      21:56 6/11/20      13:38             Lactate Dehydrogenase       271 U/L      (120-240)                           Lab Scanned Report              LAB FINAL      CUMULATIVES -                    White Blood Count              7.03 10*3/uL      (4.80-10.80)             Red Blood Count              3.80 10*6/uL      (4.20-5.40)             Hemoglobin              12.1 g/dL      (12.0-16.0)             Hematocrit              37.3 %       (37.0-47.0)             Mean Corpuscular Volume              98.2 fL      (81.0-99.0)             Mean Corpuscular Hemoglobin              31.8 pg      (27.0-31.0)             Mean Corpuscular Hemoglobin      Concent               32.4      (33.0-37.0)             Red Cell Distribution Width              13.7 %      (11.7-14.4)             RDW Standard Deviation              49.6 fL      (36.4-46.3)             Platelet Count              187 10*3/uL      (130-400)             Mean Platelet Volume              10.3 fL      (9.4-12.3)             Granulocytes (%)              64.7 %      (30.0-85.0)             Granulocytes #              4.54 10*3/uL      (2.00-8.00)             Lymphocytes # (Auto)              1.52 10*3/uL      (1.00-5.00)             Monocytes # (Auto)              0.79 10*3/uL      (0.20-1.20)             Eosinophils # (Auto)              0.14 10*3/uL      (0.00-0.70)             Basophils # (Auto)              0.03 10*3/uL      (0.00-0.20)             Immature Granulocytes %              0.1 %      (0.0-1.8)             Lymphocytes %              21.6 %      (20.0-45.0)             Monocytes %              11.2 %      (3.0-10.0)             Eosinophils %              2.0 %      (0.0-7.0)             Basophils %              0.4 %      (0.0-3.0)             Nucleated Red Blood Cells %              0.00 %      (0.00-0.70)             Immature Granulocytes #              0.01 10*3/uL      (0.00-0.20)             Sodium Level              144 mmol/L      (135-147)             Potassium Level              4.0 mmol/L      (3.5-5.3)             Chloride Level              106 mmol/L      ()             Carbon Dioxide Level              24 mmol/L      (22-32)             Anion Gap              18 mmol/L      (8-19)             Blood Urea Nitrogen              27 mg/dL      (5-25)             Creatinine              0.95 mg/dL      (0.50-0.90)             Glomerular Filtration Rate      Calc                52      mL/min/{1.73_m2}             BUN/Creatinine Ratio              28 {ratio}      (6-20)             Glucose Level              87 mg/dL      (65-99)             Serum Osmolality   302 (273-304)              Calcium Level              9.4 mg/dL      (8.7-10.4)             Magnesium Level              2.11 mg/dL      (1.60-2.30)             Total Bilirubin              0.80 mg/dL      (0.20-1.30)             Aspartate Amino Transf      (AST/SGOT)               30 U/L (15-50)                    Alanine Aminotransferase      (ALT/SGPT)               18 U/L (10-40)                    Alkaline Phosphatase              61 U/L      ()             Total Protein              7.0 g/dL      (6.3-8.2)             Albumin              4.0 g/dL      (3.5-5.0)             Globulin              3.0 g/dL      (2.0-3.5)             Albumin/Globulin Ratio              1.3 {ratio}      (1.4-2.6)             Triglycerides Level              51 mg/dL      ()             Cholesterol Level              139 mg/dL      (107-200)             LDL Cholesterol              59 mg/dL      ()             VLDL Cholesterol              10 mg/dL      (5-37)             HDL Cholesterol              70 mg/dL      (40-60)             Cholesterol/HDL Ratio              2.0 NA      (3.0-6.0)             Vitamin B12 Level              1317 pg/mL      (211-911)             Vitamin D 25-Hydroxy              44.0 ng/mL      (30.0-100.0)             Folate              >20.0 ng/mL      (4.8-20.0)             Thyroid Stimulating Hormone      (TSH)               1.460 m(iU)/L      (0.270-4.200)             Free Thyroxine              1.1 ng/dL      (0.9-1.8)            Impression/Problem List      Follicular lymphoma grade 1 stage III improved with 4 weekly courses of Rituxan      Cardiomegaly      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      History of bladder cancer  followed by Dr. 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  Her last PET scan showed improvement but it still had areas     of hypermetabolic lymphadenopathy in the supraclavicular on the left and the     prior thick areas.      Patient agreed to get a PET CT scan and decide if she was going to get more     treatment depending on the results.            IV INVASIVE LINE CARE      IV ASSESSMENT      IV Line Location:  Chest      IV Location Modifier:  Right      IV Line Type:  Port-A-Cath      Insertion Date:  Jul 25, 2019      IV Catheter Gauge:  20      Site Observation:  Asymptomatic      IV Care:  Blood Return Present, Flushes Easily, Heparin Flush, Maintained per     Policy, Saline Flush, Site Care, Start per Policy      IV Discontinued Reason:  Patient Discharged      IV Infusion Comment:  Routine VAD Maintenance            POST INFUSION      Pt Tolerance to Procedure:  Good      Instructed on care of VAD/IV:  Yes            Patient Education:        DI for Non-Hodgkin's Lymphoma            PREVENTION      Hx Influenza Vaccination:  Yes      Date Influenza Vaccine Given:  Nov 11, 2019      Influenza Vaccine Declined:  No      2 or More Falls Past Year?:  No      Fall Past Year with Injury?:  No      Hx Pneumococcal Vaccination:  No      Encouraged to follow-up with:  PCP regarding preventative exams.      Chart initiated by      TAI Anderson MA            Electronically signed by Rhoda Zaragoza  06/23/2020 18:49       Disclaimer: Converted document may not contain table  formatting or lab diagrams. Please see [employer] for the authenticated document.

## 2021-05-28 NOTE — PROGRESS NOTES
Patient: QUEENIE MURRIETA     Acct: [id number]     Report: #[id number]  UNIT #: [id number]     : 1928    Encounter Date:2020  PRIMARY CARE: Gala Rao  ***Signed***  --------------------------------------------------------------------------------------------------------------------  Progress Note      DATE: 20      Primary Care Provider:  Gala Rao      Referring Provider:  Glaa Rao      Chief Complaint      FU NHL, RIC            Subjective      89-year-old white female has a history of follicular lymphoma diagnosed in 2019.     She presented with abdominal pain and on evaluation had retroperitoneal     adenopathy that turned out to be lymphoma.  Patient also has a small to moderate    right and small pleural pleural effusions with bilateral lower lobe atelectasis.     Patient was observed until September when PET scan showed progression of     disease and compression of the left renal brain by the left periaortic lymph     nodes.  Patient was treated with Rituxan monotherapy last October.  There was     some improvement in her lymphadenopathy and she did not opt for maintenance     Rituxan.            This past July patient showed enlargement of the left     infraclavicular/subpectoral lymph node as well as increase in size and metabolic    activity of the left axillary lymph node.  She also had slightly larger as left     pleural effusion.  There was increase in size and activity in the left para-    aortic lymph node conglomerate.  Patient opted to receive Rituxan monotherapy.      This is her third out of a series of 4.            Past Med/Surg History            Past Med/Surg History:   Hypertension             Diabetes Mellitus             Heart Disease             No Blood Clots             Cancer (Diffuse large B-cell lymphoma in )             No Lung Disease             No Kidney Disease             No Other            Social History      Social History:  No Tobacco  Use, No Alcohol Use, No Recreational Drug use, No     Other            Allergies      Coded Allergies:             IODINATED CONTRAST MEDIA (Verified  Allergy, Unknown, HIVES,SNEEZING,ITCHY     EYES, 6/11/20)           PENICILLINS (Verified  Allergy, Unknown, RASH, 6/11/20)           SPINACH (Verified  Allergy, Unknown, 6/11/20)                  SHOWED UP ON ALLERGY TESTING                    SULFA (SULFONAMIDE ANTIBIOTICS) (Verified  Allergy, Unknown, RASH, 6/11/20)            Medications      Medications    Last Reconciled on 8/4/20 18:34 by RAHEL ZARAGOZA MD      Lactobacillus Acidophilus (Florajen) 460 Mg Capsule      460 MG PO QDAY, CAP         Reported         8/4/20       Carvedilol (Carvedilol) 6.25 Mg Tablet      6.25 MG PO BID for 30 Days, #60 TAB         Prov: Rhoda Zaragoza         8/4/20       Montelukast Sodium (Singulair*) 10 Mg Tab      10 MG PO QDAY, #30 TAB 0 Refills         Reported         12/18/19       Aspirin Chew (Aspirin Baby) 81 Mg Tab.chew      81 MG PO QDAY, #30 TAB.CHEW 0 Refills         Reported         3/13/19       Furosemide* (Lasix*) 40 Mg Tablet      60 MG PO MOWEFR, #30 TAB 0 Refills         Reported         3/13/19       Calcium Carb/Vit D3 (CALCIUM 600-VIT D3 400 TABLET) 1 Each Tablet      1 EACH PO BID, #120 TAB 0 Refills         Reported         3/13/19       Losartan Potassium (Losartan*) 25 Mg Tablet      25 MG PO QDAY, #30 TAB 0 Refills         Reported         2/21/19       Dabigatran (Pradaxa) 75 Mg Capsule      75 MG PO BID, #60 CAP         Reported         2/21/19       Raloxifene HCl (Evista) 60 Mg Tablet      60 MG PO QDAY, TAB         Reported         2/21/19       Atorvastatin (Atorvastatin) 40 Mg Tablet      40 MG PO HS, #30 TAB 0 Refills         Reported         2/21/19       Ergocalciferol (Vitamin D2) (Vitamin D2) 50,000 Units Capsule      04577 UNITS PO Q14D, #4 CAP 0 Refills         Reported         10/22/18       Spironolactone (Aldactone) 25 Mg Tablet      12.5 MG  PO QDAY, #30 TAB 0 Refills         Reported         8/12/14       Furosemide (Furosemide) 40 Mg Tablet      40 MG PO, #30 TAB 0 Refills         Reported         8/12/14       Polysaccharide Iron Complex (Niferex-150*) 150 Mg Cap      150 MG PO QDAY         Reported         1/28/09      Current Medications      Current Medications Reviewed 8/18/20            Pain Assessment      Pain Intensity:  0      Description:  None            Review of Systems      General:  No Anxiety; Fatigue Scale: (4), Pain Scale: (0)      HEENT:  No Dysphagia, No Hearing Changes      Respiratory:  No Cough; Shortness of Air      Cardiovascular:  No Chest Pain; Pedal Edema      Gastrointestinal:  No Nausea, No Constipation; Appetite Good (Good)      Genitourinary:  No Nocturia, No Dysuria      Musculoskeletal:  No Joint Effusions      Endocrine:  No Heat Intolerance, No Cold Intolerance      Hematologic:  No Bleeding      Allergic/Immunologic:  No Hives; Nasal drip (Sinus drainage)      Psychological:  No Anxiety, No Depression      Neurological:  No Headaches; Weakness      Skin:  No Rash, No Open Wounds            Vitals      Height 5 ft 1.75 in / 156.85 cm      Weight 149 lbs 0 oz / 67.870810 kg      BSA 1.68 m2      BMI 27.5 kg/m2      Temperature 97.8 F / 36.56 C      Pulse 57      Respirations 18      Blood Pressure 126/48      Pulse Oximetry 96%            Exam      Constitutional:  No acute distress, Conversant      Eyes:  Anicteric sclerae, Palpebral Conjunctivae, WILLIE (Pain)      Neck:  Supple, Full Range of Motion      Cardiovascular:  RRR; No Murmurs; Normal PMI; No Peripheral Edema      Lungs:  Clear to Ausculation, Normal Respiratory Effort      Abdomen:  Soft; No Masses, No Tenderness      Chest:  Other (Symmetrical and equal)      Extremities:  No digital cyanosis, No digital ischemia, Other (Pain)      Neurological:  Cranial Nerve II-XII (Intact); No Focal Sensory deficits      Psychological:  Appropriate affect,  Appropriate mood, Intact judgement, Alert      Skin:  Other (No dermatoses)            Lab Results      Laboratory Tests      8/25/20 13:49            Laboratory Tests            Test       6/11/20      13:38 8/25/20      13:49 8/26/20      10:54 8/27/20      21:57             Vitamin D 25-Hydroxy       44.0 ng/mL      (30.0-100.0)                           Folate       >20.0 ng/mL      (4.8-20.0)                           Thyroid Stimulating Hormone      (TSH) 1.460 m(iU)/L      (0.270-4.200)                           Free Thyroxine       1.1 ng/dL      (0.9-1.8)                           White Blood Count              6.97 10*3/uL      (4.80-10.80)                           Red Blood Count              3.99 10*6/uL      (4.20-5.40)                           Hemoglobin              12.3 g/dL      (12.0-16.0)                           Hematocrit              38.8 %      (37.0-47.0)                           Mean Corpuscular Volume              97.2 fL      (81.0-99.0)                           Mean Corpuscular Hemoglobin              30.8 pg      (27.0-31.0)                           Mean Corpuscular Hemoglobin      Concent        31.7      (33.0-37.0)                           Red Cell Distribution Width              13.5 %      (11.7-14.4)                           RDW Standard Deviation              48.2 fL      (36.4-46.3)                           Platelet Count              246 10*3/uL      (130-400)                           Mean Platelet Volume              10.4 fL      (9.4-12.3)                           Granulocytes (%)              67.2 %      (30.0-85.0)                           Granulocytes #              4.68 10*3/uL      (2.00-8.00)                           Lymphocytes # (Auto)              1.23 10*3/uL      (1.00-5.00)                           Monocytes # (Auto)              0.74 10*3/uL      (0.20-1.20)                           Eosinophils # (Auto)              0.26 10*3/uL      (0.00-0.70)                            Basophils # (Auto)              0.04 10*3/uL      (0.00-0.20)                           Immature Granulocytes %              0.3 %      (0.0-1.8)                           Lymphocytes %              17.6 %      (20.0-45.0)                           Monocytes %              10.6 %      (3.0-10.0)                           Eosinophils %              3.7 %      (0.0-7.0)                           Basophils %              0.6 %      (0.0-3.0)                           Nucleated Red Blood Cells %              0.00 %      (0.00-0.70)                           Immature Granulocytes #              0.02 10*3/uL      (0.00-0.20)                           Sodium Level              140 mmol/L      (135-147)                           Potassium Level              4.4 mmol/L      (3.5-5.3)                           Chloride Level              96 mmol/L      ()                           Carbon Dioxide Level              31 mmol/L      (22-32)                           Anion Gap              17 mmol/L      (8-19)                           Blood Urea Nitrogen              30 mg/dL      (5-25)                           Creatinine              1.15 mg/dL      (0.50-0.90)                           Glomerular Filtration Rate      Calc        41      mL/min/{1.73_m2}                           BUN/Creatinine Ratio              26 {ratio}      (6-20)                           Glucose Level              117 mg/dL      (65-99)                           Serum Osmolality  297 (273-304)                Calcium Level              11.0 mg/dL      (8.7-10.4)                           Total Bilirubin              0.68 mg/dL      (0.20-1.30)                           Aspartate Amino Transf      (AST/SGOT)        32 U/L (15-50)                           Alanine Aminotransferase      (ALT/SGPT)        20 U/L (10-40)                           Alkaline Phosphatase              69 U/L      ()                            Total Protein              7.2 g/dL      (6.3-8.2)                           Albumin              4.0 g/dL      (3.5-5.0)                           Globulin              3.2 g/dL      (2.0-3.5)                           Albumin/Globulin Ratio              1.3 {ratio}      (1.4-2.6)                           Magnesium Level              2.92 mg/dL      (1.60-2.30)                    Iron Level              89 ug/dL      ()                    Total Iron Binding Capacity              369 ug/dL      (245-450)                    Percent Iron Saturation   24 % (20-55)               Transferrin              258.00 mg/dL      (250..00)                    Ferritin              112 ng/mL      ()                    Triglycerides Level              63 mg/dL      ()                    Cholesterol Level              158 mg/dL      (107-200)                    LDL Cholesterol              78 mg/dL      ()                    VLDL Cholesterol              13 mg/dL      (5-37)                    HDL Cholesterol              67 mg/dL      (40-60)                    Cholesterol/HDL Ratio              2.4 NA      (3.0-6.0)                    Vitamin B12 Level              1535 pg/mL      (211-911)                    Vitamin D 1,25-Dihydroxy              39.7 pg/mL      (19.9-79.3)                    Lab Scanned Report              LAB FINAL      CUMULATIVES -            Impression/Problem List      Follicular lymphoma grade 1 stage III improved with 4 weekly courses of Rituxan.      Recent PET CT scan showed increase in size and metabolic activity of some of     her lymph nodes.      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      History of bladder cancer followed by Dr. Grubbs      Aortic stenosis      History of diastolic congestive heart failure      Atrial fibrillation      History of hypertension      Osteoporosis       Lung nodule right lower lobe      Notes      New Diagnostics      * Comp Metabolic Panel, Stat         Dx: Follicular non-Hodgkin's lymphoma - C82.90      * CBC With Auto Diff, Stat         Dx: Follicular non-Hodgkin's lymphoma - C82.90      Problems:        (1) Essential hypertension      (2) Diabetes mellitus type 2 in nonobese      (3) Follicular non-Hodgkin's lymphoma      (4) History of diffuse large B-cell lymphoma            Plan      Continue Rituxan monotherapy.  Patient will come back in 1 week for her last     Rituxan.            IV INVASIVE LINE CARE      IV ASSESSMENT      IV Line Location:  Chest      IV Location Modifier:  Right      IV Line Type:  Port-A-Cath      Insertion Date:  Jul 25, 2019      IV Catheter Gauge:  20      Site Observation:  Asymptomatic      IV Care:  Blood Return Present, Flushes Easily, Heparin Flush, Maintained per     Policy, Saline Flush, Site Care, Start per Policy      IV Discontinued Reason:  Patient Discharged      IV Infusion Comment:  Routine VAD Maintenance            POST INFUSION      Pt Tolerance to Procedure:  Good      Instructed on care of VAD/IV:  Yes            Patient Education:        DI for Non-Hodgkin's Lymphoma            PREVENTION      Hx Influenza Vaccination:  Yes      Date Influenza Vaccine Given:  Nov 11, 2019      Influenza Vaccine Declined:  No      2 or More Falls in Past Year?:  No      Fall Past Year with Injury?:  No      Hx Pneumococcal Vaccination:  No      Encouraged to follow-up with:  PCP regarding preventative exams.      Chart initiated by      TAI Anderson MA            Electronically signed by Rhoda Zaragoza  08/28/2020 19:41       Disclaimer: Converted document may not contain table formatting or lab diagrams. Please see [employer] for the authenticated document.

## 2021-05-28 NOTE — PROGRESS NOTES
Patient: QUEENIE MURRIETA     Acct: [id number]     Report: #[id number]  UNIT #: [id number]     : 1928    Encounter Date:2019  PRIMARY CARE: Gala Rao  ***Signed***  --------------------------------------------------------------------------------------------------------------------  Progress Note      DATE: 19      Primary Care Provider:  Gala Rao      Referring Provider:  Gala Rao      Chief Complaint      FU/ NHL, RIC            Subjective      89-year-old white female with history of large cell non-Hodgkin's lymphoma in      that had metastasized to the bone marrow in the left axilla.  Patient had     complete response with 6 courses of CHOP.            In 2019 patient was found to have follicular lymphoma grade 1 on biopsy    of her retroperitoneal lymph node.  Because of left hydronephrosis secondary to     lymphadenopathy patient was started on Rituxan monotherapy.  Patient finished 4     weekly courses of Rituxan.  Patient is here for results of  PET CT scan            Past Med/Surg History            Past Med/Surg History:   Hypertension             Diabetes Mellitus             Heart Disease             No Blood Clots             Cancer (Diffuse large B-cell lymphoma in )             No Lung Disease             No Kidney Disease             No Other            Social History      Social History:  No Tobacco Use, No Alcohol Use, No Recreational Drug use, No     Other            Allergies      Coded Allergies:             IODINATED CONTRAST MEDIA (Verified  Allergy, Unknown, HIVES,SNEEZING,ITCHY     EYES, 19)           PENICILLINS (Verified  Allergy, Unknown, RASH, 19)           SPINACH (Verified  Allergy, Unknown, 19)                  SHOWED UP ON ALLERGY TESTING                    SULFA (SULFONAMIDE ANTIBIOTICS) (Verified  Allergy, Unknown, RASH,     19)            Medications      Medications    Last Reconciled on 20 18:16 by  RAHEL WESLEY MD      Montelukast Sodium (Singulair*) 10 Mg Tab      10 MG PO QDAY, #30 TAB 0 Refills         Reported         12/18/19       Aspirin Chew (Aspirin Baby) 81 Mg Tab.chew      81 MG PO QDAY, #30 TAB.CHEW 0 Refills         Reported         3/13/19       Furosemide* (Lasix*) 40 Mg Tablet      60 MG PO MOWEFR, #30 TAB 0 Refills         Reported         3/13/19       Calcium Carb/Vit D3 (CALCIUM 600-VIT D3 400 TABLET) 1 Each Tablet      1 EACH PO BID, #120 TAB 0 Refills         Reported         3/13/19       Losartan Potassium (Losartan*) 25 Mg Tablet      25 MG PO QDAY, #30 TAB 0 Refills         Reported         2/21/19       Dabigatran (Pradaxa) 75 Mg Capsule      75 MG PO BID, #60 CAP         Reported         2/21/19       Raloxifene HCl (Evista) 60 Mg Tablet      60 MG PO QDAY, TAB         Reported         2/21/19       Atorvastatin (Atorvastatin) 40 Mg Tablet      40 MG PO HS, #30 TAB 0 Refills         Reported         2/21/19       Carvedilol (Carvedilol) 6.25 Mg Tablet      12.5 MG PO BID for 30 Days, #120 TAB         Prov: Gala Rao         2/19/19       Ergocalciferol (Vitamin D2*) 50,000 Units Capsule      63073 UNITS PO Q14D, #4 CAP 0 Refills         Reported         10/22/18       Spironolactone (Aldactone) 25 Mg Tablet      12.5 MG PO QDAY, #30 TAB 0 Refills         Reported         8/12/14       Furosemide (Furosemide) 40 Mg Tablet      40 MG PO, #30 TAB 0 Refills         Reported         8/12/14       Polysaccharide Iron Complex (Niferex-150*) 150 Mg Cap      150 MG PO QDAY         Reported         1/28/09      Current Medications      Current Medications Reviewed 12/18/19            Pain Assessment      Pain Intensity:  0      Description:  None            Review of Systems      General:  Anxiety, Fatigue Scale: (4), Pain Scale: (0)      HEENT:  No Hearing Changes, No Tinnitus, No Visual Changes      Respiratory:  Cough, Shortness of Air, Sputum Changes (WHITE)      Cardiovascular:  Pedal  Edema; No Palpitations, No Chest Pressure      Gastrointestinal:  No Nausea, No Vomiting, No Constipation, No Diarrhea;     Appetite Fair      Genitourinary:  Nocturia (X 1)      Musculoskeletal:  No Joint Tenderness; Aches (R FOOT AND L KNEE), Pains (R FOOT     AND LEFT KNEE)      Endocrine:  No Cold Intolerance; Fatigue      Hematologic:  No Bruising, No Swollen Glands      Allergic/Immunologic:  No Throat closing off, No Nasal drip, No Itchy eyes      Psychological:  No Depression      Neurological:  No Dizziness; Weakness      Skin:  No Open Wounds; Edema            Vitals      Height 5 ft 1.75 in / 156.85 cm      Weight 145 lbs 9.6 oz / 66.781205 kg      BSA 1.67 m2      BMI 26.8 kg/m2      Temperature 98.0 F / 36.67 C      Pulse 60      Blood Pressure 117/44      Pulse Oximetry 97%            Exam      Constitutional:  No acute distress, Conversant, Pleasant      Eyes:  Anicteric sclerae, Palpebral Conjunctivae (Pink), WILLIE      HENT:  Oropharynx clear; No Erythema; Buccal mucosae (Pain)      Neck:  Supple, Full Range of Motion      Cardiovascular:  RRR; No Murmurs; Normal PMI; No Peripheral Edema      Lungs:  Clear to Ausculation, Normal Respiratory Effort      Abdomen:  Soft, NABS; No Masses, No Tenderness      Chest:  Other (Symmetrical)      Lymphatic:  No Neck, No Axillae      Extremities:  No digital cyanosis, No digital ischemia, Normal gait, Other (No     dermatosis)      Neurological:  Cranial Nerve II-XII; No Focal Sensory deficits      Psychological:  Appropriate affect, Appropriate mood, Intact judgement, Alert      Skin:  Other (No dermatosis)            Lab Results      Laboratory Tests      12/18/19 15:15            Laboratory Tests            Test       9/27/19      14:26 10/7/19      14:15 11/14/19      14:23 11/14/19      15:18             Erythrocyte Sedimentation Rate 22 mm/h (0-30)                 Uric Acid              6.6 mg/dL      (2.5-7.5)                           Serum Total  Protein              6.9 g/dL      (6.0-8.5)                           Alpha-1-Globulins              0.2 g/dL      (0.0-0.4)                           Alpha-2-Globulins              0.8 g/dL      (0.4-1.0)                           Beta Globulins              1.0 g/dL      (0.7-1.3)                           Gamma Globulins              1.4 g/dL      (0.4-1.8)                           Protein Electrophoresis Note  Comment NA                Protein Electrophoresis      Interpret        Comment NA                           Immunoglobulin G              1324 mg/dL      (700-1600)                           Immunoglobulin A              298 mg/dL      ()                           Immunoglobulin M              181 mg/dL      ()                           Serum Immunofixation  Comment NA                M-Prashanth (TOMI)              Not Observed      g/dL (Not                           Hepatitis A IgM Antibody              Negative NA      (Negative)                           Hepatitis B Surface Antigen              Negative NA      (Negative)                           Hepatitis B Core IgM Antibody              Negative NA      (Negative)                           Magnesium Level              2.06 mg/dL      (1.60-2.30)                    Iron Level              81 ug/dL      ()                    Total Iron Binding Capacity              372 ug/dL      (245-450)                    Percent Iron Saturation   22 % (20-55)               Transferrin              260.00 mg/dL      (250..00)                    NT-Pro-B-Type Natriuretic      Peptide               785 pg/mL      (0-1800)                    Triglycerides Level              50 mg/dL      ()                    Cholesterol Level              152 mg/dL      (107-200)                    LDL Cholesterol              67 mg/dL      ()                    VLDL Cholesterol              10 mg/dL      (5-37)                    HDL Cholesterol               75 mg/dL      (40-60)                    Cholesterol/HDL Ratio              2.0 NA      (3.0-6.0)                    Vitamin B12 Level              1081 pg/mL      (211-911)                    Folate              >20.0 ng/mL      (4.8-20.0)                    Thyroid Stimulating Hormone      (TSH)               1.230 m(iU)/L      (0.270-4.200)                    Free Thyroxine              1.1 ng/dL      (0.9-1.8)                    Urine Collection Type    Clean Catch NA              Urine Color    YELLOW NA              Urine Appearance    CLEAR NA              Urine pH              7.0 (pH)      (5.0-8.0)             Urine Specific Gravity              1.019 NA      (1.005-1.030)             Urine Protein              NEGATIVE mg/dL      (NEGATIVE)             Urine Glucose (UA)              NEGATIVE mg/dL      (NEGATIVE)             Urine Ketones              NEGATIVE mg/dL      (NEGATIVE)             Urine Occult Blood              NEGATIVE NA      (NEGATIVE)             Urine Nitrite              NEGATIVE NA      (NEGATIVE)             Urine Bilirubin              NEGATIVE NA      (NEGATIVE)             Urine Urobilinogen              0.2      {EhrlichU}/dL             Urine Leukocyte Esterase              TRACE NA      (NEGATIVE)             Urine RBC              NONE SEEN      /(HPF)             Urine WBC              OCC(2-5)      /(HPF)             Urine Bacteria    NEGATIVE NA              Test       12/18/19      15:15 12/20/19      21:57                           White Blood Count       6.34 10*3/uL      (4.80-10.80)                           Red Blood Count       4.18 10*6/uL      (4.20-5.40)                           Hemoglobin       13.1 g/dL      (12.0-16.0)                           Hematocrit       40.8 %      (37.0-47.0)                           Mean Corpuscular Volume       97.6 fL      (81.0-99.0)                           Mean Corpuscular Hemoglobin       31.3 pg      (27.0-31.0)                            Mean Corpuscular Hemoglobin      Concent 32.1      (33.0-37.0)                           Red Cell Distribution Width       13.6 %      (11.7-14.4)                           RDW Standard Deviation       49.1 fL      (36.4-46.3)                           Platelet Count       222 10*3/uL      (130-400)                           Mean Platelet Volume       10.8 fL      (9.4-12.3)                           Granulocytes (%)       71.0 %      (30.0-85.0)                           Granulocytes #       4.50 10*3/uL      (2.00-8.00)                           Lymphocytes # (Auto)       1.11 10*3/uL      (1.00-5.00)                           Monocytes # (Auto)       0.62 10*3/uL      (0.20-1.20)                           Eosinophils # (Auto)       0.06 10*3/uL      (0.00-0.70)                           Basophils # (Auto)       0.03 10*3/uL      (0.00-0.20)                           Immature Granulocytes %       0.3 %      (0.0-1.8)                           Lymphocytes %       17.5 %      (20.0-45.0)                           Monocytes %       9.8 %      (3.0-10.0)                           Eosinophils %       0.9 %      (0.0-7.0)                           Basophils %       0.5 %      (0.0-3.0)                           Nucleated Red Blood Cells %       0.00 %      (0.00-0.70)                           Immature Granulocytes #       0.02 10*3/uL      (0.00-0.20)                           Sodium Level       140 mmol/L      (135-147)                           Potassium Level       3.8 mmol/L      (3.5-5.3)                           Chloride Level       98 mmol/L      ()                           Carbon Dioxide Level       31 mmol/L      (22-32)                           Anion Gap       15 mmol/L      (8-19)                           Blood Urea Nitrogen       29 mg/dL      (5-25)                           Creatinine       1.05 mg/dL      (0.50-0.90)                           Glomerular Filtration Rate       Calc 46      mL/min/{1.73_m2}                           BUN/Creatinine Ratio       28 {ratio}      (6-20)                           Glucose Level       97 mg/dL      (65-99)                           Serum Osmolality 296 (273-304)                 Calcium Level       9.8 mg/dL      (8.7-10.4)                           Total Bilirubin       0.75 mg/dL      (0.20-1.30)                           Aspartate Amino Transf      (AST/SGOT) 31 U/L (15-50)                           Alanine Aminotransferase      (ALT/SGPT) 20 U/L (10-40)                           Alkaline Phosphatase       63 U/L      ()                           Lactate Dehydrogenase       259 U/L      (120-240)                           Total Protein       7.3 g/dL      (6.3-8.2)                           Albumin       3.9 g/dL      (3.5-5.0)                           Globulin       3.4 g/dL      (2.0-3.5)                           Albumin/Globulin Ratio       1.1 {ratio}      (1.4-2.6)                           Lab Scanned Report              LAB FINAL      CUMULATIVES -                    Radiology Impressions      PET CT scan done on 12/11/2019 showed improvement in the appearance of the     nuclear medicine PET scan.  The areas of hypermetabolic lymphadenopathy,     supraclavicular on the left and left para-aortic have been diminished both in     size and degree of metabolic activity.  No new areas of abnormal metabolic     activity are identified.  The mass status post have caused compression of the     left renal vein was 7.7 x 3.5 with a maximum SUV of 5.5.  Presently the mass     measures approximately 5 cm in the superior to inferior dimension and is a     maximum width of 2.2 cm.  SUV is only 2.8.            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       History of bladder cancer followed by Dr. Grubbs      Aortic stenosis      History of diastolic congestive heart failure      Atrial fibrillation      History of hypertension      Osteoporosis      Lung nodule right lower lobe      Notes      New Medications      * Montelukast Sodium (Singulair*) 10 MG TAB: 10 MG PO QDAY #30      Discontinued Medications      * ONDANSETRON HCL 4 MG TABLET: 4 MG PO Q4HR PRN NAUSEA AND/OR VOMITING #60      Problems:        (1) Essential hypertension      (2) Diabetes mellitus type 2 in nonobese      (3) Follicular non-Hodgkin's lymphoma      (4) History of diffuse large B-cell lymphoma            Plan      Return in March with a CBC, CMP, LDH, and sed rate.            Patient Education:        DI for Diabetes Type 2            PREVENTION      Hx Influenza Vaccination:  Yes      Date Influenza Vaccine Given:  Nov 11, 2019      Influenza Vaccine Declined:  No      2 or More Falls Past Year?:  No      Fall Past Year with Injury?:  No      Hx Pneumococcal Vaccination:  No      Encouraged to follow-up with:  PCP regarding preventative exams.      Chart initiated by      JOSÉ MANUEL JEAN.            Electronically signed by Rhoda Zaragoza  01/13/2020 18:16       Disclaimer: Converted document may not contain table formatting or lab diagrams. Please see [facility] for the authenticated document.

## 2021-05-28 NOTE — PROGRESS NOTES
Patient: QUEENIE MURRIETA     Acct: [id number]     Report: #[id number]  UNIT #: [id number]     : 1928    Encounter Date:10/20/2020  PRIMARY CARE: Gala Rao  ***ISigned***  --------------------------------------------------------------------------------------------------------------------  NURSE INTAKE      Visit Type      New Patient Visit            Chief Complaint      FOLLICULAR LYMPHOMA            Referring Provider/Copies To      Primary Care Provider:  Gala Rao      Copies To:   Gala Rao            History and Present Illness      Past Oncology Illness History      Ms. Queenie Murrieta is a pleasant 89 year old female who presents with NHL,     follicular type diagnosed in 2019.  Ms. Murrieta has been receiving her oncological     care with Dr. Jenn Zaragoza who recently retired.  Ms. Murrieta initially presented RLQ     pain to her PCP, Dr. Gala Rao.  In light of her bladder cancer, CT of     Abdomen and Pelvis was obtained on 2019.  Findings indicated     retroperitoneal adenopathy measuring up to 4 cm x 2cm, thought either to be     lymphoma or metastatic disease.  Incidently mild bilateral hydronephrosis was     noted without urolithiasis.             CT guided biopsy of the retroperitoneal lymph node was obtained.  Pathology     (S-) indicated non-hodgkins's b-cell lymphoma; Follicular lymphoma (grade    1).  Flow cytometryrevealed diffuse positivity with CD 20, BCL2, CD10 and patchy    decoration with BCL-6.  A few days after biopsy she was admitted to hospital     with acute respiratory hypoxemia secondary to influenza.  Repeat imaging with CT    Abdomen and Pelvis with heterogeneous hyperdense mass in the anterior abdominal     wass musculature 8 cm x 12.6 cm consistent wtih rectus sheath hematoma.      Retroperitoneal adenopathy is redemonstrated.  Index left periaortic mass     measured 3.1 x 2.2 cm, previously 3.9 x 2.4. Mildly enlarged retrocrural lymph     nodes.  She  was discharged from the hospital on 2/20/2019.             Ms. Aparicio was evaluated by Dr. Jenn Zaragoza on March 13, 2019. PET CT was ordered     and obtained.  It indicated metabolic activity within the lymph node at the base    of upper chest and neck (SUV 3.85)  as well as the left axilla (SUV 5.4) in     addition to 2 lymph nodes within the retroperitoneal area with SUV (8.76). LDH     250.  Staging for her follicular carcinoma grade 1 is a clinical stage III with     no B symptoms no significant laboratory abnormalities. At this time it was     decided pursue active surveillance and close follow.             On October 17, 2019, PETCT  indicated interval progression of disease with     previous noted lymph adenopathy larger and more metabolic. The left periaortic     lymph node mass noted to be compressing not only the left renal vein but     resulting in mild left hydronephrosis which is new. .   Single agent     RITUXAN was initiated on October 9, 2019 and completed 4 weekly doses of     RITUXAN.  Ms. Aparicio continues to have no B-symptoms.             Repeat PETCT obtained December 11, 2019 indicated overall improvement from     previous study.  The areas of hypermetabolic lymphadenopathy supraclavicular on     the left and left paraaortic have diminshed both in size and degree of metabolic    activity with no new areas noted. .              PETCT done on July 2, 2020 indicated enlargement of left infraclavicular /     subpectoral lynph node measuring up to 1.8 cm with SUV 5.7, increase in size and    SUV of left axillary lymph node, small left pleural effusion slightly larger     than prior PET, left paraaortic lymph node conglomerate slightly larger and     slightly increased in SUV.  .  It was decided at that time to restart     RITUXAN every 8 weeks.  Ms. Aparicio continues to have no B-symptoms.             Ms. Aparicio presents today to establish with new oncologist and for continuation of     care.            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  Pathology S-.             History of Bladder Cancer in 2018 followed by Dr. Grubbs.          02/23/2017 - Pathology S- Bladder transurethral resection, low grade     papillary present at muscularis propria, noninvasive         07/16/2017 - Pathology S- Bladder biopsy negative         11/30/2017 - Pathology [id number] Bladder biopsy with low grade papillary     present at muscularis propria, noninvasive         10/26/2018 - Pathology [id number] Bladder biopsy negative            Clinical Staging      Stage III            Treatments      Chemotherapy      NO      Radiation Therapy      NO            Clinical Trial Participant      No            ECOG Performance Status      1            PAST, FAMILY   Past Medical History      Past Medical History:  Arthritis, Bleeding Condition (DUE TO BLOOD THINNERS),     Diabetes Type 2, High Cholesterol, Hypertension      Other PMH:        Acute congestive heart failure improved.      Moderately severe aortic stenosis.  Patient declines to have transcatheter      aortic valve replacement.      Non-Hodgkin's lymphoma 2008 treated 2008 2009.      Biopsy of retroperitoneal lymph node or mass 2 days ago.  Results pending.      Chronic atrial fibrillation.      Right rectus sheath hematoma, off anticoagulation because of the hematoma.      Hematology/Oncology (F):  Bladder Cancer, Lymphoma, Skin Cancer      Genetic/Metabolic:  None            Past Surgical History      Biopsy, Bladder Surgery, Cataract Surgery, Skin Cancer Removal, VAD Placement            TONSILLECTOMY, D        Family History      Family History:  Stomach Cancer (PATERNAL GREAT-GRANDFATHER)            PATERNAL            Social History      Marital Status:        Lives  independently:  No      Number of Children:  2      Occupation:  RETIRED            Tobacco Use      Tobacco status:  Never smoker      Currently Vaping:  No            Alcohol Use      Alcohol intake:  None            Substance Use      Substance use:  Denies use            REVIEW OF SYSTEMS      General:  Admits: Fatigue;          Denies: Appetite Change, Fever, Night Sweats, Weight Gain, Weight Loss      Eye:  Admits Corrective Lenses; Denies Blurred Vision, Denies Diplopia, Denies     Vision Changes      ENT:  Denies Headache; Admits Hearing Loss; Denies Hoarseness, Denies Sore     Throat      Cardiovascular:  Denies Chest Pain, Denies Palpitations      Respiratory:  Admits: Shortness of Air;          Denies: Cough, Coughing Blood, Productive Cough, Wheezing      Gastrointestinal:  Denies Bloody Stools, Denies Constipation, Denies Diarrhea,     Denies Nausea/Vomiting, Denies Problem Swallowing, Denies Unable to Control     Bowels      Genitourinary:  Denies Blood in Urine, Denies Incontinence, Denies Painful     Urination      Musculoskeletal:  Denies Back Pain; Admits Muscle Pain (FEET, LEGS); Denies     Painful Joints      Integumentary:  Denies Itching, Denies Lesions, Denies Rash      Other      DRY SKIN      Neurologic:  Denies Dizziness, Denies Numbness\Tingling, Denies Seizures      Psychiatric:  Denies Anxiety, Denies Depression      Endocrine:  Denies Cold Intolerance, Denies Heat Intolerance      Hematologic/Lymphatic:  Denies Bruising, Denies Bleeding, Denies Enlarged Lymph     Nodes      Reproductive:  Denies: Menopause, Heavy Periods, Pregnant, Still Menstruating            VITAL SIGNS AND SCORES      Vitals      Height 5 ft 2.60 in / 159.0 cm      Weight 150 lbs 5.660 oz / 68.2 kg      BSA 1.71 m2      BMI 27.0 kg/m2      Temperature 97.2 F / 36.22 C - Temporal      Pulse 64      Respirations 20      Blood Pressure 132/53 Sitting, Right Arm      Pulse Oximetry 96%            Pain Score       Experiencing any pain?:  No      Pain Scale Used:  Numerical      Pain Intensity:  0            Fatigue Score      Experiencing any fatigue?:  Yes      Fatigue (0-10 scale):  0 (none)            EXAM      General: Alert, cooperative, no acute distress, well-appearing      Eyes: Anicteric sclera, PERRLA      Respiratory: CTAB, normal respiratory effort      Abdomen: Normal active bowel sounds, no tenderness, no distention      Cardiovascular: RRR, no murmur, trace lower extremity edema      Skin: Normal tone, no rash, no lesions      Psychiatric: Appropriate affect, intact judgment      Neurologic: No focal sensory or motor deficits, no weakness, numbness, dizziness      Musculoskeletal: Normal muscle strength, normal muscle tone      Extremities: No clubbing, cyanosis, or deformities            PREVENTION      Date Influenza Vaccine Given:  Nov 11, 2019      Influenza Vaccine Declined:  No      2 or More Falls in Past Year?:  No      Fall Past Year with Injury?:  No      Hx Pneumococcal Vaccination:  No      Encouraged to follow-up with:  PCP regarding preventative exams.      Chart initiated by      HARLAN REA MA            ALLERGY/MEDS      Allergies      Coded Allergies:             IODINATED CONTRAST MEDIA (Verified  Allergy, Unknown, HIVES,SNEEZING,ITCHY     EYES, 10/20/20)           PENICILLINS (Verified  Allergy, Unknown, RASH, 10/20/20)           SPINACH (Verified  Allergy, Unknown, 10/20/20)                  SHOWED UP ON ALLERGY TESTING                    SULFA (SULFONAMIDE ANTIBIOTICS) (Verified  Allergy, Unknown, RASH,     10/20/20)            Medications      Last Reconciled on 10/28/20 18:24 by ADEEL RUANO      Lactobacillus Acidophilus (Florajen) 460 Mg Capsule      460 MG PO QDAY, CAP         Reported         10/20/20       Carvedilol (Carvedilol) 6.25 Mg Tablet      6.25 MG PO BID for 30 Days, #60 TAB         Prov: [place]         8/4/20       Montelukast Sodium (Singulair*) 10 Mg Tab       10 MG PO QDAY, #30 TAB 0 Refills         Reported         12/18/19       Aspirin Chew (Aspirin Baby) 81 Mg Tab.chew      81 MG PO QDAY, #30 TAB.CHEW 0 Refills         Reported         3/13/19       Furosemide* (Lasix*) 40 Mg Tablet      60 MG PO MOWEFR, #30 TAB 0 Refills         Reported         3/13/19       Calcium Carb/Vit D3 (CALCIUM 600-VIT D3 400 TABLET) 1 Each Tablet      1 EACH PO BID, #120 TAB 0 Refills         Reported         3/13/19       Losartan Potassium (Losartan*) 25 Mg Tablet      25 MG PO QDAY, #30 TAB 0 Refills         Reported         2/21/19       Dabigatran (Pradaxa) 75 Mg Capsule      75 MG PO BID, #60 CAP         Reported         2/21/19       Raloxifene HCl (Evista) 60 Mg Tablet      60 MG PO QDAY, TAB         Reported         2/21/19       Atorvastatin (Atorvastatin) 40 Mg Tablet      40 MG PO HS, #30 TAB 0 Refills         Reported         2/21/19       Ergocalciferol (Vitamin D2) (Vitamin D2) 50,000 Units Capsule      44694 UNITS PO Q14D, #4 CAP 0 Refills         Reported         10/22/18       Spironolactone (Aldactone) 25 Mg Tablet      12.5 MG PO QDAY, #30 TAB 0 Refills         Reported         8/12/14       Furosemide (Furosemide) 40 Mg Tablet      40 MG PO, #30 TAB 0 Refills         Reported         8/12/14       Polysaccharide Iron Complex (Niferex-150*) 150 Mg Cap      150 MG PO QDAY         Reported         1/28/09      Medications Reviewed:  No Changes made to meds            IMPRESSION/PLAN      Diagnosis      Follicular non-Hodgkin's lymphoma - C82.90            Notes      New Medications      * Lactobacillus Acidophilus (Florajen) 460 MG CAPSULE: 460 MG PO QDAY         Instructions: 1 CAP      New Diagnostics      * CT Abd/Pelvis/Chest W/Contrast, SCHEDULED PROCEDURE         Dx: Follicular non-Hodgkin's lymphoma - C82.90      New Office Procedures      * VAD Maintenance, Routine         Dx: Follicular non-Hodgkin's lymphoma - C82.90            Plan      Stage III follicular  lymphoma: I reviewed the patient's past medical history in     its entirety.  I discussed the plan of care with the patient and her daughter     today.  Diagnosed 10/2019.  Initially treated with 4 weekly cycles of rituximab     with partial response.  The disease quickly progressed and she resumed treatment    in July.  She is transferring her care from Dr. Xiao who had planned maintenance    rituximab every 8 weeks.  I believe this is a reasonable option for the patient     given her wish to avoid chemotherapy.  She has not had restaging images since     her last cycle of treatment in July.  At this time we will get a CT CAP with     contrast.  She will not need a PET scan unless there are notable abnormalities.     She will get repeat labs the day of her CT scan and return to clinic to see me     with her first cycle of rituximab to discuss these results.            Patient Education      Patient Education Provided:  Yes            Electronically signed by ADEEL RUANO  10/28/2020 18:24       Disclaimer: Converted document may not contain table formatting or lab diagrams. Please see [employer] for the authenticated document.

## 2021-06-02 ENCOUNTER — HOSPITAL ENCOUNTER (OUTPATIENT)
Dept: LAB | Facility: HOSPITAL | Age: 86
Discharge: HOME | End: 2021-06-02
Attending: INTERNAL MEDICINE

## 2021-06-02 LAB
ALBUMIN SERPL-MCNC: 4.3 G/DL (ref 3.5–5)
ALBUMIN/GLOB SERPL: 1.2 {RATIO} (ref 1.4–2.6)
ALP SERPL-CCNC: 76 U/L (ref 38–185)
ALT SERPL-CCNC: 22 U/L (ref 10–40)
ANION GAP SERPL CALC-SCNC: 12 MMOL/L (ref 8–19)
APPEARANCE UR: CLEAR
AST SERPL-CCNC: 36 U/L (ref 15–50)
BASOPHILS # BLD AUTO: 0.04 10*3/UL (ref 0–0.2)
BASOPHILS NFR BLD AUTO: 0.6 % (ref 0–3)
BILIRUB SERPL-MCNC: 0.83 MG/DL (ref 0.2–1.3)
BILIRUB UR QL: NEGATIVE
BNP SERPL-MCNC: 1016 PG/ML (ref 0–1800)
BUN SERPL-MCNC: 31 MG/DL (ref 5–25)
BUN/CREAT SERPL: 32 {RATIO} (ref 6–20)
CALCIUM SERPL-MCNC: 9.8 MG/DL (ref 8.7–10.4)
CHLORIDE SERPL-SCNC: 101 MMOL/L (ref 99–111)
CHOLEST SERPL-MCNC: 154 MG/DL (ref 107–200)
CHOLEST/HDLC SERPL: 2.1 {RATIO} (ref 3–6)
COLOR UR: YELLOW
CONV ABS IMM GRAN: 0.03 10*3/UL (ref 0–0.2)
CONV BACTERIA: NEGATIVE
CONV CO2: 29 MMOL/L (ref 22–32)
CONV COLLECTION SOURCE (UA): ABNORMAL
CONV IMMATURE GRAN: 0.4 % (ref 0–1.8)
CONV TOTAL PROTEIN: 7.9 G/DL (ref 6.3–8.2)
CONV UROBILINOGEN IN URINE BY AUTOMATED TEST STRIP: 0.2 {EHRLICHU}/DL (ref 0.1–1)
CREAT UR-MCNC: 0.98 MG/DL (ref 0.5–0.9)
DEPRECATED RDW RBC AUTO: 49.8 FL (ref 36.4–46.3)
EOSINOPHIL # BLD AUTO: 0.1 10*3/UL (ref 0–0.7)
EOSINOPHIL # BLD AUTO: 1.4 % (ref 0–7)
ERYTHROCYTE [DISTWIDTH] IN BLOOD BY AUTOMATED COUNT: 14.2 % (ref 11.7–14.4)
EST. AVERAGE GLUCOSE BLD GHB EST-MCNC: 131 MG/DL
FERRITIN SERPL-MCNC: 90 NG/ML (ref 10–200)
GFR SERPLBLD BASED ON 1.73 SQ M-ARVRAT: 50 ML/MIN/{1.73_M2}
GLOBULIN UR ELPH-MCNC: 3.6 G/DL (ref 2–3.5)
GLUCOSE SERPL-MCNC: 88 MG/DL (ref 65–99)
GLUCOSE UR QL: NEGATIVE MG/DL
HBA1C MFR BLD: 6.2 % (ref 3.5–5.7)
HCT VFR BLD AUTO: 41.6 % (ref 37–47)
HDLC SERPL-MCNC: 75 MG/DL (ref 40–60)
HGB BLD-MCNC: 13.3 G/DL (ref 12–16)
HGB UR QL STRIP: ABNORMAL
IRON SATN MFR SERPL: 24 % (ref 20–55)
IRON SERPL-MCNC: 98 UG/DL (ref 60–170)
KETONES UR QL STRIP: NEGATIVE MG/DL
LDLC SERPL CALC-MCNC: 69 MG/DL (ref 70–100)
LEUKOCYTE ESTERASE UR QL STRIP: NEGATIVE
LYMPHOCYTES # BLD AUTO: 2.23 10*3/UL (ref 1–5)
LYMPHOCYTES NFR BLD AUTO: 31.4 % (ref 20–45)
MCH RBC QN AUTO: 30.7 PG (ref 27–31)
MCHC RBC AUTO-ENTMCNC: 32 G/DL (ref 33–37)
MCV RBC AUTO: 96.1 FL (ref 81–99)
MONOCYTES # BLD AUTO: 0.71 10*3/UL (ref 0.2–1.2)
MONOCYTES NFR BLD AUTO: 10 % (ref 3–10)
NEUTROPHILS # BLD AUTO: 3.99 10*3/UL (ref 2–8)
NEUTROPHILS NFR BLD AUTO: 56.2 % (ref 30–85)
NITRITE UR QL STRIP: NEGATIVE
NRBC CBCN: 0 % (ref 0–0.7)
OSMOLALITY SERPL CALC.SUM OF ELEC: 292 MOSM/KG (ref 273–304)
PH UR STRIP.AUTO: 7 [PH] (ref 5–8)
PLATELET # BLD AUTO: 255 10*3/UL (ref 130–400)
PMV BLD AUTO: 11 FL (ref 9.4–12.3)
POTASSIUM SERPL-SCNC: 4 MMOL/L (ref 3.5–5.3)
PROT UR QL: NEGATIVE MG/DL
RBC # BLD AUTO: 4.33 10*6/UL (ref 4.2–5.4)
RBC #/AREA URNS HPF: ABNORMAL /[HPF]
SODIUM SERPL-SCNC: 138 MMOL/L (ref 135–147)
SP GR UR: 1.01 (ref 1–1.03)
TIBC SERPL-MCNC: 416 UG/DL (ref 245–450)
TRANSFERRIN SERPL-MCNC: 291 MG/DL (ref 250–380)
TRIGL SERPL-MCNC: 50 MG/DL (ref 40–150)
VLDLC SERPL-MCNC: 10 MG/DL (ref 5–37)
WBC # BLD AUTO: 7.1 10*3/UL (ref 4.8–10.8)
WBC #/AREA URNS HPF: ABNORMAL /[HPF]

## 2021-06-03 LAB
FOLATE SERPL-MCNC: >20 NG/ML (ref 4.8–20)
VIT B12 SERPL-MCNC: 1155 PG/ML (ref 211–911)

## 2021-06-04 LAB — BACTERIA UR CULT: NORMAL

## 2021-06-16 PROBLEM — E11.9 TYPE 2 DIABETES MELLITUS WITHOUT COMPLICATION: Status: ACTIVE | Noted: 2021-06-16

## 2021-06-16 PROBLEM — C82.08 FOLLICULAR LYMPHOMA GRADE I OF LYMPH NODES OF MULTIPLE SITES: Status: ACTIVE | Noted: 2019-03-13

## 2021-06-16 PROBLEM — L89.891 PRESSURE ULCER OF FOOT, STAGE 1: Status: ACTIVE | Noted: 2021-06-16

## 2021-06-16 PROBLEM — C67.9 BLADDER CANCER: Status: ACTIVE | Noted: 2017-02-23

## 2021-06-16 PROBLEM — I51.9 HEART PROBLEM: Status: ACTIVE | Noted: 2021-06-16

## 2021-06-16 PROBLEM — C85.90 LYMPHOMA: Status: ACTIVE | Noted: 2021-06-16

## 2021-06-16 PROBLEM — M79.672 FOOT PAIN, LEFT: Status: ACTIVE | Noted: 2021-06-16

## 2021-06-16 PROBLEM — E78.00 HIGH CHOLESTEROL: Status: ACTIVE | Noted: 2021-06-16

## 2021-06-16 PROBLEM — Z45.2 ADJUSTMENT AND MANAGEMENT OF VASCULAR ACCESS DEVICE: Status: ACTIVE | Noted: 2021-06-16

## 2021-06-16 PROBLEM — C67.9 BLADDER CANCER (HCC): Status: ACTIVE | Noted: 2021-06-16

## 2021-06-16 PROBLEM — J01.80 OTHER ACUTE SINUSITIS: Status: ACTIVE | Noted: 2021-06-16

## 2021-06-16 PROBLEM — R31.9 HEMATURIA: Status: ACTIVE | Noted: 2021-06-16

## 2021-06-16 PROBLEM — L60.0 INGROWING TOENAIL: Status: ACTIVE | Noted: 2021-06-16

## 2021-06-16 PROBLEM — B35.1 TINEA UNGUIUM: Status: ACTIVE | Noted: 2021-06-16

## 2021-06-16 RX ORDER — ATORVASTATIN CALCIUM 40 MG/1
TABLET, FILM COATED ORAL
COMMUNITY
End: 2021-08-02

## 2021-06-16 RX ORDER — IRON POLYSACCHARIDE COMPLEX 150 MG
150 CAPSULE ORAL 2 TIMES DAILY
COMMUNITY
End: 2022-04-19 | Stop reason: SDUPTHER

## 2021-06-16 RX ORDER — MONTELUKAST SODIUM 10 MG/1
10 TABLET ORAL NIGHTLY
COMMUNITY
End: 2022-04-08

## 2021-06-16 RX ORDER — ASPIRIN 81 MG/1
81 TABLET ORAL DAILY
COMMUNITY

## 2021-06-16 RX ORDER — BACILLUS COAGULANS/INULIN 1B-250 MG
1 CAPSULE ORAL EVERY MORNING
COMMUNITY
End: 2022-03-31

## 2021-06-16 RX ORDER — DIGOXIN 125 MCG
TABLET ORAL
COMMUNITY
End: 2021-08-12 | Stop reason: SDUPTHER

## 2021-06-16 RX ORDER — LOSARTAN POTASSIUM 25 MG/1
25 TABLET ORAL DAILY
COMMUNITY
Start: 2021-04-12 | End: 2021-06-16 | Stop reason: SDUPTHER

## 2021-06-16 RX ORDER — LANCETS 28 GAUGE
EACH MISCELLANEOUS
COMMUNITY
Start: 2021-04-27 | End: 2021-08-11 | Stop reason: SDUPTHER

## 2021-06-16 RX ORDER — DABIGATRAN ETEXILATE 75 MG/1
CAPSULE ORAL
COMMUNITY
End: 2021-08-10

## 2021-06-16 RX ORDER — FUROSEMIDE 40 MG/1
TABLET ORAL
COMMUNITY
Start: 2021-04-29 | End: 2021-07-23 | Stop reason: SDUPTHER

## 2021-06-16 RX ORDER — ERGOCALCIFEROL 1.25 MG/1
CAPSULE ORAL
COMMUNITY
End: 2021-12-16 | Stop reason: SDUPTHER

## 2021-06-16 RX ORDER — RALOXIFENE HYDROCHLORIDE 60 MG/1
TABLET, FILM COATED ORAL
COMMUNITY
End: 2022-01-10

## 2021-06-16 RX ORDER — BLOOD-GLUCOSE METER
KIT MISCELLANEOUS
COMMUNITY
Start: 2021-04-27 | End: 2021-07-27

## 2021-06-16 RX ORDER — SPIRONOLACTONE 25 MG/1
TABLET ORAL
COMMUNITY
End: 2021-06-16 | Stop reason: SDUPTHER

## 2021-06-16 RX ORDER — CARVEDILOL 6.25 MG/1
6.25 TABLET ORAL 2 TIMES DAILY WITH MEALS
COMMUNITY
End: 2021-11-11

## 2021-06-16 RX ORDER — BENZONATATE 100 MG/1
CAPSULE ORAL
COMMUNITY
End: 2021-09-23

## 2021-06-17 ENCOUNTER — OFFICE VISIT (OUTPATIENT)
Dept: ONCOLOGY | Facility: HOSPITAL | Age: 86
End: 2021-06-17

## 2021-06-17 ENCOUNTER — HOSPITAL ENCOUNTER (OUTPATIENT)
Dept: ONCOLOGY | Facility: HOSPITAL | Age: 86
Setting detail: INFUSION SERIES
Discharge: HOME | End: 2021-06-17

## 2021-06-17 VITALS
HEART RATE: 83 BPM | SYSTOLIC BLOOD PRESSURE: 152 MMHG | TEMPERATURE: 97.4 F | OXYGEN SATURATION: 95 % | DIASTOLIC BLOOD PRESSURE: 66 MMHG | WEIGHT: 145.5 LBS | RESPIRATION RATE: 20 BRPM | BODY MASS INDEX: 26.11 KG/M2

## 2021-06-17 VITALS
OXYGEN SATURATION: 95 % | TEMPERATURE: 98.6 F | WEIGHT: 145.5 LBS | HEART RATE: 64 BPM | RESPIRATION RATE: 18 BRPM | DIASTOLIC BLOOD PRESSURE: 56 MMHG | SYSTOLIC BLOOD PRESSURE: 122 MMHG | BODY MASS INDEX: 26.11 KG/M2

## 2021-06-17 DIAGNOSIS — C82.08 FOLLICULAR LYMPHOMA GRADE I OF LYMPH NODES OF MULTIPLE SITES (HCC): Primary | ICD-10-CM

## 2021-06-17 DIAGNOSIS — Z45.2 ADJUSTMENT AND MANAGEMENT OF VASCULAR ACCESS DEVICE: ICD-10-CM

## 2021-06-17 LAB
ALBUMIN SERPL-MCNC: 4.06 G/DL (ref 3.5–5.2)
ALBUMIN/GLOB SERPL: 1.4 G/DL
ALP SERPL-CCNC: 73 U/L (ref 39–117)
ALT SERPL W P-5'-P-CCNC: 17 U/L (ref 1–33)
ANION GAP SERPL CALCULATED.3IONS-SCNC: 6.2 MMOL/L (ref 5–15)
AST SERPL-CCNC: 30 U/L (ref 1–32)
BASOPHILS # BLD AUTO: 0.03 10*3/MM3 (ref 0–0.2)
BASOPHILS NFR BLD AUTO: 0.4 % (ref 0–1.5)
BILIRUB SERPL-MCNC: 0.7 MG/DL (ref 0–1.2)
BUN SERPL-MCNC: 34 MG/DL (ref 8–23)
BUN/CREAT SERPL: 37.8 (ref 7–25)
CALCIUM SPEC-SCNC: 9.4 MG/DL (ref 8.2–9.6)
CHLORIDE SERPL-SCNC: 103 MMOL/L (ref 98–107)
CO2 SERPL-SCNC: 31.8 MMOL/L (ref 22–29)
CREAT SERPL-MCNC: 0.9 MG/DL (ref 0.57–1)
DEPRECATED RDW RBC AUTO: 49.3 FL (ref 37–54)
EOSINOPHIL # BLD AUTO: 0.12 10*3/MM3 (ref 0–0.4)
EOSINOPHIL NFR BLD AUTO: 1.6 % (ref 0.3–6.2)
ERYTHROCYTE [DISTWIDTH] IN BLOOD BY AUTOMATED COUNT: 14.1 % (ref 12.3–15.4)
GFR SERPL CREATININE-BSD FRML MDRD: 59 ML/MIN/1.73
GLOBULIN UR ELPH-MCNC: 2.8 GM/DL
GLUCOSE SERPL-MCNC: 110 MG/DL (ref 65–99)
HCT VFR BLD AUTO: 38.3 % (ref 34–46.6)
HGB BLD-MCNC: 12.6 G/DL (ref 12–15.9)
IMM GRANULOCYTES # BLD AUTO: 0.01 10*3/MM3 (ref 0–0.05)
IMM GRANULOCYTES NFR BLD AUTO: 0.1 % (ref 0–0.5)
LYMPHOCYTES # BLD AUTO: 1.78 10*3/MM3 (ref 0.7–3.1)
LYMPHOCYTES NFR BLD AUTO: 24.1 % (ref 19.6–45.3)
MCH RBC QN AUTO: 31.3 PG (ref 26.6–33)
MCHC RBC AUTO-ENTMCNC: 32.9 G/DL (ref 31.5–35.7)
MCV RBC AUTO: 95.3 FL (ref 79–97)
MONOCYTES # BLD AUTO: 0.83 10*3/MM3 (ref 0.1–0.9)
MONOCYTES NFR BLD AUTO: 11.2 % (ref 5–12)
NEUTROPHILS NFR BLD AUTO: 4.63 10*3/MM3 (ref 1.7–7)
NEUTROPHILS NFR BLD AUTO: 62.6 % (ref 42.7–76)
PLATELET # BLD AUTO: 183 10*3/MM3 (ref 140–450)
PMV BLD AUTO: 10.2 FL (ref 6–12)
POTASSIUM SERPL-SCNC: 4 MMOL/L (ref 3.5–5.2)
PROT SERPL-MCNC: 6.9 G/DL (ref 6–8.5)
RBC # BLD AUTO: 4.02 10*6/MM3 (ref 3.77–5.28)
SODIUM SERPL-SCNC: 141 MMOL/L (ref 136–145)
WBC # BLD AUTO: 7.4 10*3/MM3 (ref 3.4–10.8)

## 2021-06-17 PROCEDURE — 99214 OFFICE O/P EST MOD 30 MIN: CPT | Performed by: INTERNAL MEDICINE

## 2021-06-17 PROCEDURE — 85025 COMPLETE CBC W/AUTO DIFF WBC: CPT | Performed by: INTERNAL MEDICINE

## 2021-06-17 PROCEDURE — 25010000002 RITUXIMAB 10 MG/ML SOLUTION 10 ML VIAL: Performed by: INTERNAL MEDICINE

## 2021-06-17 PROCEDURE — 25010000002 DIPHENHYDRAMINE PER 50 MG: Performed by: INTERNAL MEDICINE

## 2021-06-17 PROCEDURE — 96375 TX/PRO/DX INJ NEW DRUG ADDON: CPT

## 2021-06-17 PROCEDURE — 96415 CHEMO IV INFUSION ADDL HR: CPT

## 2021-06-17 PROCEDURE — 80053 COMPREHEN METABOLIC PANEL: CPT | Performed by: INTERNAL MEDICINE

## 2021-06-17 PROCEDURE — 25010000002 RITUXIMAB 10 MG/ML SOLUTION 50 ML VIAL: Performed by: INTERNAL MEDICINE

## 2021-06-17 PROCEDURE — 25010000002 HEPARIN LOCK FLUSH PER 10 UNITS: Performed by: INTERNAL MEDICINE

## 2021-06-17 PROCEDURE — 96413 CHEMO IV INFUSION 1 HR: CPT

## 2021-06-17 RX ORDER — PREDNISONE 50 MG/1
50 TABLET ORAL TAKE AS DIRECTED
Qty: 3 TABLET | Refills: 0 | Status: SHIPPED | OUTPATIENT
Start: 2021-06-17 | End: 2021-09-23

## 2021-06-17 RX ORDER — DIPHENHYDRAMINE HYDROCHLORIDE 50 MG/ML
50 INJECTION INTRAMUSCULAR; INTRAVENOUS AS NEEDED
Status: CANCELLED | OUTPATIENT
Start: 2021-06-17

## 2021-06-17 RX ORDER — SODIUM CHLORIDE 0.9 % (FLUSH) 0.9 %
20 SYRINGE (ML) INJECTION AS NEEDED
Status: DISCONTINUED | OUTPATIENT
Start: 2021-06-17 | End: 2021-06-18 | Stop reason: HOSPADM

## 2021-06-17 RX ORDER — HEPARIN SODIUM (PORCINE) LOCK FLUSH IV SOLN 100 UNIT/ML 100 UNIT/ML
500 SOLUTION INTRAVENOUS AS NEEDED
Status: DISCONTINUED | OUTPATIENT
Start: 2021-06-17 | End: 2021-06-18 | Stop reason: HOSPADM

## 2021-06-17 RX ORDER — FAMOTIDINE 10 MG/ML
20 INJECTION, SOLUTION INTRAVENOUS AS NEEDED
Status: CANCELLED | OUTPATIENT
Start: 2021-06-17

## 2021-06-17 RX ORDER — MEPERIDINE HYDROCHLORIDE 25 MG/ML
25 INJECTION INTRAMUSCULAR; INTRAVENOUS; SUBCUTANEOUS
Status: CANCELLED | OUTPATIENT
Start: 2021-06-17

## 2021-06-17 RX ORDER — SODIUM CHLORIDE 0.9 % (FLUSH) 0.9 %
20 SYRINGE (ML) INJECTION AS NEEDED
Status: CANCELLED | OUTPATIENT
Start: 2021-06-17

## 2021-06-17 RX ORDER — ACETAMINOPHEN 325 MG/1
650 TABLET ORAL ONCE
Status: COMPLETED | OUTPATIENT
Start: 2021-06-17 | End: 2021-06-17

## 2021-06-17 RX ORDER — HEPARIN SODIUM (PORCINE) LOCK FLUSH IV SOLN 100 UNIT/ML 100 UNIT/ML
500 SOLUTION INTRAVENOUS AS NEEDED
Status: CANCELLED | OUTPATIENT
Start: 2021-06-17

## 2021-06-17 RX ORDER — SODIUM CHLORIDE 9 MG/ML
250 INJECTION, SOLUTION INTRAVENOUS ONCE
Status: CANCELLED | OUTPATIENT
Start: 2021-06-17

## 2021-06-17 RX ADMIN — ACETAMINOPHEN 650 MG: 325 TABLET, FILM COATED ORAL at 12:33

## 2021-06-17 RX ADMIN — Medication 20 ML: at 15:22

## 2021-06-17 RX ADMIN — RITUXIMAB 600 MG: 10 INJECTION, SOLUTION INTRAVENOUS at 13:05

## 2021-06-17 RX ADMIN — HEPARIN SODIUM (PORCINE) LOCK FLUSH IV SOLN 100 UNIT/ML 500 UNITS: 100 SOLUTION at 15:23

## 2021-06-17 RX ADMIN — DIPHENHYDRAMINE HYDROCHLORIDE 25 MG: 50 INJECTION INTRAMUSCULAR; INTRAVENOUS at 12:38

## 2021-06-17 NOTE — PROGRESS NOTES
Chief Complaint  Chemotherapy (FOLLICULAR LYMPHOMA) and Appointment (FOLLICULAR LYMPHOMA)    Referring Provider: Gala Rao MD  PCP: Gala Rao MD    Subjective          Oncology/Hematology History Overview Note   Ms. Radha Aparicio is a pleasant 89 year old female who presents with NHL, follicular type diagnosed in 2019.  Ms. Aparicio has been receiving her oncological care with Dr. Jenn Zaragoza who recently retired.  Ms. Aparicio initially presented RLQ pain to her PCP, Dr. Gala Rao.  In light of her bladder cancer, CT of Abdomen and Pelvis was obtained on January 16, 2019.  Findings indicated retroperitoneal adenopathy measuring up to 4 cm x 2cm, thought either to be lymphoma or metastatic disease.  Incidently mild bilateral hydronephrosis was noted without urolithiasis.     CT guided biopsy of the retroperitoneal lymph node was obtained.  Pathology (S-) indicated non-hodgkins's b-cell lymphoma; Follicular lymphoma (grade 1).  Flow cytometryrevealed diffuse positivity with CD 20, BCL2, CD10 and patchy decoration with BCL-6.  A few days after biopsy she was admitted to hospital with acute respiratory hypoxemia secondary to influenza.  Repeat imaging with CT Abdomen and Pelvis with heterogeneous hyperdense mass in the anterior abdominal wass musculature 8 cm x 12.6 cm consistent wtih rectus sheath hematoma.  Retroperitoneal adenopathy is redemonstrated.  Index left periaortic mass measured 3.1 x 2.2 cm, previously 3.9 x 2.4. Mildly enlarged retrocrural lymph nodes.  She was discharged from the hospital on 2/20/2019.     Ms. Aparicio was evaluated by Dr. Jenn Zaragoza on March 13, 2019. PET CT was ordered and obtained.  It indicated metabolic activity within the lymph node at the base of upper chest and neck (SUV 3.85)  as well as the left axilla (SUV 5.4) in addition to 2 lymph nodes within the retroperitoneal area with SUV (8.76). .  Staging for her follicular carcinoma grade 1 is a clinical stage III with  no B symptoms no significant laboratory abnormalities. At this time it was decided pursue active surveillance and close follow.     On October 17, 2019, PETCT  indicated interval progression of disease with previous noted lymph adenopathy larger and more metabolic. The left periaortic lymph node mass noted to be compressing not only the left renal vein but resulting in mild left hydronephrosis which is new. .   Single agent RITUXAN was initiated on October 9, 2019 and completed 4 weekly doses of RITUXAN.  Ms. Aparicio continues to have no B-symptoms.     Repeat PETCT obtained December 11, 2019 indicated overall improvement from previous study.  The areas of hypermetabolic lymphadenopathy supraclavicular on the left and left paraaortic have diminshed both in size and degree of metabolic activity with no new areas noted. .      PETCT done on July 2, 2020 indicated enlargement of left infraclavicular / subpectoral lynph node measuring up to 1.8 cm with SUV 5.7, increase in size and SUV of left axillary lymph node, small left pleural effusion slightly larger than prior PET, left paraaortic lymph node conglomerate slightly larger and slightly increased in SUV.  .  It was decided at that time to restart RITUXAN every 8 weeks.  Ms. Aparicio continues to have no B-symptoms.     Maintenance rituximab started November 2020, every 8 weeks.    CT CAP on 12/16/2020 showed decrease in the size of left retropectoral lymph nodes and left periaortic lymph nodes.  No new or enlarging lymph nodes in the chest, abdomen, or pelvis.  There are stable subcentimeter noncalcified pulmonary nodules as well.       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 (CMS/HCC)   3/13/2019 Initial Diagnosis    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 (CMS/HCC)     12/31/2020 -  Chemotherapy    OP LYMPHOMA RiTUXimab (Maintenance - Every 2 Months)     Bladder cancer (CMS/HCC)   2/23/2017 Initial Diagnosis     Bladder cancer (CMS/HCC)         Radha Aparicio presents to [facility] for her next cycle of maintenance rituximab.  History of Present Illness   Patient says she is doing well with the exception of some pain in her left shoulder which is chronic.  She is also very fatigued but believes that is due to her age.  This is not changed significantly.  She is still able to get around on her own and drives herself to many appointments.       Review of Systems   Constitutional: Positive for fatigue. Negative for appetite change, diaphoresis, fever, unexpected weight gain and unexpected weight loss.   HENT: Negative for hearing loss, mouth sores, sore throat, swollen glands, trouble swallowing and voice change.    Eyes: Negative for blurred vision.   Respiratory: Negative for cough, shortness of breath and wheezing.    Cardiovascular: Negative for chest pain and palpitations.   Gastrointestinal: Negative for abdominal pain, blood in stool, constipation, diarrhea, nausea and vomiting.   Endocrine: Negative for cold intolerance and heat intolerance.   Genitourinary: Negative for difficulty urinating, dysuria, frequency, hematuria and urinary incontinence.   Musculoskeletal: Negative for arthralgias, back pain and myalgias.   Skin: Negative for rash, skin lesions and bruise.   Neurological: Negative for dizziness, seizures, weakness, numbness and headache.   Hematological: Does not bruise/bleed easily.   Psychiatric/Behavioral: Negative for depressed mood. The patient is not nervous/anxious.        Past Medical History:   Diagnosis Date   • Acute congestive heart failure (CMS/HCC)     improved   • Arthritis    • Bladder cancer (CMS/HCC)    • Bleeding disorder (CMS/HCC)     (CONDITION DUE TO BLOOD THINNERS)   • Chronic atrial fibrillation (CMS/HCC)    • High cholesterol    • Hypertension    • Lymphoma (CMS/HCC)    • Moderate to severe aortic stenosis     Patient declines to have  transcatheter   • Non Hodgkin's lymphoma (CMS/HCC)     2008 treated 2008 2009.   • Rectus sheath hematoma     Right rectus sheath hematoma, off anticoagulation because of the hematoma.   • Skin cancer    • Type 2 diabetes mellitus (CMS/HCC)      Past Surgical History:   Procedure Laterality Date   • BLADDER SURGERY     • CATARACT EXTRACTION     • DILATION AND CURETTAGE, DIAGNOSTIC / THERAPEUTIC     • LEFT VENTRICULAR ASSIST DEVICE     • LYMPH NODE BIOPSY      Biopsy of retroperitoneal lymph node or mass    • SKIN CANCER DESTRUCTION     • TONSILLECTOMY       Social History     Socioeconomic History   • Marital status:      Spouse name: Not on file   • Number of children: 2   • Years of education: Not on file   • Highest education level: Not on file   Tobacco Use   • Smoking status: Never Smoker   Substance and Sexual Activity   • Alcohol use: Not Currently   • Drug use: Not Currently     Family History   Problem Relation Age of Onset   • Stomach cancer Paternal Great-Grandfather        Objective   Physical Exam   General: Alert, cooperative, no acute distress, well appearing for age  Eyes: Anicteric sclera, PERRLA  Respiratory: CTAB, normal respiratory effort  Abdomen: Normal active bowel sounds, no tenderness, no distention  Cardiovascular: RRR, no murmur, no lower extremity edema  Skin: Normal tone, no rash, no lesions  Psychiatric: Appropriate affect, intact judgment  Neurologic: No focal sensory or motor deficits, no weakness, numbness, dizziness  Musculoskeletal: Normal muscle strength and tone, ambulates slowly but easily on her own  Extremities: No clubbing, cyanosis, or deformities      Vitals:    06/17/21 1124   BP: 152/66   Pulse: 83   Resp: 20   Temp: 97.4 °F (36.3 °C)   TempSrc: Temporal   SpO2: 95%   Weight: 66 kg (145 lb 8.1 oz)   PainSc: 0-No pain     ECOG score: 1         PHQ-9 Total Score: 0                  Result Review :   The following data was reviewed by: Lily Byrd MD PhD on  "06/17/2021:  Lab Results   Component Value Date    HGB 12.6 06/17/2021    HCT 38.3 06/17/2021    MCV 95.3 06/17/2021     06/17/2021    WBC 7.40 06/17/2021    NEUTROABS 4.63 06/17/2021    LYMPHSABS 1.78 06/17/2021    MONOSABS 0.83 06/17/2021    EOSABS 0.12 06/17/2021    BASOSABS 0.03 06/17/2021     Lab Results   Component Value Date    GLUCOSE 110 (H) 06/17/2021    BUN 34 (H) 06/17/2021    CREATININE 0.90 06/17/2021     06/17/2021    K 4.0 06/17/2021     06/17/2021    CO2 31.8 (H) 06/17/2021    CALCIUM 9.4 06/17/2021    PROTEINTOT 6.9 06/17/2021    ALBUMIN 4.06 06/17/2021    BILITOT 0.7 06/17/2021    ALKPHOS 73 06/17/2021    AST 30 06/17/2021    ALT 17 06/17/2021          Assessment and Plan    Diagnoses and all orders for this visit:    1. 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 (CMS/HCC) (Primary)  -     CT chest w contrast; Future  -     CT abdomen pelvis w contrast; Future    Other orders  -     predniSONE (DELTASONE) 50 MG tablet; Take 1 tablet by mouth Take As Directed. Take 1 tab 13hrs before CT scan, 1 tab 7hrs before scan, and 1 tab 1 hr before scan  Dispense: 3 tablet; Refill: 0    Patient will continue getting maintenance rituximab every other month.  I will schedule her next CT scan of her chest, abdomen, and pelvis with IV contrast only.  This will be done in August and I will follow up with the patient after.  This follow-up should be scheduled with her next treatment.  The patient told me she had to \"bumps\" pop up after her last CT scan with contrast.  Therefore she was told she would have to have medication prior.  For this reason I will send oral steroids but avoid Benadryl due to the patient's age.  She was given instructions on taking this medication prior to the scan.        Patient was given instructions and counseling regarding her condition or for health maintenance advice. Please see specific information pulled into the AVS if appropriate.     Lily ANNE" MD Rochelle PhD    6/17/2021

## 2021-07-08 DIAGNOSIS — I51.9 HEART PROBLEM: Primary | ICD-10-CM

## 2021-07-08 RX ORDER — LOSARTAN POTASSIUM 25 MG/1
25 TABLET ORAL DAILY
Qty: 90 TABLET | Refills: 0 | Status: SHIPPED | OUTPATIENT
Start: 2021-07-08 | End: 2021-07-22 | Stop reason: SDUPTHER

## 2021-07-08 RX ORDER — LOSARTAN POTASSIUM 25 MG/1
25 TABLET ORAL DAILY
COMMUNITY
End: 2021-07-08 | Stop reason: SDUPTHER

## 2021-07-14 ENCOUNTER — HOSPITAL ENCOUNTER (OUTPATIENT)
Dept: CT IMAGING | Facility: HOSPITAL | Age: 86
Discharge: HOME | End: 2021-07-14

## 2021-07-14 DIAGNOSIS — C82.08 FOLLICULAR LYMPHOMA GRADE I OF LYMPH NODES OF MULTIPLE SITES (HCC): ICD-10-CM

## 2021-07-14 PROCEDURE — 71250 CT THORAX DX C-: CPT

## 2021-07-14 PROCEDURE — 74176 CT ABD & PELVIS W/O CONTRAST: CPT

## 2021-07-21 RX ORDER — SPIRONOLACTONE 25 MG/1
TABLET ORAL
Qty: 15 TABLET | Refills: 5 | Status: SHIPPED | OUTPATIENT
Start: 2021-07-21 | End: 2022-02-07 | Stop reason: SDUPTHER

## 2021-07-22 DIAGNOSIS — I51.9 HEART PROBLEM: ICD-10-CM

## 2021-07-22 RX ORDER — LOSARTAN POTASSIUM 25 MG/1
25 TABLET ORAL DAILY
Qty: 90 TABLET | Refills: 0 | Status: SHIPPED | OUTPATIENT
Start: 2021-07-22 | End: 2022-01-10 | Stop reason: SDUPTHER

## 2021-07-23 RX ORDER — FUROSEMIDE 40 MG/1
TABLET ORAL
Qty: 135 TABLET | Refills: 3 | Status: SHIPPED | OUTPATIENT
Start: 2021-07-23 | End: 2021-09-23

## 2021-07-27 RX ORDER — BLOOD-GLUCOSE METER
KIT MISCELLANEOUS
Qty: 90 EACH | Refills: 3 | Status: SHIPPED | OUTPATIENT
Start: 2021-07-27 | End: 2021-08-11 | Stop reason: SDUPTHER

## 2021-08-02 RX ORDER — ATORVASTATIN CALCIUM 40 MG/1
TABLET, FILM COATED ORAL
Qty: 90 TABLET | Refills: 1 | Status: SHIPPED | OUTPATIENT
Start: 2021-08-02 | End: 2022-01-10 | Stop reason: SDUPTHER

## 2021-08-02 RX ORDER — ATORVASTATIN CALCIUM 40 MG/1
TABLET, FILM COATED ORAL
Qty: 90 TABLET | Refills: 1 | Status: SHIPPED | OUTPATIENT
Start: 2021-08-02 | End: 2021-09-23 | Stop reason: SDUPTHER

## 2021-08-10 RX ORDER — DABIGATRAN ETEXILATE MESYLATE 75 MG/1
CAPSULE ORAL
Qty: 60 CAPSULE | Refills: 3 | Status: SHIPPED | OUTPATIENT
Start: 2021-08-10 | End: 2021-12-08

## 2021-08-11 RX ORDER — LANCETS 28 GAUGE
EACH MISCELLANEOUS
Qty: 100 EACH | Refills: 3 | Status: SHIPPED | OUTPATIENT
Start: 2021-08-11 | End: 2021-09-09 | Stop reason: SDUPTHER

## 2021-08-11 RX ORDER — BLOOD-GLUCOSE METER
KIT MISCELLANEOUS
Qty: 90 EACH | Refills: 3 | Status: SHIPPED | OUTPATIENT
Start: 2021-08-11 | End: 2022-05-04 | Stop reason: SDUPTHER

## 2021-08-12 ENCOUNTER — OFFICE VISIT (OUTPATIENT)
Dept: ONCOLOGY | Facility: HOSPITAL | Age: 86
End: 2021-08-12

## 2021-08-12 ENCOUNTER — HOSPITAL ENCOUNTER (OUTPATIENT)
Dept: ONCOLOGY | Facility: HOSPITAL | Age: 86
Setting detail: INFUSION SERIES
Discharge: HOME | End: 2021-08-12

## 2021-08-12 VITALS
BODY MASS INDEX: 25.91 KG/M2 | SYSTOLIC BLOOD PRESSURE: 106 MMHG | DIASTOLIC BLOOD PRESSURE: 48 MMHG | TEMPERATURE: 97.6 F | WEIGHT: 144.4 LBS | HEART RATE: 63 BPM | OXYGEN SATURATION: 98 % | RESPIRATION RATE: 16 BRPM

## 2021-08-12 VITALS
SYSTOLIC BLOOD PRESSURE: 138 MMHG | OXYGEN SATURATION: 98 % | RESPIRATION RATE: 18 BRPM | TEMPERATURE: 98.2 F | DIASTOLIC BLOOD PRESSURE: 59 MMHG | HEART RATE: 62 BPM | BODY MASS INDEX: 25.91 KG/M2 | WEIGHT: 144.4 LBS

## 2021-08-12 DIAGNOSIS — C82.08 FOLLICULAR LYMPHOMA GRADE I OF LYMPH NODES OF MULTIPLE SITES (HCC): Primary | ICD-10-CM

## 2021-08-12 DIAGNOSIS — Z45.2 ADJUSTMENT AND MANAGEMENT OF VASCULAR ACCESS DEVICE: ICD-10-CM

## 2021-08-12 LAB
ALBUMIN SERPL-MCNC: 4.05 G/DL (ref 3.5–5.2)
ALBUMIN/GLOB SERPL: 1.4 G/DL
ALP SERPL-CCNC: 71 U/L (ref 39–117)
ALT SERPL W P-5'-P-CCNC: 22 U/L (ref 1–33)
ANION GAP SERPL CALCULATED.3IONS-SCNC: 6.6 MMOL/L (ref 5–15)
AST SERPL-CCNC: 32 U/L (ref 1–32)
BASOPHILS # BLD AUTO: 0.02 10*3/MM3 (ref 0–0.2)
BASOPHILS NFR BLD AUTO: 0.4 % (ref 0–1.5)
BILIRUB SERPL-MCNC: 0.7 MG/DL (ref 0–1.2)
BUN SERPL-MCNC: 29 MG/DL (ref 8–23)
BUN/CREAT SERPL: 28.7 (ref 7–25)
CALCIUM SPEC-SCNC: 9.8 MG/DL (ref 8.2–9.6)
CHLORIDE SERPL-SCNC: 101 MMOL/L (ref 98–107)
CO2 SERPL-SCNC: 30.4 MMOL/L (ref 22–29)
CREAT SERPL-MCNC: 1.01 MG/DL (ref 0.57–1)
DEPRECATED RDW RBC AUTO: 48 FL (ref 37–54)
EOSINOPHIL # BLD AUTO: 0.12 10*3/MM3 (ref 0–0.4)
EOSINOPHIL NFR BLD AUTO: 2.1 % (ref 0.3–6.2)
ERYTHROCYTE [DISTWIDTH] IN BLOOD BY AUTOMATED COUNT: 13.7 % (ref 12.3–15.4)
GFR SERPL CREATININE-BSD FRML MDRD: 51 ML/MIN/1.73
GLOBULIN UR ELPH-MCNC: 2.9 GM/DL
GLUCOSE SERPL-MCNC: 141 MG/DL (ref 65–99)
HCT VFR BLD AUTO: 40.7 % (ref 34–46.6)
HGB BLD-MCNC: 13.1 G/DL (ref 12–15.9)
IMM GRANULOCYTES # BLD AUTO: 0.04 10*3/MM3 (ref 0–0.05)
IMM GRANULOCYTES NFR BLD AUTO: 0.7 % (ref 0–0.5)
LYMPHOCYTES # BLD AUTO: 1.3 10*3/MM3 (ref 0.7–3.1)
LYMPHOCYTES NFR BLD AUTO: 22.8 % (ref 19.6–45.3)
MCH RBC QN AUTO: 31 PG (ref 26.6–33)
MCHC RBC AUTO-ENTMCNC: 32.2 G/DL (ref 31.5–35.7)
MCV RBC AUTO: 96.4 FL (ref 79–97)
MONOCYTES # BLD AUTO: 0.6 10*3/MM3 (ref 0.1–0.9)
MONOCYTES NFR BLD AUTO: 10.5 % (ref 5–12)
NEUTROPHILS NFR BLD AUTO: 3.63 10*3/MM3 (ref 1.7–7)
NEUTROPHILS NFR BLD AUTO: 63.5 % (ref 42.7–76)
PLATELET # BLD AUTO: 195 10*3/MM3 (ref 140–450)
PMV BLD AUTO: 9.8 FL (ref 6–12)
POTASSIUM SERPL-SCNC: 3.9 MMOL/L (ref 3.5–5.2)
PROT SERPL-MCNC: 6.9 G/DL (ref 6–8.5)
RBC # BLD AUTO: 4.22 10*6/MM3 (ref 3.77–5.28)
SODIUM SERPL-SCNC: 138 MMOL/L (ref 136–145)
WBC # BLD AUTO: 5.71 10*3/MM3 (ref 3.4–10.8)

## 2021-08-12 PROCEDURE — 25010000002 DIPHENHYDRAMINE PER 50 MG: Performed by: INTERNAL MEDICINE

## 2021-08-12 PROCEDURE — 80053 COMPREHEN METABOLIC PANEL: CPT | Performed by: INTERNAL MEDICINE

## 2021-08-12 PROCEDURE — 96415 CHEMO IV INFUSION ADDL HR: CPT

## 2021-08-12 PROCEDURE — 99214 OFFICE O/P EST MOD 30 MIN: CPT | Performed by: INTERNAL MEDICINE

## 2021-08-12 PROCEDURE — 85025 COMPLETE CBC W/AUTO DIFF WBC: CPT | Performed by: INTERNAL MEDICINE

## 2021-08-12 PROCEDURE — 96413 CHEMO IV INFUSION 1 HR: CPT

## 2021-08-12 PROCEDURE — 96375 TX/PRO/DX INJ NEW DRUG ADDON: CPT

## 2021-08-12 PROCEDURE — 25010000002 RITUXIMAB 10 MG/ML SOLUTION 50 ML VIAL: Performed by: INTERNAL MEDICINE

## 2021-08-12 PROCEDURE — 25010000002 RITUXIMAB 10 MG/ML SOLUTION 10 ML VIAL: Performed by: INTERNAL MEDICINE

## 2021-08-12 PROCEDURE — 25010000002 HEPARIN LOCK FLUSH PER 10 UNITS: Performed by: INTERNAL MEDICINE

## 2021-08-12 RX ORDER — MEPERIDINE HYDROCHLORIDE 25 MG/ML
25 INJECTION INTRAMUSCULAR; INTRAVENOUS; SUBCUTANEOUS
Status: CANCELLED | OUTPATIENT
Start: 2021-10-07

## 2021-08-12 RX ORDER — HEPARIN SODIUM (PORCINE) LOCK FLUSH IV SOLN 100 UNIT/ML 100 UNIT/ML
500 SOLUTION INTRAVENOUS AS NEEDED
Status: DISCONTINUED | OUTPATIENT
Start: 2021-08-12 | End: 2021-08-13 | Stop reason: HOSPADM

## 2021-08-12 RX ORDER — DIPHENHYDRAMINE HYDROCHLORIDE 50 MG/ML
50 INJECTION INTRAMUSCULAR; INTRAVENOUS AS NEEDED
Status: CANCELLED | OUTPATIENT
Start: 2021-08-12

## 2021-08-12 RX ORDER — ACETAMINOPHEN 325 MG/1
650 TABLET ORAL ONCE
Status: COMPLETED | OUTPATIENT
Start: 2021-08-12 | End: 2021-08-12

## 2021-08-12 RX ORDER — MEPERIDINE HYDROCHLORIDE 25 MG/ML
25 INJECTION INTRAMUSCULAR; INTRAVENOUS; SUBCUTANEOUS
Status: CANCELLED | OUTPATIENT
Start: 2021-08-12

## 2021-08-12 RX ORDER — SODIUM CHLORIDE 0.9 % (FLUSH) 0.9 %
20 SYRINGE (ML) INJECTION AS NEEDED
Status: CANCELLED | OUTPATIENT
Start: 2021-10-07

## 2021-08-12 RX ORDER — SODIUM CHLORIDE 9 MG/ML
250 INJECTION, SOLUTION INTRAVENOUS ONCE
Status: COMPLETED | OUTPATIENT
Start: 2021-08-12 | End: 2021-08-12

## 2021-08-12 RX ORDER — SODIUM CHLORIDE 9 MG/ML
250 INJECTION, SOLUTION INTRAVENOUS ONCE
Status: CANCELLED | OUTPATIENT
Start: 2021-10-07

## 2021-08-12 RX ORDER — DIPHENHYDRAMINE HYDROCHLORIDE 50 MG/ML
50 INJECTION INTRAMUSCULAR; INTRAVENOUS AS NEEDED
Status: CANCELLED | OUTPATIENT
Start: 2021-10-07

## 2021-08-12 RX ORDER — FAMOTIDINE 10 MG/ML
20 INJECTION, SOLUTION INTRAVENOUS AS NEEDED
Status: CANCELLED | OUTPATIENT
Start: 2021-10-07

## 2021-08-12 RX ORDER — ACETAMINOPHEN 325 MG/1
650 TABLET ORAL ONCE
Status: CANCELLED | OUTPATIENT
Start: 2021-10-07

## 2021-08-12 RX ORDER — FAMOTIDINE 10 MG/ML
20 INJECTION, SOLUTION INTRAVENOUS AS NEEDED
Status: CANCELLED | OUTPATIENT
Start: 2021-08-12

## 2021-08-12 RX ORDER — ACETAMINOPHEN 325 MG/1
650 TABLET ORAL ONCE
Status: CANCELLED | OUTPATIENT
Start: 2021-08-12

## 2021-08-12 RX ORDER — SODIUM CHLORIDE 0.9 % (FLUSH) 0.9 %
20 SYRINGE (ML) INJECTION AS NEEDED
Status: DISCONTINUED | OUTPATIENT
Start: 2021-08-12 | End: 2021-08-13 | Stop reason: HOSPADM

## 2021-08-12 RX ORDER — SODIUM CHLORIDE 9 MG/ML
250 INJECTION, SOLUTION INTRAVENOUS ONCE
Status: CANCELLED | OUTPATIENT
Start: 2021-08-12

## 2021-08-12 RX ORDER — HEPARIN SODIUM (PORCINE) LOCK FLUSH IV SOLN 100 UNIT/ML 100 UNIT/ML
500 SOLUTION INTRAVENOUS AS NEEDED
Status: CANCELLED | OUTPATIENT
Start: 2021-10-07

## 2021-08-12 RX ADMIN — HEPARIN SODIUM (PORCINE) LOCK FLUSH IV SOLN 100 UNIT/ML 500 UNITS: 100 SOLUTION at 15:15

## 2021-08-12 RX ADMIN — ACETAMINOPHEN 650 MG: 325 TABLET, FILM COATED ORAL at 12:06

## 2021-08-12 RX ADMIN — RITUXIMAB 600 MG: 10 INJECTION, SOLUTION INTRAVENOUS at 12:35

## 2021-08-12 RX ADMIN — SODIUM CHLORIDE 250 ML: 9 INJECTION, SOLUTION INTRAVENOUS at 12:11

## 2021-08-12 RX ADMIN — SODIUM CHLORIDE, PRESERVATIVE FREE 20 ML: 5 INJECTION INTRAVENOUS at 15:15

## 2021-08-12 RX ADMIN — DIPHENHYDRAMINE HYDROCHLORIDE 25 MG: 50 INJECTION, SOLUTION INTRAMUSCULAR; INTRAVENOUS at 12:11

## 2021-08-12 NOTE — PROGRESS NOTES
Patient  Radha Aparicio    Location  [facility] HEMATOLOGY & ONCOLOGY    Chief Complaint  Lymphoma and Chemotherapy    Referring Provider: Lily Byrd MD PhD  PCP: Gala Rao MD    Subjective          Oncology/Hematology History Overview Note   Ms. Radha Aparicio is a pleasant 89 year old female who presents with NHL, follicular type diagnosed in 2019.  Ms. Aparicio has been receiving her oncological care with Dr. Jenn Zaragoza who recently retired.  Ms. Aparicio initially presented RLQ pain to her PCP, Dr. Gala Rao.  In light of her bladder cancer, CT of Abdomen and Pelvis was obtained on January 16, 2019.  Findings indicated retroperitoneal adenopathy measuring up to 4 cm x 2cm, thought either to be lymphoma or metastatic disease.  Incidently mild bilateral hydronephrosis was noted without urolithiasis.     CT guided biopsy of the retroperitoneal lymph node was obtained.  Pathology (S-) indicated non-hodgkins's b-cell lymphoma; Follicular lymphoma (grade 1).  Flow cytometryrevealed diffuse positivity with CD 20, BCL2, CD10 and patchy decoration with BCL-6.  A few days after biopsy she was admitted to hospital with acute respiratory hypoxemia secondary to influenza.  Repeat imaging with CT Abdomen and Pelvis with heterogeneous hyperdense mass in the anterior abdominal wass musculature 8 cm x 12.6 cm consistent wtih rectus sheath hematoma.  Retroperitoneal adenopathy is redemonstrated.  Index left periaortic mass measured 3.1 x 2.2 cm, previously 3.9 x 2.4. Mildly enlarged retrocrural lymph nodes.  She was discharged from the hospital on 2/20/2019.     Ms. Aparicio was evaluated by Dr. Jenn Zaragoza on March 13, 2019. PET CT was ordered and obtained.  It indicated metabolic activity within the lymph node at the base of upper chest and neck (SUV 3.85)  as well as the left axilla (SUV 5.4) in addition to 2 lymph nodes within the retroperitoneal area with SUV (8.76). .  Staging for her  follicular carcinoma grade 1 is a clinical stage III with no B symptoms no significant laboratory abnormalities. At this time it was decided pursue active surveillance and close follow.     On October 17, 2019, PETCT  indicated interval progression of disease with previous noted lymph adenopathy larger and more metabolic. The left periaortic lymph node mass noted to be compressing not only the left renal vein but resulting in mild left hydronephrosis which is new. .   Single agent RITUXAN was initiated on October 9, 2019 and completed 4 weekly doses of RITUXAN.  Ms. Aparicio continues to have no B-symptoms.     Repeat PETCT obtained December 11, 2019 indicated overall improvement from previous study.  The areas of hypermetabolic lymphadenopathy supraclavicular on the left and left paraaortic have diminshed both in size and degree of metabolic activity with no new areas noted. .      PETCT done on July 2, 2020 indicated enlargement of left infraclavicular / subpectoral lynph node measuring up to 1.8 cm with SUV 5.7, increase in size and SUV of left axillary lymph node, small left pleural effusion slightly larger than prior PET, left paraaortic lymph node conglomerate slightly larger and slightly increased in SUV.  .  It was decided at that time to restart RITUXAN every 8 weeks.  Ms. Aparicio continues to have no B-symptoms.     Maintenance rituximab started November 2020, every 8 weeks.    CT CAP on 12/16/2020 showed decrease in the size of left retropectoral lymph nodes and left periaortic lymph nodes.  No new or enlarging lymph nodes in the chest, abdomen, or pelvis.  There are stable subcentimeter noncalcified pulmonary nodules as well.       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  She has no new complaints.  Since she was diagnosed with Covid she still has a runny nose and some generalized complaints including fatigue.  However there have been no changes in her health over the past several weeks or months.    I reviewed the results of the CT CAP that were done on 6/17/2021 with the patient and her daughter.  I gave him a copy of the results.  She has some areas of concern along which have not changed in size and may or may not be related to malignancy.  However she has a lymph node in the left subpectoral region which is decreased in size.  This was previously 1.8 cm and is now 1.4 cm.  There is no evidence of new lymph nodes or other lesions.    Review of Systems   Constitutional: Positive for fatigue. Negative for appetite change, diaphoresis, fever, unexpected weight gain and unexpected weight loss.   HENT: Negative for hearing loss, mouth sores, sore throat, swollen glands, trouble swallowing and voice change.    Eyes: Negative for blurred vision.   Respiratory: Positive for shortness of breath. Negative for cough and wheezing.    Cardiovascular: Negative for chest pain and palpitations.   Gastrointestinal: Negative for abdominal pain, blood in stool, constipation, diarrhea, nausea and vomiting.   Endocrine: Negative for cold intolerance and heat intolerance.   Genitourinary: Negative for difficulty urinating, dysuria, frequency, hematuria and urinary incontinence.   Musculoskeletal: Negative for arthralgias, back pain and myalgias.   Skin: Negative for rash, skin lesions and bruise.   Neurological: Negative for dizziness, seizures, weakness, numbness and headache.   Hematological: Does not bruise/bleed easily.   Psychiatric/Behavioral: Negative for depressed mood. The patient is not nervous/anxious.        Past  Medical History:   Diagnosis Date   • Acute congestive heart failure (CMS/HCC)     improved   • Arthritis    • Bladder cancer (CMS/HCC)    • Bleeding disorder (CMS/HCC)     (CONDITION DUE TO BLOOD THINNERS)   • Chronic atrial fibrillation (CMS/HCC)    • High cholesterol    • Hypertension    • Lymphoma (CMS/HCC)    • Moderate to severe aortic stenosis     Patient declines to have transcatheter   • Non Hodgkin's lymphoma (CMS/HCC)     2008 treated 2008 2009.   • Rectus sheath hematoma     Right rectus sheath hematoma, off anticoagulation because of the hematoma.   • Skin cancer    • Type 2 diabetes mellitus (CMS/HCC)      Past Surgical History:   Procedure Laterality Date   • BLADDER SURGERY     • CATARACT EXTRACTION     • DILATION AND CURETTAGE, DIAGNOSTIC / THERAPEUTIC     • LEFT VENTRICULAR ASSIST DEVICE     • LYMPH NODE BIOPSY      Biopsy of retroperitoneal lymph node or mass    • SKIN CANCER DESTRUCTION     • TONSILLECTOMY       Social History     Socioeconomic History   • Marital status:      Spouse name: Not on file   • Number of children: 2   • Years of education: Not on file   • Highest education level: Not on file   Tobacco Use   • Smoking status: Never Smoker   Substance and Sexual Activity   • Alcohol use: Not Currently   • Drug use: Not Currently     Family History   Problem Relation Age of Onset   • Stomach cancer Paternal Great-Grandfather        Objective   Physical Exam  General: Alert, cooperative, no acute distress, somewhat frail but otherwise well-appearing  Eyes: Anicteric sclera, PERRLA  Respiratory: CTAB, normal respiratory effort  Abdomen: Normal active bowel sounds, no tenderness, no distention  Cardiovascular: RRR, no murmur, no lower extremity edema  Skin: Normal tone, no rash, no lesions  Psychiatric: Appropriate affect, intact judgment  Neurologic: No focal sensory or motor deficits, no weakness, numbness, dizziness  Musculoskeletal: Normal muscle strength and tone for age, able to  ambulate slowly on her own  Extremities: No clubbing, cyanosis, or deformities      Vitals:    08/12/21 1049   BP: 138/59   Pulse: 62   Resp: 18   Temp: 98.2 °F (36.8 °C)   TempSrc: Temporal   SpO2: 98%  Comment: RM AIR   Weight: 65.5 kg (144 lb 6.4 oz)   PainSc: 0-No pain               PHQ-9 Total Score: 0       Result Review :   The following data was reviewed by: Lily Byrd MD PhD on 08/12/2021:  Lab Results   Component Value Date    HGB 13.1 08/12/2021    HCT 40.7 08/12/2021    MCV 96.4 08/12/2021     08/12/2021    WBC 5.71 08/12/2021    NEUTROABS 3.63 08/12/2021    LYMPHSABS 1.30 08/12/2021    MONOSABS 0.60 08/12/2021    EOSABS 0.12 08/12/2021    BASOSABS 0.02 08/12/2021     Lab Results   Component Value Date    GLUCOSE 141 (H) 08/12/2021    BUN 29 (H) 08/12/2021    CREATININE 1.01 (H) 08/12/2021     08/12/2021    K 3.9 08/12/2021     08/12/2021    CO2 30.4 (H) 08/12/2021    CALCIUM 9.8 (H) 08/12/2021    PROTEINTOT 6.9 08/12/2021    ALBUMIN 4.05 08/12/2021    BILITOT 0.7 08/12/2021    ALKPHOS 71 08/12/2021    AST 32 08/12/2021    ALT 22 08/12/2021          Assessment and Plan    Diagnoses and all orders for this visit:    1. 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 (CMS/HCC) (Primary)  -     Cancel: sodium chloride 0.9 % infusion 250 mL  -     Cancel: acetaminophen (TYLENOL) tablet 650 mg  -     Cancel: diphenhydrAMINE (BENADRYL) 25 mg in sodium chloride 0.9 % 50 mL IVPB  -     Cancel: riTUXimab (RITUXAN) 640 mg in sodium chloride 0.9 % 314 mL chemo IVPB Rapid Infusion  -     CBC and Differential; Future  -     Comprehensive metabolic panel; Future    Other orders  -     Cancel: hydrocortisone sodium succinate (Solu-CORTEF) injection 100 mg  -     Cancel: diphenhydrAMINE (BENADRYL) injection 50 mg  -     Cancel: famotidine (PEPCID) injection 20 mg  -     Cancel: meperidine (DEMEROL) injection 25 mg  -     sodium chloride 0.9 % infusion 250 mL  -     acetaminophen  (TYLENOL) tablet 650 mg  -     diphenhydrAMINE (BENADRYL) 25 mg in sodium chloride 0.9 % 50 mL IVPB  -     riTUXimab (RITUXAN) 640 mg in sodium chloride 0.9 % 314 mL chemo IVPB Rapid Infusion  -     hydrocortisone sodium succinate (Solu-CORTEF) injection 100 mg  -     diphenhydrAMINE (BENADRYL) injection 50 mg  -     famotidine (PEPCID) injection 20 mg  -     meperidine (DEMEROL) injection 25 mg      Patient has no evidence of disease recurrence on recent scans.  I reviewed her labs there is no evidence of toxicity or disease progression.  She will get her next dose of rituximab maintenance and follow-up with me in 2 months.    Patient was given instructions and counseling regarding her condition or for health maintenance advice. Please see specific information pulled into the AVS if appropriate.     Lily Byrd MD PhD    8/12/2021

## 2021-09-02 ENCOUNTER — LAB (OUTPATIENT)
Dept: LAB | Facility: HOSPITAL | Age: 86
End: 2021-09-02

## 2021-09-02 ENCOUNTER — APPOINTMENT (OUTPATIENT)
Dept: BONE DENSITY | Facility: HOSPITAL | Age: 86
End: 2021-09-02

## 2021-09-02 ENCOUNTER — TRANSCRIBE ORDERS (OUTPATIENT)
Dept: LAB | Facility: HOSPITAL | Age: 86
End: 2021-09-02

## 2021-09-02 ENCOUNTER — HOSPITAL ENCOUNTER (OUTPATIENT)
Dept: MAMMOGRAPHY | Facility: HOSPITAL | Age: 86
Discharge: HOME | End: 2021-09-02
Admitting: INTERNAL MEDICINE

## 2021-09-02 DIAGNOSIS — I50.9 HEART FAILURE, UNSPECIFIED HF CHRONICITY, UNSPECIFIED HEART FAILURE TYPE (HCC): Primary | ICD-10-CM

## 2021-09-02 DIAGNOSIS — Z79.899 ENCOUNTER FOR LONG-TERM (CURRENT) USE OF OTHER MEDICATIONS: ICD-10-CM

## 2021-09-02 DIAGNOSIS — E78.5 HYPERLIPIDEMIA, UNSPECIFIED HYPERLIPIDEMIA TYPE: ICD-10-CM

## 2021-09-02 DIAGNOSIS — E61.1 IRON DEFICIENCY: ICD-10-CM

## 2021-09-02 DIAGNOSIS — E55.9 AVITAMINOSIS D: ICD-10-CM

## 2021-09-02 DIAGNOSIS — R73.09 ABNORMAL BLOOD SUGAR: ICD-10-CM

## 2021-09-02 DIAGNOSIS — Z12.31 VISIT FOR SCREENING MAMMOGRAM: ICD-10-CM

## 2021-09-02 DIAGNOSIS — I50.9 HEART FAILURE, UNSPECIFIED HF CHRONICITY, UNSPECIFIED HEART FAILURE TYPE (HCC): ICD-10-CM

## 2021-09-02 DIAGNOSIS — R53.83 TIREDNESS: ICD-10-CM

## 2021-09-02 DIAGNOSIS — R63.4 LOSS OF WEIGHT: ICD-10-CM

## 2021-09-02 LAB
BASOPHILS # BLD AUTO: 0.05 10*3/MM3 (ref 0–0.2)
BASOPHILS NFR BLD AUTO: 0.7 % (ref 0–1.5)
CHOLEST SERPL-MCNC: 154 MG/DL (ref 0–200)
DEPRECATED RDW RBC AUTO: 44.8 FL (ref 37–54)
EOSINOPHIL # BLD AUTO: 0.06 10*3/MM3 (ref 0–0.4)
EOSINOPHIL NFR BLD AUTO: 0.9 % (ref 0.3–6.2)
ERYTHROCYTE [DISTWIDTH] IN BLOOD BY AUTOMATED COUNT: 12.5 % (ref 12.3–15.4)
HBA1C MFR BLD: 5.85 % (ref 4.8–5.6)
HCT VFR BLD AUTO: 42.7 % (ref 34–46.6)
HDLC SERPL-MCNC: 75 MG/DL (ref 40–60)
HGB BLD-MCNC: 14 G/DL (ref 12–15.9)
IMM GRANULOCYTES # BLD AUTO: 0.01 10*3/MM3 (ref 0–0.05)
IMM GRANULOCYTES NFR BLD AUTO: 0.1 % (ref 0–0.5)
IRON 24H UR-MRATE: 98 MCG/DL (ref 37–145)
IRON SATN MFR SERPL: 23 % (ref 20–50)
LDLC SERPL CALC-MCNC: 68 MG/DL (ref 0–100)
LDLC/HDLC SERPL: 0.91 {RATIO}
LYMPHOCYTES # BLD AUTO: 2.03 10*3/MM3 (ref 0.7–3.1)
LYMPHOCYTES NFR BLD AUTO: 28.8 % (ref 19.6–45.3)
MCH RBC QN AUTO: 31.5 PG (ref 26.6–33)
MCHC RBC AUTO-ENTMCNC: 32.8 G/DL (ref 31.5–35.7)
MCV RBC AUTO: 96 FL (ref 79–97)
MONOCYTES # BLD AUTO: 0.54 10*3/MM3 (ref 0.1–0.9)
MONOCYTES NFR BLD AUTO: 7.7 % (ref 5–12)
NEUTROPHILS NFR BLD AUTO: 4.35 10*3/MM3 (ref 1.7–7)
NEUTROPHILS NFR BLD AUTO: 61.8 % (ref 42.7–76)
NRBC BLD AUTO-RTO: 0 /100 WBC (ref 0–0.2)
NT-PROBNP SERPL-MCNC: 1237 PG/ML (ref 0–1800)
PLATELET # BLD AUTO: 231 10*3/MM3 (ref 140–450)
PMV BLD AUTO: 10.9 FL (ref 6–12)
RBC # BLD AUTO: 4.45 10*6/MM3 (ref 3.77–5.28)
T4 FREE SERPL-MCNC: 1.23 NG/DL (ref 0.93–1.7)
TIBC SERPL-MCNC: 419 MCG/DL (ref 298–536)
TRANSFERRIN SERPL-MCNC: 281 MG/DL (ref 200–360)
TRIGL SERPL-MCNC: 53 MG/DL (ref 0–150)
TSH SERPL DL<=0.05 MIU/L-ACNC: 1.13 UIU/ML (ref 0.27–4.2)
VLDLC SERPL-MCNC: 11 MG/DL (ref 5–40)
WBC # BLD AUTO: 7.04 10*3/MM3 (ref 3.4–10.8)

## 2021-09-02 PROCEDURE — 83036 HEMOGLOBIN GLYCOSYLATED A1C: CPT

## 2021-09-02 PROCEDURE — 85025 COMPLETE CBC W/AUTO DIFF WBC: CPT

## 2021-09-02 PROCEDURE — 77063 BREAST TOMOSYNTHESIS BI: CPT

## 2021-09-02 PROCEDURE — 83540 ASSAY OF IRON: CPT

## 2021-09-02 PROCEDURE — 84443 ASSAY THYROID STIM HORMONE: CPT

## 2021-09-02 PROCEDURE — 84466 ASSAY OF TRANSFERRIN: CPT

## 2021-09-02 PROCEDURE — 77067 SCR MAMMO BI INCL CAD: CPT

## 2021-09-02 PROCEDURE — 82746 ASSAY OF FOLIC ACID SERUM: CPT

## 2021-09-02 PROCEDURE — 82607 VITAMIN B-12: CPT

## 2021-09-02 PROCEDURE — 80061 LIPID PANEL: CPT

## 2021-09-02 PROCEDURE — 36415 COLL VENOUS BLD VENIPUNCTURE: CPT

## 2021-09-02 PROCEDURE — 80299 QUANTITATIVE ASSAY DRUG: CPT

## 2021-09-02 PROCEDURE — 83880 ASSAY OF NATRIURETIC PEPTIDE: CPT

## 2021-09-02 PROCEDURE — 84439 ASSAY OF FREE THYROXINE: CPT

## 2021-09-02 PROCEDURE — 82306 VITAMIN D 25 HYDROXY: CPT

## 2021-09-03 LAB
25(OH)D3 SERPL-MCNC: 59.5 NG/ML
FOLATE SERPL-MCNC: >20 NG/ML (ref 4.78–24.2)
VIT B12 BLD-MCNC: 1282 PG/ML (ref 211–946)

## 2021-09-06 LAB — DIGITOXIN SERPL-MCNC: <5 NG/ML (ref 10–25)

## 2021-09-08 DIAGNOSIS — E11.9 DIABETES MELLITUS TYPE II, NON INSULIN DEPENDENT (HCC): Primary | ICD-10-CM

## 2021-09-09 RX ORDER — LANCETS 28 GAUGE
1 EACH MISCELLANEOUS 2 TIMES DAILY
Qty: 100 EACH | Refills: 3 | Status: SHIPPED | OUTPATIENT
Start: 2021-09-09 | End: 2022-04-21

## 2021-09-16 ENCOUNTER — OFFICE VISIT (OUTPATIENT)
Dept: INTERNAL MEDICINE | Facility: CLINIC | Age: 86
End: 2021-09-16

## 2021-09-16 VITALS
DIASTOLIC BLOOD PRESSURE: 70 MMHG | WEIGHT: 143.6 LBS | HEART RATE: 56 BPM | TEMPERATURE: 96.5 F | RESPIRATION RATE: 12 BRPM | HEIGHT: 62 IN | BODY MASS INDEX: 26.43 KG/M2 | SYSTOLIC BLOOD PRESSURE: 110 MMHG

## 2021-09-16 DIAGNOSIS — I50.31 ACUTE DIASTOLIC CHF (CONGESTIVE HEART FAILURE) (HCC): ICD-10-CM

## 2021-09-16 DIAGNOSIS — I48.11 LONGSTANDING PERSISTENT ATRIAL FIBRILLATION (HCC): Primary | ICD-10-CM

## 2021-09-16 DIAGNOSIS — M85.851 OSTEOPENIA OF BOTH HIPS: ICD-10-CM

## 2021-09-16 DIAGNOSIS — C67.9 MALIGNANT NEOPLASM OF URINARY BLADDER, UNSPECIFIED SITE (HCC): ICD-10-CM

## 2021-09-16 DIAGNOSIS — M85.852 OSTEOPENIA OF BOTH HIPS: ICD-10-CM

## 2021-09-16 DIAGNOSIS — E78.2 MIXED HYPERLIPIDEMIA: ICD-10-CM

## 2021-09-16 DIAGNOSIS — I35.0 NONRHEUMATIC AORTIC VALVE STENOSIS: ICD-10-CM

## 2021-09-16 DIAGNOSIS — E11.9 TYPE 2 DIABETES MELLITUS WITHOUT COMPLICATION, WITHOUT LONG-TERM CURRENT USE OF INSULIN (HCC): ICD-10-CM

## 2021-09-16 DIAGNOSIS — D50.8 IRON DEFICIENCY ANEMIA SECONDARY TO INADEQUATE DIETARY IRON INTAKE: ICD-10-CM

## 2021-09-16 DIAGNOSIS — E53.8 B12 DEFICIENCY: ICD-10-CM

## 2021-09-16 DIAGNOSIS — I10 ESSENTIAL HYPERTENSION: ICD-10-CM

## 2021-09-16 DIAGNOSIS — E55.9 VITAMIN D DEFICIENCY: ICD-10-CM

## 2021-09-16 PROBLEM — M85.859 OSTEOPENIA OF HIP: Status: ACTIVE | Noted: 2021-09-16

## 2021-09-16 PROCEDURE — 99214 OFFICE O/P EST MOD 30 MIN: CPT | Performed by: INTERNAL MEDICINE

## 2021-09-16 NOTE — ASSESSMENT & PLAN NOTE
Patient's cholesterol is doing very well.  Tolerating atorvastatin 40 mg nightly.  Plan: Continue Lipitor 40 mg at bedtime

## 2021-09-16 NOTE — ASSESSMENT & PLAN NOTE
Patient's heart rate is controlled 56 beats a minute.  No evidence of CHF.  Patient is on Pradaxa 75 mg twice daily and the cost was  $200 last month.

## 2021-09-16 NOTE — ASSESSMENT & PLAN NOTE
Patient has declined TAVR or aortic valve replacement.  She denies any undue shortness of breath, chest pain or syncope.  No dizziness.

## 2021-09-16 NOTE — PROGRESS NOTES
"Chief Complaint  Labs Only and Fatigue    Subjective      Radha Aparicio presents to [facility]  History of Present Illness  89-year-old white  female who lives at home and remains mentally very sharp.  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  Takes her daughter to a wound care center several times a week.  Another daughter has been helping her.  She is a ,  and is looking for another job.  No chest pain.  Congestive heart failure is compensated.  Persistent atrial fibrillation is controlled.  Asthma and allergies are doing well.  She has non-Hodgkin's lymphoma and receives rituximab from Dr. Byrd, oncologist.  She did well the first round of chemo and then had a relapse and is still doing well with rituximab.    Patient has bladder cancer and follows with Dr. Grubbs for that.  She has cystoscopy scheduled.    Patient also is iron deficient remains on iron daily  I reviewed all of her medications with her  Objective   Vital Signs:   /70 (BP Location: Left arm, Patient Position: Sitting, Cuff Size: Adult)   Pulse 56   Temp 96.5 °F (35.8 °C) (Temporal)   Resp 12   Ht 157.5 cm (62\")   Wt 65.1 kg (143 lb 9.6 oz)   BMI 26.26 kg/m²     Physical Exam  Vitals and nursing note reviewed.   Constitutional:       Appearance: Normal appearance. She is normal weight.   Eyes:      Extraocular Movements: Extraocular movements intact.      Conjunctiva/sclera: Conjunctivae normal.   Cardiovascular:      Rate and Rhythm: Rhythm irregular.      Heart sounds: Murmur heard.        Comments: Patient has harsh grade 5/6 systolic ejection murmur along left sternal border and aortic area.  Rate is controlled.  Pulmonary:      Effort: Pulmonary effort is normal.      Breath sounds: Normal breath sounds. No wheezing or rales.   Abdominal:      Palpations: Abdomen is soft.      Tenderness: There is no abdominal tenderness. There is no guarding.   Musculoskeletal:         " General: Swelling (Mild bilateral swelling) present.   Skin:     General: Skin is warm and dry.   Neurological:      General: No focal deficit present.      Mental Status: She is alert. Mental status is at baseline.   Psychiatric:         Mood and Affect: Mood normal.         Thought Content: Thought content normal.         Judgment: Judgment normal.        Result Review :  The following data was reviewed by: Gala Rao MD on 09/16/2021:  CMP    CMP 6/2/21 6/17/21 8/12/21   Glucose  110 (A) 141 (A)   Glucose 88     BUN 31 (A) 34 (A) 29 (A)   Creatinine 0.98 (A) 0.90 1.01 (A)   eGFR Non  Am  59 (A) 51 (A)   Sodium 138 141 138   Potassium 4.0 4.0 3.9   Chloride 101 103 101   Calcium 9.8 9.4 9.8 (A)   Albumin 4.3 4.06 4.05   Total Bilirubin 0.83 0.7 0.7   Alkaline Phosphatase 76 73 71   AST (SGOT) 36 30 32   ALT (SGPT) 22 17 22   (A) Abnormal value            CBC    CBC 6/17/21 8/12/21 9/2/21   WBC 7.40 5.71 7.04   RBC 4.02 4.22 4.45   Hemoglobin 12.6 13.1 14.0   Hematocrit 38.3 40.7 42.7   MCV 95.3 96.4 96.0   MCH 31.3 31.0 31.5   MCHC 32.9 32.2 32.8   RDW 14.1 13.7 12.5   Platelets 183 195 231           CBC w/diff    CBC w/Diff 6/17/21 8/12/21 9/2/21   WBC 7.40 5.71 7.04   RBC 4.02 4.22 4.45   Hemoglobin 12.6 13.1 14.0   Hematocrit 38.3 40.7 42.7   MCV 95.3 96.4 96.0   MCH 31.3 31.0 31.5   MCHC 32.9 32.2 32.8   RDW 14.1 13.7 12.5   Platelets 183 195 231   Neutrophil Rel % 62.6 63.5 61.8   Immature Granulocyte Rel % 0.1 0.7 (A) 0.1   Lymphocyte Rel % 24.1 22.8 28.8   Monocyte Rel % 11.2 10.5 7.7   Eosinophil Rel % 1.6 2.1 0.9   Basophil Rel % 0.4 0.4 0.7   (A) Abnormal value            Lipid Panel    Lipid Panel 3/22/21 6/2/21 9/2/21   Total Cholesterol   154   Total Cholesterol 136 154    Triglycerides 36 (A) 50 53   HDL Cholesterol 78 (A) 75 (A) 75 (A)   VLDL Cholesterol 7 10 11   LDL Cholesterol  51 (A) 69 (A) 68   LDL/HDL Ratio   0.91   (A) Abnormal value       Comments are available for some  flowsheets but are not being displayed.           TSH    TSH 12/3/20 3/3/21 9/2/21   TSH 1.630 1.350 1.130           Most Recent A1C    HGBA1C Most Recent 9/2/21   Hemoglobin A1C 5.85 (A)   (A) Abnormal value            Last Completed Colonoscopy     This patient has no relevant Health Maintenance data.               BNP is normal.  Mammogram was normal recently     Assessment and Plan   Diagnoses and all orders for this visit:    1. Longstanding persistent atrial fibrillation (CMS/[facility]) (Primary)  Assessment & Plan:  Patient's heart rate is controlled 56 beats a minute.  No evidence of CHF.  Patient is on Pradaxa 75 mg twice daily and the cost was  $200 last month.      2. Type 2 diabetes mellitus without complication, without long-term current use of insulin (CMS/[facility])  Assessment & Plan:  Diabetes mellitus is very well controlled.  Hemoglobin A1c was reviewed with her.  She checks her fasting blood sugar morning.  Was about 112 this morning.  Patient controls with diet alone.    Orders:  -     Comprehensive Metabolic Panel; Future  -     Hemoglobin A1c; Future    3. Nonrheumatic aortic valve stenosis  Assessment & Plan:  Patient has declined TAVR or aortic valve replacement.  She denies any undue shortness of breath, chest pain or syncope.  No dizziness.      4. Osteopenia of both hips  Assessment & Plan:  Patient is on raloxifene 60 mg daily.  Bone density is scheduled for later this year.      5. Essential hypertension  Assessment & Plan:  Hypertension is well controlled at 110/70.  Plan: Continue losartan 25 mg daily line blood pressure will be rechecked next visit.  She also checks it at home occasionally added her other doctors appointments.  She sees her cardiologist in the near future    Orders:  -     Comprehensive Metabolic Panel; Future    6. Mixed hyperlipidemia  -     Lipid Panel; Future    7. Acute diastolic CHF (congestive heart failure) (CMS/[facility])  -     BNP; Future    8. Malignant neoplasm of urinary  bladder, unspecified site (CMS/HCC)  -     Urinalysis With Microscopic - Urine, Clean Catch; Future    9. Vitamin D deficiency  -     Vitamin D 25 Hydroxy; Future    10. B12 deficiency  -     Vitamin B12 & Folate; Future    11. Iron deficiency anemia secondary to inadequate dietary iron intake  -     CBC & Differential; Future  -     Iron Profile; Future        Follow Up Return in about 3 months (around 12/16/2021).  Patient was given instructions and counseling regarding her condition or for health maintenance advice. Please see specific information pulled into the AVS if appropriate.

## 2021-09-16 NOTE — ASSESSMENT & PLAN NOTE
Hypertension is well controlled at 110/70.  Plan: Continue losartan 25 mg daily line blood pressure will be rechecked next visit.  She also checks it at home occasionally added her other doctors appointments.  She sees her cardiologist in the near future

## 2021-09-23 ENCOUNTER — OFFICE VISIT (OUTPATIENT)
Dept: CARDIOLOGY | Facility: CLINIC | Age: 86
End: 2021-09-23

## 2021-09-23 VITALS
HEIGHT: 62 IN | SYSTOLIC BLOOD PRESSURE: 126 MMHG | WEIGHT: 144 LBS | BODY MASS INDEX: 26.5 KG/M2 | HEART RATE: 57 BPM | DIASTOLIC BLOOD PRESSURE: 44 MMHG

## 2021-09-23 DIAGNOSIS — I35.0 NONRHEUMATIC AORTIC VALVE STENOSIS: Primary | ICD-10-CM

## 2021-09-23 DIAGNOSIS — I48.11 LONGSTANDING PERSISTENT ATRIAL FIBRILLATION (HCC): ICD-10-CM

## 2021-09-23 DIAGNOSIS — I10 ESSENTIAL HYPERTENSION: ICD-10-CM

## 2021-09-23 DIAGNOSIS — I50.33 ACUTE ON CHRONIC HEART FAILURE WITH PRESERVED EJECTION FRACTION (HFPEF) (HCC): ICD-10-CM

## 2021-09-23 PROCEDURE — 99214 OFFICE O/P EST MOD 30 MIN: CPT | Performed by: INTERNAL MEDICINE

## 2021-09-23 RX ORDER — FUROSEMIDE 40 MG/1
60 TABLET ORAL DAILY
Qty: 135 TABLET | Refills: 3 | Status: SHIPPED | OUTPATIENT
Start: 2021-09-23 | End: 2022-01-03 | Stop reason: SDUPTHER

## 2021-09-23 NOTE — ASSESSMENT & PLAN NOTE
Patient with severe AS and evidence of volume overload on exam discussed with her again the option of TAVR but she was not interested in pursuing any interventions.  We will increase her Lasix to 60 mg a day repeat renal panel in 2 weeks

## 2021-09-23 NOTE — PROGRESS NOTES
Chief Complaint  Hyperlipidemia    Subjective    Does report some increased dyspnea on exertion issues stable lower extremity edema no anginal chest pain no syncope    Past Medical History:   Diagnosis Date   • Acute congestive heart failure (CMS/HCC)     improved   • Arthritis    • Bladder cancer (CMS/HCC)    • Bleeding disorder (CMS/HCC)     (CONDITION DUE TO BLOOD THINNERS)   • Chronic atrial fibrillation (CMS/HCC)    • High cholesterol    • Hypertension    • Lymphoma (CMS/HCC)    • Moderate to severe aortic stenosis     Patient declines to have transcatheter   • Non Hodgkin's lymphoma (CMS/HCC)     2008 treated 2008 2009.   • Rectus sheath hematoma     Right rectus sheath hematoma, off anticoagulation because of the hematoma.   • Skin cancer    • Type 2 diabetes mellitus (CMS/HCC)          Current Outpatient Medications:   •  aspirin (aspirin) 81 MG EC tablet, aspirin 81 mg oral tablet,delayed release (DR/EC) take 1 tablet (81 mg) by oral route once daily   Active, Disp: , Rfl:   •  atorvastatin (LIPITOR) 40 MG tablet, TAKE 1 TABLET BY MOUTH DAILY, Disp: 90 tablet, Rfl: 1  •  Bacillus Coagulans-Inulin (Probiotic) 1-250 BILLION-MG capsule, Probiotic 20 billion cell oral capsule take 1 capsule by oral route daily   Active, Disp: , Rfl:   •  Calcium Carbonate-Vitamin D 600-200 MG-UNIT capsule, Calcium 600 + D(3) 600 mg calcium- 200 unit oral capsule take 1 capsule by oral route daily   Active, Disp: , Rfl:   •  carvedilol (COREG) 6.25 MG tablet, Take 6.25 mg by mouth 2 (Two) Times a Day With Meals., Disp: , Rfl:   •  ergocalciferol (ERGOCALCIFEROL) 1.25 MG (52118 UT) capsule, Vitamin D2 50,000 unit oral capsule 1 cap by mouth everyother week   Active, Disp: , Rfl:   •  glucose blood (FREESTYLE LITE) test strip, Use as instructed, Disp: 90 each, Rfl: 3  •  iron polysaccharides (NIFEREX) 150 MG capsule, Poly-Iron 150 mg iron oral capsule take 1 capsule by oral route daily   Active, Disp: , Rfl:   •  Lactobacillus  (FLORAJEN ACIDOPHILUS PO), Take 460 mg by mouth., Disp: , Rfl:   •  Lancets (freestyle) lancets, 1 each by Other route 2 (two) times a day. Check blood sugars twice a day-DM Type II, Diagnosis Code E11.9, Disp: 100 each, Rfl: 3  •  losartan (COZAAR) 25 MG tablet, Take 1 tablet by mouth Daily., Disp: 90 tablet, Rfl: 0  •  montelukast (Singulair) 10 MG tablet, Singulair 10 mg oral tablet take 1 tablet (10 mg) by oral route once daily in the evening   Active, Disp: , Rfl:   •  Pradaxa 75 MG capsule, TAKE 1 CAPSULE BY MOUTH TWICE DAILY, Disp: 60 capsule, Rfl: 3  •  raloxifene (EVISTA) 60 MG tablet, raloxifene 60 mg oral tablet take 1 tablet (60 mg) by oral route once daily   Active, Disp: , Rfl:   •  spironolactone (Aldactone) 25 MG tablet, Take 1/2 tablet every day, Disp: 15 tablet, Rfl: 5  •  furosemide (LASIX) 40 MG tablet, Take 1.5 tablets by mouth Daily., Disp: 135 tablet, Rfl: 3    Medications Discontinued During This Encounter   Medication Reason   • benzonatate (Tessalon Perles) 100 MG capsule    • atorvastatin (LIPITOR) 40 MG tablet Duplicate order   • predniSONE (DELTASONE) 50 MG tablet    • furosemide (LASIX) 40 MG tablet      Allergies   Allergen Reactions   • Contrast Dye Unknown - Low Severity   • Iodinated Diagnostic Agents Hives and Other (See Comments)     IODINATED CONTRAST MEDIA (Verified  Allergy, Unknown, HIVES,SNEEZING,ITCHY EYES)   • Iodine Unknown - Low Severity   • Shellfish Allergy Unknown - Low Severity   • Spinach Other (See Comments)      SPINACH (Verified  Allergy, Unknown, 2/22/21)      SHOWED UP ON ALLERGY TESTING   • Sulfa Antibiotics Other (See Comments)     (Verified  Allergy, Unknown, RASH)   • Penicillins Rash        Social History     Tobacco Use   • Smoking status: Never Smoker   • Smokeless tobacco: Never Used   Vaping Use   • Vaping Use: Never used   Substance Use Topics   • Alcohol use: Not Currently   • Drug use: Not Currently       Family History   Problem Relation Age of  "Onset   • Stomach cancer Paternal Great-Grandfather         Objective     /44 (BP Location: Right arm, Patient Position: Sitting)   Pulse 57   Ht 157.5 cm (62\")   Wt 65.3 kg (144 lb)   BMI 26.34 kg/m²       Physical Exam    General Appearance:   · no acute distress  · Alert and oriented x3  HENT:   · lips not cyanotic  · Atraumatic  Neck:  · No jvd   · supple  Respiratory:  · no respiratory distress  · normal breath sounds  · no rales  Cardiovascular:  · Irregularly irregular  · no S3, no S4   · 2/6 late peaking systolic murmur  · no rub  Extremities  · No cyanosis  · lower extremity edema: none    Skin:   · warm, dry  · No rashes      Result Review :     proBNP   Date Value Ref Range Status   09/02/2021 1,237.0 0.0-1,800.0 pg/mL Final     CMP    CMP 6/2/21 6/17/21 8/12/21   Glucose  110 (A) 141 (A)   Glucose 88     BUN 31 (A) 34 (A) 29 (A)   Creatinine 0.98 (A) 0.90 1.01 (A)   eGFR Non  Am  59 (A) 51 (A)   Sodium 138 141 138   Potassium 4.0 4.0 3.9   Chloride 101 103 101   Calcium 9.8 9.4 9.8 (A)   Albumin 4.3 4.06 4.05   Total Bilirubin 0.83 0.7 0.7   Alkaline Phosphatase 76 73 71   AST (SGOT) 36 30 32   ALT (SGPT) 22 17 22   (A) Abnormal value            CBC w/diff    CBC w/Diff 6/17/21 8/12/21 9/2/21   WBC 7.40 5.71 7.04   RBC 4.02 4.22 4.45   Hemoglobin 12.6 13.1 14.0   Hematocrit 38.3 40.7 42.7   MCV 95.3 96.4 96.0   MCH 31.3 31.0 31.5   MCHC 32.9 32.2 32.8   RDW 14.1 13.7 12.5   Platelets 183 195 231   Neutrophil Rel % 62.6 63.5 61.8   Immature Granulocyte Rel % 0.1 0.7 (A) 0.1   Lymphocyte Rel % 24.1 22.8 28.8   Monocyte Rel % 11.2 10.5 7.7   Eosinophil Rel % 1.6 2.1 0.9   Basophil Rel % 0.4 0.4 0.7   (A) Abnormal value             Lab Results   Component Value Date    TSH 1.130 09/02/2021      Lab Results   Component Value Date    FREET4 1.23 09/02/2021      No results found for: DDIMERQUANT  Magnesium   Date Value Ref Range Status   03/23/2021 1.91 1.60 - 2.30 mg/dL Final      No results " found for: DIGOXIN   Lab Results   Component Value Date    TROPONINT <0.01 03/22/2021           Lipid Panel    Lipid Panel 3/22/21 6/2/21 9/2/21   Total Cholesterol   154   Total Cholesterol 136 154    Triglycerides 36 (A) 50 53   HDL Cholesterol 78 (A) 75 (A) 75 (A)   VLDL Cholesterol 7 10 11   LDL Cholesterol  51 (A) 69 (A) 68   LDL/HDL Ratio   0.91   (A) Abnormal value       Comments are available for some flowsheets but are not being displayed.           Lab Results   Component Value Date    POCTROP 0.02 03/21/2021                      Diagnoses and all orders for this visit:    1. Severe aortic stenosis (Primary)  Assessment & Plan:  Patient with severe AS and evidence of volume overload on exam discussed with her again the option of TAVR but she was not interested in pursuing any interventions.  We will increase her Lasix to 60 mg a day repeat renal panel in 2 weeks      2. Longstanding persistent atrial fibrillation (CMS/[facility])  Assessment & Plan:  Patient rate controlled and on Pradaxa for CVA prevention    Orders:  -     Basic Metabolic Panel; Future    3. Acute on chronic heart failure with preserved ejection fraction (HFpEF) ([facility])  Assessment & Plan:  Increase Lasix to 60 daily      4. Essential hypertension    Other orders  -     furosemide (LASIX) 40 MG tablet; Take 1.5 tablets by mouth Daily.  Dispense: 135 tablet; Refill: 3          Follow Up     Return in about 6 months (around 3/23/2022).          Patient was given instructions and counseling regarding her condition or for health maintenance advice. Please see specific information pulled into the AVS if appropriate.

## 2021-10-06 ENCOUNTER — OFFICE VISIT (OUTPATIENT)
Dept: UROLOGY | Facility: CLINIC | Age: 86
End: 2021-10-06

## 2021-10-06 ENCOUNTER — LAB (OUTPATIENT)
Dept: LAB | Facility: HOSPITAL | Age: 86
End: 2021-10-06

## 2021-10-06 VITALS
BODY MASS INDEX: 26.57 KG/M2 | HEIGHT: 62 IN | WEIGHT: 144.4 LBS | SYSTOLIC BLOOD PRESSURE: 169 MMHG | DIASTOLIC BLOOD PRESSURE: 73 MMHG

## 2021-10-06 DIAGNOSIS — C67.8 MALIGNANT NEOPLASM OF OVERLAPPING SITES OF BLADDER (HCC): Primary | ICD-10-CM

## 2021-10-06 DIAGNOSIS — C82.08 FOLLICULAR LYMPHOMA GRADE I OF LYMPH NODES OF MULTIPLE SITES (HCC): ICD-10-CM

## 2021-10-06 DIAGNOSIS — I48.11 LONGSTANDING PERSISTENT ATRIAL FIBRILLATION (HCC): ICD-10-CM

## 2021-10-06 DIAGNOSIS — N39.46 URINARY INCONTINENCE, MIXED: ICD-10-CM

## 2021-10-06 LAB
ALBUMIN SERPL-MCNC: 4.2 G/DL (ref 3.5–5.2)
ALBUMIN/GLOB SERPL: 1.4 G/DL
ALP SERPL-CCNC: 67 U/L (ref 39–117)
ALT SERPL W P-5'-P-CCNC: 23 U/L (ref 1–33)
ANION GAP SERPL CALCULATED.3IONS-SCNC: 12.9 MMOL/L (ref 5–15)
AST SERPL-CCNC: 37 U/L (ref 1–32)
BASOPHILS # BLD AUTO: 0.04 10*3/MM3 (ref 0–0.2)
BASOPHILS NFR BLD AUTO: 0.5 % (ref 0–1.5)
BILIRUB BLD-MCNC: NEGATIVE MG/DL
BILIRUB SERPL-MCNC: 1 MG/DL (ref 0–1.2)
BUN SERPL-MCNC: 29 MG/DL (ref 8–23)
BUN/CREAT SERPL: 29.6 (ref 7–25)
CALCIUM SPEC-SCNC: 9.7 MG/DL (ref 8.2–9.6)
CHLORIDE SERPL-SCNC: 104 MMOL/L (ref 98–107)
CLARITY, POC: CLEAR
CO2 SERPL-SCNC: 25.1 MMOL/L (ref 22–29)
COLOR UR: YELLOW
CREAT SERPL-MCNC: 0.98 MG/DL (ref 0.57–1)
DEPRECATED RDW RBC AUTO: 49.5 FL (ref 37–54)
EOSINOPHIL # BLD AUTO: 0.08 10*3/MM3 (ref 0–0.4)
EOSINOPHIL NFR BLD AUTO: 1 % (ref 0.3–6.2)
ERYTHROCYTE [DISTWIDTH] IN BLOOD BY AUTOMATED COUNT: 14 % (ref 12.3–15.4)
GFR SERPL CREATININE-BSD FRML MDRD: 53 ML/MIN/1.73
GLOBULIN UR ELPH-MCNC: 3 GM/DL
GLUCOSE SERPL-MCNC: 99 MG/DL (ref 65–99)
GLUCOSE UR STRIP-MCNC: NEGATIVE MG/DL
HCT VFR BLD AUTO: 40.2 % (ref 34–46.6)
HGB BLD-MCNC: 13 G/DL (ref 12–15.9)
IMM GRANULOCYTES # BLD AUTO: 0.11 10*3/MM3 (ref 0–0.05)
IMM GRANULOCYTES NFR BLD AUTO: 1.4 % (ref 0–0.5)
KETONES UR QL: NEGATIVE
LEUKOCYTE EST, POC: ABNORMAL
LYMPHOCYTES # BLD AUTO: 2.1 10*3/MM3 (ref 0.7–3.1)
LYMPHOCYTES NFR BLD AUTO: 26.1 % (ref 19.6–45.3)
MCH RBC QN AUTO: 31.3 PG (ref 26.6–33)
MCHC RBC AUTO-ENTMCNC: 32.3 G/DL (ref 31.5–35.7)
MCV RBC AUTO: 96.6 FL (ref 79–97)
MONOCYTES # BLD AUTO: 0.61 10*3/MM3 (ref 0.1–0.9)
MONOCYTES NFR BLD AUTO: 7.6 % (ref 5–12)
NEUTROPHILS NFR BLD AUTO: 5.1 10*3/MM3 (ref 1.7–7)
NEUTROPHILS NFR BLD AUTO: 63.4 % (ref 42.7–76)
NITRITE UR-MCNC: NEGATIVE MG/ML
NRBC BLD AUTO-RTO: 0 /100 WBC (ref 0–0.2)
PH UR: 6.5 [PH] (ref 5–8)
PLATELET # BLD AUTO: 236 10*3/MM3 (ref 140–450)
PMV BLD AUTO: 11 FL (ref 6–12)
POTASSIUM SERPL-SCNC: 4.3 MMOL/L (ref 3.5–5.2)
PROT SERPL-MCNC: 7.2 G/DL (ref 6–8.5)
PROT UR STRIP-MCNC: NEGATIVE MG/DL
RBC # BLD AUTO: 4.16 10*6/MM3 (ref 3.77–5.28)
RBC # UR STRIP: ABNORMAL /UL
SODIUM SERPL-SCNC: 142 MMOL/L (ref 136–145)
SP GR UR: 1.02 (ref 1–1.03)
UROBILINOGEN UR QL: NORMAL
WBC # BLD AUTO: 8.04 10*3/MM3 (ref 3.4–10.8)

## 2021-10-06 PROCEDURE — 52000 CYSTOURETHROSCOPY: CPT | Performed by: UROLOGY

## 2021-10-06 PROCEDURE — 85025 COMPLETE CBC W/AUTO DIFF WBC: CPT

## 2021-10-06 PROCEDURE — 36415 COLL VENOUS BLD VENIPUNCTURE: CPT

## 2021-10-06 PROCEDURE — 81003 URINALYSIS AUTO W/O SCOPE: CPT | Performed by: UROLOGY

## 2021-10-06 PROCEDURE — 80053 COMPREHEN METABOLIC PANEL: CPT

## 2021-10-06 NOTE — PROGRESS NOTES
Cystoscopy    Date/Time: 10/6/2021 5:04 PM  Performed by: Dariana Grubbs MD  Authorized by: Dariana Grubbs MD   Preparation: Patient was prepped and draped in the usual sterile fashion.  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       Cytoscopy Procedure:     Procedure: Flexible cytoscope was passed per urethra into the bladder without difficulty after proper consent. The bladder was inspected in a systematic meridian fashion. There were no tumors, lesions, stones, or other abnormalities noted within the bladder except for a large wide mouthed bladder diverticulum on the right lateral bladder wall. Of note, there was no increased vascularity as well. Both ureteral orifices were identified and were normal in appearance. The flexible cytoscope was removed. The patient tolerated the procedure well.

## 2021-10-07 ENCOUNTER — OFFICE VISIT (OUTPATIENT)
Dept: ONCOLOGY | Facility: HOSPITAL | Age: 86
End: 2021-10-07

## 2021-10-07 ENCOUNTER — HOSPITAL ENCOUNTER (OUTPATIENT)
Dept: ONCOLOGY | Facility: HOSPITAL | Age: 86
Setting detail: INFUSION SERIES
Discharge: HOME | End: 2021-10-07

## 2021-10-07 VITALS
HEART RATE: 60 BPM | SYSTOLIC BLOOD PRESSURE: 115 MMHG | DIASTOLIC BLOOD PRESSURE: 53 MMHG | TEMPERATURE: 97.5 F | OXYGEN SATURATION: 100 % | BODY MASS INDEX: 26.08 KG/M2 | RESPIRATION RATE: 20 BRPM | WEIGHT: 142.64 LBS

## 2021-10-07 VITALS
HEIGHT: 62 IN | RESPIRATION RATE: 20 BRPM | HEART RATE: 58 BPM | BODY MASS INDEX: 26.25 KG/M2 | DIASTOLIC BLOOD PRESSURE: 62 MMHG | TEMPERATURE: 97.5 F | SYSTOLIC BLOOD PRESSURE: 129 MMHG | WEIGHT: 142.64 LBS | OXYGEN SATURATION: 100 %

## 2021-10-07 DIAGNOSIS — Z45.2 ADJUSTMENT AND MANAGEMENT OF VASCULAR ACCESS DEVICE: Primary | ICD-10-CM

## 2021-10-07 DIAGNOSIS — C82.08 FOLLICULAR LYMPHOMA GRADE I OF LYMPH NODES OF MULTIPLE SITES (HCC): Primary | ICD-10-CM

## 2021-10-07 DIAGNOSIS — C82.08 FOLLICULAR LYMPHOMA GRADE I OF LYMPH NODES OF MULTIPLE SITES (HCC): ICD-10-CM

## 2021-10-07 PROCEDURE — 96413 CHEMO IV INFUSION 1 HR: CPT

## 2021-10-07 PROCEDURE — 25010000002 DIPHENHYDRAMINE PER 50 MG: Performed by: INTERNAL MEDICINE

## 2021-10-07 PROCEDURE — 25010000002 HEPARIN LOCK FLUSH PER 10 UNITS: Performed by: INTERNAL MEDICINE

## 2021-10-07 PROCEDURE — 25010000002 RITUXIMAB 10 MG/ML SOLUTION 50 ML VIAL: Performed by: INTERNAL MEDICINE

## 2021-10-07 PROCEDURE — 99214 OFFICE O/P EST MOD 30 MIN: CPT | Performed by: INTERNAL MEDICINE

## 2021-10-07 PROCEDURE — 96375 TX/PRO/DX INJ NEW DRUG ADDON: CPT

## 2021-10-07 PROCEDURE — 96415 CHEMO IV INFUSION ADDL HR: CPT

## 2021-10-07 PROCEDURE — 25010000002 RITUXIMAB 10 MG/ML SOLUTION 10 ML VIAL: Performed by: INTERNAL MEDICINE

## 2021-10-07 RX ORDER — ACETAMINOPHEN 325 MG/1
650 TABLET ORAL ONCE
Status: COMPLETED | OUTPATIENT
Start: 2021-10-07 | End: 2021-10-07

## 2021-10-07 RX ORDER — SODIUM CHLORIDE 0.9 % (FLUSH) 0.9 %
20 SYRINGE (ML) INJECTION AS NEEDED
Status: CANCELLED | OUTPATIENT
Start: 2021-12-02

## 2021-10-07 RX ORDER — SODIUM CHLORIDE 9 MG/ML
250 INJECTION, SOLUTION INTRAVENOUS ONCE
Status: COMPLETED | OUTPATIENT
Start: 2021-10-07 | End: 2021-10-07

## 2021-10-07 RX ORDER — HEPARIN SODIUM (PORCINE) LOCK FLUSH IV SOLN 100 UNIT/ML 100 UNIT/ML
500 SOLUTION INTRAVENOUS AS NEEDED
Status: DISCONTINUED | OUTPATIENT
Start: 2021-10-07 | End: 2021-10-08 | Stop reason: HOSPADM

## 2021-10-07 RX ORDER — SODIUM CHLORIDE 0.9 % (FLUSH) 0.9 %
20 SYRINGE (ML) INJECTION AS NEEDED
Status: DISCONTINUED | OUTPATIENT
Start: 2021-10-07 | End: 2021-10-08 | Stop reason: HOSPADM

## 2021-10-07 RX ORDER — HEPARIN SODIUM (PORCINE) LOCK FLUSH IV SOLN 100 UNIT/ML 100 UNIT/ML
500 SOLUTION INTRAVENOUS AS NEEDED
Status: CANCELLED | OUTPATIENT
Start: 2021-12-02

## 2021-10-07 RX ADMIN — HEPARIN SODIUM (PORCINE) LOCK FLUSH IV SOLN 100 UNIT/ML 500 UNITS: 100 SOLUTION at 14:43

## 2021-10-07 RX ADMIN — DIPHENHYDRAMINE HYDROCHLORIDE 25 MG: 50 INJECTION, SOLUTION INTRAMUSCULAR; INTRAVENOUS at 12:05

## 2021-10-07 RX ADMIN — RITUXIMAB 600 MG: 10 INJECTION, SOLUTION INTRAVENOUS at 12:45

## 2021-10-07 RX ADMIN — SODIUM CHLORIDE, PRESERVATIVE FREE 20 ML: 5 INJECTION INTRAVENOUS at 14:42

## 2021-10-07 RX ADMIN — SODIUM CHLORIDE 250 ML: 9 INJECTION, SOLUTION INTRAVENOUS at 12:05

## 2021-10-07 RX ADMIN — ACETAMINOPHEN 650 MG: 325 TABLET ORAL at 11:52

## 2021-11-04 ENCOUNTER — HOSPITAL ENCOUNTER (OUTPATIENT)
Dept: BONE DENSITY | Facility: HOSPITAL | Age: 86
Discharge: HOME | End: 2021-11-04
Admitting: INTERNAL MEDICINE

## 2021-11-04 DIAGNOSIS — Z78.0 POST-MENOPAUSAL: ICD-10-CM

## 2021-11-04 PROCEDURE — 77080 DXA BONE DENSITY AXIAL: CPT

## 2021-11-11 RX ORDER — CARVEDILOL 6.25 MG/1
TABLET ORAL
Qty: 60 TABLET | Refills: 6 | Status: ON HOLD | OUTPATIENT
Start: 2021-11-11 | End: 2022-06-16

## 2021-11-17 ENCOUNTER — OFFICE VISIT (OUTPATIENT)
Dept: INTERNAL MEDICINE | Facility: CLINIC | Age: 86
End: 2021-11-17

## 2021-11-17 VITALS
DIASTOLIC BLOOD PRESSURE: 78 MMHG | RESPIRATION RATE: 16 BRPM | WEIGHT: 147.2 LBS | HEART RATE: 60 BPM | SYSTOLIC BLOOD PRESSURE: 120 MMHG | HEIGHT: 62 IN | TEMPERATURE: 98 F | OXYGEN SATURATION: 95 % | BODY MASS INDEX: 27.09 KG/M2

## 2021-11-17 DIAGNOSIS — T45.1X5A IMMUNOSUPPRESSED DUE TO CHEMOTHERAPY (HCC): ICD-10-CM

## 2021-11-17 DIAGNOSIS — J45.901 MODERATE ASTHMA WITH EXACERBATION, UNSPECIFIED WHETHER PERSISTENT: ICD-10-CM

## 2021-11-17 DIAGNOSIS — L03.115 CELLULITIS OF RIGHT LOWER EXTREMITY WITHOUT FOOT: ICD-10-CM

## 2021-11-17 DIAGNOSIS — R05.9 COUGH: Primary | ICD-10-CM

## 2021-11-17 DIAGNOSIS — D84.821 IMMUNOSUPPRESSED DUE TO CHEMOTHERAPY (HCC): ICD-10-CM

## 2021-11-17 DIAGNOSIS — J20.8 ACUTE BRONCHITIS DUE TO OTHER SPECIFIED ORGANISMS: ICD-10-CM

## 2021-11-17 DIAGNOSIS — Z79.899 IMMUNOSUPPRESSED DUE TO CHEMOTHERAPY (HCC): ICD-10-CM

## 2021-11-17 PROCEDURE — 99214 OFFICE O/P EST MOD 30 MIN: CPT | Performed by: INTERNAL MEDICINE

## 2021-11-17 RX ORDER — ALBUTEROL SULFATE 90 UG/1
2 AEROSOL, METERED RESPIRATORY (INHALATION) EVERY 4 HOURS PRN
Qty: 6.7 G | Refills: 1 | Status: SHIPPED | OUTPATIENT
Start: 2021-11-17 | End: 2021-12-21

## 2021-11-17 RX ORDER — DOXYCYCLINE HYCLATE 100 MG/1
100 CAPSULE ORAL 2 TIMES DAILY
Qty: 20 CAPSULE | Refills: 0 | Status: SHIPPED | OUTPATIENT
Start: 2021-11-17 | End: 2022-02-21

## 2021-11-17 NOTE — ASSESSMENT & PLAN NOTE
She is wheezing with acute bronchitis for the last week.  Assessment: Acute bronchospasm secondary to acute bronchitis new  Plan: Add Trelegy 100, 1 puff daily.  One sample was given for 14 days.  Patient was instructed how to use the Trelegy inhaler and she did well after was demonstrated.  Albuterol prescription sent also.  Prednisone not added because of acute cellulitis in the right lower extremity  Tussionex sent for the cough.  Have the daughter  an oximeter today at the drugstore.  Monitor her O2 sats.  If they get below 90, go to the ED.  RTO in 1 week.

## 2021-11-17 NOTE — ASSESSMENT & PLAN NOTE
Patient has non-Hodgkin's lymphoma.  Been followed by oncology Dr. Byrd.  Patient had non-Hodgkin's lymphoma large cell diagnosed in 2008.  Received chemotherapy.  Responded.  Then had a relapse.  Had another round of chemotherapy.  Assessment: Immune suppression secondary to NHL and previous chemotherapy.  Plan: Patient does not respond to oral antibiotics outpatient treatment, may need inpatient treatment

## 2021-11-17 NOTE — ASSESSMENT & PLAN NOTE
Patient has been coughing bringing up yellow sputum for about a week.  Shortness of breath.  No fever.  No chills.  No change in taste or smell.  She has had booster Covid.  She is immunocompromised with a previous history of NHL.  Has not been around anyone with Covid to her knowledge.  Assessment: Acute bronchitis.  Plan: Add doxycycline 100 mg twice daily for 10 days

## 2021-11-17 NOTE — ASSESSMENT & PLAN NOTE
Patient fell after stumping her toe on concrete porch and then fell through the kitchen door causing injury to her right distal posterior lower extremity.  Is been getting sore and red and warm as the week has progressed  Assessment: Patient has acute cellulitis of the right distal lower extremity particularly posteriorly but also coming around to the front of the leg.  There is also a blister with blood in it posteriorly.  She is quite tender upon palpation.  Is warm.  Plan: Add doxycycline 100 mg twice daily for 10 days.  RTO in 1 week to follow-up on the cellulitis as well as the lungs.

## 2021-11-17 NOTE — PROGRESS NOTES
"Chief Complaint  Fall (Pt had a fall monday and injured left wrist and right ankle. The ankle is very swollen and sore ), URI (Pt had some head congestion for over a week now that has gone down into chest ), and Cough    Subjective      Radha Aparicio presents to [facility]  History of Present Illness  Patient with a fall about a week ago injuring her right distal lower extremity and now with acute cellulitis.  No evidence of skin tear or fracture.  She is immunosuppressed.  Also having acute bronchitis with acute bronchospasm.  Chronic combined CHF.  Atrial fibrillation.  Mitral regurgitation.  Severe aortic stenosis and patient has declined TAVR procedure.  Does have a history of asthma in the past.  Hypertension.  Hyperlipidemia.  Myalgias with Zocor 80.  Now takes 40 mg of Lipitor.  Hypertension.  Daughter accompanies her  Objective   Vital Signs:   /78 (BP Location: Left arm, Patient Position: Sitting, Cuff Size: Adult)   Pulse 60   Temp 98 °F (36.7 °C) (Temporal)   Resp 16   Ht 157.5 cm (62.01\")   Wt 66.8 kg (147 lb 3.2 oz)   SpO2 95%   BMI 26.91 kg/m²     Physical Exam  Vitals and nursing note reviewed.   Constitutional:       Appearance: Normal appearance. She is normal weight.   Cardiovascular:      Rate and Rhythm: Normal rate. Rhythm irregular.      Heart sounds: Murmur heard.       Pulmonary:      Breath sounds: No stridor. Wheezing (Bilateral mild to moderate wheeze) and rhonchi present. No rales.   Skin:     General: Skin is warm and dry.      Comments: Patient has erythema, warmth, and tenderness of the right distal lower extremity worse posteriorly with extends around to the anterior area.  Comes around laterally from posterior to anterior.  No laceration.   Neurological:      General: No focal deficit present.      Mental Status: She is alert and oriented to person, place, and time. Mental status is at baseline.   Psychiatric:         Mood and " Affect: Mood normal.         Thought Content: Thought content normal.        Result Review :               No labs were done for this visit     Assessment and Plan   Diagnoses and all orders for this visit:    1. Cough (Primary)  -     HYDROcod Polst-CPM Polst ER (Tussionex Pennkinetic ER) 10-8 MG/5ML ER suspension; Take 5 mL by mouth Every 12 (Twelve) Hours As Needed for Cough.  Dispense: 120 mL; Refill: 0    2. Acute bronchitis due to other specified organisms  Assessment & Plan:  Patient has been coughing bringing up yellow sputum for about a week.  Shortness of breath.  No fever.  No chills.  No change in taste or smell.  She has had booster Covid.  She is immunocompromised with a previous history of NHL.  Has not been around anyone with Covid to her knowledge.  Assessment: Acute bronchitis.  Plan: Add doxycycline 100 mg twice daily for 10 days      3. Moderate asthma with exacerbation, unspecified whether persistent  Assessment & Plan:  She is wheezing with acute bronchitis for the last week.  Assessment: Acute bronchospasm secondary to acute bronchitis new  Plan: Add Trelegy 100, 1 puff daily.  One sample was given for 14 days.  Patient was instructed how to use the Trelegy inhaler and she did well after was demonstrated.  Albuterol prescription sent also.  Prednisone not added because of acute cellulitis in the right lower extremity  Tussionex sent for the cough.  Have the daughter  an oximeter today at the drugstore.  Monitor her O2 sats.  If they get below 90, go to the ED.  RTO in 1 week.        4. Cellulitis of right lower extremity without foot  Assessment & Plan:  Patient fell after stumping her toe on concrete porch and then fell through the kitchen door causing injury to her right distal posterior lower extremity.  Is been getting sore and red and warm as the week has progressed  Assessment: Patient has acute cellulitis of the right distal lower extremity particularly posteriorly but also  coming around to the front of the leg.  There is also a blister with blood in it posteriorly.  She is quite tender upon palpation.  Is warm.  Plan: Add doxycycline 100 mg twice daily for 10 days.  RTO in 1 week to follow-up on the cellulitis as well as the lungs.      5. 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  Been followed by oncology Dr. Byrd.  Patient had non-Hodgkin's lymphoma large cell diagnosed in 2008.  Received chemotherapy.  Responded.  Then had a relapse.  Had another round of chemotherapy.  Assessment: Immune suppression secondary to NHL and previous chemotherapy.  Plan: Patient does not respond to oral antibiotics outpatient treatment, may need inpatient treatment      Other orders  -     doxycycline (VIBRAMYCIN) 100 MG capsule; Take 1 capsule by mouth 2 (Two) Times a Day.  Dispense: 20 capsule; Refill: 0  -     albuterol sulfate  (90 Base) MCG/ACT inhaler; Inhale 2 puffs Every 4 (Four) Hours As Needed for Wheezing.  Dispense: 6.7 g; Refill: 1        Follow Up Return in about 1 week (around 11/24/2021).  Patient was given instructions and counseling regarding her condition or for health maintenance advice. Please see specific information pulled into the AVS if appropriate.

## 2021-11-24 ENCOUNTER — OFFICE VISIT (OUTPATIENT)
Dept: INTERNAL MEDICINE | Facility: CLINIC | Age: 86
End: 2021-11-24

## 2021-11-24 VITALS
TEMPERATURE: 97.8 F | HEART RATE: 68 BPM | SYSTOLIC BLOOD PRESSURE: 124 MMHG | WEIGHT: 147 LBS | HEIGHT: 62 IN | OXYGEN SATURATION: 98 % | BODY MASS INDEX: 27.05 KG/M2 | DIASTOLIC BLOOD PRESSURE: 70 MMHG | RESPIRATION RATE: 20 BRPM

## 2021-11-24 DIAGNOSIS — I35.0 NONRHEUMATIC AORTIC VALVE STENOSIS: ICD-10-CM

## 2021-11-24 DIAGNOSIS — E78.00 HIGH CHOLESTEROL: ICD-10-CM

## 2021-11-24 DIAGNOSIS — L03.115 CELLULITIS OF RIGHT LOWER EXTREMITY WITHOUT FOOT: ICD-10-CM

## 2021-11-24 DIAGNOSIS — R05.9 COUGH: Primary | ICD-10-CM

## 2021-11-24 DIAGNOSIS — I10 ESSENTIAL HYPERTENSION: ICD-10-CM

## 2021-11-24 DIAGNOSIS — D84.9 IMMUNOSUPPRESSED STATUS (HCC): ICD-10-CM

## 2021-11-24 PROCEDURE — 99213 OFFICE O/P EST LOW 20 MIN: CPT | Performed by: INTERNAL MEDICINE

## 2021-11-24 RX ORDER — DOXYCYCLINE HYCLATE 100 MG/1
100 CAPSULE ORAL 2 TIMES DAILY
Qty: 28 CAPSULE | Refills: 0 | Status: SHIPPED | OUTPATIENT
Start: 2021-11-24 | End: 2022-02-21

## 2021-11-24 NOTE — ASSESSMENT & PLAN NOTE
Patient has long history of NHL.  She is currently getting chemotherapy.  Assessment: Immunosuppression.  Make it more difficult for her to heal the cellulitis.  She also has edema that right lower extremity which is making it difficult to heal.  Plan: Hold chemotherapy if Dr. Byrd, her oncologist is agreeable.  The daughter will check with Dr. Byrd's office.  Use Ace wrap to decrease the edema.  Continue Lasix 40 mg daily.  Continue good local care.

## 2021-11-24 NOTE — ASSESSMENT & PLAN NOTE
Patient denies any undue dyspnea, syncope, or chest pain.  Has declined TAVR.  Plan: Continue periodic follow-up with cardiology.

## 2021-11-24 NOTE — PROGRESS NOTES
"Chief Complaint  Follow-up (follow-up on cough and right leg. Pt states right leg is still swollen and tender but has improved. Pt's left wrist is doing better.) and Cough (Pt still has a cough however the cough has improved. )    Subjective      Radha Aparicio presents to [facility]  History of Present Illness  She had a cough but that has improved her lungs sounded clear on exam today.  Acute cellulitis.  Edema right lower extremity.  Immunosuppressed.  NHL.  Getting chemotherapy.  Type 2 diabetes mellitus.  Chronic CHF.  Severe aortic stenosis.  Hypertension.  Chronic atrial fibrillation.  Objective   Vital Signs:   /70 (BP Location: Left arm, Patient Position: Sitting, Cuff Size: Adult)   Pulse 68   Temp 97.8 °F (36.6 °C) (Temporal)   Resp 20   Ht 157.5 cm (62.01\")   Wt 66.7 kg (147 lb)   SpO2 98%   BMI 26.88 kg/m²     Physical Exam  Vitals and nursing note reviewed.   Constitutional:       Appearance: Normal appearance.   Pulmonary:      Effort: Pulmonary effort is normal.      Breath sounds: No stridor. No wheezing, rhonchi or rales.   Abdominal:      Tenderness: There is no abdominal tenderness. There is no guarding.   Musculoskeletal:         General: Swelling (Right lower extremity swelling.) present.   Skin:     Comments: Erythema of the right distal lower extremity posteriorly coming around medially to the front of the leg.  No active draining.  No ulcers or blisters.  Clinically it looks better but it will take a while to heal.   Neurological:      General: No focal deficit present.      Mental Status: She is alert. Mental status is at baseline.   Psychiatric:         Mood and Affect: Mood normal.         Thought Content: Thought content normal.        Result Review :             No labs were done for this visit       Assessment and Plan   Diagnoses and all orders for this visit:    1. Cough (Primary)  -     XR Chest PA & Lateral; Future    2. Cellulitis " of right lower extremity without foot  Assessment & Plan:  Blood blister on the right lower extremity is resolved.  Its not weeping or draining fluid.  There is still edema.  Is still erythematous.  Not as red hot or tender though.  Assessment: Cellulitis of the right lower extremity.  Patient is immunosuppressed due to NHL and chemotherapy.  Plan: We will give 14 days of doxycycline.  Try to elevate the leg is much as possible.  Use Ace wraps for compression if possible.  Continue dressing change daily.  Does not have home health nurse.  She might benefit from that if she continues to have difficulty with the leg.  I would prefer she not get her next round of chemotherapy which is scheduled next week.  I asked her daughter to check with Dr. Byrd her oncologist.      3. Essential hypertension  Assessment & Plan:  Hypertension is well controlled.  Plan: Continue Coreg 6.25 mg twice daily, Lasix 40 mg daily, losartan 25 mg daily.  Also takes Aldactone 25 mg daily.      4. Severe aortic stenosis  Assessment & Plan:  Patient denies any undue dyspnea, syncope, or chest pain.  Has declined TAVR.  Plan: Continue periodic follow-up with cardiology.      5. High cholesterol  Assessment & Plan:  Hyperlipidemia.  Plan continue Lipitor 40 mg daily.      6. Immunosuppressed status (HCC)  Assessment & Plan:  Patient has long history of NHL.  She is currently getting chemotherapy.  Assessment: Immunosuppression.  Make it more difficult for her to heal the cellulitis.  She also has edema that right lower extremity which is making it difficult to heal.  Plan: Hold chemotherapy if Dr. Byrd, her oncologist is agreeable.  The daughter will check with Dr. Byrd's office.  Use Ace wrap to decrease the edema.  Continue Lasix 40 mg daily.  Continue good local care.      Other orders  -     doxycycline (VIBRAMYCIN) 100 MG capsule; Take 1 capsule by mouth 2 (Two) Times a Day.  Dispense: 28 capsule; Refill: 0        Follow Up No  follow-ups on file.  Patient was given instructions and counseling regarding her condition or for health maintenance advice. Please see specific information pulled into the AVS if appropriate.

## 2021-11-24 NOTE — ASSESSMENT & PLAN NOTE
Hypertension is well controlled.  Plan: Continue Coreg 6.25 mg twice daily, Lasix 40 mg daily, losartan 25 mg daily.  Also takes Aldactone 25 mg daily.

## 2021-11-29 ENCOUNTER — TELEPHONE (OUTPATIENT)
Dept: ONCOLOGY | Facility: HOSPITAL | Age: 86
End: 2021-11-29

## 2021-11-29 NOTE — TELEPHONE ENCOUNTER
Advised patient that Rituxan will not interfere with her antibiotics and since it is not chemotherapy, it will not compromise her immune system. She does not need to delay her treatment on 12/2. Patient voiced understanding.

## 2021-11-29 NOTE — TELEPHONE ENCOUNTER
PATIENT CALLED WANTED TO KNOW BEING SHE HAS BEEN SICK SHOULD SHE STILL COME GET HER INFUSION ON THE 12-2-21. PLEASE CALL TO ADVISE.

## 2021-11-29 NOTE — TELEPHONE ENCOUNTER
pt called during the holiday break and left a message on 11/26/21. pt states that she has seen her PCP twice and is asking if she should come to her chemo tx apt on 12-2-21. According to Dr Rao's note on 11/24/21, the pt has a cough and right lower extremity cellulitis. Dr Rao also put in her note that she wishes for the pt to skip her next chemo tx. The pt is currently taking a 14 regamine of doxycycline.

## 2021-12-02 ENCOUNTER — OFFICE VISIT (OUTPATIENT)
Dept: ONCOLOGY | Facility: HOSPITAL | Age: 86
End: 2021-12-02

## 2021-12-02 ENCOUNTER — HOSPITAL ENCOUNTER (OUTPATIENT)
Dept: ONCOLOGY | Facility: HOSPITAL | Age: 86
Setting detail: INFUSION SERIES
Discharge: HOME | End: 2021-12-02

## 2021-12-02 VITALS
HEIGHT: 62 IN | WEIGHT: 147.05 LBS | OXYGEN SATURATION: 97 % | HEART RATE: 70 BPM | SYSTOLIC BLOOD PRESSURE: 107 MMHG | TEMPERATURE: 98 F | DIASTOLIC BLOOD PRESSURE: 61 MMHG | RESPIRATION RATE: 18 BRPM | BODY MASS INDEX: 27.06 KG/M2

## 2021-12-02 VITALS
HEART RATE: 78 BPM | RESPIRATION RATE: 22 BRPM | DIASTOLIC BLOOD PRESSURE: 53 MMHG | OXYGEN SATURATION: 97 % | BODY MASS INDEX: 26.89 KG/M2 | WEIGHT: 147.05 LBS | TEMPERATURE: 98.6 F | SYSTOLIC BLOOD PRESSURE: 109 MMHG

## 2021-12-02 DIAGNOSIS — Z45.2 ADJUSTMENT AND MANAGEMENT OF VASCULAR ACCESS DEVICE: ICD-10-CM

## 2021-12-02 DIAGNOSIS — R06.02 SHORTNESS OF BREATH: ICD-10-CM

## 2021-12-02 DIAGNOSIS — C82.08 FOLLICULAR LYMPHOMA GRADE I OF LYMPH NODES OF MULTIPLE SITES (HCC): Primary | ICD-10-CM

## 2021-12-02 DIAGNOSIS — M79.604 PAIN AND SWELLING OF RIGHT LOWER EXTREMITY: ICD-10-CM

## 2021-12-02 DIAGNOSIS — C82.08 FOLLICULAR LYMPHOMA GRADE I OF LYMPH NODES OF MULTIPLE SITES: Primary | ICD-10-CM

## 2021-12-02 DIAGNOSIS — M79.89 PAIN AND SWELLING OF RIGHT LOWER EXTREMITY: ICD-10-CM

## 2021-12-02 LAB
ALBUMIN SERPL-MCNC: 3.85 G/DL (ref 3.5–5.2)
ALBUMIN/GLOB SERPL: 1.4 G/DL
ALP SERPL-CCNC: 83 U/L (ref 39–117)
ALT SERPL W P-5'-P-CCNC: 24 U/L (ref 1–33)
ANION GAP SERPL CALCULATED.3IONS-SCNC: 6.9 MMOL/L (ref 5–15)
AST SERPL-CCNC: 42 U/L (ref 1–32)
BASOPHILS # BLD AUTO: 0.08 10*3/MM3 (ref 0–0.2)
BASOPHILS NFR BLD AUTO: 1.5 % (ref 0–1.5)
BILIRUB SERPL-MCNC: 0.8 MG/DL (ref 0–1.2)
BUN SERPL-MCNC: 28 MG/DL (ref 8–23)
BUN/CREAT SERPL: 32.2 (ref 7–25)
CALCIUM SPEC-SCNC: 9.5 MG/DL (ref 8.2–9.6)
CHLORIDE SERPL-SCNC: 99 MMOL/L (ref 98–107)
CO2 SERPL-SCNC: 29.1 MMOL/L (ref 22–29)
CREAT SERPL-MCNC: 0.87 MG/DL (ref 0.57–1)
DEPRECATED RDW RBC AUTO: 47.3 FL (ref 37–54)
EOSINOPHIL # BLD AUTO: 0.09 10*3/MM3 (ref 0–0.4)
EOSINOPHIL NFR BLD AUTO: 1.7 % (ref 0.3–6.2)
ERYTHROCYTE [DISTWIDTH] IN BLOOD BY AUTOMATED COUNT: 13.2 % (ref 12.3–15.4)
GFR SERPL CREATININE-BSD FRML MDRD: 61 ML/MIN/1.73
GLOBULIN UR ELPH-MCNC: 2.8 GM/DL
GLUCOSE SERPL-MCNC: 189 MG/DL (ref 65–99)
HCT VFR BLD AUTO: 35.3 % (ref 34–46.6)
HGB BLD-MCNC: 11.4 G/DL (ref 12–15.9)
IMM GRANULOCYTES # BLD AUTO: 0.17 10*3/MM3 (ref 0–0.05)
IMM GRANULOCYTES NFR BLD AUTO: 3.3 % (ref 0–0.5)
LYMPHOCYTES # BLD AUTO: 0.99 10*3/MM3 (ref 0.7–3.1)
LYMPHOCYTES NFR BLD AUTO: 18.9 % (ref 19.6–45.3)
MCH RBC QN AUTO: 31.1 PG (ref 26.6–33)
MCHC RBC AUTO-ENTMCNC: 32.3 G/DL (ref 31.5–35.7)
MCV RBC AUTO: 96.2 FL (ref 79–97)
MONOCYTES # BLD AUTO: 0.59 10*3/MM3 (ref 0.1–0.9)
MONOCYTES NFR BLD AUTO: 11.3 % (ref 5–12)
NEUTROPHILS NFR BLD AUTO: 3.31 10*3/MM3 (ref 1.7–7)
NEUTROPHILS NFR BLD AUTO: 63.3 % (ref 42.7–76)
PLATELET # BLD AUTO: 265 10*3/MM3 (ref 140–450)
PMV BLD AUTO: 9.6 FL (ref 6–12)
POTASSIUM SERPL-SCNC: 3.7 MMOL/L (ref 3.5–5.2)
PROT SERPL-MCNC: 6.6 G/DL (ref 6–8.5)
RBC # BLD AUTO: 3.67 10*6/MM3 (ref 3.77–5.28)
SODIUM SERPL-SCNC: 135 MMOL/L (ref 136–145)
WBC NRBC COR # BLD: 5.23 10*3/MM3 (ref 3.4–10.8)

## 2021-12-02 PROCEDURE — 80053 COMPREHEN METABOLIC PANEL: CPT | Performed by: INTERNAL MEDICINE

## 2021-12-02 PROCEDURE — 96413 CHEMO IV INFUSION 1 HR: CPT

## 2021-12-02 PROCEDURE — 96415 CHEMO IV INFUSION ADDL HR: CPT

## 2021-12-02 PROCEDURE — 25010000002 DIPHENHYDRAMINE PER 50 MG: Performed by: INTERNAL MEDICINE

## 2021-12-02 PROCEDURE — 85025 COMPLETE CBC W/AUTO DIFF WBC: CPT | Performed by: INTERNAL MEDICINE

## 2021-12-02 PROCEDURE — 96375 TX/PRO/DX INJ NEW DRUG ADDON: CPT

## 2021-12-02 PROCEDURE — 25010000002 RITUXIMAB 10 MG/ML SOLUTION 10 ML VIAL: Performed by: INTERNAL MEDICINE

## 2021-12-02 PROCEDURE — 99214 OFFICE O/P EST MOD 30 MIN: CPT | Performed by: INTERNAL MEDICINE

## 2021-12-02 PROCEDURE — 25010000002 HEPARIN LOCK FLUSH PER 10 UNITS: Performed by: INTERNAL MEDICINE

## 2021-12-02 PROCEDURE — 25010000002 RITUXIMAB 10 MG/ML SOLUTION 50 ML VIAL: Performed by: INTERNAL MEDICINE

## 2021-12-02 RX ORDER — MEPERIDINE HYDROCHLORIDE 25 MG/ML
25 INJECTION INTRAMUSCULAR; INTRAVENOUS; SUBCUTANEOUS
Status: CANCELLED | OUTPATIENT
Start: 2021-12-02

## 2021-12-02 RX ORDER — SODIUM CHLORIDE 9 MG/ML
250 INJECTION, SOLUTION INTRAVENOUS ONCE
Status: COMPLETED | OUTPATIENT
Start: 2021-12-02 | End: 2021-12-02

## 2021-12-02 RX ORDER — SODIUM CHLORIDE 0.9 % (FLUSH) 0.9 %
20 SYRINGE (ML) INJECTION AS NEEDED
Status: DISCONTINUED | OUTPATIENT
Start: 2021-12-02 | End: 2021-12-03 | Stop reason: HOSPADM

## 2021-12-02 RX ORDER — ACETAMINOPHEN 325 MG/1
650 TABLET ORAL ONCE
Status: COMPLETED | OUTPATIENT
Start: 2021-12-02 | End: 2021-12-02

## 2021-12-02 RX ORDER — FAMOTIDINE 10 MG/ML
20 INJECTION, SOLUTION INTRAVENOUS AS NEEDED
Status: CANCELLED | OUTPATIENT
Start: 2021-12-02

## 2021-12-02 RX ORDER — HEPARIN SODIUM (PORCINE) LOCK FLUSH IV SOLN 100 UNIT/ML 100 UNIT/ML
500 SOLUTION INTRAVENOUS AS NEEDED
Status: DISCONTINUED | OUTPATIENT
Start: 2021-12-02 | End: 2021-12-03 | Stop reason: HOSPADM

## 2021-12-02 RX ORDER — SODIUM CHLORIDE 0.9 % (FLUSH) 0.9 %
20 SYRINGE (ML) INJECTION AS NEEDED
Status: CANCELLED | OUTPATIENT
Start: 2022-01-25

## 2021-12-02 RX ORDER — HEPARIN SODIUM (PORCINE) LOCK FLUSH IV SOLN 100 UNIT/ML 100 UNIT/ML
500 SOLUTION INTRAVENOUS AS NEEDED
Status: CANCELLED | OUTPATIENT
Start: 2022-01-25

## 2021-12-02 RX ORDER — DIPHENHYDRAMINE HYDROCHLORIDE 50 MG/ML
50 INJECTION INTRAMUSCULAR; INTRAVENOUS AS NEEDED
Status: CANCELLED | OUTPATIENT
Start: 2021-12-02

## 2021-12-02 RX ADMIN — HEPARIN SODIUM (PORCINE) LOCK FLUSH IV SOLN 100 UNIT/ML 500 UNITS: 100 SOLUTION at 14:53

## 2021-12-02 RX ADMIN — DIPHENHYDRAMINE HYDROCHLORIDE 25 MG: 50 INJECTION, SOLUTION INTRAMUSCULAR; INTRAVENOUS at 12:19

## 2021-12-02 RX ADMIN — RITUXIMAB 600 MG: 10 INJECTION, SOLUTION INTRAVENOUS at 12:51

## 2021-12-02 RX ADMIN — SODIUM CHLORIDE 250 ML: 9 INJECTION, SOLUTION INTRAVENOUS at 12:11

## 2021-12-02 RX ADMIN — ACETAMINOPHEN 650 MG: 325 TABLET ORAL at 12:11

## 2021-12-02 RX ADMIN — Medication 20 ML: at 14:53

## 2021-12-06 ENCOUNTER — TELEPHONE (OUTPATIENT)
Dept: INTERNAL MEDICINE | Facility: CLINIC | Age: 86
End: 2021-12-06

## 2021-12-06 NOTE — TELEPHONE ENCOUNTER
Svetlana from Dr. Byrd's office called. Patient was seen last week in their office. They are following up on her routine CT's, so they are scheduling a CT of the Chest before she comes back to see us so patient will not have a regular chest x-ray per your order. Any questions, call Svetlana at Dr. Byrd's office at [phone]

## 2021-12-07 ENCOUNTER — TELEPHONE (OUTPATIENT)
Dept: INTERNAL MEDICINE | Facility: CLINIC | Age: 86
End: 2021-12-07

## 2021-12-07 NOTE — TELEPHONE ENCOUNTER
Patient called twice concerned about her Dr. Byrd's orders. I advised the patient that Dr. Byrd's office called us and told us they would be doing a CT of the Chest and that would be more definitive than a chest x-ray. Patient was reassured that the orders from Dr. Byrd's office were okay.

## 2021-12-08 RX ORDER — DABIGATRAN ETEXILATE MESYLATE 75 MG/1
CAPSULE ORAL
Qty: 60 CAPSULE | Refills: 3 | Status: SHIPPED | OUTPATIENT
Start: 2021-12-08 | End: 2022-04-13

## 2021-12-13 ENCOUNTER — LAB (OUTPATIENT)
Dept: LAB | Facility: HOSPITAL | Age: 86
End: 2021-12-13

## 2021-12-13 DIAGNOSIS — E53.8 B12 DEFICIENCY: ICD-10-CM

## 2021-12-13 DIAGNOSIS — I50.31 ACUTE DIASTOLIC CHF (CONGESTIVE HEART FAILURE) (HCC): ICD-10-CM

## 2021-12-13 DIAGNOSIS — E55.9 VITAMIN D DEFICIENCY: ICD-10-CM

## 2021-12-13 DIAGNOSIS — C67.9 MALIGNANT NEOPLASM OF URINARY BLADDER, UNSPECIFIED SITE (HCC): ICD-10-CM

## 2021-12-13 DIAGNOSIS — I10 ESSENTIAL HYPERTENSION: ICD-10-CM

## 2021-12-13 DIAGNOSIS — D50.8 IRON DEFICIENCY ANEMIA SECONDARY TO INADEQUATE DIETARY IRON INTAKE: ICD-10-CM

## 2021-12-13 DIAGNOSIS — E78.2 MIXED HYPERLIPIDEMIA: ICD-10-CM

## 2021-12-13 DIAGNOSIS — E11.9 TYPE 2 DIABETES MELLITUS WITHOUT COMPLICATION, WITHOUT LONG-TERM CURRENT USE OF INSULIN (HCC): ICD-10-CM

## 2021-12-13 LAB
25(OH)D3 SERPL-MCNC: 53 NG/ML (ref 30–100)
ALBUMIN SERPL-MCNC: 4 G/DL (ref 3.5–5.2)
ALBUMIN/GLOB SERPL: 1.2 G/DL
ALP SERPL-CCNC: 77 U/L (ref 39–117)
ALT SERPL W P-5'-P-CCNC: 28 U/L (ref 1–33)
ANION GAP SERPL CALCULATED.3IONS-SCNC: 8.4 MMOL/L (ref 5–15)
AST SERPL-CCNC: 49 U/L (ref 1–32)
BASOPHILS # BLD AUTO: 0.06 10*3/MM3 (ref 0–0.2)
BASOPHILS NFR BLD AUTO: 1 % (ref 0–1.5)
BILIRUB SERPL-MCNC: 0.6 MG/DL (ref 0–1.2)
BUN SERPL-MCNC: 27 MG/DL (ref 8–23)
BUN/CREAT SERPL: 26.2 (ref 7–25)
CALCIUM SPEC-SCNC: 10 MG/DL (ref 8.2–9.6)
CHLORIDE SERPL-SCNC: 101 MMOL/L (ref 98–107)
CHOLEST SERPL-MCNC: 145 MG/DL (ref 0–200)
CO2 SERPL-SCNC: 32.6 MMOL/L (ref 22–29)
CREAT SERPL-MCNC: 1.03 MG/DL (ref 0.57–1)
DEPRECATED RDW RBC AUTO: 41.9 FL (ref 37–54)
EOSINOPHIL # BLD AUTO: 0.13 10*3/MM3 (ref 0–0.4)
EOSINOPHIL NFR BLD AUTO: 2.2 % (ref 0.3–6.2)
ERYTHROCYTE [DISTWIDTH] IN BLOOD BY AUTOMATED COUNT: 11.9 % (ref 12.3–15.4)
FOLATE SERPL-MCNC: 18.6 NG/ML (ref 4.78–24.2)
GFR SERPL CREATININE-BSD FRML MDRD: 50 ML/MIN/1.73
GLOBULIN UR ELPH-MCNC: 3.3 GM/DL
GLUCOSE SERPL-MCNC: 120 MG/DL (ref 65–99)
HBA1C MFR BLD: 6.08 % (ref 4.8–5.6)
HCT VFR BLD AUTO: 38.6 % (ref 34–46.6)
HDLC SERPL-MCNC: 73 MG/DL (ref 40–60)
HGB BLD-MCNC: 12.5 G/DL (ref 12–15.9)
IMM GRANULOCYTES # BLD AUTO: 0.16 10*3/MM3 (ref 0–0.05)
IMM GRANULOCYTES NFR BLD AUTO: 2.6 % (ref 0–0.5)
IRON 24H UR-MRATE: 77 MCG/DL (ref 37–145)
IRON SATN MFR SERPL: 19 % (ref 20–50)
LDLC SERPL CALC-MCNC: 63 MG/DL (ref 0–100)
LDLC/HDLC SERPL: 0.89 {RATIO}
LYMPHOCYTES # BLD AUTO: 1.33 10*3/MM3 (ref 0.7–3.1)
LYMPHOCYTES NFR BLD AUTO: 22 % (ref 19.6–45.3)
MCH RBC QN AUTO: 31.1 PG (ref 26.6–33)
MCHC RBC AUTO-ENTMCNC: 32.4 G/DL (ref 31.5–35.7)
MCV RBC AUTO: 96 FL (ref 79–97)
MONOCYTES # BLD AUTO: 0.64 10*3/MM3 (ref 0.1–0.9)
MONOCYTES NFR BLD AUTO: 10.6 % (ref 5–12)
NEUTROPHILS NFR BLD AUTO: 3.72 10*3/MM3 (ref 1.7–7)
NEUTROPHILS NFR BLD AUTO: 61.6 % (ref 42.7–76)
NRBC BLD AUTO-RTO: 0 /100 WBC (ref 0–0.2)
NT-PROBNP SERPL-MCNC: 2218 PG/ML (ref 0–1800)
PLATELET # BLD AUTO: 225 10*3/MM3 (ref 140–450)
PMV BLD AUTO: 10.8 FL (ref 6–12)
POTASSIUM SERPL-SCNC: 3.8 MMOL/L (ref 3.5–5.2)
PROT SERPL-MCNC: 7.3 G/DL (ref 6–8.5)
RBC # BLD AUTO: 4.02 10*6/MM3 (ref 3.77–5.28)
SODIUM SERPL-SCNC: 142 MMOL/L (ref 136–145)
TIBC SERPL-MCNC: 416 MCG/DL (ref 298–536)
TRANSFERRIN SERPL-MCNC: 279 MG/DL (ref 200–360)
TRIGL SERPL-MCNC: 36 MG/DL (ref 0–150)
VIT B12 BLD-MCNC: 1223 PG/ML (ref 211–946)
VLDLC SERPL-MCNC: 9 MG/DL (ref 5–40)
WBC NRBC COR # BLD: 6.04 10*3/MM3 (ref 3.4–10.8)

## 2021-12-13 PROCEDURE — 83540 ASSAY OF IRON: CPT

## 2021-12-13 PROCEDURE — 84466 ASSAY OF TRANSFERRIN: CPT

## 2021-12-13 PROCEDURE — 82306 VITAMIN D 25 HYDROXY: CPT

## 2021-12-13 PROCEDURE — 36415 COLL VENOUS BLD VENIPUNCTURE: CPT

## 2021-12-13 PROCEDURE — 82746 ASSAY OF FOLIC ACID SERUM: CPT

## 2021-12-13 PROCEDURE — 80053 COMPREHEN METABOLIC PANEL: CPT

## 2021-12-13 PROCEDURE — 83880 ASSAY OF NATRIURETIC PEPTIDE: CPT

## 2021-12-13 PROCEDURE — 83036 HEMOGLOBIN GLYCOSYLATED A1C: CPT

## 2021-12-13 PROCEDURE — 85025 COMPLETE CBC W/AUTO DIFF WBC: CPT

## 2021-12-13 PROCEDURE — 82607 VITAMIN B-12: CPT

## 2021-12-13 PROCEDURE — 80061 LIPID PANEL: CPT

## 2021-12-13 PROCEDURE — 81001 URINALYSIS AUTO W/SCOPE: CPT

## 2021-12-14 LAB
BACTERIA UR QL AUTO: NORMAL /HPF
BILIRUB UR QL STRIP: NEGATIVE
CLARITY UR: CLEAR
COLOR UR: YELLOW
GLUCOSE UR STRIP-MCNC: NEGATIVE MG/DL
HGB UR QL STRIP.AUTO: NEGATIVE
HYALINE CASTS UR QL AUTO: NORMAL /LPF
KETONES UR QL STRIP: NEGATIVE
LEUKOCYTE ESTERASE UR QL STRIP.AUTO: NEGATIVE
NITRITE UR QL STRIP: NEGATIVE
PH UR STRIP.AUTO: 6.5 [PH] (ref 5–8)
PROT UR QL STRIP: NEGATIVE
RBC # UR STRIP: NORMAL /HPF
REF LAB TEST METHOD: NORMAL
SP GR UR STRIP: 1.01 (ref 1–1.03)
SQUAMOUS #/AREA URNS HPF: NORMAL /HPF
UROBILINOGEN UR QL STRIP: NORMAL
WBC # UR STRIP: NORMAL /HPF

## 2021-12-16 ENCOUNTER — OFFICE VISIT (OUTPATIENT)
Dept: INTERNAL MEDICINE | Facility: CLINIC | Age: 86
End: 2021-12-16

## 2021-12-16 VITALS
TEMPERATURE: 96.6 F | OXYGEN SATURATION: 96 % | HEART RATE: 74 BPM | BODY MASS INDEX: 27.05 KG/M2 | DIASTOLIC BLOOD PRESSURE: 60 MMHG | SYSTOLIC BLOOD PRESSURE: 104 MMHG | WEIGHT: 147 LBS | RESPIRATION RATE: 20 BRPM | HEIGHT: 62 IN

## 2021-12-16 DIAGNOSIS — Z79.899 ENCOUNTER FOR LONG-TERM (CURRENT) USE OF OTHER MEDICATIONS: ICD-10-CM

## 2021-12-16 DIAGNOSIS — R63.4 WEIGHT LOSS: ICD-10-CM

## 2021-12-16 DIAGNOSIS — C82.03 FOLLICULAR LYMPHOMA GRADE I OF INTRA-ABDOMINAL LYMPH NODES (HCC): ICD-10-CM

## 2021-12-16 DIAGNOSIS — E53.8 B12 DEFICIENCY: Primary | ICD-10-CM

## 2021-12-16 DIAGNOSIS — I35.0 NONRHEUMATIC AORTIC VALVE STENOSIS: ICD-10-CM

## 2021-12-16 DIAGNOSIS — E55.9 VITAMIN D DEFICIENCY: ICD-10-CM

## 2021-12-16 DIAGNOSIS — L03.115 CELLULITIS OF RIGHT LOWER EXTREMITY WITHOUT FOOT: ICD-10-CM

## 2021-12-16 DIAGNOSIS — Z23 NEED FOR INFLUENZA VACCINATION: ICD-10-CM

## 2021-12-16 DIAGNOSIS — I10 PRIMARY HYPERTENSION: ICD-10-CM

## 2021-12-16 DIAGNOSIS — D84.9 IMMUNOSUPPRESSED STATUS (HCC): ICD-10-CM

## 2021-12-16 DIAGNOSIS — E11.9 TYPE 2 DIABETES MELLITUS WITHOUT COMPLICATION, UNSPECIFIED WHETHER LONG TERM INSULIN USE (HCC): ICD-10-CM

## 2021-12-16 DIAGNOSIS — E61.1 IRON DEFICIENCY: ICD-10-CM

## 2021-12-16 DIAGNOSIS — M10.9 ARTHRITIS DUE TO GOUT: ICD-10-CM

## 2021-12-16 DIAGNOSIS — E78.5 HYPERLIPIDEMIA, UNSPECIFIED HYPERLIPIDEMIA TYPE: ICD-10-CM

## 2021-12-16 DIAGNOSIS — I50.33 ACUTE ON CHRONIC HEART FAILURE WITH PRESERVED EJECTION FRACTION (HFPEF) (HCC): ICD-10-CM

## 2021-12-16 DIAGNOSIS — I48.11 LONGSTANDING PERSISTENT ATRIAL FIBRILLATION (HCC): ICD-10-CM

## 2021-12-16 PROCEDURE — 90662 IIV NO PRSV INCREASED AG IM: CPT | Performed by: INTERNAL MEDICINE

## 2021-12-16 PROCEDURE — G0008 ADMIN INFLUENZA VIRUS VAC: HCPCS | Performed by: INTERNAL MEDICINE

## 2021-12-16 PROCEDURE — 99214 OFFICE O/P EST MOD 30 MIN: CPT | Performed by: INTERNAL MEDICINE

## 2021-12-16 RX ORDER — ERGOCALCIFEROL 1.25 MG/1
50000 CAPSULE ORAL
Qty: 6 CAPSULE | Refills: 3 | Status: SHIPPED | OUTPATIENT
Start: 2021-12-16 | End: 2022-08-08 | Stop reason: SDUPTHER

## 2021-12-16 NOTE — PROGRESS NOTES
"Chief Complaint  Labs Only (Routine office visit with labs. ) and Joint Swelling (Pt's right ankle still has some swelling. )    Subjective  Patient has done an excellent job of caring for the ulcer on her right lower extremity and cellulitis and chronic venous stasis edema.  She got a new pair of diabetic shoes yesterday and is very happy with them.  Has had Covid vaccination x3.  We gave her the high-dose flu vaccine today.  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  Declines TAVR.  Chronic atrial fibrillation persistent.  Hyperlipidemia.  Hypertension.  Iron deficiency.  Diabetes mellitus type 2 controlled.  Vitamin D deficient.  Osteopenia on Evista, bladder cancer followed by urology COPD/asthma  Objective   Vital Signs:   /60 (BP Location: Right arm, Patient Position: Sitting, Cuff Size: Adult)   Pulse 74   Temp 96.6 °F (35.9 °C) (Temporal)   Resp 20   Ht 157.5 cm (62.01\")   Wt 66.7 kg (147 lb)   SpO2 96%   BMI 26.88 kg/m²     Physical Exam  Vitals and nursing note reviewed.   Constitutional:       Appearance: Normal appearance.   HENT:      Head: Normocephalic.   Eyes:      Extraocular Movements: Extraocular movements intact.      Conjunctiva/sclera: Conjunctivae normal.   Cardiovascular:      Rate and Rhythm: Normal rate and regular rhythm.      Heart sounds: Normal heart sounds. No murmur heard.      Pulmonary:      Effort: Pulmonary effort is normal.      Breath sounds: Normal breath sounds. No wheezing or rales.   Abdominal:      General: Bowel sounds are normal.      Palpations: Abdomen is soft.      Tenderness: There is no abdominal tenderness. There is no guarding.   Musculoskeletal:         General: No swelling. Normal range of motion.      Right lower leg: Edema (Chronic venous stasis edema much improved) present.      Left lower " leg: Edema present.   Skin:     General: Skin is warm and dry.      Findings: Lesion (Lesion on the right lower extremity looks much improved.) present.   Neurological:      General: No focal deficit present.      Mental Status: She is alert and oriented to person, place, and time. Mental status is at baseline.   Psychiatric:         Mood and Affect: Mood normal.         Behavior: Behavior normal.         Thought Content: Thought content normal.        Result Review :  The following data was reviewed by: Gala Rao MD on 12/16/2021:  CMP    CMP 10/6/21 12/2/21 12/13/21   Glucose 99 189 (A) 120 (A)   BUN 29 (A) 28 (A) 27 (A)   Creatinine 0.98 0.87 1.03 (A)   eGFR Non African Am 53 (A) 61 50 (A)   Sodium 142 135 (A) 142   Potassium 4.3 3.7 3.8   Chloride 104 99 101   Calcium 9.7 (A) 9.5 10.0 (A)   Albumin 4.20 3.85 4.00   Total Bilirubin 1.0 0.8 0.6   Alkaline Phosphatase 67 83 77   AST (SGOT) 37 (A) 42 (A) 49 (A)   ALT (SGPT) 23 24 28   (A) Abnormal value            CBC    CBC 10/6/21 12/2/21 12/13/21   WBC 8.04 5.23 6.04   RBC 4.16 3.67 (A) 4.02   Hemoglobin 13.0 11.4 (A) 12.5   Hematocrit 40.2 35.3 38.6   MCV 96.6 96.2 96.0   MCH 31.3 31.1 31.1   MCHC 32.3 32.3 32.4   RDW 14.0 13.2 11.9 (A)   Platelets 236 265 225   (A) Abnormal value            CBC w/diff    CBC w/Diff 10/6/21 12/2/21 12/13/21   WBC 8.04 5.23 6.04   RBC 4.16 3.67 (A) 4.02   Hemoglobin 13.0 11.4 (A) 12.5   Hematocrit 40.2 35.3 38.6   MCV 96.6 96.2 96.0   MCH 31.3 31.1 31.1   MCHC 32.3 32.3 32.4   RDW 14.0 13.2 11.9 (A)   Platelets 236 265 225   Neutrophil Rel % 63.4 63.3 61.6   Immature Granulocyte Rel % 1.4 (A) 3.3 (A) 2.6 (A)   Lymphocyte Rel % 26.1 18.9 (A) 22.0   Monocyte Rel % 7.6 11.3 10.6   Eosinophil Rel % 1.0 1.7 2.2   Basophil Rel % 0.5 1.5 1.0   (A) Abnormal value            Lipid Panel    Lipid Panel 6/2/21 9/2/21 12/13/21   Total Cholesterol  154 145   Total Cholesterol 154     Triglycerides 50 53 36   HDL Cholesterol 75 (A) 75  (A) 73 (A)   VLDL Cholesterol 10 11 9   LDL Cholesterol  69 (A) 68 63   LDL/HDL Ratio  0.91 0.89   (A) Abnormal value       Comments are available for some flowsheets but are not being displayed.           TSH    TSH 3/3/21 9/2/21   TSH 1.350 1.130           Most Recent A1C    HGBA1C Most Recent 12/13/21   Hemoglobin A1C 6.08 (A)   (A) Abnormal value                UA    Urinalysis 6/2/21 10/6/21 12/13/21 12/13/21      1424 1424   Squamous Epithelial Cells, UA    0-2   Specific Gravity, UA 1.015  1.011    Ketones, UA NEGATIVE Negative Negative    Blood, UA TRACE (A)  Negative    Leukocytes, UA NEGATIVE Trace (A) Negative    Nitrite, UA NEGATIVE  Negative    RBC, UA OCC (2-5) (A)   None Seen   WBC, UA    None Seen   Bacteria, UA    None Seen   (A) Abnormal value       Comments are available for some flowsheets but are not being displayed.           Urine Culture    Urine Culture 6/2/21   Urine Culture No Uropathogens Isolated.      Comments are available for some flowsheets but are not being displayed.                         Assessment and Plan   Diagnoses and all orders for this visit:    1. B12 deficiency (Primary)  -     Vitamin B12; Future  -     Folate; Future    2. Encounter for long-term (current) use of other medications  -     CBC w AUTO Differential; Future    3. Primary hypertension  -     Comprehensive metabolic panel; Future  -     Urinalysis With Microscopic - Urine, Clean Catch; Future    4. Hyperlipidemia, unspecified hyperlipidemia type  -     Lipid panel; Future    5. Iron deficiency  -     Iron and TIBC; Future  -     Ferritin; Future    6. Vitamin D deficiency  -     Vitamin D 25 hydroxy; Future    7. Type 2 diabetes mellitus without complication, unspecified whether long term insulin use (HCC)  Assessment & Plan:  Hyperglycemia which is stable.  On diet alone.  Assessment: Hyperglycemia with diabetes mellitus type 2 no insulin or other meds.  Plan: Continue diet control.  Hemoglobin A1c next  visit    Orders:  -     Hemoglobin A1c; Future  -     MicroAlbumin, Urine, Random - Urine, Clean Catch; Future    8. 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 Weight loss  -     TSH; Future    11. Need for influenza vaccination  -     Fluzone High-Dose 65+yrs    12. Severe aortic stenosis  Assessment & Plan:  Patient with severe aortic stenosis.  Has not wanted to have TAVR.  It is been offered multiple times.  No chest pain or shortness of breath or syncope or presyncope.  Assessment: Severe aortic stenosis with no symptoms.  Declined TAVR.  Does have CHF  Plan: Continue to see cardiology also.      13. Longstanding persistent atrial fibrillation ([facility])  Assessment & Plan:  Longstanding chronic persistent atrial fibrillation.  Assessment: Atrial fibrillation controlled rate.  On anticoagulation.  Plan: Continue Pradaxa 75 mg twice daily.      14. Acute on chronic heart failure with preserved ejection fraction (HFpEF) ([facility])  Assessment & Plan:  Acute on chronic heart failure with preserved ejection fraction controlled.  Plan: Continue losartan 25 mg daily, Lasix 40 mg daily      15. Cellulitis of right lower extremity without foot  Assessment & Plan:  Cellulitis has resolved.  The draining wound has healed.  She still has a dry area with mild eschar on the right lateral malleolus.  The skin is very dry.  The edema is much better.  Assessment: Cellulitis right lower extremity with large ulcer that was draining and severe venous stasis edema.  Much improved.  Plan: Patient and daughter have been doing excellent care.  Continue keeping the skin clean and moist.  Using Dove soap.  Aveeno lotion or Vaseline.  Continue to wrap with Ace wrap to keep swelling to a minimum.      16. Immunosuppressed status ([facility])  Assessment & Plan:  Patient with long  history of NHL.  Getting chemotherapy by oncologist.  She did get a round of chemo after she was here last visit.  CBC is normal.  NHL follicular cell.  Plan: Continue follow-up with oncology.          Follow Up Return in about 3 months (around 3/16/2022).  Patient was given instructions and counseling regarding her condition or for health maintenance advice. Please see specific information pulled into the AVS if appropriate.

## 2021-12-16 NOTE — ASSESSMENT & PLAN NOTE
Longstanding chronic persistent atrial fibrillation.  Assessment: Atrial fibrillation controlled rate.  On anticoagulation.  Plan: Continue Pradaxa 75 mg twice daily.

## 2021-12-16 NOTE — ASSESSMENT & PLAN NOTE
Patient with severe aortic stenosis.  Has not wanted to have TAVR.  It is been offered multiple times.  No chest pain or shortness of breath or syncope or presyncope.  Assessment: Severe aortic stenosis with no symptoms.  Declined TAVR.  Does have CHF  Plan: Continue to see cardiology also.

## 2021-12-16 NOTE — ASSESSMENT & PLAN NOTE
Hyperglycemia which is stable.  On diet alone.  Assessment: Hyperglycemia with diabetes mellitus type 2 no insulin or other meds.  Plan: Continue diet control.  Hemoglobin A1c next visit

## 2021-12-16 NOTE — ASSESSMENT & PLAN NOTE
Cellulitis has resolved.  The draining wound has healed.  She still has a dry area with mild eschar on the right lateral malleolus.  The skin is very dry.  The edema is much better.  Assessment: Cellulitis right lower extremity with large ulcer that was draining and severe venous stasis edema.  Much improved.  Plan: Patient and daughter have been doing excellent care.  Continue keeping the skin clean and moist.  Using Dove soap.  Aveeno lotion or Vaseline.  Continue to wrap with Ace wrap to keep swelling to a minimum.

## 2021-12-16 NOTE — ASSESSMENT & PLAN NOTE
Patient with long history of NHL.  Getting chemotherapy by oncologist.  She did get a round of chemo after she was here last visit.  CBC is normal.  NHL follicular cell.  Plan: Continue follow-up with oncology.

## 2021-12-16 NOTE — ASSESSMENT & PLAN NOTE
Acute on chronic heart failure with preserved ejection fraction controlled.  Plan: Continue losartan 25 mg daily, Lasix 40 mg daily

## 2021-12-21 ENCOUNTER — APPOINTMENT (OUTPATIENT)
Dept: BONE DENSITY | Facility: HOSPITAL | Age: 86
End: 2021-12-21

## 2021-12-21 RX ORDER — ALBUTEROL SULFATE 90 UG/1
AEROSOL, METERED RESPIRATORY (INHALATION)
Qty: 6.7 G | Refills: 1 | Status: SHIPPED | OUTPATIENT
Start: 2021-12-21

## 2022-01-03 RX ORDER — FUROSEMIDE 40 MG/1
TABLET ORAL
Qty: 135 TABLET | Refills: 3 | Status: SHIPPED | OUTPATIENT
Start: 2022-01-03 | End: 2022-03-31

## 2022-01-03 NOTE — TELEPHONE ENCOUNTER
Patient called. She spilled her pills on the floor and salvaged most of them. She was told to take Furosemide 40 mgs 1 1/2 on MWF and one all other days. Dr. Harden told her to increase it to 1 1/2 every day. Asking for a new Rx to be sent to [facility]. Pended.

## 2022-01-05 ENCOUNTER — HOSPITAL ENCOUNTER (OUTPATIENT)
Dept: CT IMAGING | Facility: HOSPITAL | Age: 87
Discharge: HOME | End: 2022-01-05

## 2022-01-05 DIAGNOSIS — C82.03 FOLLICULAR LYMPHOMA GRADE I OF INTRA-ABDOMINAL LYMPH NODES: ICD-10-CM

## 2022-01-05 PROCEDURE — 74176 CT ABD & PELVIS W/O CONTRAST: CPT

## 2022-01-05 PROCEDURE — 71250 CT THORAX DX C-: CPT

## 2022-01-10 ENCOUNTER — TELEPHONE (OUTPATIENT)
Dept: INTERNAL MEDICINE | Facility: CLINIC | Age: 87
End: 2022-01-10

## 2022-01-10 DIAGNOSIS — I51.9 HEART PROBLEM: ICD-10-CM

## 2022-01-10 RX ORDER — RALOXIFENE HYDROCHLORIDE 60 MG/1
TABLET, FILM COATED ORAL
Qty: 90 TABLET | Refills: 3 | Status: SHIPPED | OUTPATIENT
Start: 2022-01-10 | End: 2022-01-10 | Stop reason: SDUPTHER

## 2022-01-11 RX ORDER — LOSARTAN POTASSIUM 25 MG/1
25 TABLET ORAL DAILY
Qty: 90 TABLET | Refills: 3 | Status: SHIPPED | OUTPATIENT
Start: 2022-01-11 | End: 2022-06-16 | Stop reason: HOSPADM

## 2022-01-11 RX ORDER — ATORVASTATIN CALCIUM 40 MG/1
40 TABLET, FILM COATED ORAL DAILY
Qty: 90 TABLET | Refills: 3 | Status: SHIPPED | OUTPATIENT
Start: 2022-01-11 | End: 2022-10-06 | Stop reason: SDUPTHER

## 2022-01-11 RX ORDER — RALOXIFENE HYDROCHLORIDE 60 MG/1
60 TABLET, FILM COATED ORAL DAILY
Qty: 90 TABLET | Refills: 3 | Status: SHIPPED | OUTPATIENT
Start: 2022-01-11

## 2022-01-24 RX ORDER — SODIUM CHLORIDE 9 MG/ML
250 INJECTION, SOLUTION INTRAVENOUS ONCE
Status: CANCELLED | OUTPATIENT
Start: 2022-01-27

## 2022-01-24 RX ORDER — FAMOTIDINE 10 MG/ML
20 INJECTION, SOLUTION INTRAVENOUS AS NEEDED
Status: CANCELLED | OUTPATIENT
Start: 2022-01-27

## 2022-01-24 RX ORDER — MEPERIDINE HYDROCHLORIDE 25 MG/ML
25 INJECTION INTRAMUSCULAR; INTRAVENOUS; SUBCUTANEOUS
Status: CANCELLED | OUTPATIENT
Start: 2022-01-27

## 2022-01-24 RX ORDER — ACETAMINOPHEN 325 MG/1
650 TABLET ORAL ONCE
Status: CANCELLED | OUTPATIENT
Start: 2022-01-27

## 2022-01-24 RX ORDER — DIPHENHYDRAMINE HYDROCHLORIDE 50 MG/ML
50 INJECTION INTRAMUSCULAR; INTRAVENOUS AS NEEDED
Status: CANCELLED | OUTPATIENT
Start: 2022-01-27

## 2022-01-25 ENCOUNTER — HOSPITAL ENCOUNTER (OUTPATIENT)
Dept: ONCOLOGY | Facility: HOSPITAL | Age: 87
Setting detail: INFUSION SERIES
End: 2022-01-25

## 2022-01-25 ENCOUNTER — APPOINTMENT (OUTPATIENT)
Dept: ONCOLOGY | Facility: HOSPITAL | Age: 87
End: 2022-01-25

## 2022-01-27 ENCOUNTER — HOSPITAL ENCOUNTER (OUTPATIENT)
Dept: ONCOLOGY | Facility: HOSPITAL | Age: 87
Setting detail: INFUSION SERIES
Discharge: HOME | End: 2022-01-27

## 2022-01-27 ENCOUNTER — OFFICE VISIT (OUTPATIENT)
Dept: ONCOLOGY | Facility: HOSPITAL | Age: 87
End: 2022-01-27

## 2022-01-27 ENCOUNTER — APPOINTMENT (OUTPATIENT)
Dept: ONCOLOGY | Facility: HOSPITAL | Age: 87
End: 2022-01-27

## 2022-01-27 VITALS
RESPIRATION RATE: 18 BRPM | SYSTOLIC BLOOD PRESSURE: 104 MMHG | HEART RATE: 64 BPM | TEMPERATURE: 98.5 F | WEIGHT: 148.59 LBS | DIASTOLIC BLOOD PRESSURE: 41 MMHG | OXYGEN SATURATION: 95 % | BODY MASS INDEX: 27.17 KG/M2

## 2022-01-27 VITALS
DIASTOLIC BLOOD PRESSURE: 45 MMHG | TEMPERATURE: 98.5 F | RESPIRATION RATE: 18 BRPM | OXYGEN SATURATION: 95 % | WEIGHT: 148.59 LBS | HEART RATE: 71 BPM | BODY MASS INDEX: 27.17 KG/M2 | SYSTOLIC BLOOD PRESSURE: 103 MMHG

## 2022-01-27 DIAGNOSIS — C82.08 FOLLICULAR LYMPHOMA GRADE I OF LYMPH NODES OF MULTIPLE SITES: Primary | ICD-10-CM

## 2022-01-27 DIAGNOSIS — Z45.2 ADJUSTMENT AND MANAGEMENT OF VASCULAR ACCESS DEVICE: ICD-10-CM

## 2022-01-27 LAB
ALBUMIN SERPL-MCNC: 4 G/DL (ref 3.5–5.2)
ALBUMIN/GLOB SERPL: 1.4 G/DL
ALP SERPL-CCNC: 85 U/L (ref 39–117)
ALT SERPL W P-5'-P-CCNC: 23 U/L (ref 1–33)
ANION GAP SERPL CALCULATED.3IONS-SCNC: 11.1 MMOL/L (ref 5–15)
AST SERPL-CCNC: 36 U/L (ref 1–32)
BASOPHILS # BLD AUTO: 0.04 10*3/MM3 (ref 0–0.2)
BASOPHILS NFR BLD AUTO: 0.7 % (ref 0–1.5)
BILIRUB SERPL-MCNC: 0.8 MG/DL (ref 0–1.2)
BUN SERPL-MCNC: 30 MG/DL (ref 8–23)
BUN/CREAT SERPL: 27.5 (ref 7–25)
CALCIUM SPEC-SCNC: 9.7 MG/DL (ref 8.2–9.6)
CHLORIDE SERPL-SCNC: 101 MMOL/L (ref 98–107)
CO2 SERPL-SCNC: 29.9 MMOL/L (ref 22–29)
CREAT SERPL-MCNC: 1.09 MG/DL (ref 0.57–1)
DEPRECATED RDW RBC AUTO: 49.9 FL (ref 37–54)
EOSINOPHIL # BLD AUTO: 0.32 10*3/MM3 (ref 0–0.4)
EOSINOPHIL NFR BLD AUTO: 5.3 % (ref 0.3–6.2)
ERYTHROCYTE [DISTWIDTH] IN BLOOD BY AUTOMATED COUNT: 14.1 % (ref 12.3–15.4)
GFR SERPL CREATININE-BSD FRML MDRD: 47 ML/MIN/1.73
GLOBULIN UR ELPH-MCNC: 2.8 GM/DL
GLUCOSE SERPL-MCNC: 139 MG/DL (ref 65–99)
HCT VFR BLD AUTO: 37.2 % (ref 34–46.6)
HGB BLD-MCNC: 11.9 G/DL (ref 12–15.9)
IMM GRANULOCYTES # BLD AUTO: 0.05 10*3/MM3 (ref 0–0.05)
IMM GRANULOCYTES NFR BLD AUTO: 0.8 % (ref 0–0.5)
LYMPHOCYTES # BLD AUTO: 1.41 10*3/MM3 (ref 0.7–3.1)
LYMPHOCYTES NFR BLD AUTO: 23.2 % (ref 19.6–45.3)
MCH RBC QN AUTO: 30.7 PG (ref 26.6–33)
MCHC RBC AUTO-ENTMCNC: 32 G/DL (ref 31.5–35.7)
MCV RBC AUTO: 96.1 FL (ref 79–97)
MONOCYTES # BLD AUTO: 0.91 10*3/MM3 (ref 0.1–0.9)
MONOCYTES NFR BLD AUTO: 14.9 % (ref 5–12)
NEUTROPHILS NFR BLD AUTO: 3.36 10*3/MM3 (ref 1.7–7)
NEUTROPHILS NFR BLD AUTO: 55.1 % (ref 42.7–76)
PLATELET # BLD AUTO: 248 10*3/MM3 (ref 140–450)
PMV BLD AUTO: 9.5 FL (ref 6–12)
POTASSIUM SERPL-SCNC: 4 MMOL/L (ref 3.5–5.2)
PROT SERPL-MCNC: 6.8 G/DL (ref 6–8.5)
RBC # BLD AUTO: 3.87 10*6/MM3 (ref 3.77–5.28)
SODIUM SERPL-SCNC: 142 MMOL/L (ref 136–145)
WBC NRBC COR # BLD: 6.09 10*3/MM3 (ref 3.4–10.8)

## 2022-01-27 PROCEDURE — 96375 TX/PRO/DX INJ NEW DRUG ADDON: CPT

## 2022-01-27 PROCEDURE — 25010000002 DIPHENHYDRAMINE PER 50 MG: Performed by: INTERNAL MEDICINE

## 2022-01-27 PROCEDURE — 99214 OFFICE O/P EST MOD 30 MIN: CPT | Performed by: INTERNAL MEDICINE

## 2022-01-27 PROCEDURE — 96413 CHEMO IV INFUSION 1 HR: CPT

## 2022-01-27 PROCEDURE — 25010000002 RITUXIMAB 10 MG/ML SOLUTION 50 ML VIAL: Performed by: INTERNAL MEDICINE

## 2022-01-27 PROCEDURE — 25010000002 RITUXIMAB 10 MG/ML SOLUTION 10 ML VIAL: Performed by: INTERNAL MEDICINE

## 2022-01-27 PROCEDURE — 80053 COMPREHEN METABOLIC PANEL: CPT | Performed by: INTERNAL MEDICINE

## 2022-01-27 PROCEDURE — 85025 COMPLETE CBC W/AUTO DIFF WBC: CPT | Performed by: INTERNAL MEDICINE

## 2022-01-27 PROCEDURE — 96415 CHEMO IV INFUSION ADDL HR: CPT

## 2022-01-27 PROCEDURE — 25010000002 HEPARIN LOCK FLUSH PER 10 UNITS: Performed by: INTERNAL MEDICINE

## 2022-01-27 RX ORDER — SODIUM CHLORIDE 0.9 % (FLUSH) 0.9 %
20 SYRINGE (ML) INJECTION AS NEEDED
Status: CANCELLED | OUTPATIENT
Start: 2022-01-27

## 2022-01-27 RX ORDER — HEPARIN SODIUM (PORCINE) LOCK FLUSH IV SOLN 100 UNIT/ML 100 UNIT/ML
500 SOLUTION INTRAVENOUS AS NEEDED
Status: DISCONTINUED | OUTPATIENT
Start: 2022-01-27 | End: 2022-01-28 | Stop reason: HOSPADM

## 2022-01-27 RX ORDER — HEPARIN SODIUM (PORCINE) LOCK FLUSH IV SOLN 100 UNIT/ML 100 UNIT/ML
500 SOLUTION INTRAVENOUS AS NEEDED
Status: CANCELLED | OUTPATIENT
Start: 2022-01-27

## 2022-01-27 RX ORDER — SODIUM CHLORIDE 0.9 % (FLUSH) 0.9 %
20 SYRINGE (ML) INJECTION AS NEEDED
Status: DISCONTINUED | OUTPATIENT
Start: 2022-01-27 | End: 2022-01-28 | Stop reason: HOSPADM

## 2022-01-27 RX ORDER — SODIUM CHLORIDE 9 MG/ML
250 INJECTION, SOLUTION INTRAVENOUS ONCE
Status: COMPLETED | OUTPATIENT
Start: 2022-01-27 | End: 2022-01-27

## 2022-01-27 RX ORDER — ACETAMINOPHEN 325 MG/1
650 TABLET ORAL ONCE
Status: COMPLETED | OUTPATIENT
Start: 2022-01-27 | End: 2022-01-27

## 2022-01-27 RX ADMIN — DIPHENHYDRAMINE HYDROCHLORIDE 25 MG: 50 INJECTION, SOLUTION INTRAMUSCULAR; INTRAVENOUS at 11:48

## 2022-01-27 RX ADMIN — SODIUM CHLORIDE, PRESERVATIVE FREE 20 ML: 5 INJECTION INTRAVENOUS at 14:33

## 2022-01-27 RX ADMIN — RITUXIMAB 600 MG: 10 INJECTION, SOLUTION INTRAVENOUS at 12:09

## 2022-01-27 RX ADMIN — SODIUM CHLORIDE 250 ML: 9 INJECTION, SOLUTION INTRAVENOUS at 11:47

## 2022-01-27 RX ADMIN — HEPARIN SODIUM (PORCINE) LOCK FLUSH IV SOLN 100 UNIT/ML 500 UNITS: 100 SOLUTION at 14:33

## 2022-01-27 RX ADMIN — ACETAMINOPHEN 650 MG: 325 TABLET ORAL at 11:47

## 2022-02-06 PROBLEM — C85.90 LYMPHOMA: Status: RESOLVED | Noted: 2021-06-16 | Resolved: 2022-02-06

## 2022-02-07 RX ORDER — SPIRONOLACTONE 25 MG/1
TABLET ORAL
Qty: 45 TABLET | Refills: 3 | Status: SHIPPED | OUTPATIENT
Start: 2022-02-07

## 2022-02-17 ENCOUNTER — TELEPHONE (OUTPATIENT)
Dept: INTERNAL MEDICINE | Facility: CLINIC | Age: 87
End: 2022-02-17

## 2022-02-17 NOTE — TELEPHONE ENCOUNTER
Pt left  and states she is sick and would like to be seen. I attempted to call the [phone] number to speak with pt however both times I called I was given a busy signal and the call would beep and not go through. I tried calling with and without a 1 at the beginning. No other number on file.

## 2022-02-19 ENCOUNTER — HOSPITAL ENCOUNTER (EMERGENCY)
Facility: HOSPITAL | Age: 87
Discharge: HOME | End: 2022-02-19
Attending: EMERGENCY MEDICINE | Admitting: EMERGENCY MEDICINE

## 2022-02-19 ENCOUNTER — APPOINTMENT (OUTPATIENT)
Dept: GENERAL RADIOLOGY | Facility: HOSPITAL | Age: 87
End: 2022-02-19

## 2022-02-19 VITALS
HEART RATE: 65 BPM | SYSTOLIC BLOOD PRESSURE: 127 MMHG | TEMPERATURE: 98.1 F | DIASTOLIC BLOOD PRESSURE: 49 MMHG | OXYGEN SATURATION: 91 % | WEIGHT: 143.3 LBS | HEIGHT: 64 IN | RESPIRATION RATE: 20 BRPM | BODY MASS INDEX: 24.46 KG/M2

## 2022-02-19 DIAGNOSIS — I50.21 ACUTE SYSTOLIC CONGESTIVE HEART FAILURE: ICD-10-CM

## 2022-02-19 DIAGNOSIS — I48.20 CHRONIC ATRIAL FIBRILLATION: ICD-10-CM

## 2022-02-19 DIAGNOSIS — R06.02 SHORTNESS OF BREATH: Primary | ICD-10-CM

## 2022-02-19 DIAGNOSIS — U07.1 CLINICAL DIAGNOSIS OF COVID-19: ICD-10-CM

## 2022-02-19 LAB
ALBUMIN SERPL-MCNC: 4 G/DL (ref 3.5–5.2)
ALBUMIN/GLOB SERPL: 1.3 G/DL
ALP SERPL-CCNC: 80 U/L (ref 39–117)
ALT SERPL W P-5'-P-CCNC: 26 U/L (ref 1–33)
ANION GAP SERPL CALCULATED.3IONS-SCNC: 9.5 MMOL/L (ref 5–15)
AST SERPL-CCNC: 46 U/L (ref 1–32)
BASOPHILS # BLD AUTO: 0.01 10*3/MM3 (ref 0–0.2)
BASOPHILS NFR BLD AUTO: 0.2 % (ref 0–1.5)
BILIRUB SERPL-MCNC: 0.6 MG/DL (ref 0–1.2)
BILIRUB UR QL STRIP: NEGATIVE
BUN SERPL-MCNC: 21 MG/DL (ref 8–23)
BUN/CREAT SERPL: 25.3 (ref 7–25)
CALCIUM SPEC-SCNC: 9.5 MG/DL (ref 8.2–9.6)
CHLORIDE SERPL-SCNC: 101 MMOL/L (ref 98–107)
CLARITY UR: CLEAR
CO2 SERPL-SCNC: 28.5 MMOL/L (ref 22–29)
COLOR UR: YELLOW
CREAT SERPL-MCNC: 0.83 MG/DL (ref 0.57–1)
DEPRECATED RDW RBC AUTO: 47.8 FL (ref 37–54)
EOSINOPHIL # BLD AUTO: 0.02 10*3/MM3 (ref 0–0.4)
EOSINOPHIL NFR BLD AUTO: 0.5 % (ref 0.3–6.2)
ERYTHROCYTE [DISTWIDTH] IN BLOOD BY AUTOMATED COUNT: 14 % (ref 12.3–15.4)
GFR SERPL CREATININE-BSD FRML MDRD: 64 ML/MIN/1.73
GLOBULIN UR ELPH-MCNC: 3.1 GM/DL
GLUCOSE SERPL-MCNC: 145 MG/DL (ref 65–99)
GLUCOSE UR STRIP-MCNC: NEGATIVE MG/DL
HCT VFR BLD AUTO: 37.2 % (ref 34–46.6)
HGB BLD-MCNC: 12.5 G/DL (ref 12–15.9)
HGB UR QL STRIP.AUTO: NEGATIVE
HOLD SPECIMEN: NORMAL
HOLD SPECIMEN: NORMAL
IMM GRANULOCYTES # BLD AUTO: 0.02 10*3/MM3 (ref 0–0.05)
IMM GRANULOCYTES NFR BLD AUTO: 0.5 % (ref 0–0.5)
KETONES UR QL STRIP: NEGATIVE
LEUKOCYTE ESTERASE UR QL STRIP.AUTO: NEGATIVE
LYMPHOCYTES # BLD AUTO: 1.2 10*3/MM3 (ref 0.7–3.1)
LYMPHOCYTES NFR BLD AUTO: 27.5 % (ref 19.6–45.3)
MAGNESIUM SERPL-MCNC: 2.3 MG/DL (ref 1.7–2.3)
MCH RBC QN AUTO: 31.2 PG (ref 26.6–33)
MCHC RBC AUTO-ENTMCNC: 33.6 G/DL (ref 31.5–35.7)
MCV RBC AUTO: 92.8 FL (ref 79–97)
MONOCYTES # BLD AUTO: 0.64 10*3/MM3 (ref 0.1–0.9)
MONOCYTES NFR BLD AUTO: 14.7 % (ref 5–12)
NEUTROPHILS NFR BLD AUTO: 2.47 10*3/MM3 (ref 1.7–7)
NEUTROPHILS NFR BLD AUTO: 56.6 % (ref 42.7–76)
NITRITE UR QL STRIP: NEGATIVE
NRBC BLD AUTO-RTO: 0 /100 WBC (ref 0–0.2)
NT-PROBNP SERPL-MCNC: 3238 PG/ML (ref 0–1800)
PH UR STRIP.AUTO: 7 [PH] (ref 5–8)
PLATELET # BLD AUTO: 157 10*3/MM3 (ref 140–450)
PMV BLD AUTO: 10.8 FL (ref 6–12)
POTASSIUM SERPL-SCNC: 4 MMOL/L (ref 3.5–5.2)
PROT SERPL-MCNC: 7.1 G/DL (ref 6–8.5)
PROT UR QL STRIP: NEGATIVE
RBC # BLD AUTO: 4.01 10*6/MM3 (ref 3.77–5.28)
SODIUM SERPL-SCNC: 139 MMOL/L (ref 136–145)
SP GR UR STRIP: <=1.005 (ref 1–1.03)
TROPONIN T SERPL-MCNC: 0.02 NG/ML (ref 0–0.03)
UROBILINOGEN UR QL STRIP: NORMAL
WBC NRBC COR # BLD: 4.36 10*3/MM3 (ref 3.4–10.8)
WHOLE BLOOD HOLD SPECIMEN: NORMAL
WHOLE BLOOD HOLD SPECIMEN: NORMAL

## 2022-02-19 PROCEDURE — 99283 EMERGENCY DEPT VISIT LOW MDM: CPT

## 2022-02-19 PROCEDURE — 80053 COMPREHEN METABOLIC PANEL: CPT | Performed by: EMERGENCY MEDICINE

## 2022-02-19 PROCEDURE — C9803 HOPD COVID-19 SPEC COLLECT: HCPCS | Performed by: EMERGENCY MEDICINE

## 2022-02-19 PROCEDURE — 83880 ASSAY OF NATRIURETIC PEPTIDE: CPT | Performed by: EMERGENCY MEDICINE

## 2022-02-19 PROCEDURE — 93005 ELECTROCARDIOGRAM TRACING: CPT

## 2022-02-19 PROCEDURE — 99284 EMERGENCY DEPT VISIT MOD MDM: CPT

## 2022-02-19 PROCEDURE — 84484 ASSAY OF TROPONIN QUANT: CPT | Performed by: EMERGENCY MEDICINE

## 2022-02-19 PROCEDURE — 93005 ELECTROCARDIOGRAM TRACING: CPT | Performed by: EMERGENCY MEDICINE

## 2022-02-19 PROCEDURE — 63710000001 ONDANSETRON ODT 4 MG TABLET DISPERSIBLE: Performed by: EMERGENCY MEDICINE

## 2022-02-19 PROCEDURE — U0005 INFEC AGEN DETEC AMPLI PROBE: HCPCS | Performed by: EMERGENCY MEDICINE

## 2022-02-19 PROCEDURE — U0004 COV-19 TEST NON-CDC HGH THRU: HCPCS | Performed by: EMERGENCY MEDICINE

## 2022-02-19 PROCEDURE — 36415 COLL VENOUS BLD VENIPUNCTURE: CPT

## 2022-02-19 PROCEDURE — 83735 ASSAY OF MAGNESIUM: CPT | Performed by: EMERGENCY MEDICINE

## 2022-02-19 PROCEDURE — 81003 URINALYSIS AUTO W/O SCOPE: CPT | Performed by: EMERGENCY MEDICINE

## 2022-02-19 PROCEDURE — 71045 X-RAY EXAM CHEST 1 VIEW: CPT

## 2022-02-19 PROCEDURE — 93010 ELECTROCARDIOGRAM REPORT: CPT | Performed by: INTERNAL MEDICINE

## 2022-02-19 PROCEDURE — 85025 COMPLETE CBC W/AUTO DIFF WBC: CPT

## 2022-02-19 RX ORDER — SODIUM CHLORIDE 0.9 % (FLUSH) 0.9 %
10 SYRINGE (ML) INJECTION AS NEEDED
Status: DISCONTINUED | OUTPATIENT
Start: 2022-02-19 | End: 2022-02-19 | Stop reason: HOSPADM

## 2022-02-19 RX ORDER — DOXYCYCLINE 100 MG/1
100 CAPSULE ORAL 2 TIMES DAILY
Qty: 20 CAPSULE | Refills: 0 | Status: SHIPPED | OUTPATIENT
Start: 2022-02-19 | End: 2022-03-31

## 2022-02-19 RX ORDER — ONDANSETRON 4 MG/1
4 TABLET, ORALLY DISINTEGRATING ORAL ONCE
Status: COMPLETED | OUTPATIENT
Start: 2022-02-19 | End: 2022-02-19

## 2022-02-19 RX ADMIN — ONDANSETRON 4 MG: 4 TABLET, ORALLY DISINTEGRATING ORAL at 18:36

## 2022-02-20 LAB — SARS-COV-2 RNA PNL SPEC NAA+PROBE: DETECTED

## 2022-02-21 ENCOUNTER — APPOINTMENT (OUTPATIENT)
Dept: GENERAL RADIOLOGY | Facility: HOSPITAL | Age: 87
End: 2022-02-21

## 2022-02-21 ENCOUNTER — HOSPITAL ENCOUNTER (INPATIENT)
Facility: HOSPITAL | Age: 87
LOS: 4 days | Discharge: NURSING, REHAB OR LONG-TERM CARE | End: 2022-02-25
Attending: EMERGENCY MEDICINE | Admitting: INTERNAL MEDICINE

## 2022-02-21 DIAGNOSIS — R26.2 DIFFICULTY WALKING: ICD-10-CM

## 2022-02-21 DIAGNOSIS — J18.9 PNEUMONIA DUE TO INFECTIOUS ORGANISM, UNSPECIFIED LATERALITY, UNSPECIFIED PART OF LUNG: ICD-10-CM

## 2022-02-21 DIAGNOSIS — U07.1 COVID: ICD-10-CM

## 2022-02-21 DIAGNOSIS — Z78.9 DECREASED ACTIVITIES OF DAILY LIVING (ADL): ICD-10-CM

## 2022-02-21 DIAGNOSIS — I50.9 CONGESTIVE HEART FAILURE, UNSPECIFIED HF CHRONICITY, UNSPECIFIED HEART FAILURE TYPE: Primary | ICD-10-CM

## 2022-02-21 LAB
ALBUMIN SERPL-MCNC: 3.8 G/DL (ref 3.5–5.2)
ALBUMIN/GLOB SERPL: 1.2 G/DL
ALP SERPL-CCNC: 82 U/L (ref 39–117)
ALT SERPL W P-5'-P-CCNC: 24 U/L (ref 1–33)
ANION GAP SERPL CALCULATED.3IONS-SCNC: 12.9 MMOL/L (ref 5–15)
ARTERIAL PATENCY WRIST A: POSITIVE
AST SERPL-CCNC: 41 U/L (ref 1–32)
BASE EXCESS BLDA CALC-SCNC: 4.5 MMOL/L (ref -2–2)
BASOPHILS # BLD AUTO: 0.02 10*3/MM3 (ref 0–0.2)
BASOPHILS NFR BLD AUTO: 0.4 % (ref 0–1.5)
BDY SITE: ABNORMAL
BILIRUB SERPL-MCNC: 0.7 MG/DL (ref 0–1.2)
BUN SERPL-MCNC: 22 MG/DL (ref 8–23)
BUN/CREAT SERPL: 23.7 (ref 7–25)
CALCIUM SPEC-SCNC: 9.6 MG/DL (ref 8.2–9.6)
CHLORIDE SERPL-SCNC: 98 MMOL/L (ref 98–107)
CO2 SERPL-SCNC: 26.1 MMOL/L (ref 22–29)
COHGB MFR BLD: 0.3 % (ref 0–1.5)
CREAT SERPL-MCNC: 0.93 MG/DL (ref 0.57–1)
D-LACTATE SERPL-SCNC: 1 MMOL/L (ref 0.5–2)
DEPRECATED RDW RBC AUTO: 47.7 FL (ref 37–54)
EOSINOPHIL # BLD AUTO: 0.02 10*3/MM3 (ref 0–0.4)
EOSINOPHIL NFR BLD AUTO: 0.4 % (ref 0.3–6.2)
ERYTHROCYTE [DISTWIDTH] IN BLOOD BY AUTOMATED COUNT: 14 % (ref 12.3–15.4)
FHHB: 5.8 % (ref 0–5)
GFR SERPL CREATININE-BSD FRML MDRD: 56 ML/MIN/1.73
GLOBULIN UR ELPH-MCNC: 3.2 GM/DL
GLUCOSE BLDC GLUCOMTR-MCNC: 77 MG/DL (ref 70–99)
GLUCOSE SERPL-MCNC: 89 MG/DL (ref 65–99)
HCO3 BLDA-SCNC: 28.2 MMOL/L (ref 22–26)
HCT VFR BLD AUTO: 37.6 % (ref 34–46.6)
HGB BLD-MCNC: 12.4 G/DL (ref 12–15.9)
HGB BLDA-MCNC: 13 G/DL (ref 11.7–14.6)
HOLD SPECIMEN: NORMAL
HOLD SPECIMEN: NORMAL
IMM GRANULOCYTES # BLD AUTO: 0.01 10*3/MM3 (ref 0–0.05)
IMM GRANULOCYTES NFR BLD AUTO: 0.2 % (ref 0–0.5)
INHALED O2 CONCENTRATION: 21 %
LACTATE BLDA-SCNC: ABNORMAL MMOL/L
LYMPHOCYTES # BLD AUTO: 1.52 10*3/MM3 (ref 0.7–3.1)
LYMPHOCYTES NFR BLD AUTO: 27 % (ref 19.6–45.3)
MCH RBC QN AUTO: 30.5 PG (ref 26.6–33)
MCHC RBC AUTO-ENTMCNC: 33 G/DL (ref 31.5–35.7)
MCV RBC AUTO: 92.6 FL (ref 79–97)
METHGB BLD QL: 0.1 % (ref 0–1.5)
MODALITY: ABNORMAL
MONOCYTES # BLD AUTO: 0.81 10*3/MM3 (ref 0.1–0.9)
MONOCYTES NFR BLD AUTO: 14.4 % (ref 5–12)
NEUTROPHILS NFR BLD AUTO: 3.24 10*3/MM3 (ref 1.7–7)
NEUTROPHILS NFR BLD AUTO: 57.6 % (ref 42.7–76)
NRBC BLD AUTO-RTO: 0 /100 WBC (ref 0–0.2)
NT-PROBNP SERPL-MCNC: 2866 PG/ML (ref 0–1800)
OXYHGB MFR BLDV: 93.8 % (ref 94–99)
PCO2 BLDA: 38.8 MM HG (ref 35–45)
PH BLDA: 7.48 PH UNITS (ref 7.35–7.45)
PLATELET # BLD AUTO: 176 10*3/MM3 (ref 140–450)
PMV BLD AUTO: 10.3 FL (ref 6–12)
PO2 BLD: 328 MM[HG] (ref 0–500)
PO2 BLDA: 68.8 MM HG (ref 80–100)
POTASSIUM SERPL-SCNC: 4.1 MMOL/L (ref 3.5–5.2)
PROCALCITONIN SERPL-MCNC: 0.17 NG/ML (ref 0–0.25)
PROT SERPL-MCNC: 7 G/DL (ref 6–8.5)
QT INTERVAL: 460 MS
RBC # BLD AUTO: 4.06 10*6/MM3 (ref 3.77–5.28)
SAO2 % BLDCOA: 94.2 % (ref 95–99)
SODIUM SERPL-SCNC: 137 MMOL/L (ref 136–145)
TROPONIN T SERPL-MCNC: 0.03 NG/ML (ref 0–0.03)
WBC NRBC COR # BLD: 5.62 10*3/MM3 (ref 3.4–10.8)
WHOLE BLOOD HOLD SPECIMEN: NORMAL
WHOLE BLOOD HOLD SPECIMEN: NORMAL

## 2022-02-21 PROCEDURE — 93005 ELECTROCARDIOGRAM TRACING: CPT | Performed by: EMERGENCY MEDICINE

## 2022-02-21 PROCEDURE — 84484 ASSAY OF TROPONIN QUANT: CPT

## 2022-02-21 PROCEDURE — 71045 X-RAY EXAM CHEST 1 VIEW: CPT

## 2022-02-21 PROCEDURE — 36600 WITHDRAWAL OF ARTERIAL BLOOD: CPT | Performed by: EMERGENCY MEDICINE

## 2022-02-21 PROCEDURE — 80053 COMPREHEN METABOLIC PANEL: CPT

## 2022-02-21 PROCEDURE — 83605 ASSAY OF LACTIC ACID: CPT | Performed by: EMERGENCY MEDICINE

## 2022-02-21 PROCEDURE — 82962 GLUCOSE BLOOD TEST: CPT

## 2022-02-21 PROCEDURE — 83050 HGB METHEMOGLOBIN QUAN: CPT | Performed by: EMERGENCY MEDICINE

## 2022-02-21 PROCEDURE — 93010 ELECTROCARDIOGRAM REPORT: CPT | Performed by: INTERNAL MEDICINE

## 2022-02-21 PROCEDURE — 84145 PROCALCITONIN (PCT): CPT | Performed by: INTERNAL MEDICINE

## 2022-02-21 PROCEDURE — 25010000002 FUROSEMIDE PER 20 MG: Performed by: EMERGENCY MEDICINE

## 2022-02-21 PROCEDURE — 87040 BLOOD CULTURE FOR BACTERIA: CPT | Performed by: EMERGENCY MEDICINE

## 2022-02-21 PROCEDURE — 82805 BLOOD GASES W/O2 SATURATION: CPT | Performed by: EMERGENCY MEDICINE

## 2022-02-21 PROCEDURE — 82375 ASSAY CARBOXYHB QUANT: CPT | Performed by: EMERGENCY MEDICINE

## 2022-02-21 PROCEDURE — 99284 EMERGENCY DEPT VISIT MOD MDM: CPT

## 2022-02-21 PROCEDURE — 83880 ASSAY OF NATRIURETIC PEPTIDE: CPT

## 2022-02-21 PROCEDURE — 99223 1ST HOSP IP/OBS HIGH 75: CPT | Performed by: INTERNAL MEDICINE

## 2022-02-21 PROCEDURE — 25010000002 ONDANSETRON PER 1 MG: Performed by: INTERNAL MEDICINE

## 2022-02-21 PROCEDURE — 85025 COMPLETE CBC W/AUTO DIFF WBC: CPT

## 2022-02-21 PROCEDURE — 93005 ELECTROCARDIOGRAM TRACING: CPT

## 2022-02-21 PROCEDURE — 25010000002 CEFTRIAXONE PER 250 MG: Performed by: EMERGENCY MEDICINE

## 2022-02-21 RX ORDER — ONDANSETRON 4 MG/1
4 TABLET, FILM COATED ORAL EVERY 6 HOURS PRN
Status: DISCONTINUED | OUTPATIENT
Start: 2022-02-21 | End: 2022-02-25 | Stop reason: HOSPADM

## 2022-02-21 RX ORDER — SODIUM CHLORIDE 0.9 % (FLUSH) 0.9 %
10 SYRINGE (ML) INJECTION AS NEEDED
Status: DISCONTINUED | OUTPATIENT
Start: 2022-02-21 | End: 2022-02-25 | Stop reason: HOSPADM

## 2022-02-21 RX ORDER — ONDANSETRON 2 MG/ML
4 INJECTION INTRAMUSCULAR; INTRAVENOUS EVERY 6 HOURS PRN
Status: DISCONTINUED | OUTPATIENT
Start: 2022-02-21 | End: 2022-02-25 | Stop reason: HOSPADM

## 2022-02-21 RX ORDER — SODIUM CHLORIDE 0.9 % (FLUSH) 0.9 %
10 SYRINGE (ML) INJECTION AS NEEDED
Status: DISCONTINUED | OUTPATIENT
Start: 2022-02-21 | End: 2022-02-22

## 2022-02-21 RX ORDER — SODIUM CHLORIDE 0.9 % (FLUSH) 0.9 %
10 SYRINGE (ML) INJECTION EVERY 12 HOURS SCHEDULED
Status: DISCONTINUED | OUTPATIENT
Start: 2022-02-21 | End: 2022-02-25 | Stop reason: HOSPADM

## 2022-02-21 RX ORDER — CEFTRIAXONE SODIUM 1 G/50ML
1 INJECTION, SOLUTION INTRAVENOUS ONCE
Status: COMPLETED | OUTPATIENT
Start: 2022-02-21 | End: 2022-02-21

## 2022-02-21 RX ORDER — ACETAMINOPHEN 325 MG/1
650 TABLET ORAL EVERY 4 HOURS PRN
Status: DISCONTINUED | OUTPATIENT
Start: 2022-02-21 | End: 2022-02-25 | Stop reason: HOSPADM

## 2022-02-21 RX ORDER — FUROSEMIDE 10 MG/ML
40 INJECTION INTRAMUSCULAR; INTRAVENOUS ONCE
Status: COMPLETED | OUTPATIENT
Start: 2022-02-21 | End: 2022-02-21

## 2022-02-21 RX ADMIN — Medication 10 ML: at 20:00

## 2022-02-21 RX ADMIN — FUROSEMIDE 40 MG: 10 INJECTION, SOLUTION INTRAMUSCULAR; INTRAVENOUS at 19:26

## 2022-02-21 RX ADMIN — CEFTRIAXONE SODIUM 1 G: 1 INJECTION, SOLUTION INTRAVENOUS at 16:47

## 2022-02-21 RX ADMIN — ONDANSETRON 4 MG: 2 INJECTION INTRAMUSCULAR; INTRAVENOUS at 19:26

## 2022-02-22 LAB
ALBUMIN SERPL-MCNC: 3.4 G/DL (ref 3.5–5.2)
ALBUMIN/GLOB SERPL: 1.1 G/DL
ALP SERPL-CCNC: 80 U/L (ref 39–117)
ALT SERPL W P-5'-P-CCNC: 22 U/L (ref 1–33)
ANION GAP SERPL CALCULATED.3IONS-SCNC: 13.6 MMOL/L (ref 5–15)
AST SERPL-CCNC: 39 U/L (ref 1–32)
BILIRUB SERPL-MCNC: 0.5 MG/DL (ref 0–1.2)
BUN SERPL-MCNC: 24 MG/DL (ref 8–23)
BUN/CREAT SERPL: 23.3 (ref 7–25)
CALCIUM SPEC-SCNC: 9 MG/DL (ref 8.2–9.6)
CHLORIDE SERPL-SCNC: 100 MMOL/L (ref 98–107)
CO2 SERPL-SCNC: 26.4 MMOL/L (ref 22–29)
CREAT SERPL-MCNC: 1.03 MG/DL (ref 0.57–1)
D DIMER PPP FEU-MCNC: 0.41 MG/L (FEU) (ref 0–0.59)
DEPRECATED RDW RBC AUTO: 48.2 FL (ref 37–54)
ERYTHROCYTE [DISTWIDTH] IN BLOOD BY AUTOMATED COUNT: 14.2 % (ref 12.3–15.4)
GFR SERPL CREATININE-BSD FRML MDRD: 50 ML/MIN/1.73
GLOBULIN UR ELPH-MCNC: 3.2 GM/DL
GLUCOSE BLDC GLUCOMTR-MCNC: 127 MG/DL (ref 70–99)
GLUCOSE BLDC GLUCOMTR-MCNC: 176 MG/DL (ref 70–99)
GLUCOSE BLDC GLUCOMTR-MCNC: 91 MG/DL (ref 70–99)
GLUCOSE SERPL-MCNC: 81 MG/DL (ref 65–99)
HCT VFR BLD AUTO: 39.1 % (ref 34–46.6)
HGB BLD-MCNC: 13.1 G/DL (ref 12–15.9)
MAGNESIUM SERPL-MCNC: 2 MG/DL (ref 1.7–2.3)
MCH RBC QN AUTO: 31.4 PG (ref 26.6–33)
MCHC RBC AUTO-ENTMCNC: 33.5 G/DL (ref 31.5–35.7)
MCV RBC AUTO: 93.8 FL (ref 79–97)
PLATELET # BLD AUTO: 181 10*3/MM3 (ref 140–450)
PMV BLD AUTO: 10.6 FL (ref 6–12)
POTASSIUM SERPL-SCNC: 3.6 MMOL/L (ref 3.5–5.2)
PROCALCITONIN SERPL-MCNC: 0.16 NG/ML (ref 0–0.25)
PROT SERPL-MCNC: 6.6 G/DL (ref 6–8.5)
QT INTERVAL: 450 MS
RBC # BLD AUTO: 4.17 10*6/MM3 (ref 3.77–5.28)
SODIUM SERPL-SCNC: 140 MMOL/L (ref 136–145)
WBC NRBC COR # BLD: 5.51 10*3/MM3 (ref 3.4–10.8)

## 2022-02-22 PROCEDURE — 80053 COMPREHEN METABOLIC PANEL: CPT | Performed by: INTERNAL MEDICINE

## 2022-02-22 PROCEDURE — 84145 PROCALCITONIN (PCT): CPT | Performed by: INTERNAL MEDICINE

## 2022-02-22 PROCEDURE — 85027 COMPLETE CBC AUTOMATED: CPT | Performed by: INTERNAL MEDICINE

## 2022-02-22 PROCEDURE — 82962 GLUCOSE BLOOD TEST: CPT

## 2022-02-22 PROCEDURE — 83735 ASSAY OF MAGNESIUM: CPT | Performed by: INTERNAL MEDICINE

## 2022-02-22 PROCEDURE — 97165 OT EVAL LOW COMPLEX 30 MIN: CPT

## 2022-02-22 PROCEDURE — 36415 COLL VENOUS BLD VENIPUNCTURE: CPT | Performed by: INTERNAL MEDICINE

## 2022-02-22 PROCEDURE — 99232 SBSQ HOSP IP/OBS MODERATE 35: CPT | Performed by: INTERNAL MEDICINE

## 2022-02-22 PROCEDURE — 97161 PT EVAL LOW COMPLEX 20 MIN: CPT

## 2022-02-22 PROCEDURE — 97530 THERAPEUTIC ACTIVITIES: CPT

## 2022-02-22 PROCEDURE — 85379 FIBRIN DEGRADATION QUANT: CPT | Performed by: INTERNAL MEDICINE

## 2022-02-22 RX ORDER — CLINDAMYCIN PHOSPHATE 300 MG/50ML
300 INJECTION, SOLUTION INTRAVENOUS EVERY 8 HOURS
Status: COMPLETED | OUTPATIENT
Start: 2022-02-22 | End: 2022-02-25

## 2022-02-22 RX ORDER — IRON POLYSACCHARIDE COMPLEX 150 MG
150 CAPSULE ORAL DAILY
Status: DISCONTINUED | OUTPATIENT
Start: 2022-02-22 | End: 2022-02-25 | Stop reason: HOSPADM

## 2022-02-22 RX ORDER — DABIGATRAN ETEXILATE 75 MG/1
75 CAPSULE ORAL 2 TIMES DAILY
Status: DISCONTINUED | OUTPATIENT
Start: 2022-02-22 | End: 2022-02-25 | Stop reason: HOSPADM

## 2022-02-22 RX ORDER — MONTELUKAST SODIUM 5 MG/1
5 TABLET, CHEWABLE ORAL DAILY
Status: DISCONTINUED | OUTPATIENT
Start: 2022-02-22 | End: 2022-02-25 | Stop reason: HOSPADM

## 2022-02-22 RX ORDER — RALOXIFENE HYDROCHLORIDE 60 MG/1
60 TABLET, FILM COATED ORAL DAILY
Status: DISCONTINUED | OUTPATIENT
Start: 2022-02-22 | End: 2022-02-22 | Stop reason: CLARIF

## 2022-02-22 RX ORDER — SPIRONOLACTONE 25 MG/1
12.5 TABLET ORAL DAILY
Status: DISCONTINUED | OUTPATIENT
Start: 2022-02-22 | End: 2022-02-25 | Stop reason: HOSPADM

## 2022-02-22 RX ORDER — ASPIRIN 81 MG/1
81 TABLET ORAL DAILY
Status: DISCONTINUED | OUTPATIENT
Start: 2022-02-22 | End: 2022-02-25 | Stop reason: HOSPADM

## 2022-02-22 RX ORDER — ALBUTEROL SULFATE 90 UG/1
2 AEROSOL, METERED RESPIRATORY (INHALATION) EVERY 4 HOURS PRN
Status: DISCONTINUED | OUTPATIENT
Start: 2022-02-22 | End: 2022-02-25 | Stop reason: HOSPADM

## 2022-02-22 RX ORDER — LOSARTAN POTASSIUM 25 MG/1
25 TABLET ORAL DAILY
Status: DISCONTINUED | OUTPATIENT
Start: 2022-02-22 | End: 2022-02-25 | Stop reason: HOSPADM

## 2022-02-22 RX ORDER — FUROSEMIDE 40 MG/1
40 TABLET ORAL DAILY
Status: DISCONTINUED | OUTPATIENT
Start: 2022-02-22 | End: 2022-02-23

## 2022-02-22 RX ORDER — CARVEDILOL 6.25 MG/1
6.25 TABLET ORAL 2 TIMES DAILY WITH MEALS
Status: DISCONTINUED | OUTPATIENT
Start: 2022-02-22 | End: 2022-02-25 | Stop reason: HOSPADM

## 2022-02-22 RX ORDER — MONTELUKAST SODIUM 10 MG/1
5 TABLET ORAL DAILY
Status: DISCONTINUED | OUTPATIENT
Start: 2022-02-22 | End: 2022-02-22 | Stop reason: CLARIF

## 2022-02-22 RX ORDER — ATORVASTATIN CALCIUM 40 MG/1
40 TABLET, FILM COATED ORAL NIGHTLY
Status: DISCONTINUED | OUTPATIENT
Start: 2022-02-22 | End: 2022-02-25 | Stop reason: HOSPADM

## 2022-02-22 RX ADMIN — ATORVASTATIN CALCIUM 40 MG: 40 TABLET, FILM COATED ORAL at 00:46

## 2022-02-22 RX ADMIN — Medication 1 TABLET: at 06:51

## 2022-02-22 RX ADMIN — DABIGATRAN ETEXILATE MESYLATE 75 MG: 75 CAPSULE ORAL at 00:46

## 2022-02-22 RX ADMIN — ATORVASTATIN CALCIUM 40 MG: 40 TABLET, FILM COATED ORAL at 21:23

## 2022-02-22 RX ADMIN — MONTELUKAST SODIUM 5 MG: 5 TABLET, CHEWABLE ORAL at 09:25

## 2022-02-22 RX ADMIN — CARVEDILOL 6.25 MG: 6.25 TABLET, FILM COATED ORAL at 09:25

## 2022-02-22 RX ADMIN — CLINDAMYCIN PHOSPHATE 300 MG: 300 INJECTION, SOLUTION INTRAVENOUS at 17:25

## 2022-02-22 RX ADMIN — ASPIRIN 81 MG: 81 TABLET, COATED ORAL at 09:23

## 2022-02-22 RX ADMIN — CLINDAMYCIN PHOSPHATE 300 MG: 300 INJECTION, SOLUTION INTRAVENOUS at 01:07

## 2022-02-22 RX ADMIN — SPIRONOLACTONE 12.5 MG: 25 TABLET ORAL at 09:25

## 2022-02-22 RX ADMIN — LOSARTAN POTASSIUM 25 MG: 25 TABLET, FILM COATED ORAL at 09:25

## 2022-02-22 RX ADMIN — CLINDAMYCIN PHOSPHATE 300 MG: 300 INJECTION, SOLUTION INTRAVENOUS at 09:26

## 2022-02-22 RX ADMIN — Medication 10 ML: at 21:23

## 2022-02-22 RX ADMIN — DABIGATRAN ETEXILATE MESYLATE 75 MG: 75 CAPSULE ORAL at 10:10

## 2022-02-22 RX ADMIN — Medication 150 MG: at 10:10

## 2022-02-22 RX ADMIN — Medication 10 ML: at 09:26

## 2022-02-22 RX ADMIN — DABIGATRAN ETEXILATE MESYLATE 75 MG: 75 CAPSULE ORAL at 21:23

## 2022-02-22 RX ADMIN — CARVEDILOL 6.25 MG: 6.25 TABLET, FILM COATED ORAL at 17:25

## 2022-02-22 RX ADMIN — FUROSEMIDE 40 MG: 40 TABLET ORAL at 09:25

## 2022-02-23 LAB
ALBUMIN SERPL-MCNC: 3.3 G/DL (ref 3.5–5.2)
ALBUMIN/GLOB SERPL: 1 G/DL
ALP SERPL-CCNC: 74 U/L (ref 39–117)
ALT SERPL W P-5'-P-CCNC: 19 U/L (ref 1–33)
ANION GAP SERPL CALCULATED.3IONS-SCNC: 10.7 MMOL/L (ref 5–15)
AST SERPL-CCNC: 35 U/L (ref 1–32)
BASOPHILS # BLD AUTO: 0.02 10*3/MM3 (ref 0–0.2)
BASOPHILS NFR BLD AUTO: 0.5 % (ref 0–1.5)
BILIRUB SERPL-MCNC: 0.4 MG/DL (ref 0–1.2)
BUN SERPL-MCNC: 22 MG/DL (ref 8–23)
BUN/CREAT SERPL: 25 (ref 7–25)
CALCIUM SPEC-SCNC: 8.7 MG/DL (ref 8.2–9.6)
CHLORIDE SERPL-SCNC: 101 MMOL/L (ref 98–107)
CO2 SERPL-SCNC: 28.3 MMOL/L (ref 22–29)
CREAT SERPL-MCNC: 0.88 MG/DL (ref 0.57–1)
DEPRECATED RDW RBC AUTO: 48.6 FL (ref 37–54)
EOSINOPHIL # BLD AUTO: 0.1 10*3/MM3 (ref 0–0.4)
EOSINOPHIL NFR BLD AUTO: 2.3 % (ref 0.3–6.2)
ERYTHROCYTE [DISTWIDTH] IN BLOOD BY AUTOMATED COUNT: 14.1 % (ref 12.3–15.4)
GFR SERPL CREATININE-BSD FRML MDRD: 60 ML/MIN/1.73
GLOBULIN UR ELPH-MCNC: 3.2 GM/DL
GLUCOSE SERPL-MCNC: 94 MG/DL (ref 65–99)
HCT VFR BLD AUTO: 38.7 % (ref 34–46.6)
HGB BLD-MCNC: 13 G/DL (ref 12–15.9)
IMM GRANULOCYTES # BLD AUTO: 0.01 10*3/MM3 (ref 0–0.05)
IMM GRANULOCYTES NFR BLD AUTO: 0.2 % (ref 0–0.5)
LYMPHOCYTES # BLD AUTO: 1.39 10*3/MM3 (ref 0.7–3.1)
LYMPHOCYTES NFR BLD AUTO: 32.2 % (ref 19.6–45.3)
MAGNESIUM SERPL-MCNC: 1.9 MG/DL (ref 1.7–2.3)
MCH RBC QN AUTO: 31.4 PG (ref 26.6–33)
MCHC RBC AUTO-ENTMCNC: 33.6 G/DL (ref 31.5–35.7)
MCV RBC AUTO: 93.5 FL (ref 79–97)
MONOCYTES # BLD AUTO: 0.82 10*3/MM3 (ref 0.1–0.9)
MONOCYTES NFR BLD AUTO: 19 % (ref 5–12)
NEUTROPHILS NFR BLD AUTO: 1.98 10*3/MM3 (ref 1.7–7)
NEUTROPHILS NFR BLD AUTO: 45.8 % (ref 42.7–76)
NRBC BLD AUTO-RTO: 0 /100 WBC (ref 0–0.2)
PHOSPHATE SERPL-MCNC: 2.7 MG/DL (ref 2.5–4.5)
PLATELET # BLD AUTO: 184 10*3/MM3 (ref 140–450)
PMV BLD AUTO: 10.6 FL (ref 6–12)
POTASSIUM SERPL-SCNC: 3.5 MMOL/L (ref 3.5–5.2)
PROT SERPL-MCNC: 6.5 G/DL (ref 6–8.5)
RBC # BLD AUTO: 4.14 10*6/MM3 (ref 3.77–5.28)
SODIUM SERPL-SCNC: 140 MMOL/L (ref 136–145)
WBC NRBC COR # BLD: 4.32 10*3/MM3 (ref 3.4–10.8)

## 2022-02-23 PROCEDURE — 80053 COMPREHEN METABOLIC PANEL: CPT | Performed by: INTERNAL MEDICINE

## 2022-02-23 PROCEDURE — 83735 ASSAY OF MAGNESIUM: CPT | Performed by: INTERNAL MEDICINE

## 2022-02-23 PROCEDURE — 97110 THERAPEUTIC EXERCISES: CPT

## 2022-02-23 PROCEDURE — 25010000002 FUROSEMIDE PER 20 MG: Performed by: INTERNAL MEDICINE

## 2022-02-23 PROCEDURE — 84100 ASSAY OF PHOSPHORUS: CPT | Performed by: INTERNAL MEDICINE

## 2022-02-23 PROCEDURE — 25010000002 ONDANSETRON PER 1 MG: Performed by: INTERNAL MEDICINE

## 2022-02-23 PROCEDURE — 85025 COMPLETE CBC W/AUTO DIFF WBC: CPT | Performed by: INTERNAL MEDICINE

## 2022-02-23 PROCEDURE — 99233 SBSQ HOSP IP/OBS HIGH 50: CPT | Performed by: INTERNAL MEDICINE

## 2022-02-23 RX ORDER — BISACODYL 5 MG/1
5 TABLET, DELAYED RELEASE ORAL DAILY PRN
Status: DISCONTINUED | OUTPATIENT
Start: 2022-02-23 | End: 2022-02-25 | Stop reason: HOSPADM

## 2022-02-23 RX ORDER — AMOXICILLIN 250 MG
2 CAPSULE ORAL 2 TIMES DAILY
Status: DISCONTINUED | OUTPATIENT
Start: 2022-02-23 | End: 2022-02-25 | Stop reason: HOSPADM

## 2022-02-23 RX ORDER — BISACODYL 10 MG
10 SUPPOSITORY, RECTAL RECTAL DAILY PRN
Status: DISCONTINUED | OUTPATIENT
Start: 2022-02-23 | End: 2022-02-25 | Stop reason: HOSPADM

## 2022-02-23 RX ORDER — POLYETHYLENE GLYCOL 3350 17 G/17G
17 POWDER, FOR SOLUTION ORAL DAILY PRN
Status: DISCONTINUED | OUTPATIENT
Start: 2022-02-23 | End: 2022-02-25 | Stop reason: HOSPADM

## 2022-02-23 RX ORDER — FUROSEMIDE 10 MG/ML
40 INJECTION INTRAMUSCULAR; INTRAVENOUS EVERY 12 HOURS
Status: DISCONTINUED | OUTPATIENT
Start: 2022-02-23 | End: 2022-02-25 | Stop reason: HOSPADM

## 2022-02-23 RX ADMIN — Medication 1 TABLET: at 06:31

## 2022-02-23 RX ADMIN — BISACODYL 5 MG: 5 TABLET, COATED ORAL at 13:59

## 2022-02-23 RX ADMIN — DABIGATRAN ETEXILATE MESYLATE 75 MG: 75 CAPSULE ORAL at 20:24

## 2022-02-23 RX ADMIN — ONDANSETRON 4 MG: 2 INJECTION INTRAMUSCULAR; INTRAVENOUS at 13:59

## 2022-02-23 RX ADMIN — CARVEDILOL 6.25 MG: 6.25 TABLET, FILM COATED ORAL at 17:53

## 2022-02-23 RX ADMIN — SENNOSIDES AND DOCUSATE SODIUM 2 TABLET: 50; 8.6 TABLET ORAL at 20:24

## 2022-02-23 RX ADMIN — CLINDAMYCIN PHOSPHATE 300 MG: 300 INJECTION, SOLUTION INTRAVENOUS at 09:25

## 2022-02-23 RX ADMIN — DABIGATRAN ETEXILATE MESYLATE 75 MG: 75 CAPSULE ORAL at 09:59

## 2022-02-23 RX ADMIN — MONTELUKAST SODIUM 5 MG: 5 TABLET, CHEWABLE ORAL at 09:59

## 2022-02-23 RX ADMIN — Medication 10 ML: at 09:25

## 2022-02-23 RX ADMIN — CARVEDILOL 6.25 MG: 6.25 TABLET, FILM COATED ORAL at 10:00

## 2022-02-23 RX ADMIN — CLINDAMYCIN PHOSPHATE 300 MG: 300 INJECTION, SOLUTION INTRAVENOUS at 17:53

## 2022-02-23 RX ADMIN — ATORVASTATIN CALCIUM 40 MG: 40 TABLET, FILM COATED ORAL at 20:24

## 2022-02-23 RX ADMIN — WHITE PETROLATUM 1 APPLICATION: 1.75 OINTMENT TOPICAL at 20:24

## 2022-02-23 RX ADMIN — SPIRONOLACTONE 12.5 MG: 25 TABLET ORAL at 09:59

## 2022-02-23 RX ADMIN — Medication 150 MG: at 09:59

## 2022-02-23 RX ADMIN — ASPIRIN 81 MG: 81 TABLET, COATED ORAL at 09:59

## 2022-02-23 RX ADMIN — LOSARTAN POTASSIUM 25 MG: 25 TABLET, FILM COATED ORAL at 10:00

## 2022-02-23 RX ADMIN — FUROSEMIDE 40 MG: 10 INJECTION, SOLUTION INTRAMUSCULAR; INTRAVENOUS at 15:49

## 2022-02-23 RX ADMIN — CLINDAMYCIN PHOSPHATE 300 MG: 300 INJECTION, SOLUTION INTRAVENOUS at 00:30

## 2022-02-23 RX ADMIN — Medication 10 ML: at 20:23

## 2022-02-23 RX ADMIN — FUROSEMIDE 40 MG: 40 TABLET ORAL at 09:59

## 2022-02-24 LAB
ALBUMIN SERPL-MCNC: 3.5 G/DL (ref 3.5–5.2)
ALBUMIN/GLOB SERPL: 1.1 G/DL
ALP SERPL-CCNC: 81 U/L (ref 39–117)
ALT SERPL W P-5'-P-CCNC: 18 U/L (ref 1–33)
ANION GAP SERPL CALCULATED.3IONS-SCNC: 11.7 MMOL/L (ref 5–15)
AST SERPL-CCNC: 34 U/L (ref 1–32)
BASOPHILS # BLD AUTO: 0.01 10*3/MM3 (ref 0–0.2)
BASOPHILS NFR BLD AUTO: 0.2 % (ref 0–1.5)
BILIRUB SERPL-MCNC: 0.5 MG/DL (ref 0–1.2)
BUN SERPL-MCNC: 25 MG/DL (ref 8–23)
BUN/CREAT SERPL: 26 (ref 7–25)
CALCIUM SPEC-SCNC: 8.6 MG/DL (ref 8.2–9.6)
CHLORIDE SERPL-SCNC: 97 MMOL/L (ref 98–107)
CO2 SERPL-SCNC: 29.3 MMOL/L (ref 22–29)
CREAT SERPL-MCNC: 0.96 MG/DL (ref 0.57–1)
DEPRECATED RDW RBC AUTO: 46.9 FL (ref 37–54)
EOSINOPHIL # BLD AUTO: 0.08 10*3/MM3 (ref 0–0.4)
EOSINOPHIL NFR BLD AUTO: 1.6 % (ref 0.3–6.2)
ERYTHROCYTE [DISTWIDTH] IN BLOOD BY AUTOMATED COUNT: 13.7 % (ref 12.3–15.4)
GFR SERPL CREATININE-BSD FRML MDRD: 54 ML/MIN/1.73
GLOBULIN UR ELPH-MCNC: 3.3 GM/DL
GLUCOSE SERPL-MCNC: 106 MG/DL (ref 65–99)
HCT VFR BLD AUTO: 40 % (ref 34–46.6)
HGB BLD-MCNC: 13.6 G/DL (ref 12–15.9)
IMM GRANULOCYTES # BLD AUTO: 0.04 10*3/MM3 (ref 0–0.05)
IMM GRANULOCYTES NFR BLD AUTO: 0.8 % (ref 0–0.5)
LYMPHOCYTES # BLD AUTO: 1.22 10*3/MM3 (ref 0.7–3.1)
LYMPHOCYTES NFR BLD AUTO: 23.7 % (ref 19.6–45.3)
MAGNESIUM SERPL-MCNC: 1.8 MG/DL (ref 1.7–2.3)
MCH RBC QN AUTO: 31.2 PG (ref 26.6–33)
MCHC RBC AUTO-ENTMCNC: 34 G/DL (ref 31.5–35.7)
MCV RBC AUTO: 91.7 FL (ref 79–97)
MONOCYTES # BLD AUTO: 0.87 10*3/MM3 (ref 0.1–0.9)
MONOCYTES NFR BLD AUTO: 16.9 % (ref 5–12)
NEUTROPHILS NFR BLD AUTO: 2.93 10*3/MM3 (ref 1.7–7)
NEUTROPHILS NFR BLD AUTO: 56.8 % (ref 42.7–76)
NRBC BLD AUTO-RTO: 0 /100 WBC (ref 0–0.2)
PHOSPHATE SERPL-MCNC: 2.6 MG/DL (ref 2.5–4.5)
PLATELET # BLD AUTO: 213 10*3/MM3 (ref 140–450)
PMV BLD AUTO: 10.3 FL (ref 6–12)
POTASSIUM SERPL-SCNC: 3.1 MMOL/L (ref 3.5–5.2)
PROT SERPL-MCNC: 6.8 G/DL (ref 6–8.5)
RBC # BLD AUTO: 4.36 10*6/MM3 (ref 3.77–5.28)
SODIUM SERPL-SCNC: 138 MMOL/L (ref 136–145)
WBC NRBC COR # BLD: 5.15 10*3/MM3 (ref 3.4–10.8)

## 2022-02-24 PROCEDURE — 85025 COMPLETE CBC W/AUTO DIFF WBC: CPT | Performed by: INTERNAL MEDICINE

## 2022-02-24 PROCEDURE — 84100 ASSAY OF PHOSPHORUS: CPT | Performed by: INTERNAL MEDICINE

## 2022-02-24 PROCEDURE — 63710000001 ONDANSETRON PER 8 MG: Performed by: INTERNAL MEDICINE

## 2022-02-24 PROCEDURE — 25010000002 FUROSEMIDE PER 20 MG: Performed by: INTERNAL MEDICINE

## 2022-02-24 PROCEDURE — 83735 ASSAY OF MAGNESIUM: CPT | Performed by: INTERNAL MEDICINE

## 2022-02-24 PROCEDURE — 99232 SBSQ HOSP IP/OBS MODERATE 35: CPT | Performed by: INTERNAL MEDICINE

## 2022-02-24 PROCEDURE — 97530 THERAPEUTIC ACTIVITIES: CPT

## 2022-02-24 PROCEDURE — 80053 COMPREHEN METABOLIC PANEL: CPT | Performed by: INTERNAL MEDICINE

## 2022-02-24 RX ORDER — POTASSIUM CHLORIDE 1.5 G/1.77G
40 POWDER, FOR SOLUTION ORAL EVERY 4 HOURS
Status: COMPLETED | OUTPATIENT
Start: 2022-02-24 | End: 2022-02-24

## 2022-02-24 RX ORDER — POTASSIUM CHLORIDE 750 MG/1
40 CAPSULE, EXTENDED RELEASE ORAL EVERY 4 HOURS
Status: DISCONTINUED | OUTPATIENT
Start: 2022-02-24 | End: 2022-02-24

## 2022-02-24 RX ADMIN — ASPIRIN 81 MG: 81 TABLET, COATED ORAL at 09:39

## 2022-02-24 RX ADMIN — WHITE PETROLATUM 1 APPLICATION: 1.75 OINTMENT TOPICAL at 09:41

## 2022-02-24 RX ADMIN — Medication 150 MG: at 09:39

## 2022-02-24 RX ADMIN — MONTELUKAST SODIUM 5 MG: 5 TABLET, CHEWABLE ORAL at 09:39

## 2022-02-24 RX ADMIN — SPIRONOLACTONE 12.5 MG: 25 TABLET ORAL at 09:40

## 2022-02-24 RX ADMIN — ATORVASTATIN CALCIUM 40 MG: 40 TABLET, FILM COATED ORAL at 20:30

## 2022-02-24 RX ADMIN — Medication 10 ML: at 09:40

## 2022-02-24 RX ADMIN — DABIGATRAN ETEXILATE MESYLATE 75 MG: 75 CAPSULE ORAL at 20:30

## 2022-02-24 RX ADMIN — Medication 1 TABLET: at 06:22

## 2022-02-24 RX ADMIN — CLINDAMYCIN PHOSPHATE 300 MG: 300 INJECTION, SOLUTION INTRAVENOUS at 01:48

## 2022-02-24 RX ADMIN — FUROSEMIDE 40 MG: 10 INJECTION, SOLUTION INTRAMUSCULAR; INTRAVENOUS at 01:48

## 2022-02-24 RX ADMIN — CARVEDILOL 6.25 MG: 6.25 TABLET, FILM COATED ORAL at 17:46

## 2022-02-24 RX ADMIN — POTASSIUM CHLORIDE 40 MEQ: 1.5 POWDER, FOR SOLUTION ORAL at 14:24

## 2022-02-24 RX ADMIN — POTASSIUM CHLORIDE 40 MEQ: 1.5 POWDER, FOR SOLUTION ORAL at 22:16

## 2022-02-24 RX ADMIN — WHITE PETROLATUM 1 APPLICATION: 1.75 OINTMENT TOPICAL at 20:31

## 2022-02-24 RX ADMIN — FUROSEMIDE 40 MG: 10 INJECTION, SOLUTION INTRAMUSCULAR; INTRAVENOUS at 13:16

## 2022-02-24 RX ADMIN — POTASSIUM CHLORIDE 40 MEQ: 1.5 POWDER, FOR SOLUTION ORAL at 17:46

## 2022-02-24 RX ADMIN — Medication 10 ML: at 20:29

## 2022-02-24 RX ADMIN — CLINDAMYCIN PHOSPHATE 300 MG: 300 INJECTION, SOLUTION INTRAVENOUS at 09:52

## 2022-02-24 RX ADMIN — POLYETHYLENE GLYCOL 3350 17 G: 17 POWDER, FOR SOLUTION ORAL at 02:06

## 2022-02-24 RX ADMIN — DABIGATRAN ETEXILATE MESYLATE 75 MG: 75 CAPSULE ORAL at 09:39

## 2022-02-24 RX ADMIN — CARVEDILOL 6.25 MG: 6.25 TABLET, FILM COATED ORAL at 09:39

## 2022-02-24 RX ADMIN — CLINDAMYCIN PHOSPHATE 300 MG: 300 INJECTION, SOLUTION INTRAVENOUS at 17:46

## 2022-02-24 RX ADMIN — LOSARTAN POTASSIUM 25 MG: 25 TABLET, FILM COATED ORAL at 09:39

## 2022-02-24 RX ADMIN — ONDANSETRON HYDROCHLORIDE 4 MG: 4 TABLET, FILM COATED ORAL at 13:16

## 2022-02-25 VITALS
HEART RATE: 65 BPM | BODY MASS INDEX: 20.85 KG/M2 | RESPIRATION RATE: 20 BRPM | TEMPERATURE: 98.1 F | WEIGHT: 122.14 LBS | OXYGEN SATURATION: 94 % | SYSTOLIC BLOOD PRESSURE: 110 MMHG | DIASTOLIC BLOOD PRESSURE: 54 MMHG | HEIGHT: 64 IN

## 2022-02-25 PROBLEM — D89.831 CYTOKINE RELEASE SYNDROME, GRADE 1: Status: ACTIVE | Noted: 2022-02-25

## 2022-02-25 LAB
ALBUMIN SERPL-MCNC: 3.4 G/DL (ref 3.5–5.2)
ALBUMIN/GLOB SERPL: 0.9 G/DL
ALP SERPL-CCNC: 82 U/L (ref 39–117)
ALT SERPL W P-5'-P-CCNC: 17 U/L (ref 1–33)
ANION GAP SERPL CALCULATED.3IONS-SCNC: 10.1 MMOL/L (ref 5–15)
AST SERPL-CCNC: 34 U/L (ref 1–32)
BASOPHILS # BLD AUTO: 0.03 10*3/MM3 (ref 0–0.2)
BASOPHILS NFR BLD AUTO: 0.6 % (ref 0–1.5)
BILIRUB SERPL-MCNC: 0.5 MG/DL (ref 0–1.2)
BUN SERPL-MCNC: 22 MG/DL (ref 8–23)
BUN/CREAT SERPL: 24.7 (ref 7–25)
CALCIUM SPEC-SCNC: 9 MG/DL (ref 8.2–9.6)
CHLORIDE SERPL-SCNC: 99 MMOL/L (ref 98–107)
CO2 SERPL-SCNC: 26.9 MMOL/L (ref 22–29)
CREAT SERPL-MCNC: 0.89 MG/DL (ref 0.57–1)
DEPRECATED RDW RBC AUTO: 46.8 FL (ref 37–54)
EOSINOPHIL # BLD AUTO: 0.1 10*3/MM3 (ref 0–0.4)
EOSINOPHIL NFR BLD AUTO: 1.9 % (ref 0.3–6.2)
ERYTHROCYTE [DISTWIDTH] IN BLOOD BY AUTOMATED COUNT: 14 % (ref 12.3–15.4)
GFR SERPL CREATININE-BSD FRML MDRD: 59 ML/MIN/1.73
GLOBULIN UR ELPH-MCNC: 3.6 GM/DL
GLUCOSE SERPL-MCNC: 104 MG/DL (ref 65–99)
HCT VFR BLD AUTO: 42.1 % (ref 34–46.6)
HGB BLD-MCNC: 13.8 G/DL (ref 12–15.9)
IMM GRANULOCYTES # BLD AUTO: 0.03 10*3/MM3 (ref 0–0.05)
IMM GRANULOCYTES NFR BLD AUTO: 0.6 % (ref 0–0.5)
LYMPHOCYTES # BLD AUTO: 1.35 10*3/MM3 (ref 0.7–3.1)
LYMPHOCYTES NFR BLD AUTO: 26 % (ref 19.6–45.3)
MAGNESIUM SERPL-MCNC: 2 MG/DL (ref 1.7–2.3)
MCH RBC QN AUTO: 30 PG (ref 26.6–33)
MCHC RBC AUTO-ENTMCNC: 32.8 G/DL (ref 31.5–35.7)
MCV RBC AUTO: 91.5 FL (ref 79–97)
MONOCYTES # BLD AUTO: 0.75 10*3/MM3 (ref 0.1–0.9)
MONOCYTES NFR BLD AUTO: 14.4 % (ref 5–12)
NEUTROPHILS NFR BLD AUTO: 2.94 10*3/MM3 (ref 1.7–7)
NEUTROPHILS NFR BLD AUTO: 56.5 % (ref 42.7–76)
NRBC BLD AUTO-RTO: 0 /100 WBC (ref 0–0.2)
PHOSPHATE SERPL-MCNC: 2.4 MG/DL (ref 2.5–4.5)
PLATELET # BLD AUTO: 242 10*3/MM3 (ref 140–450)
PMV BLD AUTO: 10.2 FL (ref 6–12)
POTASSIUM SERPL-SCNC: 3.9 MMOL/L (ref 3.5–5.2)
PROT SERPL-MCNC: 7 G/DL (ref 6–8.5)
RBC # BLD AUTO: 4.6 10*6/MM3 (ref 3.77–5.28)
SODIUM SERPL-SCNC: 136 MMOL/L (ref 136–145)
WBC NRBC COR # BLD: 5.2 10*3/MM3 (ref 3.4–10.8)

## 2022-02-25 PROCEDURE — 97116 GAIT TRAINING THERAPY: CPT

## 2022-02-25 PROCEDURE — 80053 COMPREHEN METABOLIC PANEL: CPT | Performed by: INTERNAL MEDICINE

## 2022-02-25 PROCEDURE — 99239 HOSP IP/OBS DSCHRG MGMT >30: CPT | Performed by: INTERNAL MEDICINE

## 2022-02-25 PROCEDURE — 84100 ASSAY OF PHOSPHORUS: CPT | Performed by: INTERNAL MEDICINE

## 2022-02-25 PROCEDURE — 97110 THERAPEUTIC EXERCISES: CPT

## 2022-02-25 PROCEDURE — 83735 ASSAY OF MAGNESIUM: CPT | Performed by: INTERNAL MEDICINE

## 2022-02-25 PROCEDURE — 85025 COMPLETE CBC W/AUTO DIFF WBC: CPT | Performed by: INTERNAL MEDICINE

## 2022-02-25 PROCEDURE — 25010000002 FUROSEMIDE PER 20 MG: Performed by: INTERNAL MEDICINE

## 2022-02-25 PROCEDURE — 63710000001 ONDANSETRON PER 8 MG: Performed by: INTERNAL MEDICINE

## 2022-02-25 RX ADMIN — DABIGATRAN ETEXILATE MESYLATE 75 MG: 75 CAPSULE ORAL at 09:10

## 2022-02-25 RX ADMIN — CARVEDILOL 6.25 MG: 6.25 TABLET, FILM COATED ORAL at 09:10

## 2022-02-25 RX ADMIN — LOSARTAN POTASSIUM 25 MG: 25 TABLET, FILM COATED ORAL at 09:10

## 2022-02-25 RX ADMIN — Medication 1 TABLET: at 06:11

## 2022-02-25 RX ADMIN — WHITE PETROLATUM 1 APPLICATION: 1.75 OINTMENT TOPICAL at 10:29

## 2022-02-25 RX ADMIN — FUROSEMIDE 40 MG: 10 INJECTION, SOLUTION INTRAMUSCULAR; INTRAVENOUS at 02:25

## 2022-02-25 RX ADMIN — FUROSEMIDE 40 MG: 10 INJECTION, SOLUTION INTRAMUSCULAR; INTRAVENOUS at 13:09

## 2022-02-25 RX ADMIN — SPIRONOLACTONE 12.5 MG: 25 TABLET ORAL at 09:10

## 2022-02-25 RX ADMIN — ONDANSETRON HYDROCHLORIDE 4 MG: 4 TABLET, FILM COATED ORAL at 10:29

## 2022-02-25 RX ADMIN — Medication 150 MG: at 09:10

## 2022-02-25 RX ADMIN — ASPIRIN 81 MG: 81 TABLET, COATED ORAL at 09:10

## 2022-02-25 RX ADMIN — Medication 10 ML: at 09:10

## 2022-02-25 RX ADMIN — ACETAMINOPHEN 650 MG: 325 TABLET ORAL at 13:09

## 2022-02-25 RX ADMIN — MONTELUKAST SODIUM 5 MG: 5 TABLET, CHEWABLE ORAL at 09:10

## 2022-02-26 LAB
BACTERIA SPEC AEROBE CULT: NORMAL
BACTERIA SPEC AEROBE CULT: NORMAL

## 2022-03-17 ENCOUNTER — TELEPHONE (OUTPATIENT)
Dept: ONCOLOGY | Facility: HOSPITAL | Age: 87
End: 2022-03-17

## 2022-03-17 NOTE — TELEPHONE ENCOUNTER
Caller: JORGITO CHRISTIANSON    Relationship: Emergency Contact    Best call back number: [phone]    What is the best time to reach you: ASAP    Who are you requesting to speak with (clinical staff, provider,  specific staff member): NURSE    Do you know the name of the person who called:     What was the call regarding: PT HAS APPT QUESTIONS     Do you require a callback: YES

## 2022-03-21 ENCOUNTER — TELEPHONE (OUTPATIENT)
Dept: ONCOLOGY | Facility: HOSPITAL | Age: 87
End: 2022-03-21

## 2022-03-22 ENCOUNTER — TELEPHONE (OUTPATIENT)
Dept: INTERNAL MEDICINE | Facility: CLINIC | Age: 87
End: 2022-03-22

## 2022-03-22 ENCOUNTER — APPOINTMENT (OUTPATIENT)
Dept: ONCOLOGY | Facility: HOSPITAL | Age: 87
End: 2022-03-22

## 2022-03-22 NOTE — TELEPHONE ENCOUNTER
Caller: JORGITO CHRISTIANSON    Relationship to patient: Emergency Contact    Best call back number: [phone]     Patient is needing: PATIENT DAUGHTER CALLED STATING SHE WOULD LIKE TO KNOW IF THE PATIENTS PCP CAN PUT ORDERS IN FOR THE HOME HEALTH NURSE THAT COMES FOR HER EVERY Monday TO GET LABS DONE FOR UPCOMING APPT WITH PCP ON 03/31/2022. THE PATIENTS DAUGHTER WOULD LIKE A CALL BACK PLEASE ADVISE THANK YOU.

## 2022-03-24 ENCOUNTER — APPOINTMENT (OUTPATIENT)
Dept: ONCOLOGY | Facility: HOSPITAL | Age: 87
End: 2022-03-24

## 2022-03-28 ENCOUNTER — LAB REQUISITION (OUTPATIENT)
Dept: LAB | Facility: HOSPITAL | Age: 87
End: 2022-03-28

## 2022-03-28 DIAGNOSIS — R53.1 WEAKNESS: ICD-10-CM

## 2022-03-28 DIAGNOSIS — E10.9 TYPE 1 DIABETES MELLITUS WITHOUT COMPLICATIONS: ICD-10-CM

## 2022-03-28 LAB
ACANTHOCYTES BLD QL SMEAR: NORMAL
ALBUMIN SERPL-MCNC: 3.6 G/DL (ref 3.5–5.2)
ALBUMIN/GLOB SERPL: 1.1 G/DL
ALP SERPL-CCNC: 72 U/L (ref 39–117)
ALT SERPL W P-5'-P-CCNC: 20 U/L (ref 1–33)
ANION GAP SERPL CALCULATED.3IONS-SCNC: 13.9 MMOL/L (ref 5–15)
AST SERPL-CCNC: 34 U/L (ref 1–32)
BILIRUB SERPL-MCNC: 0.6 MG/DL (ref 0–1.2)
BILIRUB UR QL STRIP: NEGATIVE
BUN SERPL-MCNC: 23 MG/DL (ref 8–23)
BUN/CREAT SERPL: 28.4 (ref 7–25)
CALCIUM SPEC-SCNC: 9.4 MG/DL (ref 8.2–9.6)
CHLORIDE SERPL-SCNC: 102 MMOL/L (ref 98–107)
CHOLEST SERPL-MCNC: 156 MG/DL (ref 0–200)
CLARITY UR: CLEAR
CO2 SERPL-SCNC: 28.1 MMOL/L (ref 22–29)
COLOR UR: YELLOW
CREAT SERPL-MCNC: 0.81 MG/DL (ref 0.57–1)
DEPRECATED RDW RBC AUTO: 47 FL (ref 37–54)
EGFRCR SERPLBLD CKD-EPI 2021: 67.8 ML/MIN/1.73
EOSINOPHIL # BLD MANUAL: 0.07 10*3/MM3 (ref 0–0.4)
EOSINOPHIL NFR BLD MANUAL: 1 % (ref 0.3–6.2)
ERYTHROCYTE [DISTWIDTH] IN BLOOD BY AUTOMATED COUNT: 13.7 % (ref 12.3–15.4)
FERRITIN SERPL-MCNC: 209 NG/ML (ref 13–150)
GLOBULIN UR ELPH-MCNC: 3.4 GM/DL
GLUCOSE SERPL-MCNC: 94 MG/DL (ref 65–99)
GLUCOSE UR STRIP-MCNC: NEGATIVE MG/DL
HBA1C MFR BLD: 6.4 % (ref 4.8–5.6)
HCT VFR BLD AUTO: 39.4 % (ref 34–46.6)
HDLC SERPL-MCNC: 69 MG/DL (ref 40–60)
HGB BLD-MCNC: 13 G/DL (ref 12–15.9)
HGB UR QL STRIP.AUTO: NEGATIVE
KETONES UR QL STRIP: NEGATIVE
LDLC SERPL CALC-MCNC: 76 MG/DL (ref 0–100)
LDLC/HDLC SERPL: 1.1 {RATIO}
LEUKOCYTE ESTERASE UR QL STRIP.AUTO: ABNORMAL
LYMPHOCYTES # BLD MANUAL: 1.56 10*3/MM3 (ref 0.7–3.1)
LYMPHOCYTES NFR BLD MANUAL: 8.2 % (ref 5–12)
MAGNESIUM SERPL-MCNC: 2.6 MG/DL (ref 1.7–2.3)
MCH RBC QN AUTO: 31 PG (ref 26.6–33)
MCHC RBC AUTO-ENTMCNC: 33 G/DL (ref 31.5–35.7)
MCV RBC AUTO: 93.8 FL (ref 79–97)
MONOCYTES # BLD: 0.59 10*3/MM3 (ref 0.1–0.9)
NEUTROPHILS # BLD AUTO: 4.98 10*3/MM3 (ref 1.7–7)
NEUTROPHILS NFR BLD MANUAL: 69.1 % (ref 42.7–76)
NITRITE UR QL STRIP: NEGATIVE
PH UR STRIP.AUTO: 6.5 [PH] (ref 5–8)
PLAT MORPH BLD: NORMAL
PLATELET # BLD AUTO: 231 10*3/MM3 (ref 140–450)
PMV BLD AUTO: 10.8 FL (ref 6–12)
POIKILOCYTOSIS BLD QL SMEAR: NORMAL
POTASSIUM SERPL-SCNC: 4.6 MMOL/L (ref 3.5–5.2)
PROT SERPL-MCNC: 7 G/DL (ref 6–8.5)
PROT UR QL STRIP: ABNORMAL
RBC # BLD AUTO: 4.2 10*6/MM3 (ref 3.77–5.28)
SODIUM SERPL-SCNC: 144 MMOL/L (ref 136–145)
SP GR UR STRIP: 1.02 (ref 1–1.03)
T4 FREE SERPL-MCNC: 1.07 NG/DL (ref 0.93–1.7)
TRIGL SERPL-MCNC: 55 MG/DL (ref 0–150)
TSH SERPL DL<=0.05 MIU/L-ACNC: 3.54 UIU/ML (ref 0.27–4.2)
URATE SERPL-MCNC: 6 MG/DL (ref 2.4–5.7)
UROBILINOGEN UR QL STRIP: ABNORMAL
VARIANT LYMPHS NFR BLD MANUAL: 1 % (ref 0–5)
VARIANT LYMPHS NFR BLD MANUAL: 20.6 % (ref 19.6–45.3)
VLDLC SERPL-MCNC: 11 MG/DL (ref 5–40)
WBC MORPH BLD: NORMAL
WBC NRBC COR # BLD: 7.21 10*3/MM3 (ref 3.4–10.8)

## 2022-03-28 PROCEDURE — 80061 LIPID PANEL: CPT | Performed by: INTERNAL MEDICINE

## 2022-03-28 PROCEDURE — 82728 ASSAY OF FERRITIN: CPT | Performed by: INTERNAL MEDICINE

## 2022-03-28 PROCEDURE — 84439 ASSAY OF FREE THYROXINE: CPT | Performed by: INTERNAL MEDICINE

## 2022-03-28 PROCEDURE — 82306 VITAMIN D 25 HYDROXY: CPT | Performed by: INTERNAL MEDICINE

## 2022-03-28 PROCEDURE — 82607 VITAMIN B-12: CPT | Performed by: INTERNAL MEDICINE

## 2022-03-28 PROCEDURE — 85007 BL SMEAR W/DIFF WBC COUNT: CPT | Performed by: INTERNAL MEDICINE

## 2022-03-28 PROCEDURE — 84443 ASSAY THYROID STIM HORMONE: CPT | Performed by: INTERNAL MEDICINE

## 2022-03-28 PROCEDURE — 87086 URINE CULTURE/COLONY COUNT: CPT | Performed by: INTERNAL MEDICINE

## 2022-03-28 PROCEDURE — 85027 COMPLETE CBC AUTOMATED: CPT | Performed by: INTERNAL MEDICINE

## 2022-03-28 PROCEDURE — 83735 ASSAY OF MAGNESIUM: CPT | Performed by: INTERNAL MEDICINE

## 2022-03-28 PROCEDURE — 83540 ASSAY OF IRON: CPT | Performed by: INTERNAL MEDICINE

## 2022-03-28 PROCEDURE — 80053 COMPREHEN METABOLIC PANEL: CPT | Performed by: INTERNAL MEDICINE

## 2022-03-28 PROCEDURE — 84550 ASSAY OF BLOOD/URIC ACID: CPT | Performed by: INTERNAL MEDICINE

## 2022-03-28 PROCEDURE — 84466 ASSAY OF TRANSFERRIN: CPT | Performed by: INTERNAL MEDICINE

## 2022-03-28 PROCEDURE — 83036 HEMOGLOBIN GLYCOSYLATED A1C: CPT | Performed by: INTERNAL MEDICINE

## 2022-03-28 PROCEDURE — 81001 URINALYSIS AUTO W/SCOPE: CPT | Performed by: INTERNAL MEDICINE

## 2022-03-28 PROCEDURE — 82746 ASSAY OF FOLIC ACID SERUM: CPT | Performed by: INTERNAL MEDICINE

## 2022-03-29 LAB
25(OH)D3 SERPL-MCNC: 55.3 NG/ML (ref 30–100)
BACTERIA SPEC AEROBE CULT: NORMAL
BACTERIA UR QL AUTO: ABNORMAL /HPF
FOLATE SERPL-MCNC: 16 NG/ML (ref 4.78–24.2)
HYALINE CASTS UR QL AUTO: ABNORMAL /LPF
IRON 24H UR-MRATE: 90 MCG/DL (ref 37–145)
IRON SATN MFR SERPL: 24 % (ref 20–50)
RBC # UR STRIP: ABNORMAL /HPF
REF LAB TEST METHOD: ABNORMAL
SQUAMOUS #/AREA URNS HPF: ABNORMAL /HPF
TIBC SERPL-MCNC: 371 MCG/DL (ref 298–536)
TRANSFERRIN SERPL-MCNC: 249 MG/DL (ref 200–360)
VIT B12 BLD-MCNC: 1163 PG/ML (ref 211–946)
WBC # UR STRIP: ABNORMAL /HPF

## 2022-03-31 ENCOUNTER — OFFICE VISIT (OUTPATIENT)
Dept: CARDIOLOGY | Facility: CLINIC | Age: 87
End: 2022-03-31

## 2022-03-31 ENCOUNTER — OFFICE VISIT (OUTPATIENT)
Dept: INTERNAL MEDICINE | Facility: CLINIC | Age: 87
End: 2022-03-31

## 2022-03-31 VITALS
SYSTOLIC BLOOD PRESSURE: 120 MMHG | DIASTOLIC BLOOD PRESSURE: 75 MMHG | WEIGHT: 139 LBS | BODY MASS INDEX: 25.58 KG/M2 | HEART RATE: 72 BPM | HEIGHT: 62 IN | TEMPERATURE: 97.5 F | OXYGEN SATURATION: 96 % | RESPIRATION RATE: 17 BRPM

## 2022-03-31 VITALS
HEIGHT: 64 IN | DIASTOLIC BLOOD PRESSURE: 59 MMHG | SYSTOLIC BLOOD PRESSURE: 115 MMHG | BODY MASS INDEX: 23.9 KG/M2 | HEART RATE: 64 BPM | WEIGHT: 140 LBS

## 2022-03-31 DIAGNOSIS — E78.5 HYPERLIPIDEMIA, UNSPECIFIED HYPERLIPIDEMIA TYPE: ICD-10-CM

## 2022-03-31 DIAGNOSIS — I10 HYPERTENSION, UNSPECIFIED TYPE: Primary | ICD-10-CM

## 2022-03-31 DIAGNOSIS — E55.9 VITAMIN D DEFICIENCY: ICD-10-CM

## 2022-03-31 DIAGNOSIS — I35.0 NONRHEUMATIC AORTIC VALVE STENOSIS: Primary | ICD-10-CM

## 2022-03-31 DIAGNOSIS — I10 ESSENTIAL HYPERTENSION: ICD-10-CM

## 2022-03-31 DIAGNOSIS — I35.0 NONRHEUMATIC AORTIC VALVE STENOSIS: ICD-10-CM

## 2022-03-31 DIAGNOSIS — I50.33 ACUTE ON CHRONIC HEART FAILURE WITH PRESERVED EJECTION FRACTION (HFPEF): ICD-10-CM

## 2022-03-31 DIAGNOSIS — I48.11 LONGSTANDING PERSISTENT ATRIAL FIBRILLATION: ICD-10-CM

## 2022-03-31 DIAGNOSIS — I50.31 ACUTE DIASTOLIC CHF (CONGESTIVE HEART FAILURE): ICD-10-CM

## 2022-03-31 DIAGNOSIS — E11.9 NON-INSULIN DEPENDENT TYPE 2 DIABETES MELLITUS: ICD-10-CM

## 2022-03-31 DIAGNOSIS — E83.42 HYPOMAGNESEMIA: ICD-10-CM

## 2022-03-31 DIAGNOSIS — R63.4 WEIGHT LOSS: ICD-10-CM

## 2022-03-31 DIAGNOSIS — E53.8 B12 DEFICIENCY: ICD-10-CM

## 2022-03-31 DIAGNOSIS — C85.90 NON-HODGKIN'S LYMPHOMA, UNSPECIFIED BODY REGION, UNSPECIFIED NON-HODGKIN LYMPHOMA TYPE: ICD-10-CM

## 2022-03-31 DIAGNOSIS — D89.831 CYTOKINE RELEASE SYNDROME, GRADE 1: ICD-10-CM

## 2022-03-31 PROCEDURE — 99214 OFFICE O/P EST MOD 30 MIN: CPT | Performed by: INTERNAL MEDICINE

## 2022-03-31 RX ORDER — MELATONIN
1000 DAILY
COMMUNITY
End: 2022-06-02

## 2022-03-31 RX ORDER — FUROSEMIDE 40 MG/1
40 TABLET ORAL DAILY
COMMUNITY
End: 2022-06-16 | Stop reason: HOSPADM

## 2022-03-31 RX ORDER — METOLAZONE 2.5 MG/1
10 TABLET ORAL
Qty: 10 TABLET | Refills: 1 | Status: SHIPPED | OUTPATIENT
Start: 2022-03-31 | End: 2022-03-31

## 2022-03-31 NOTE — PROGRESS NOTES
Chief Complaint  Hyperlipidemia, Atrial Fibrillation, and Coronary Artery Disease    Subjective    Patiently recently discharged after a Covid pneumonia and CHF episode in February she has been recovering some functionality in the last couple weeks still remains persistently shortness of breath and is somewhat labored even in the office today.  Denies any chest pain or syncope    Past Medical History:   Diagnosis Date   • Acute congestive heart failure (HCC)     improved   • Arthritis    • Bladder cancer (HCC)    • Bleeding disorder (HCC)     (CONDITION DUE TO BLOOD THINNERS)   • Chronic atrial fibrillation (HCC)    • High cholesterol    • Hypertension    • Lymphoma (HCC)    • Moderate to severe aortic stenosis     Patient declines to have transcatheter   • Non Hodgkin's lymphoma (HCC)     2008 treated 2008 2009.   • Rectus sheath hematoma     Right rectus sheath hematoma, off anticoagulation because of the hematoma.   • Skin cancer    • Type 2 diabetes mellitus (HCC)          Current Outpatient Medications:   •  albuterol sulfate  (90 Base) MCG/ACT inhaler, USE 2 PUFFS BY MOUTH EVERY 4 HOURS AS NEEDED FOR WHEEZING (Patient taking differently: Inhale 2 puffs Every 4 (Four) Hours As Needed.), Disp: 6.7 g, Rfl: 1  •  aspirin 81 MG EC tablet, Take 81 mg by mouth Daily., Disp: , Rfl:   •  atorvastatin (LIPITOR) 40 MG tablet, Take 1 tablet by mouth Daily., Disp: 90 tablet, Rfl: 3  •  Calcium Carbonate-Vitamin D 600-200 MG-UNIT capsule, Take 1 capsule by mouth Every Morning., Disp: , Rfl:   •  carvedilol (COREG) 6.25 MG tablet, TAKE 1 TABLET BY MOUTH TWICE DAILY WITH FOOD (Patient taking differently: Take 6.25 mg by mouth 2 (Two) Times a Day With Meals.), Disp: 60 tablet, Rfl: 6  •  cholecalciferol (VITAMIN D3) 25 MCG (1000 UT) tablet, Take 1,000 Units by mouth Daily. 50.000 iu, Disp: , Rfl:   •  ergocalciferol (ERGOCALCIFEROL) 1.25 MG (81080 UT) capsule, Take 1 capsule by mouth Every 14 (Fourteen) Days., Disp: 6  capsule, Rfl: 3  •  glucose blood (FREESTYLE LITE) test strip, Use as instructed, Disp: 90 each, Rfl: 3  •  iron polysaccharides (NIFEREX) 150 MG capsule, Take 150 mg by mouth 2 (Two) Times a Day., Disp: , Rfl:   •  Lactobacillus (FLORAJEN ACIDOPHILUS PO), Take 460 mg by mouth., Disp: , Rfl:   •  Lancets (freestyle) lancets, 1 each by Other route 2 (two) times a day. Check blood sugars twice a day-DM Type II, Diagnosis Code E11.9, Disp: 100 each, Rfl: 3  •  losartan (COZAAR) 25 MG tablet, Take 1 tablet by mouth Daily., Disp: 90 tablet, Rfl: 3  •  montelukast (SINGULAIR) 10 MG tablet, Take 10 mg by mouth Every Night., Disp: , Rfl:   •  Pradaxa 75 MG capsule, TAKE 1 CAPSULE BY MOUTH TWICE DAILY (Patient taking differently: Take 75 mg by mouth Every Morning.), Disp: 60 capsule, Rfl: 3  •  raloxifene (EVISTA) 60 MG tablet, Take 1 tablet by mouth Daily., Disp: 90 tablet, Rfl: 3  •  spironolactone (Aldactone) 25 MG tablet, Take 1/2 tablet every day (Patient taking differently: Take 12.5 mg by mouth Daily. Take 1/2 tablet every day), Disp: 45 tablet, Rfl: 3  •  furosemide (LASIX) 40 MG tablet, Take 40 mg by mouth 2 (Two) Times a Day. Take 2 tablets every morning, Disp: , Rfl:     Medications Discontinued During This Encounter   Medication Reason   • HYDROcod Polst-CPM Polst ER (Tussionex Pennkinetic ER) 10-8 MG/5ML ER suspension *Therapy completed   • doxycycline (MONODOX) 100 MG capsule *Therapy completed   • furosemide (LASIX) 40 MG tablet      Allergies   Allergen Reactions   • Contrast Dye Unknown - Low Severity   • Iodinated Diagnostic Agents Hives and Other (See Comments)     IODINATED CONTRAST MEDIA (Verified  Allergy, Unknown, HIVES,SNEEZING,ITCHY EYES)   • Iodine Unknown - Low Severity   • Penicillins Rash   • Shellfish Allergy Unknown - Low Severity   • Spinach Other (See Comments)      SPINACH (Verified  Allergy, Unknown, 2/22/21)      SHOWED UP ON ALLERGY TESTING   • Sulfa Antibiotics Other (See Comments)      "(Verified  Allergy, Unknown, RASH)        Social History     Tobacco Use   • Smoking status: Never Smoker   • Smokeless tobacco: Never Used   Vaping Use   • Vaping Use: Never used   Substance Use Topics   • Alcohol use: Not Currently   • Drug use: Not Currently       Family History   Problem Relation Age of Onset   • Stomach cancer Paternal Great-Grandfather         Objective     /59 (BP Location: Left arm)   Pulse 64   Ht 162.6 cm (64\")   Wt 63.5 kg (140 lb)   BMI 24.03 kg/m²       Physical Exam    General Appearance:   · no acute distress  · Alert and oriented x3  HENT:   · lips not cyanotic  · Atraumatic  Neck:  · No jvd   · supple  Respiratory:  · no respiratory distress  · normal breath sounds  · no rales  Cardiovascular:  · RRR  · no S3, no S4   · 2/6 systolic late peaking murmur  · no rub  Extremities  · No cyanosis  · lower extremity edema: 1-2  Skin:   · warm, dry  · No rashes      Result Review :     proBNP   Date Value Ref Range Status   03/05/2022 2,180.0 (H) 0.0 - 1,800.0 pg/mL Final     CMP    CMP 2/26/22 3/5/22 3/28/22   Glucose 87 107 (A) 94   BUN 24 (A) 26 (A) 23   Creatinine 0.91 0.91 0.81   eGFR Non African Am 58 (A)     Sodium 138 136 144   Potassium 3.9 4.0 4.6   Chloride 97 (A) 98 102   Calcium 9.3 9.7 (A) 9.4   Albumin 3.60 3.30 (A) 3.60   Total Bilirubin 0.6 0.6 0.6   Alkaline Phosphatase 80 91 72   AST (SGOT) 33 (A) 44 (A) 34 (A)   ALT (SGPT) 17 25 20   (A) Abnormal value            CBC w/diff    CBC w/Diff 2/26/22 3/5/22 3/28/22   WBC 5.70 7.41 7.21   RBC 4.44 4.30 4.20   Hemoglobin 13.6 12.9 13.0   Hematocrit 40.6 38.7 39.4   MCV 91.4 90.0 93.8   MCH 30.6 30.0 31.0   MCHC 33.5 33.3 33.0   RDW 13.8 13.6 13.7   Platelets 243 297 231   Neutrophil Rel % 58.1 62.8    Immature Granulocyte Rel % 0.7 (A) 0.9 (A)    Lymphocyte Rel % 24.0 19.2 (A)    Monocyte Rel % 15.1 (A) 14.8 (A)    Eosinophil Rel % 1.6 1.8    Basophil Rel % 0.5 0.5    (A) Abnormal value             Lab Results "   Component Value Date    TSH 3.540 03/28/2022      Lab Results   Component Value Date    FREET4 1.07 03/28/2022      No results found for: DDIMERQUANT  Magnesium   Date Value Ref Range Status   03/28/2022 2.6 (H) 1.7 - 2.3 mg/dL Final      No results found for: DIGOXIN   Lab Results   Component Value Date    TROPONINT 0.026 02/21/2022          Lab 03/28/22  1100   HEMOGLOBIN A1C 6.40*      Lipid Panel    Lipid Panel 9/2/21 12/13/21 3/28/22   Total Cholesterol 154 145 156   Triglycerides 53 36 55   HDL Cholesterol 75 (A) 73 (A) 69 (A)   VLDL Cholesterol 11 9 11   LDL Cholesterol  68 63 76   LDL/HDL Ratio 0.91 0.89 1.10   (A) Abnormal value            Lab Results   Component Value Date    POCTROP 0.02 03/21/2021                      Diagnoses and all orders for this visit:    1. Severe aortic stenosis (Primary)  Assessment & Plan:  Severe and symptomatic in nature discussed with patient again her underlying and that was progressive in nature and it would become more difficult control over time and at some point unmanageable over the course the next few years.  Discussed with her again possible options of TAVR and after discussion she felt that in the event of worsening symptoms she would prefer only palliative measures    Orders:  -     Cancel: Basic Metabolic Panel; Future  -     Basic Metabolic Panel; Future    2. Acute on chronic heart failure with preserved ejection fraction (HFpEF) ([facility])  Assessment & Plan:  Patient with still persistent issues with CHF and edema recommended titrating up on her Lasix to 80 mg a day in addition we will give zaroxolyn 2.5 mg for 3 days to help try to better maintain her fluid.  Repeat her renal panel in 2 weeks.  In addition did go over with patient and daughter that heart failure issues will be progressive given her valvular issues and becoming more difficult to control given her underlying aortic stenosis issues      3. Longstanding persistent atrial fibrillation  (HCC)  Assessment & Plan:  Patient is rate controlled continue with Pradaxa for CVA prevention    Orders:  -     Cancel: Basic Metabolic Panel; Future  -     Basic Metabolic Panel; Future    4. Essential hypertension    Other orders  -     Discontinue: metOLazone (ZAROXOLYN) 2.5 MG tablet; Take 4 tablets by mouth Q24H (TPN).  Dispense: 10 tablet; Refill: 1          Follow Up     Return in about 6 months (around 9/30/2022).          Patient was given instructions and counseling regarding her condition or for health maintenance advice. Please see specific information pulled into the AVS if appropriate.

## 2022-03-31 NOTE — ASSESSMENT & PLAN NOTE
Patient with still persistent issues with CHF and edema recommended titrating up on her Lasix to 80 mg a day in addition we will give zaroxolyn 2.5 mg for 3 days to help try to better maintain her fluid.  Repeat her renal panel in 2 weeks.  In addition did go over with patient and daughter that heart failure issues will be progressive given her valvular issues and becoming more difficult to control given her underlying aortic stenosis issues

## 2022-03-31 NOTE — ASSESSMENT & PLAN NOTE
Congestive heart failure.  She just saw Dr. Harden, cardiologist.  He gave her Zaroxolyn 10 mg daily for couple of days to help diuresis.  Continue Lasix 80 mg daily.  Assessment: Chronic congestive heart failure with preserved EF.  Plan: Continue Lasix 80 mg daily.  Careful with diet.  Extra Zaroxolyn for few days.  Dr. Harden is ordered lab work in 2 weeks.  She also takes Aldactone 12.5 mg daily

## 2022-03-31 NOTE — ASSESSMENT & PLAN NOTE
Patient with longstanding atrial fibrillation controlled rate.  Plan: Continue Coreg 6.25 mg twice daily, losartan 25 mg daily, Pradaxa 75 mg twice daily for anticoagulation.

## 2022-03-31 NOTE — PROGRESS NOTES
"Chief Complaint  Follow-up and Hypertension    Subjective  89-year-old lady living at home.  Daughter lives with her and does cooking.  Had Covid infection in February 2022.  Went to rehab.  Then has gradually been improving with physical therapy occupational therapy and home health nurse.  She is immunocompromised due to non-Hodgkin's lymphoma with IV rituximab.  She has the payer thousand dollars out-of-pocket each visit with rituximab.  History of low-grade bladder cancer diagnosed in 2017 by Dr. Grubbs.    Radha Aparicio presents to [facility]  History of Present Illness  History of severe aortic stenosis but declines TAVR.  Chronic atrial fibrillation.  History of iron deficiency.  Hyperglycemia controlled with diet alone.  History of iron deficiency.  Takes Niferex 150 mg daily.  Osteopenia.  Chronic atrial fibrillation.  Chronic venous insufficiency with chronic edema.  Atrial fib.  Controlled.  Right clavicle fracture April 2011.  Had diffuse large B-cell lymphoma initially diagnosed in 2008.  Had recurrence in 2020.  Up-to-date on all vaccinations.  Mammogram has been done recently.  Objective   Vital Signs:   /75   Pulse 72   Temp 97.5 °F (36.4 °C)   Resp 17   Ht 157.5 cm (62\")   Wt 63 kg (139 lb)   SpO2 96%   BMI 25.42 kg/m²     Physical Exam  Vitals and nursing note reviewed.   Constitutional:       Appearance: Normal appearance. She is well-developed and well-groomed.      Comments: Patient comes in with her daughter.  She gives all the history.  Quite alert and appropriate.   Eyes:      Extraocular Movements: Extraocular movements intact.   Cardiovascular:      Rate and Rhythm: Normal rate. Rhythm irregular.      Heart sounds: Murmur (Grade 5/6 systolic murmur heard over the precordium.) heard.   Pulmonary:      Effort: Pulmonary effort is normal.      Breath sounds: No wheezing, rhonchi or rales.   Abdominal:      Palpations: Abdomen is soft.      " Problem: MOBILITY - ADULT  Goal: Maintain or return to baseline ADL function  Description: INTERVENTIONS:  -  Assess patient's ability to carry out ADLs; assess patient's baseline for ADL function and identify physical deficits which impact ability to perform ADLs (bathing, care of mouth/teeth, toileting, grooming, dressing, etc )  - Assess/evaluate cause of self-care deficits   - Assess range of motion  - Assess patient's mobility; develop plan if impaired  - Assess patient's need for assistive devices and provide as appropriate  - Encourage maximum independence but intervene and supervise when necessary  - Involve family in performance of ADLs  - Assess for home care needs following discharge   - Consider OT consult to assist with ADL evaluation and planning for discharge  - Provide patient education as appropriate  Outcome: Progressing  Goal: Maintains/Returns to pre admission functional level  Description: INTERVENTIONS:  - Perform BMAT or MOVE assessment daily    - Set and communicate daily mobility goal to care team and patient/family/caregiver  - Collaborate with rehabilitation services on mobility goals if consulted  Problem: Nutrition/Hydration-ADULT  Goal: Nutrient/Hydration intake appropriate for improving, restoring or maintaining nutritional needs  Description: Monitor and assess patient's nutrition/hydration status for malnutrition  Collaborate with interdisciplinary team and initiate plan and interventions as ordered  Monitor patient's weight and dietary intake as ordered or per policy  Utilize nutrition screening tool and intervene as necessary  Determine patient's food preferences and provide high-protein, high-caloric foods as appropriate       INTERVENTIONS:  - Monitor oral intake, urinary output, labs, and treatment plans  - Assess nutrition and hydration status and recommend course of action  - Evaluate amount of meals eaten  - Assist patient with eating if necessary   - Allow adequate time Tenderness: There is no abdominal tenderness. There is no guarding or rebound.   Musculoskeletal:      Right lower leg: Edema present.      Left lower leg: Edema present.   Skin:     General: Skin is warm and dry.   Neurological:      General: No focal deficit present.      Mental Status: She is alert. Mental status is at baseline.   Psychiatric:         Mood and Affect: Mood normal.         Behavior: Behavior is cooperative.         Thought Content: Thought content normal.        Result Review :  The following data was reviewed by: Gala Rao MD on 03/31/2022:  CMP    CMP 2/26/22 3/5/22 3/28/22   Glucose 87 107 (A) 94   BUN 24 (A) 26 (A) 23   Creatinine 0.91 0.91 0.81   eGFR Non African Am 58 (A)     Sodium 138 136 144   Potassium 3.9 4.0 4.6   Chloride 97 (A) 98 102   Calcium 9.3 9.7 (A) 9.4   Albumin 3.60 3.30 (A) 3.60   Total Bilirubin 0.6 0.6 0.6   Alkaline Phosphatase 80 91 72   AST (SGOT) 33 (A) 44 (A) 34 (A)   ALT (SGPT) 17 25 20   (A) Abnormal value            CBC w/diff    CBC w/Diff 2/26/22 3/5/22 3/28/22   WBC 5.70 7.41 7.21   RBC 4.44 4.30 4.20   Hemoglobin 13.6 12.9 13.0   Hematocrit 40.6 38.7 39.4   MCV 91.4 90.0 93.8   MCH 30.6 30.0 31.0   MCHC 33.5 33.3 33.0   RDW 13.8 13.6 13.7   Platelets 243 297 231   Neutrophil Rel % 58.1 62.8    Immature Granulocyte Rel % 0.7 (A) 0.9 (A)    Lymphocyte Rel % 24.0 19.2 (A)    Monocyte Rel % 15.1 (A) 14.8 (A)    Eosinophil Rel % 1.6 1.8    Basophil Rel % 0.5 0.5    (A) Abnormal value            Lipid Panel    Lipid Panel 9/2/21 12/13/21 3/28/22   Total Cholesterol 154 145 156   Triglycerides 53 36 55   HDL Cholesterol 75 (A) 73 (A) 69 (A)   VLDL Cholesterol 11 9 11   LDL Cholesterol  68 63 76   LDL/HDL Ratio 0.91 0.89 1.10   (A) Abnormal value            TSH    TSH 9/2/21 3/28/22   TSH 1.130 3.540           Most Recent A1C    HGBA1C Most Recent 3/28/22   Hemoglobin A1C 6.40 (A)   (A) Abnormal value            UA    Urinalysis 12/13/21 12/13/21 2/19/22  for meals  - Recommend/ encourage appropriate diets, oral nutritional supplements, and vitamin/mineral supplements  - Order, calculate, and assess calorie counts as needed  - Recommend, monitor, and adjust tube feedings and TPN/PPN based on assessed needs  - Assess need for intravenous fluids  - Provide specific nutrition/hydration education as appropriate  - Include patient/family/caregiver in decisions related to nutrition  Outcome: Progressing     Problem: Prexisting or High Potential for Compromised Skin Integrity  Goal: Skin integrity is maintained or improved  Description: INTERVENTIONS:  - Identify patients at risk for skin breakdown  - Assess and monitor skin integrity  - Assess and monitor nutrition and hydration status  - Monitor labs   - Assess for incontinence   - Turn and reposition patient  - Assist with mobility/ambulation  - Relieve pressure over bony prominences  - Avoid friction and shearing  - Provide appropriate hygiene as needed including keeping skin clean and dry  - Evaluate need for skin moisturizer/barrier cream  - Collaborate with interdisciplinary team   - Patient/family teaching  - Consider wound care consult   Outcome: Progressing     - Out of bed for toileting  - Record patient progress and toleration of activity level   Outcome: Progressing   Problem: Potential for Falls  Goal: Patient will remain free of falls  Description: INTERVENTIONS:  - Educate patient/family on patient safety including physical limitations  - Instruct patient to call for assistance with activity   - Consult OT/PT to assist with strengthening/mobility   - Keep Call bell within reach  - Keep bed low and locked with side rails adjusted as appropriate  - Keep care items and personal belongings within reach  - Initiate and maintain comfort rounds  - Make Fall Risk Sign visible to staff  - Apply yellow socks and bracelet for high fall risk patients  - Consider moving patient to room near nurses station  Outcome: 3/28/22 3/28/22    1424 1424  1100 1100   Squamous Epithelial Cells, UA  0-2   0-2   Specific Gravity, UA 1.011  <=1.005 1.016    Ketones, UA Negative  Negative Negative    Blood, UA Negative  Negative Negative    Leukocytes, UA Negative  Negative Moderate (2+) (A)    Nitrite, UA Negative  Negative Negative    RBC, UA  None Seen   0-2   WBC, UA  None Seen   3-5 (A)   Bacteria, UA  None Seen   Trace (A)   (A) Abnormal value                          Assessment and Plan   Diagnoses and all orders for this visit:    1. Hypertension, unspecified type (Primary)  -     Comprehensive metabolic panel; Future    2. Acute diastolic CHF (congestive heart failure) (HCC)  -     Comprehensive metabolic panel; Future  -     BNP; Future    3. Hyperlipidemia, unspecified hyperlipidemia type  -     Lipid panel; Future    4. Non-insulin dependent type 2 diabetes mellitus (HCC)  -     Comprehensive metabolic panel; Future    5. Non-Hodgkin's lymphoma, unspecified body region, unspecified non-Hodgkin lymphoma type (HCC)  -     CBC w AUTO Differential; Future  -     Comprehensive metabolic panel; Future  -     CBC & Differential; Future  -     Comprehensive Metabolic Panel; Future    6. Vitamin D deficiency  -     Vitamin D 25 hydroxy; Future    7. B12 deficiency  -     Vitamin B12; Future  -     Folate; Future    8. Hypomagnesemia  -     Magnesium; Future    9. Weight loss  -     TSH+Free T4; Future    10. Cytokine release syndrome, grade 1  Assessment & Plan:  Patient had Covid illness in February 2022.  She was briefly hospitalized.  She has been doing all right since then.  She did go to rehab for a while.  She is still getting home health nurse with physical therapy and Occupational Therapy.  She uses a walker.  Gradually getting her strength back.  Assessment: Acute Covid illness in February 2022.  Status post vaccination.  Immunosuppressed with ongoing treatment for non-Hodgkin's lymphoma.      11. Longstanding persistent atrial  Progressing fibrillation ([facility])  Assessment & Plan:  Patient with longstanding atrial fibrillation controlled rate.  Plan: Continue Coreg 6.25 mg twice daily, losartan 25 mg daily, Pradaxa 75 mg twice daily for anticoagulation.      12. Severe aortic stenosis  Assessment & Plan:  Patient has longstanding severe aortic stenosis.  TAVR has been offered on numerous occasions but she has declined due to her elderly age.  No dizziness or presyncope or syncope.  No chest pain.  No undue shortness of breath.  Assessment: Severe aortic stenosis.  Declines valve replacement.  Plan: Continue to monitor.      13. Acute on chronic heart failure with preserved ejection fraction (HFpEF) ([facility])  Assessment & Plan:  Congestive heart failure.  She just saw Dr. Harden, cardiologist.  He gave her Zaroxolyn 10 mg daily for couple of days to help diuresis.  Continue Lasix 80 mg daily.  Assessment: Chronic congestive heart failure with preserved EF.  Plan: Continue Lasix 80 mg daily.  Careful with diet.  Extra Zaroxolyn for few days.  Dr. Harden is ordered lab work in 2 weeks.  She also takes Aldactone 12.5 mg daily          Follow Up Return in about 2 months (around 5/31/2022).  Patient was given instructions and counseling regarding her condition or for health maintenance advice. Please see specific information pulled into the AVS if appropriate.

## 2022-03-31 NOTE — ASSESSMENT & PLAN NOTE
Patient has longstanding severe aortic stenosis.  TAVR has been offered on numerous occasions but she has declined due to her elderly age.  No dizziness or presyncope or syncope.  No chest pain.  No undue shortness of breath.  Assessment: Severe aortic stenosis.  Declines valve replacement.  Plan: Continue to monitor.

## 2022-03-31 NOTE — ASSESSMENT & PLAN NOTE
Patient had Covid illness in February 2022.  She was briefly hospitalized.  She has been doing all right since then.  She did go to rehab for a while.  She is still getting home health nurse with physical therapy and Occupational Therapy.  She uses a walker.  Gradually getting her strength back.  Assessment: Acute Covid illness in February 2022.  Status post vaccination.  Immunosuppressed with ongoing treatment for non-Hodgkin's lymphoma.

## 2022-03-31 NOTE — ASSESSMENT & PLAN NOTE
Severe and symptomatic in nature discussed with patient again her underlying and that was progressive in nature and it would become more difficult control over time and at some point unmanageable over the course the next few years.  Discussed with her again possible options of TAVR and after discussion she felt that in the event of worsening symptoms she would prefer only palliative measures

## 2022-04-07 ENCOUNTER — HOSPITAL ENCOUNTER (OUTPATIENT)
Dept: ONCOLOGY | Facility: HOSPITAL | Age: 87
Setting detail: INFUSION SERIES
Discharge: HOME | End: 2022-04-07

## 2022-04-07 ENCOUNTER — OFFICE VISIT (OUTPATIENT)
Dept: ONCOLOGY | Facility: HOSPITAL | Age: 87
End: 2022-04-07

## 2022-04-07 VITALS
OXYGEN SATURATION: 98 % | WEIGHT: 135.14 LBS | SYSTOLIC BLOOD PRESSURE: 97 MMHG | TEMPERATURE: 97.6 F | BODY MASS INDEX: 24.72 KG/M2 | HEART RATE: 59 BPM | DIASTOLIC BLOOD PRESSURE: 46 MMHG

## 2022-04-07 VITALS
RESPIRATION RATE: 18 BRPM | DIASTOLIC BLOOD PRESSURE: 46 MMHG | BODY MASS INDEX: 24.72 KG/M2 | WEIGHT: 135.14 LBS | TEMPERATURE: 97.6 F | HEART RATE: 76 BPM | OXYGEN SATURATION: 98 % | SYSTOLIC BLOOD PRESSURE: 103 MMHG

## 2022-04-07 DIAGNOSIS — C82.08 FOLLICULAR LYMPHOMA GRADE I OF LYMPH NODES OF MULTIPLE SITES: Primary | ICD-10-CM

## 2022-04-07 DIAGNOSIS — Z45.2 ADJUSTMENT AND MANAGEMENT OF VASCULAR ACCESS DEVICE: ICD-10-CM

## 2022-04-07 PROBLEM — Z20.822 SUSPECTED SEVERE ACUTE RESPIRATORY SYNDROME CORONAVIRUS 2 (SARS-COV-2) INFECTION: Status: ACTIVE | Noted: 2022-02-25

## 2022-04-07 PROBLEM — R26.9 ABNORMAL GAIT: Status: ACTIVE | Noted: 2022-04-07

## 2022-04-07 PROBLEM — R53.1 WEAKNESS: Status: ACTIVE | Noted: 2022-04-07

## 2022-04-07 LAB
ALBUMIN SERPL-MCNC: 4 G/DL (ref 3.5–5.2)
ALBUMIN/GLOB SERPL: 1.4 G/DL
ALP SERPL-CCNC: 70 U/L (ref 39–117)
ALT SERPL W P-5'-P-CCNC: 17 U/L (ref 1–33)
ANION GAP SERPL CALCULATED.3IONS-SCNC: 11.6 MMOL/L (ref 5–15)
AST SERPL-CCNC: 28 U/L (ref 1–32)
BASOPHILS # BLD AUTO: 0.03 10*3/MM3 (ref 0–0.2)
BASOPHILS NFR BLD AUTO: 0.5 % (ref 0–1.5)
BILIRUB SERPL-MCNC: 0.7 MG/DL (ref 0–1.2)
BUN SERPL-MCNC: 35 MG/DL (ref 8–23)
BUN/CREAT SERPL: 35.4 (ref 7–25)
CALCIUM SPEC-SCNC: 10.9 MG/DL (ref 8.2–9.6)
CHLORIDE SERPL-SCNC: 93 MMOL/L (ref 98–107)
CO2 SERPL-SCNC: 31.4 MMOL/L (ref 22–29)
CREAT SERPL-MCNC: 0.99 MG/DL (ref 0.57–1)
DEPRECATED RDW RBC AUTO: 57.4 FL (ref 37–54)
EGFRCR SERPLBLD CKD-EPI 2021: 53.3 ML/MIN/1.73
EOSINOPHIL # BLD AUTO: 0.15 10*3/MM3 (ref 0–0.4)
EOSINOPHIL NFR BLD AUTO: 2.6 % (ref 0.3–6.2)
ERYTHROCYTE [DISTWIDTH] IN BLOOD BY AUTOMATED COUNT: 16.1 % (ref 12.3–15.4)
GLOBULIN UR ELPH-MCNC: 2.9 GM/DL
GLUCOSE SERPL-MCNC: 163 MG/DL (ref 65–99)
HCT VFR BLD AUTO: 37.6 % (ref 34–46.6)
HGB BLD-MCNC: 12.1 G/DL (ref 12–15.9)
IMM GRANULOCYTES # BLD AUTO: 0 10*3/MM3 (ref 0–0.05)
IMM GRANULOCYTES NFR BLD AUTO: 0 % (ref 0–0.5)
LYMPHOCYTES # BLD AUTO: 1.23 10*3/MM3 (ref 0.7–3.1)
LYMPHOCYTES NFR BLD AUTO: 21 % (ref 19.6–45.3)
MCH RBC QN AUTO: 31 PG (ref 26.6–33)
MCHC RBC AUTO-ENTMCNC: 32.2 G/DL (ref 31.5–35.7)
MCV RBC AUTO: 96.4 FL (ref 79–97)
MONOCYTES # BLD AUTO: 0.64 10*3/MM3 (ref 0.1–0.9)
MONOCYTES NFR BLD AUTO: 10.9 % (ref 5–12)
NEUTROPHILS NFR BLD AUTO: 3.8 10*3/MM3 (ref 1.7–7)
NEUTROPHILS NFR BLD AUTO: 65 % (ref 42.7–76)
PLATELET # BLD AUTO: 238 10*3/MM3 (ref 140–450)
PMV BLD AUTO: 9.7 FL (ref 6–12)
POTASSIUM SERPL-SCNC: 3.5 MMOL/L (ref 3.5–5.2)
PROT SERPL-MCNC: 6.9 G/DL (ref 6–8.5)
RBC # BLD AUTO: 3.9 10*6/MM3 (ref 3.77–5.28)
SODIUM SERPL-SCNC: 136 MMOL/L (ref 136–145)
WBC NRBC COR # BLD: 5.85 10*3/MM3 (ref 3.4–10.8)

## 2022-04-07 PROCEDURE — 25010000002 RITUXIMAB 10 MG/ML SOLUTION 50 ML VIAL: Performed by: INTERNAL MEDICINE

## 2022-04-07 PROCEDURE — 96375 TX/PRO/DX INJ NEW DRUG ADDON: CPT

## 2022-04-07 PROCEDURE — 80053 COMPREHEN METABOLIC PANEL: CPT | Performed by: INTERNAL MEDICINE

## 2022-04-07 PROCEDURE — 96413 CHEMO IV INFUSION 1 HR: CPT

## 2022-04-07 PROCEDURE — 25010000002 HEPARIN LOCK FLUSH PER 10 UNITS: Performed by: INTERNAL MEDICINE

## 2022-04-07 PROCEDURE — 25010000002 DIPHENHYDRAMINE PER 50 MG: Performed by: INTERNAL MEDICINE

## 2022-04-07 PROCEDURE — 25010000002 RITUXIMAB 10 MG/ML SOLUTION 10 ML VIAL: Performed by: INTERNAL MEDICINE

## 2022-04-07 PROCEDURE — 99214 OFFICE O/P EST MOD 30 MIN: CPT | Performed by: INTERNAL MEDICINE

## 2022-04-07 PROCEDURE — 96415 CHEMO IV INFUSION ADDL HR: CPT

## 2022-04-07 PROCEDURE — 85025 COMPLETE CBC W/AUTO DIFF WBC: CPT | Performed by: INTERNAL MEDICINE

## 2022-04-07 RX ORDER — ACETAMINOPHEN 325 MG/1
650 TABLET ORAL ONCE
Status: COMPLETED | OUTPATIENT
Start: 2022-04-07 | End: 2022-04-07

## 2022-04-07 RX ORDER — SODIUM CHLORIDE 0.9 % (FLUSH) 0.9 %
20 SYRINGE (ML) INJECTION AS NEEDED
Status: DISCONTINUED | OUTPATIENT
Start: 2022-04-07 | End: 2022-04-08 | Stop reason: HOSPADM

## 2022-04-07 RX ORDER — DIPHENHYDRAMINE HYDROCHLORIDE 50 MG/ML
50 INJECTION INTRAMUSCULAR; INTRAVENOUS AS NEEDED
Status: CANCELLED | OUTPATIENT
Start: 2022-04-07

## 2022-04-07 RX ORDER — FAMOTIDINE 10 MG/ML
20 INJECTION, SOLUTION INTRAVENOUS AS NEEDED
Status: CANCELLED | OUTPATIENT
Start: 2022-04-07

## 2022-04-07 RX ORDER — ACETAMINOPHEN 325 MG/1
650 TABLET ORAL AS NEEDED
COMMUNITY
Start: 2022-03-09

## 2022-04-07 RX ORDER — SODIUM CHLORIDE 9 MG/ML
250 INJECTION, SOLUTION INTRAVENOUS ONCE
Status: COMPLETED | OUTPATIENT
Start: 2022-04-07 | End: 2022-04-07

## 2022-04-07 RX ORDER — SODIUM CHLORIDE 0.9 % (FLUSH) 0.9 %
20 SYRINGE (ML) INJECTION AS NEEDED
Status: CANCELLED | OUTPATIENT
Start: 2022-04-07

## 2022-04-07 RX ORDER — HEPARIN SODIUM (PORCINE) LOCK FLUSH IV SOLN 100 UNIT/ML 100 UNIT/ML
500 SOLUTION INTRAVENOUS AS NEEDED
Status: CANCELLED | OUTPATIENT
Start: 2022-04-07

## 2022-04-07 RX ORDER — HEPARIN SODIUM (PORCINE) LOCK FLUSH IV SOLN 100 UNIT/ML 100 UNIT/ML
500 SOLUTION INTRAVENOUS AS NEEDED
Status: DISCONTINUED | OUTPATIENT
Start: 2022-04-07 | End: 2022-04-08 | Stop reason: HOSPADM

## 2022-04-07 RX ORDER — LANOLIN ALCOHOL/MO/W.PET/CERES
3 CREAM (GRAM) TOPICAL
COMMUNITY
Start: 2022-03-09 | End: 2022-04-19

## 2022-04-07 RX ORDER — MEPERIDINE HYDROCHLORIDE 25 MG/ML
25 INJECTION INTRAMUSCULAR; INTRAVENOUS; SUBCUTANEOUS
Status: CANCELLED | OUTPATIENT
Start: 2022-04-07

## 2022-04-07 RX ADMIN — ACETAMINOPHEN 650 MG: 325 TABLET ORAL at 11:39

## 2022-04-07 RX ADMIN — DIPHENHYDRAMINE HYDROCHLORIDE 25 MG: 50 INJECTION, SOLUTION INTRAMUSCULAR; INTRAVENOUS at 11:36

## 2022-04-07 RX ADMIN — RITUXIMAB 600 MG: 10 INJECTION, SOLUTION INTRAVENOUS at 12:15

## 2022-04-07 RX ADMIN — Medication 20 ML: at 14:39

## 2022-04-07 RX ADMIN — HEPARIN SODIUM (PORCINE) LOCK FLUSH IV SOLN 100 UNIT/ML 500 UNITS: 100 SOLUTION at 14:39

## 2022-04-07 RX ADMIN — SODIUM CHLORIDE 250 ML: 9 INJECTION, SOLUTION INTRAVENOUS at 11:36

## 2022-04-07 NOTE — PROGRESS NOTES
Patient  Radha Aparicio    Location  [facility]EMATOLOG[facility]    Chief Complaint  Follicular Lymphoma     Referring Provider: Gala Rao MD  PCP: Gala Rao MD    Subjective          Oncology/Hematology History Overview Note   Ms. Radha Aparicio is a pleasant 89 year old female who presents with NHL, follicular type diagnosed in 2019.  Ms. Aparicio has been receiving her oncological care with Dr. Jenn Zaragoza who recently retired.  Ms. Aparicio initially presented RLQ pain to her PCP, Dr. Gala Rao.  In light of her bladder cancer, CT of Abdomen and Pelvis was obtained on January 16, 2019.  Findings indicated retroperitoneal adenopathy measuring up to 4 cm x 2cm, thought either to be lymphoma or metastatic disease.  Incidently mild bilateral hydronephrosis was noted without urolithiasis.     CT guided biopsy of the retroperitoneal lymph node was obtained.  Pathology (S-) indicated non-hodgkins's b-cell lymphoma; Follicular lymphoma (grade 1).  Flow cytometryrevealed diffuse positivity with CD 20, BCL2, CD10 and patchy decoration with BCL-6.  A few days after biopsy she was admitted to hospital with acute respiratory hypoxemia secondary to influenza.  Repeat imaging with CT Abdomen and Pelvis with heterogeneous hyperdense mass in the anterior abdominal wass musculature 8 cm x 12.6 cm consistent wtih rectus sheath hematoma.  Retroperitoneal adenopathy is redemonstrated.  Index left periaortic mass measured 3.1 x 2.2 cm, previously 3.9 x 2.4. Mildly enlarged retrocrural lymph nodes.  She was discharged from the hospital on 2/20/2019.     Ms. Aparicio was evaluated by Dr. Jenn Zaragoza on March 13, 2019. PET CT was ordered and obtained.  It indicated metabolic activity within the lymph node at the base of upper chest and neck (SUV 3.85)  as well as the left axilla (SUV 5.4) in addition to 2 lymph nodes within the retroperitoneal area with SUV (8.76). .  Staging for her follicular  carcinoma grade 1 is a clinical stage III with no B symptoms no significant laboratory abnormalities. At this time it was decided pursue active surveillance and close follow.     On October 17, 2019, PETCT  indicated interval progression of disease with previous noted lymph adenopathy larger and more metabolic. The left periaortic lymph node mass noted to be compressing not only the left renal vein but resulting in mild left hydronephrosis which is new. .   Single agent RITUXAN was initiated on October 9, 2019 and completed 4 weekly doses of RITUXAN.  Ms. Aparicio continues to have no B-symptoms.     Repeat PETCT obtained December 11, 2019 indicated overall improvement from previous study.  The areas of hypermetabolic lymphadenopathy supraclavicular on the left and left paraaortic have diminshed both in size and degree of metabolic activity with no new areas noted. .      PETCT done on July 2, 2020 indicated enlargement of left infraclavicular / subpectoral lynph node measuring up to 1.8 cm with SUV 5.7, increase in size and SUV of left axillary lymph node, small left pleural effusion slightly larger than prior PET, left paraaortic lymph node conglomerate slightly larger and slightly increased in SUV.  .  It was decided at that time to restart RITUXAN every 8 weeks.  Ms. Aparicio continues to have no B-symptoms.     Maintenance rituximab started November 2020, every 8 weeks.    CT CAP on 12/16/2020 showed decrease in the size of left retropectoral lymph nodes and left periaortic lymph nodes.  No new or enlarging lymph nodes in the chest, abdomen, or pelvis.  There are stable subcentimeter noncalcified pulmonary nodules as well.       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  She was recently diagnosed with COVID-19 despite having her vaccinations and booster.  She is still feeling very fatigued and somewhat short of breath but she has not been on oxygen since she left the hospital.      She reports less swelling in her lower extremities.  She has been taking lasix more often.     Review of Systems   Constitutional: Positive for fatigue. Negative for appetite change, diaphoresis, fever, unexpected weight gain and unexpected weight loss.   HENT: Negative for hearing loss, sore throat and voice change.    Eyes: Negative for blurred vision, double vision, pain, redness and visual disturbance.   Respiratory: Negative for cough, shortness of breath and wheezing.    Cardiovascular: Negative for chest pain, palpitations and leg swelling.   Endocrine: Negative for cold intolerance, heat intolerance, polydipsia and polyuria.   Genitourinary: Negative for decreased urine volume, difficulty urinating, frequency and urinary incontinence.   Musculoskeletal: Negative for arthralgias, back pain, joint swelling and myalgias.   Skin: Negative for color change, rash, skin lesions and wound.   Neurological: Negative for dizziness, seizures, numbness and headache.   Hematological: Negative for adenopathy. Does not bruise/bleed easily.   Psychiatric/Behavioral: Negative for depressed mood. The patient is not nervous/anxious.    All other systems reviewed and are negative.      Past Medical History:   Diagnosis Date   • Acute congestive heart failure (HCC)     improved   • Arthritis    • Bladder cancer (HCC)    • Bleeding disorder (HCC)     (CONDITION DUE TO BLOOD THINNERS)   • Chronic atrial fibrillation (HCC)    • High cholesterol    • Hypertension    • Lymphoma (HCC)    • Moderate to severe aortic stenosis     Patient declines to have  transcatheter   • Non Hodgkin's lymphoma (HCC)     2008 treated 2008 2009.   • Rectus sheath hematoma     Right rectus sheath hematoma, off anticoagulation because of the hematoma.   • Skin cancer    • Type 2 diabetes mellitus (HCC)      Past Surgical History:   Procedure Laterality Date   • BLADDER SURGERY     • CATARACT EXTRACTION     • DILATION AND CURETTAGE, DIAGNOSTIC / THERAPEUTIC     • LEFT VENTRICULAR ASSIST DEVICE     • LYMPH NODE BIOPSY      Biopsy of retroperitoneal lymph node or mass    • SKIN CANCER DESTRUCTION     • TONSILLECTOMY       Social History     Socioeconomic History   • Marital status:    • Number of children: 2   Tobacco Use   • Smoking status: Never Smoker   • Smokeless tobacco: Never Used   Vaping Use   • Vaping Use: Never used   Substance and Sexual Activity   • Alcohol use: Not Currently   • Drug use: Not Currently   • Sexual activity: Defer     Family History   Problem Relation Age of Onset   • Stomach cancer Paternal Great-Grandfather        Objective   Physical Exam  General: Alert, cooperative, no acute distress but appears fatigued  Eyes: Anicteric sclera, PERRLA  Respiratory: normal respiratory effort  Cardiovascular: trace lower extremity edema  Skin: Normal tone, no rash, no lesions  Psychiatric: Appropriate affect, intact judgment  Neurologic: No focal sensory or motor deficits, normal cognition   Musculoskeletal: Normal muscle strength and tone for age  Extremities: No clubbing, cyanosis, or deformities    Vitals:    04/07/22 1043   BP: 103/46   Pulse: 76   Resp: 18   Temp: 97.6 °F (36.4 °C)   SpO2: 98%   Weight: 61.3 kg (135 lb 2.3 oz)   PainSc: 0-No pain     ECOG score: 1         PHQ-9 Total Score:         Result Review :   The following data was reviewed by: Lily Byrd MD PhD on 04/07/2022:  Lab Results   Component Value Date    HGB 12.1 04/07/2022    HCT 37.6 04/07/2022    MCV 96.4 04/07/2022     04/07/2022    WBC 5.85 04/07/2022    NEUTROABS 3.80  04/07/2022    LYMPHSABS 1.23 04/07/2022    MONOSABS 0.64 04/07/2022    EOSABS 0.15 04/07/2022    BASOSABS 0.03 04/07/2022     Lab Results   Component Value Date    GLUCOSE 163 (H) 04/07/2022    BUN 35 (H) 04/07/2022    CREATININE 0.99 04/07/2022     04/07/2022    K 3.5 04/07/2022    CL 93 (L) 04/07/2022    CO2 31.4 (H) 04/07/2022    CALCIUM 10.9 (H) 04/07/2022    PROTEINTOT 6.9 04/07/2022    ALBUMIN 4.00 04/07/2022    BILITOT 0.7 04/07/2022    ALKPHOS 70 04/07/2022    AST 28 04/07/2022    ALT 17 04/07/2022          Assessment and Plan    Diagnoses and all orders for this visit:    1. 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  She will resume treatment as planned.  I reviewed her CBC and CMP.  We will plan for repeat CT CAP with contrast for restaging prior to the next appointment in 2 months.    Patient was given instructions and counseling regarding her condition or for health maintenance advice. Please see specific information pulled into the AVS if appropriate.

## 2022-04-08 RX ORDER — MONTELUKAST SODIUM 10 MG/1
TABLET ORAL
Qty: 90 TABLET | Refills: 3 | Status: SHIPPED | OUTPATIENT
Start: 2022-04-08

## 2022-04-12 ENCOUNTER — TELEPHONE (OUTPATIENT)
Dept: CARDIOLOGY | Facility: CLINIC | Age: 87
End: 2022-04-12

## 2022-04-12 NOTE — TELEPHONE ENCOUNTER
Received VM from Sima with [facility] in regards to BMP order    HEAVENLY Gao advised patient was to have BMP done on 4/7 to check kidney function. Per Sima patient will [facility] to [facility] or they will draw.

## 2022-04-13 RX ORDER — DABIGATRAN ETEXILATE MESYLATE 75 MG/1
CAPSULE ORAL
Qty: 60 CAPSULE | Refills: 5 | Status: SHIPPED | OUTPATIENT
Start: 2022-04-13 | End: 2022-09-23 | Stop reason: SDUPTHER

## 2022-04-18 ENCOUNTER — TELEPHONE (OUTPATIENT)
Dept: CARDIOLOGY | Facility: CLINIC | Age: 87
End: 2022-04-18

## 2022-04-18 RX ORDER — POTASSIUM CHLORIDE 750 MG/1
10 TABLET, FILM COATED, EXTENDED RELEASE ORAL DAILY
Qty: 5 TABLET | Refills: 0 | Status: SHIPPED | OUTPATIENT
Start: 2022-04-18 | End: 2022-04-20 | Stop reason: SDUPTHER

## 2022-04-18 NOTE — TELEPHONE ENCOUNTER
----- Message from FANY Amor sent at 4/18/2022 12:21 AM EDT -----  Notify pt potassium level is low, recommend potssium 10 meq daily for 5 days, recheck BMP in 1 week

## 2022-04-18 NOTE — TELEPHONE ENCOUNTER
SW patient regarding results and recommendations.     Attempted to call Sima with [facility]  To have have her draw BMP in 1 week.

## 2022-04-19 ENCOUNTER — OFFICE VISIT (OUTPATIENT)
Dept: CARDIOLOGY | Facility: CLINIC | Age: 87
End: 2022-04-19

## 2022-04-19 VITALS
SYSTOLIC BLOOD PRESSURE: 123 MMHG | DIASTOLIC BLOOD PRESSURE: 50 MMHG | WEIGHT: 141 LBS | HEIGHT: 62 IN | HEART RATE: 66 BPM | BODY MASS INDEX: 25.95 KG/M2 | OXYGEN SATURATION: 94 %

## 2022-04-19 DIAGNOSIS — I50.32 CHRONIC HEART FAILURE WITH PRESERVED EJECTION FRACTION (HFPEF): Primary | ICD-10-CM

## 2022-04-19 DIAGNOSIS — I35.0 NONRHEUMATIC AORTIC VALVE STENOSIS: ICD-10-CM

## 2022-04-19 PROBLEM — Z20.822 SUSPECTED SEVERE ACUTE RESPIRATORY SYNDROME CORONAVIRUS 2 (SARS-COV-2) INFECTION: Status: RESOLVED | Noted: 2022-02-25 | Resolved: 2022-04-19

## 2022-04-19 PROBLEM — I50.9 CONGESTIVE HEART FAILURE: Status: RESOLVED | Noted: 2022-02-21 | Resolved: 2022-04-19

## 2022-04-19 PROBLEM — R53.1 WEAKNESS: Status: RESOLVED | Noted: 2022-04-07 | Resolved: 2022-04-19

## 2022-04-19 PROBLEM — D84.9 IMMUNOSUPPRESSED STATUS (HCC): Status: RESOLVED | Noted: 2021-11-24 | Resolved: 2022-04-19

## 2022-04-19 PROCEDURE — 99213 OFFICE O/P EST LOW 20 MIN: CPT | Performed by: NURSE PRACTITIONER

## 2022-04-19 RX ORDER — IRON POLYSACCHARIDE COMPLEX 150 MG
150 CAPSULE ORAL 2 TIMES DAILY
Qty: 180 CAPSULE | Refills: 3 | Status: SHIPPED | OUTPATIENT
Start: 2022-04-19 | End: 2022-10-20

## 2022-04-19 NOTE — TELEPHONE ENCOUNTER
Caller: ROYCE MURRIETA    Relationship: Emergency Contact    Best call back number: [phone]    Requested Prescriptions:   Requested Prescriptions     Pending Prescriptions Disp Refills   • iron polysaccharides (NIFEREX) 150 MG capsule       Sig: Take 1 capsule by mouth 2 (Two) Times a Day.        Pharmacy where request should be sent: [id number] [place] - [address] AT [address] - [phone] [facility] [phone] FX     Additional details provided by patient: PATIENT HAS 5 CAPSULES LEFT     Does the patient have less than a 3 day supply:  [] Yes  [x] No    Ирина Hackett Rep   04/19/22 09:28 EDT         ROYCE MURRIETA IS NOT ON THE  VERBAL.

## 2022-04-19 NOTE — PROGRESS NOTES
"Chief Complaint  Other (Chest discomfort/)    Subjective            History of Present Illness  Radha Aparicio is a 89-year-old white/ female patient who presents to the office today for follow-up per request of [facility] service due to her having some \"low oxygen issues and chest pressure\".  The patient recently had some blood work done on 4/12/2022 which showed low potassium level.  She was advised to take potassium for 5 days and have levels rechecked.  She reports that yesterday she took her first dose of potassium and felt much better, had a great night of sleep, and actually did not want to come today but was strongly encouraged by [facility] to attend her appointment today.  She reports that all of her symptoms are now resolved which were: epigastric \"pressure not pain\" and some increased shortness of breath.  She admits to feeling much better today.  She denies any chest pain, worsening in shortness of breath, lightheadedness/dizziness, or palpitations.  She has had no weight gain since her previous office visit.  She reports compliance with all of her medications.    [facility]  Past Medical History:   Diagnosis Date   • Acute congestive heart failure (HCC)     improved   • Arthritis    • Bladder cancer (HCC)    • Bleeding disorder (HCC)     (CONDITION DUE TO BLOOD THINNERS)   • Chronic atrial fibrillation (HCC)    • Chronic heart failure with preserved ejection fraction (HFpEF) 9/23/2021   • High cholesterol    • Hypertension    • Lymphoma (HCC)    • Moderate to severe aortic stenosis     Patient declines to have transcatheter   • Non Hodgkin's lymphoma (HCC)     2008 treated 2008 2009.   • Rectus sheath hematoma     Right rectus sheath hematoma, off anticoagulation because of the hematoma.   • Skin cancer    • Type 2 diabetes mellitus (HCC)          ALLERGY  Allergies   Allergen Reactions   • Contrast Dye Unknown - Low Severity   • Iodinated Diagnostic Agents Hives and Other (See " Comments)     IODINATED CONTRAST MEDIA (Verified  Allergy, Unknown, HIVES,SNEEZING,ITCHY EYES)   • Iodine Unknown - Low Severity   • Penicillins Rash   • Shellfish Allergy Unknown - Low Severity   • Spinach Other (See Comments)      SPINACH (Verified  Allergy, Unknown, 2/22/21)      SHOWED UP ON ALLERGY TESTING   • Sulfa Antibiotics Other (See Comments)     (Verified  Allergy, Unknown, RASH)          SURGICALHX  Past Surgical History:   Procedure Laterality Date   • BLADDER SURGERY     • CATARACT EXTRACTION     • DILATION AND CURETTAGE, DIAGNOSTIC / THERAPEUTIC     • LEFT VENTRICULAR ASSIST DEVICE     • LYMPH NODE BIOPSY      Biopsy of retroperitoneal lymph node or mass    • SKIN CANCER DESTRUCTION     • TONSILLECTOMY            SOC  Social History     Socioeconomic History   • Marital status:    • Number of children: 2   Tobacco Use   • Smoking status: Never Smoker   • Smokeless tobacco: Never Used   Vaping Use   • Vaping Use: Never used   Substance and Sexual Activity   • Alcohol use: Not Currently   • Drug use: Not Currently   • Sexual activity: Defer         FAMHX  Family History   Problem Relation Age of Onset   • Stomach cancer Paternal Great-Grandfather           MEDSIGONLY  Current Outpatient Medications on File Prior to Visit   Medication Sig   • acetaminophen (TYLENOL) 325 MG tablet 650 mg As Needed.   • albuterol sulfate  (90 Base) MCG/ACT inhaler USE 2 PUFFS BY MOUTH EVERY 4 HOURS AS NEEDED FOR WHEEZING (Patient taking differently: Inhale 2 puffs Every 4 (Four) Hours As Needed.)   • aspirin 81 MG EC tablet Take 81 mg by mouth Daily.   • atorvastatin (LIPITOR) 40 MG tablet Take 1 tablet by mouth Daily.   • Calcium Carbonate-Vitamin D 600-200 MG-UNIT capsule Take 1 capsule by mouth Every Morning.   • carvedilol (COREG) 6.25 MG tablet TAKE 1 TABLET BY MOUTH TWICE DAILY WITH FOOD (Patient taking differently: Take 6.25 mg by mouth 2 (Two) Times a Day With Meals.)   • cholecalciferol (VITAMIN D3)  "25 MCG (1000 UT) tablet Take 1,000 Units by mouth Daily. 50.000 iu   • ergocalciferol (ERGOCALCIFEROL) 1.25 MG (01842 UT) capsule Take 1 capsule by mouth Every 14 (Fourteen) Days.   • furosemide (LASIX) 40 MG tablet Take 40 mg by mouth 2 (Two) Times a Day.   • glucose blood (FREESTYLE LITE) test strip Use as instructed   • iron polysaccharides (NIFEREX) 150 MG capsule Take 150 mg by mouth 2 (Two) Times a Day.   • Lactobacillus (FLORAJEN ACIDOPHILUS PO) Take 460 mg by mouth.   • Lancets (freestyle) lancets 1 each by Other route 2 (two) times a day. Check blood sugars twice a day-DM Type II, Diagnosis Code E11.9   • losartan (COZAAR) 25 MG tablet Take 1 tablet by mouth Daily.   • montelukast (SINGULAIR) 10 MG tablet TAKE 1 TABLET(10 MG) BY MOUTH EVERY DAY IN THE EVENING   • potassium chloride 10 MEQ CR tablet Take 1 tablet by mouth Daily.   • Pradaxa 75 MG capsule TAKE 1 CAPSULE BY MOUTH TWICE DAILY   • raloxifene (EVISTA) 60 MG tablet Take 1 tablet by mouth Daily.   • spironolactone (Aldactone) 25 MG tablet Take 1/2 tablet every day (Patient taking differently: Take 12.5 mg by mouth Daily. Take 1/2 tablet every day)   • [DISCONTINUED] diphenhydrAMINE HCl (BENADRYL ALLERGY PO) 25 mg.   • [DISCONTINUED] melatonin 3 MG tablet 3 mg.     No current facility-administered medications on file prior to visit.         Objective   /50   Pulse 66   Ht 157.5 cm (62\")   Wt 64 kg (141 lb)   SpO2 94%   BMI 25.79 kg/m²       Physical Exam  HENT:      Head: Normocephalic.   Neck:      Vascular: No carotid bruit.   Cardiovascular:      Rate and Rhythm: Normal rate and regular rhythm.      Pulses: Normal pulses.      Heart sounds: Normal heart sounds. No murmur heard.  Pulmonary:      Effort: Pulmonary effort is normal.      Breath sounds: Wheezing present.   Musculoskeletal:      Cervical back: Neck supple.      Right lower le+ Pitting Edema present.      Left lower le+ Pitting Edema present.   Skin:     General: " Skin is dry.      Capillary Refill: Capillary refill takes less than 2 seconds.   Neurological:      Mental Status: She is alert and oriented to person, place, and time.   Psychiatric:         Behavior: Behavior normal.       Result Review :   The following data was reviewed by: FANY Farooq on 04/19/2022:  proBNP   Date Value Ref Range Status   03/05/2022 2,180.0 (H) 0.0 - 1,800.0 pg/mL Final     CMP    CMP 4/7/22   Glucose 163 (A)   BUN 35 (A)   Creatinine 0.99   Sodium 136   Potassium 3.5   Chloride 93 (A)   Calcium 10.9 (A)   Albumin 4.00   Total Bilirubin 0.7   Alkaline Phosphatase 70   AST (SGOT) 28   ALT (SGPT) 17   (A) Abnormal value            CBC w/diff    CBC w/Diff 4/7/22   WBC 5.85   RBC 3.90   Hemoglobin 12.1   Hematocrit 37.6   MCV 96.4   MCH 31.0   MCHC 32.2   RDW 16.1 (A)   Platelets 238   Neutrophil Rel % 65.0   Immature Granulocyte Rel % 0.0   Lymphocyte Rel % 21.0   Monocyte Rel % 10.9   Eosinophil Rel % 2.6   Basophil Rel % 0.5   (A) Abnormal value             Lab Results   Component Value Date    TSH 3.540 03/28/2022      Lab Results   Component Value Date    FREET4 1.07 03/28/2022      No results found for: DDIMERQUANT  Magnesium   Date Value Ref Range Status   03/28/2022 2.6 (H) 1.7 - 2.3 mg/dL Final      No results found for: DIGOXIN   Lab Results   Component Value Date    TROPONINT 0.026 02/21/2022           Lipid Panel    Lipid Panel 3/28/22   Total Cholesterol 156   Triglycerides 55   HDL Cholesterol 69 (A)   VLDL Cholesterol 11   LDL Cholesterol  76   LDL/HDL Ratio 1.10   (A) Abnormal value          03/30/21 echo:  CONCLUSIONS:    1.  Normal left ventricular systolic function with an estimated ejection        fraction of 55-60%.  Dyssynchronous septal wall motion, secondary to left bundle branch block.  Mild concentric left ventricular hypertrophy.    2.  Diastolic function could not be fully assessed secondary to atrial        fibrillation, but the tissue Doppler findings were  consistent with        markedly elevated left ventricular filling pressure.    3.  Moderately dilated left and right atria.    4.  Mild mitral annular calcification with moderate mitral regurgitation.    5.  Severe aortic stenosis, as detailed above, with mild aortic        regurgitation.    6.  Borderline dilated IVC with blunted respirophasic changes, suggestive of elevated central venous pressures.        Assessment and Plan    Diagnoses and all orders for this visit:    1. Chronic heart failure with preserved ejection fraction (HFpEF) (Primary)  Bilateral lower extremity edema is +2 currently, her Lasix dose had recently been increased.  She admits that she has not been wearing compression socks.  Advised strongly to wear compression socks, restrict fluid intake, and low-sodium diet.  She verbalizes understanding at this time will make a better attempt to follow recommendations.  Continue current medications    2. Severe aortic stenosis  Symptomatically stable at this time, patient does not wish to forego any intervention.    Follow Up   Return for Follow up with Dr Harden as scheduled.    Patient was given instructions and counseling regarding her condition or for health maintenance advice. Please see specific information pulled into the AVS if appropriate.     Radha Aparicio  reports that she has never smoked. She has never used smokeless tobacco.         Veronica Mckeon, APRN  04/19/22  16:01 EDT    Dictated Utilizing Dragon Dictation

## 2022-04-20 RX ORDER — POTASSIUM CHLORIDE 750 MG/1
10 TABLET, FILM COATED, EXTENDED RELEASE ORAL DAILY
Qty: 90 TABLET | Refills: 0 | Status: SHIPPED | OUTPATIENT
Start: 2022-04-20 | End: 2022-10-06 | Stop reason: SDUPTHER

## 2022-04-21 DIAGNOSIS — E11.9 DIABETES MELLITUS TYPE II, NON INSULIN DEPENDENT: ICD-10-CM

## 2022-04-21 RX ORDER — LANCETS 28 GAUGE
EACH MISCELLANEOUS
Qty: 100 EACH | Refills: 3 | Status: SHIPPED | OUTPATIENT
Start: 2022-04-21 | End: 2022-06-08

## 2022-04-26 ENCOUNTER — LAB REQUISITION (OUTPATIENT)
Dept: LAB | Facility: HOSPITAL | Age: 87
End: 2022-04-26

## 2022-04-26 DIAGNOSIS — I50.9 HEART FAILURE, UNSPECIFIED: ICD-10-CM

## 2022-04-26 DIAGNOSIS — I48.91 UNSPECIFIED ATRIAL FIBRILLATION: ICD-10-CM

## 2022-04-26 DIAGNOSIS — C85.90 NON-HODGKIN LYMPHOMA, UNSPECIFIED, UNSPECIFIED SITE: ICD-10-CM

## 2022-04-26 LAB
ANION GAP SERPL CALCULATED.3IONS-SCNC: 12 MMOL/L (ref 5–15)
BUN SERPL-MCNC: 28 MG/DL (ref 8–23)
BUN/CREAT SERPL: 32.6 (ref 7–25)
CALCIUM SPEC-SCNC: 9.8 MG/DL (ref 8.2–9.6)
CHLORIDE SERPL-SCNC: 102 MMOL/L (ref 98–107)
CO2 SERPL-SCNC: 26 MMOL/L (ref 22–29)
CREAT SERPL-MCNC: 0.86 MG/DL (ref 0.57–1)
EGFRCR SERPLBLD CKD-EPI 2021: 63.1 ML/MIN/1.73
GLUCOSE SERPL-MCNC: 104 MG/DL (ref 65–99)
POTASSIUM SERPL-SCNC: 4.1 MMOL/L (ref 3.5–5.2)
SODIUM SERPL-SCNC: 140 MMOL/L (ref 136–145)

## 2022-04-26 PROCEDURE — 80048 BASIC METABOLIC PNL TOTAL CA: CPT | Performed by: INTERNAL MEDICINE

## 2022-05-04 DIAGNOSIS — Z79.4 TYPE 2 DIABETES MELLITUS WITH DIABETIC NEUROPATHY, WITH LONG-TERM CURRENT USE OF INSULIN: Primary | ICD-10-CM

## 2022-05-04 DIAGNOSIS — E11.40 TYPE 2 DIABETES MELLITUS WITH DIABETIC NEUROPATHY, WITH LONG-TERM CURRENT USE OF INSULIN: Primary | ICD-10-CM

## 2022-05-04 RX ORDER — BLOOD-GLUCOSE METER
KIT MISCELLANEOUS
Qty: 90 EACH | Refills: 3 | Status: SHIPPED | OUTPATIENT
Start: 2022-05-04 | End: 2022-05-09 | Stop reason: SDUPTHER

## 2022-05-06 ENCOUNTER — TELEPHONE (OUTPATIENT)
Dept: INTERNAL MEDICINE | Facility: CLINIC | Age: 87
End: 2022-05-06

## 2022-05-06 DIAGNOSIS — Z79.4 TYPE 2 DIABETES MELLITUS WITH DIABETIC NEUROPATHY, WITH LONG-TERM CURRENT USE OF INSULIN: ICD-10-CM

## 2022-05-06 DIAGNOSIS — E11.40 TYPE 2 DIABETES MELLITUS WITH DIABETIC NEUROPATHY, WITH LONG-TERM CURRENT USE OF INSULIN: ICD-10-CM

## 2022-05-06 NOTE — TELEPHONE ENCOUNTER
"    Pharmacy Name:  [facility]     Pharmacy representative name: SOFIA     Pharmacy representative phone number: [phone]    What medication are you calling in regards to: glucose blood (FREESTYLE LITE) test strip    What question does the pharmacy have: NEW PRESCRIPTION NEEDS TO BE SENT TO THE PHARMACY. PHARMACY STATES THAT INSURANCE WILL NOT ALLOW \"AS NEEDED\". THEY REQUIRE PRECISE DIRECTIONS FOR THE PATIENT TO USE TEST STRIPS.     Who is the provider that prescribed the medication:DR. DANIELSON     Additional notes: PHARMACY HAS ASKED FOR THE NEW PRESCRIPTION TO BE FAXED PLEASE. THANKS.     PHARMACY FAX NUMBER [phone]  "

## 2022-05-09 RX ORDER — BLOOD-GLUCOSE METER
1 KIT MISCELLANEOUS DAILY
Qty: 100 EACH | Refills: 3 | Status: SHIPPED | OUTPATIENT
Start: 2022-05-09 | End: 2022-06-08

## 2022-05-19 ENCOUNTER — TELEPHONE (OUTPATIENT)
Dept: ONCOLOGY | Facility: HOSPITAL | Age: 87
End: 2022-05-19

## 2022-05-19 NOTE — TELEPHONE ENCOUNTER
Caller: JORGITO CHRISTIANSON    Relationship to patient: Emergency Contact    Best call back number: [phone]    Chief complaint: PATIENT NEEDS TO RESCHEDULE SOME APPOINTMENTS. HUB UNABLE TO DUE TO PATIENT IN ACTIVE TREATMENT    Type of visit: CT SCAN    Requested date: 6-2-22 AROUND 12:30     If rescheduling, when is the original appointment: 5-26-22     Additional notes: PATIENT ALSO HAS AN APPOINTMENT ON 6-2-22 FOR INFUSION AND FOLLOW UP THAT SHE NEEDS TO RESCHEDULE. WOULD LIKE 6-9-22    PLEASE CALL TO CONFIRM

## 2022-05-26 ENCOUNTER — TELEPHONE (OUTPATIENT)
Dept: INTERNAL MEDICINE | Facility: CLINIC | Age: 87
End: 2022-05-26

## 2022-05-26 ENCOUNTER — APPOINTMENT (OUTPATIENT)
Dept: CT IMAGING | Facility: HOSPITAL | Age: 87
End: 2022-05-26

## 2022-05-26 NOTE — TELEPHONE ENCOUNTER
Caller: Radha Aparicio    Relationship to patient: Self    Best call back number: [phone]    Patient is needing: CALL BACK SHE HAS A COUGH AND IS HAVING TROUBLE BREATHING. WAS UNABLE TO WARM TRANSFER TO CLINICAL.

## 2022-06-02 ENCOUNTER — LAB (OUTPATIENT)
Dept: LAB | Facility: HOSPITAL | Age: 87
End: 2022-06-02

## 2022-06-02 ENCOUNTER — OFFICE VISIT (OUTPATIENT)
Dept: INTERNAL MEDICINE | Facility: CLINIC | Age: 87
End: 2022-06-02

## 2022-06-02 VITALS
HEART RATE: 60 BPM | OXYGEN SATURATION: 99 % | BODY MASS INDEX: 21.51 KG/M2 | DIASTOLIC BLOOD PRESSURE: 72 MMHG | TEMPERATURE: 97.2 F | HEIGHT: 64 IN | RESPIRATION RATE: 16 BRPM | WEIGHT: 126 LBS | SYSTOLIC BLOOD PRESSURE: 110 MMHG

## 2022-06-02 DIAGNOSIS — I50.31 ACUTE DIASTOLIC CHF (CONGESTIVE HEART FAILURE): ICD-10-CM

## 2022-06-02 DIAGNOSIS — M85.851 OSTEOPENIA OF BOTH HIPS: ICD-10-CM

## 2022-06-02 DIAGNOSIS — I10 ESSENTIAL HYPERTENSION: ICD-10-CM

## 2022-06-02 DIAGNOSIS — M85.852 OSTEOPENIA OF BOTH HIPS: ICD-10-CM

## 2022-06-02 DIAGNOSIS — I35.0 NONRHEUMATIC AORTIC VALVE STENOSIS: ICD-10-CM

## 2022-06-02 DIAGNOSIS — I10 PRIMARY HYPERTENSION: ICD-10-CM

## 2022-06-02 DIAGNOSIS — E61.1 IRON DEFICIENCY: ICD-10-CM

## 2022-06-02 DIAGNOSIS — Z79.899 ENCOUNTER FOR LONG-TERM (CURRENT) USE OF OTHER MEDICATIONS: ICD-10-CM

## 2022-06-02 DIAGNOSIS — I10 HYPERTENSION, UNSPECIFIED TYPE: ICD-10-CM

## 2022-06-02 DIAGNOSIS — D50.9 IRON DEFICIENCY ANEMIA, UNSPECIFIED IRON DEFICIENCY ANEMIA TYPE: ICD-10-CM

## 2022-06-02 DIAGNOSIS — E11.9 NON-INSULIN DEPENDENT TYPE 2 DIABETES MELLITUS: ICD-10-CM

## 2022-06-02 DIAGNOSIS — E03.4 HYPOTHYROIDISM DUE TO ACQUIRED ATROPHY OF THYROID: ICD-10-CM

## 2022-06-02 DIAGNOSIS — C85.90 NON-HODGKIN'S LYMPHOMA, UNSPECIFIED BODY REGION, UNSPECIFIED NON-HODGKIN LYMPHOMA TYPE: ICD-10-CM

## 2022-06-02 DIAGNOSIS — E53.8 B12 DEFICIENCY: ICD-10-CM

## 2022-06-02 DIAGNOSIS — E83.42 HYPOMAGNESEMIA: ICD-10-CM

## 2022-06-02 DIAGNOSIS — Z79.899 ENCOUNTER FOR LONG-TERM (CURRENT) USE OF OTHER MEDICATIONS: Primary | ICD-10-CM

## 2022-06-02 DIAGNOSIS — E11.9 TYPE 2 DIABETES MELLITUS WITHOUT COMPLICATION, UNSPECIFIED WHETHER LONG TERM INSULIN USE: ICD-10-CM

## 2022-06-02 DIAGNOSIS — I48.11 LONGSTANDING PERSISTENT ATRIAL FIBRILLATION: ICD-10-CM

## 2022-06-02 DIAGNOSIS — R63.4 WEIGHT LOSS: ICD-10-CM

## 2022-06-02 DIAGNOSIS — E55.9 VITAMIN D DEFICIENCY: ICD-10-CM

## 2022-06-02 DIAGNOSIS — J45.901 MODERATE ASTHMA WITH EXACERBATION, UNSPECIFIED WHETHER PERSISTENT: ICD-10-CM

## 2022-06-02 DIAGNOSIS — M10.9 ARTHRITIS DUE TO GOUT: ICD-10-CM

## 2022-06-02 DIAGNOSIS — E11.9 TYPE 2 DIABETES MELLITUS WITHOUT COMPLICATION, WITHOUT LONG-TERM CURRENT USE OF INSULIN: ICD-10-CM

## 2022-06-02 DIAGNOSIS — E78.5 HYPERLIPIDEMIA, UNSPECIFIED HYPERLIPIDEMIA TYPE: ICD-10-CM

## 2022-06-02 LAB
25(OH)D3 SERPL-MCNC: 59 NG/ML (ref 30–100)
ALBUMIN SERPL-MCNC: 3.9 G/DL (ref 3.5–5.2)
ALBUMIN UR-MCNC: 4.6 MG/DL
ALBUMIN/GLOB SERPL: 1 G/DL
ALP SERPL-CCNC: 60 U/L (ref 39–117)
ALT SERPL W P-5'-P-CCNC: 25 U/L (ref 1–33)
ANION GAP SERPL CALCULATED.3IONS-SCNC: 14.9 MMOL/L (ref 5–15)
AST SERPL-CCNC: 32 U/L (ref 1–32)
BACTERIA UR QL AUTO: NORMAL /HPF
BASOPHILS # BLD AUTO: 0.05 10*3/MM3 (ref 0–0.2)
BASOPHILS NFR BLD AUTO: 0.6 % (ref 0–1.5)
BILIRUB SERPL-MCNC: 0.9 MG/DL (ref 0–1.2)
BILIRUB UR QL STRIP: NEGATIVE
BUN SERPL-MCNC: 31 MG/DL (ref 8–23)
BUN/CREAT SERPL: 28.4 (ref 7–25)
CALCIUM SPEC-SCNC: 10.7 MG/DL (ref 8.2–9.6)
CHLORIDE SERPL-SCNC: 103 MMOL/L (ref 98–107)
CLARITY UR: CLEAR
CO2 SERPL-SCNC: 28.1 MMOL/L (ref 22–29)
COLOR UR: YELLOW
CREAT SERPL-MCNC: 1.09 MG/DL (ref 0.57–1)
DEPRECATED RDW RBC AUTO: 47 FL (ref 37–54)
EGFRCR SERPLBLD CKD-EPI 2021: 47.5 ML/MIN/1.73
EOSINOPHIL # BLD AUTO: 0.06 10*3/MM3 (ref 0–0.4)
EOSINOPHIL NFR BLD AUTO: 0.7 % (ref 0.3–6.2)
ERYTHROCYTE [DISTWIDTH] IN BLOOD BY AUTOMATED COUNT: 12.9 % (ref 12.3–15.4)
FERRITIN SERPL-MCNC: 135 NG/ML (ref 13–150)
FOLATE SERPL-MCNC: >20 NG/ML (ref 4.78–24.2)
GLOBULIN UR ELPH-MCNC: 3.8 GM/DL
GLUCOSE SERPL-MCNC: 113 MG/DL (ref 65–99)
GLUCOSE UR STRIP-MCNC: NEGATIVE MG/DL
HBA1C MFR BLD: 5.6 % (ref 4.8–5.6)
HCT VFR BLD AUTO: 41.1 % (ref 34–46.6)
HGB BLD-MCNC: 13.3 G/DL (ref 12–15.9)
HGB UR QL STRIP.AUTO: NEGATIVE
HYALINE CASTS UR QL AUTO: NORMAL /LPF
IMM GRANULOCYTES # BLD AUTO: 0.02 10*3/MM3 (ref 0–0.05)
IMM GRANULOCYTES NFR BLD AUTO: 0.2 % (ref 0–0.5)
IRON 24H UR-MRATE: 86 MCG/DL (ref 37–145)
IRON SATN MFR SERPL: 20 % (ref 20–50)
KETONES UR QL STRIP: NEGATIVE
LEUKOCYTE ESTERASE UR QL STRIP.AUTO: ABNORMAL
LYMPHOCYTES # BLD AUTO: 1.1 10*3/MM3 (ref 0.7–3.1)
LYMPHOCYTES NFR BLD AUTO: 12.2 % (ref 19.6–45.3)
MAGNESIUM SERPL-MCNC: 2.3 MG/DL (ref 1.7–2.3)
MCH RBC QN AUTO: 31.8 PG (ref 26.6–33)
MCHC RBC AUTO-ENTMCNC: 32.4 G/DL (ref 31.5–35.7)
MCV RBC AUTO: 98.3 FL (ref 79–97)
MONOCYTES # BLD AUTO: 0.84 10*3/MM3 (ref 0.1–0.9)
MONOCYTES NFR BLD AUTO: 9.3 % (ref 5–12)
NEUTROPHILS NFR BLD AUTO: 6.95 10*3/MM3 (ref 1.7–7)
NEUTROPHILS NFR BLD AUTO: 77 % (ref 42.7–76)
NITRITE UR QL STRIP: NEGATIVE
NRBC BLD AUTO-RTO: 0 /100 WBC (ref 0–0.2)
NT-PROBNP SERPL-MCNC: 2895 PG/ML (ref 0–1800)
PH UR STRIP.AUTO: 6 [PH] (ref 5–8)
PLATELET # BLD AUTO: 214 10*3/MM3 (ref 140–450)
PMV BLD AUTO: 11.3 FL (ref 6–12)
POTASSIUM SERPL-SCNC: 4 MMOL/L (ref 3.5–5.2)
PROT SERPL-MCNC: 7.7 G/DL (ref 6–8.5)
PROT UR QL STRIP: NEGATIVE
RBC # BLD AUTO: 4.18 10*6/MM3 (ref 3.77–5.28)
RBC # UR STRIP: NORMAL /HPF
REF LAB TEST METHOD: NORMAL
SODIUM SERPL-SCNC: 146 MMOL/L (ref 136–145)
SP GR UR STRIP: 1.01 (ref 1–1.03)
SQUAMOUS #/AREA URNS HPF: NORMAL /HPF
T4 FREE SERPL-MCNC: 1.22 NG/DL (ref 0.93–1.7)
TIBC SERPL-MCNC: 428 MCG/DL (ref 298–536)
TRANSFERRIN SERPL-MCNC: 287 MG/DL (ref 200–360)
TSH SERPL DL<=0.05 MIU/L-ACNC: 1.24 UIU/ML (ref 0.27–4.2)
URATE SERPL-MCNC: 6.2 MG/DL (ref 2.4–5.7)
UROBILINOGEN UR QL STRIP: ABNORMAL
VIT B12 BLD-MCNC: 1436 PG/ML (ref 211–946)
WBC # UR STRIP: NORMAL /HPF
WBC NRBC COR # BLD: 9.02 10*3/MM3 (ref 3.4–10.8)

## 2022-06-02 PROCEDURE — 83540 ASSAY OF IRON: CPT

## 2022-06-02 PROCEDURE — 99214 OFFICE O/P EST MOD 30 MIN: CPT | Performed by: INTERNAL MEDICINE

## 2022-06-02 PROCEDURE — 83036 HEMOGLOBIN GLYCOSYLATED A1C: CPT

## 2022-06-02 PROCEDURE — 36415 COLL VENOUS BLD VENIPUNCTURE: CPT

## 2022-06-02 PROCEDURE — 82043 UR ALBUMIN QUANTITATIVE: CPT

## 2022-06-02 PROCEDURE — 82306 VITAMIN D 25 HYDROXY: CPT

## 2022-06-02 PROCEDURE — 83880 ASSAY OF NATRIURETIC PEPTIDE: CPT

## 2022-06-02 PROCEDURE — 84439 ASSAY OF FREE THYROXINE: CPT

## 2022-06-02 PROCEDURE — 82728 ASSAY OF FERRITIN: CPT

## 2022-06-02 PROCEDURE — 84466 ASSAY OF TRANSFERRIN: CPT

## 2022-06-02 PROCEDURE — 85025 COMPLETE CBC W/AUTO DIFF WBC: CPT

## 2022-06-02 PROCEDURE — 80053 COMPREHEN METABOLIC PANEL: CPT

## 2022-06-02 PROCEDURE — 83735 ASSAY OF MAGNESIUM: CPT

## 2022-06-02 PROCEDURE — 84443 ASSAY THYROID STIM HORMONE: CPT

## 2022-06-02 PROCEDURE — 82607 VITAMIN B-12: CPT

## 2022-06-02 PROCEDURE — 81001 URINALYSIS AUTO W/SCOPE: CPT

## 2022-06-02 PROCEDURE — 84550 ASSAY OF BLOOD/URIC ACID: CPT

## 2022-06-02 PROCEDURE — 82746 ASSAY OF FOLIC ACID SERUM: CPT

## 2022-06-02 NOTE — ASSESSMENT & PLAN NOTE
Blood pressure was 110/72.  When I repeated it it was 105 systolic.  Assessment: Hypertension is getting a little low.  This may be due to severe aortic stenosis.  She also has lost weight.  Plan: Stop losartan 25 mg daily.  The bottle was pulled out of her bag of medicine to take.  Given to her family member that came with her.  Will continue Coreg 6.25 mg twice daily.

## 2022-06-02 NOTE — ASSESSMENT & PLAN NOTE
Patient went to urgent care about 5 days ago.  Was placed on a Z-Colin.  Just completed the Z-Colin.  She is having diarrhea.  She thinks is related to the Zithromax.  She does have some wheezing and rhonchi bilaterally.  Assessment: Asthma exacerbation.  Acute bronchitis.  May be having some diarrhea from the Z-Colin.  Plan: Continue Singulair 10 mg daily.  Albuterol as needed.  Imodium as needed for the diarrhea.  Use yogurt and buttermilk for good bacteria to help with the diarrhea.  Let me know if she does not clear.  Might need Advair or something similar as well as possible prednisone.

## 2022-06-02 NOTE — ASSESSMENT & PLAN NOTE
Patient is on Evista 60 mg daily for osteopenia.  Assessment: Osteopenia.  Plan: Continue raloxifene 60 mg daily.

## 2022-06-02 NOTE — ASSESSMENT & PLAN NOTE
Patient with severe aortic stenosis.  No chest pain shortness of breath dizziness or presyncope.  She has declined TAVR.  Assessment: Severe aortic stenosis.  Tolerating.  No symptoms.  Plan: Watch for worsening CHF.

## 2022-06-03 NOTE — PROGRESS NOTES
Chief Complaint  Bloated (Weak, tired. Still having GI side effects from Covid in Feb. ) and Cough (Went to [facility] last week. Was given an antibiotic. Was told she had bronchitis. Was tested for flu and Covid. Both tests were negative. Did a chest x-ray. It did not show any pneumonia. Having diarrhea. Thinks it is a side effect from the antibiotic. )    Subjective  Patient had recent bronchitis and went to urgent care.  They treated her with a Z-Colin.  She took her last dose today.  Has been having diarrhea since being on the Zithromax.  That should resolve with stopping the Zithromax at this point.  Her bronchitis is getting somewhat better but still coughing up sputum.    Radha Aparicio presents to [facility]  History of Present Illness  89-year-old lady who has multiple comorbidities.  She still lives at home with the assistance of family.  She had a hard time recovering from COVID earlier this year.  Then just recently had acute bronchitis.  She does have a history of some mild bronchospasm.  She has albuterol to use as needed.  She is on Singulair 10 mg daily.  I only heard minimal wheeze today on forced expiration and cough.  Not adding steroids because of side effects with her other multiple comorbidities.  Chronic combined CHF.  Severe aortic stenosis.  Patient has declined TAVR.  Chronic atrial fibrillation.  Hypertension is getting almost too well controlled in this lady.  We will stop Cozaar.  Hyperlipidemia.  History of iron deficiency.  Non-Hodgkin's lymphoma followed by Dr. Byrd.  Recently diagnosed about 2010 and resolved.  Then relapsed.  Getting chemotherapy every 2 months IV.  Tolerating fair.  Diabetes has resolved since she is lost weight.  Low-grade bladder cancer diagnosed in March 2017 and seen by Dr. Grubbs.  History of sinus surgery and tonsillectomy.  History of UTIs.  Cataract extractions.  Osteopenia.  History of skin cancers and has  "seen Dr. 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   Constitutional:       Appearance: She is normal weight.      Comments: Patient appears chronically ill.  Has lost weight since her last visit.  Does not appear to be feeling well today.  Family member accompanies her.   Cardiovascular:      Rate and Rhythm: Normal rate. Rhythm irregular.      Heart sounds: Murmur (Harsh systolic grade 6/6 murmur in the aortic area.) heard.   Pulmonary:      Breath sounds: Wheezing (Minimal wheeze on forced expiration.) present. No rales.   Abdominal:      Palpations: Abdomen is soft.      Tenderness: There is no abdominal tenderness. There is no guarding.   Musculoskeletal:         General: No swelling.   Skin:     General: Skin is warm and dry.   Neurological:      General: No focal deficit present.      Mental Status: She is alert and oriented to person, place, and time. Mental status is at baseline.   Psychiatric:         Behavior: Behavior is cooperative.         Thought Content: Thought content normal.        Result Review :  The following data was reviewed by: Gala Rao MD on 06/02/2022:  CMP    CMP 4/7/22 4/26/22 6/2/22   Glucose 163 (A) 104 (A) 113 (A)   BUN 35 (A) 28 (A) 31 (A)   Creatinine 0.99 0.86 1.09 (A)   Sodium 136 140 146 (A)   Potassium 3.5 4.1 4.0   Chloride 93 (A) 102 103   Calcium 10.9 (A) 9.8 (A) 10.7 (A)   Albumin 4.00  3.90   Total Bilirubin 0.7  0.9   Alkaline Phosphatase 70  60   AST (SGOT) 28  32   ALT (SGPT) 17  25   (A) Abnormal value            CBC w/diff    CBC w/Diff 3/28/22 4/7/22 6/2/22   WBC 7.21 5.85 9.02   RBC 4.20 3.90 4.18   Hemoglobin 13.0 12.1 13.3 "   Hematocrit 39.4 37.6 41.1   MCV 93.8 96.4 98.3 (A)   MCH 31.0 31.0 31.8   MCHC 33.0 32.2 32.4   RDW 13.7 16.1 (A) 12.9   Platelets 231 238 214   Neutrophil Rel %  65.0 77.0 (A)   Immature Granulocyte Rel %  0.0 0.2   Lymphocyte Rel %  21.0 12.2 (A)   Monocyte Rel %  10.9 9.3   Eosinophil Rel %  2.6 0.7   Basophil Rel %  0.5 0.6   (A) Abnormal value            Lipid Panel    Lipid Panel 9/2/21 12/13/21 3/28/22   Total Cholesterol 154 145 156   Triglycerides 53 36 55   HDL Cholesterol 75 (A) 73 (A) 69 (A)   VLDL Cholesterol 11 9 11   LDL Cholesterol  68 63 76   LDL/HDL Ratio 0.91 0.89 1.10   (A) Abnormal value            TSH    TSH 9/2/21 3/28/22 6/2/22   TSH 1.130 3.540 1.240           Most Recent A1C    HGBA1C Most Recent 6/2/22   Hemoglobin A1C 5.60               A1C Last 3 Results    HGBA1C Last 3 Results 12/13/21 3/28/22 6/2/22   Hemoglobin A1C 6.08 (A) 6.40 (A) 5.60   (A) Abnormal value            Microalbumin    Microalbumin 6/2/22   Microalbumin, Urine 4.6           UA    Urinalysis 2/19/22 3/28/22 3/28/22 6/2/22 6/2/22     1100 1100 1556 1556   Squamous Epithelial Cells, UA   0-2  0-2   Specific Gravity, UA <=1.005 1.016  1.013    Ketones, UA Negative Negative  Negative    Blood, UA Negative Negative  Negative    Leukocytes, UA Negative Moderate (2+) (A)  Trace (A)    Nitrite, UA Negative Negative  Negative    RBC, UA   0-2  0-2   WBC, UA   3-5 (A)  0-2   Bacteria, UA   Trace (A)  None Seen   (A) Abnormal value                          Assessment and Plan   Diagnoses and all orders for this visit:    1. Encounter for long-term (current) use of other medications (Primary)  -     CBC w AUTO Differential; Future    2. Primary hypertension  -     Comprehensive metabolic panel; Future    3. Hyperlipidemia, unspecified hyperlipidemia type  -     Lipid panel; Future    4. Vitamin D deficiency  -     Vitamin D 25 hydroxy; Future    5. B12 deficiency  -     Vitamin B12; Future  -     Folate; Future    6.  Hypomagnesemia  -     Magnesium; Future    7. Longstanding persistent atrial fibrillation (HCC)    8. Hypothyroidism due to acquired atrophy of thyroid  -     TSH+Free T4; Future    9. Iron deficiency anemia, unspecified iron deficiency anemia type  -     Iron Profile; Future    10. Acute diastolic CHF (congestive heart failure) (HCC)  -     BNP; Future    11. Severe aortic stenosis  Assessment & Plan:  Patient with severe aortic stenosis.  No chest pain shortness of breath dizziness or presyncope.  She has declined TAVR.  Assessment: Severe aortic stenosis.  Tolerating.  No symptoms.  Plan: Watch for worsening CHF.      12. Essential hypertension  Assessment & Plan:  Blood pressure was 110/72.  When I repeated it it was 105 systolic.  Assessment: Hypertension is getting a little low.  This may be due to severe aortic stenosis.  She also has lost weight.  Plan: Stop losartan 25 mg daily.  The bottle was pulled out of her bag of medicine to take.  Given to her family member that came with her.  Will continue Coreg 6.25 mg twice daily.      13. Moderate asthma with exacerbation, unspecified whether persistent  Assessment & Plan:  Patient went to urgent care about 5 days ago.  Was placed on a Z-Colin.  Just completed the Z-Colin.  She is having diarrhea.  She thinks is related to the Zithromax.  She does have some wheezing and rhonchi bilaterally.  Assessment: Asthma exacerbation.  Acute bronchitis.  May be having some diarrhea from the Z-Colin.  Plan: Continue Singulair 10 mg daily.  Albuterol as needed.  Imodium as needed for the diarrhea.  Use yogurt and buttermilk for good bacteria to help with the diarrhea.  Let me know if she does not clear.  Might need Advair or something similar as well as possible prednisone.      14. Osteopenia of both hips  Assessment & Plan:  Patient is on Evista 60 mg daily for osteopenia.  Assessment: Osteopenia.  Plan: Continue raloxifene 60 mg daily.      15. Type 2 diabetes mellitus without  complication, without long-term current use of insulin (HCC)  Assessment & Plan:  Hyperglycemia has resolved with weight loss and decrease in appetite.          Follow Up Return in about 3 months (around 9/2/2022).  Patient was given instructions and counseling regarding her condition or for health maintenance advice. Please see specific information pulled into the AVS if appropriate.

## 2022-06-06 ENCOUNTER — APPOINTMENT (OUTPATIENT)
Dept: CT IMAGING | Facility: HOSPITAL | Age: 87
End: 2022-06-06

## 2022-06-06 ENCOUNTER — HOSPITAL ENCOUNTER (EMERGENCY)
Facility: HOSPITAL | Age: 87
Discharge: HOME | End: 2022-06-06
Attending: EMERGENCY MEDICINE | Admitting: EMERGENCY MEDICINE

## 2022-06-06 VITALS
HEART RATE: 84 BPM | OXYGEN SATURATION: 93 % | RESPIRATION RATE: 18 BRPM | SYSTOLIC BLOOD PRESSURE: 116 MMHG | TEMPERATURE: 98.4 F | HEIGHT: 64 IN | BODY MASS INDEX: 21.63 KG/M2 | DIASTOLIC BLOOD PRESSURE: 57 MMHG

## 2022-06-06 DIAGNOSIS — R10.30 LOWER ABDOMINAL PAIN: ICD-10-CM

## 2022-06-06 DIAGNOSIS — R19.7 DIARRHEA, UNSPECIFIED TYPE: Primary | ICD-10-CM

## 2022-06-06 DIAGNOSIS — Z85.51 HISTORY OF BLADDER CANCER: ICD-10-CM

## 2022-06-06 DIAGNOSIS — N32.3 DIVERTICULA, BLADDER: ICD-10-CM

## 2022-06-06 DIAGNOSIS — K52.9 INFLAMMATION OF COLONIC MUCOSA: ICD-10-CM

## 2022-06-06 LAB
ALBUMIN SERPL-MCNC: 3.8 G/DL (ref 3.5–5.2)
ALBUMIN/GLOB SERPL: 1.3 G/DL
ALP SERPL-CCNC: 63 U/L (ref 39–117)
ALT SERPL W P-5'-P-CCNC: 18 U/L (ref 1–33)
ANION GAP SERPL CALCULATED.3IONS-SCNC: 10.2 MMOL/L (ref 5–15)
AST SERPL-CCNC: 29 U/L (ref 1–32)
BACTERIA UR QL AUTO: ABNORMAL /HPF
BASOPHILS # BLD AUTO: 0.04 10*3/MM3 (ref 0–0.2)
BASOPHILS NFR BLD AUTO: 0.2 % (ref 0–1.5)
BILIRUB SERPL-MCNC: 1.1 MG/DL (ref 0–1.2)
BILIRUB UR QL STRIP: NEGATIVE
BUN SERPL-MCNC: 20 MG/DL (ref 8–23)
BUN/CREAT SERPL: 23 (ref 7–25)
CALCIUM SPEC-SCNC: 9.8 MG/DL (ref 8.2–9.6)
CHLORIDE SERPL-SCNC: 96 MMOL/L (ref 98–107)
CLARITY UR: CLEAR
CO2 SERPL-SCNC: 27.8 MMOL/L (ref 22–29)
COLOR UR: YELLOW
CREAT SERPL-MCNC: 0.87 MG/DL (ref 0.57–1)
D-LACTATE SERPL-SCNC: 0.9 MMOL/L (ref 0.5–2)
DEPRECATED RDW RBC AUTO: 48 FL (ref 37–54)
EGFRCR SERPLBLD CKD-EPI 2021: 62.2 ML/MIN/1.73
EOSINOPHIL # BLD AUTO: 0.01 10*3/MM3 (ref 0–0.4)
EOSINOPHIL NFR BLD AUTO: 0.1 % (ref 0.3–6.2)
ERYTHROCYTE [DISTWIDTH] IN BLOOD BY AUTOMATED COUNT: 13.5 % (ref 12.3–15.4)
GLOBULIN UR ELPH-MCNC: 3 GM/DL
GLUCOSE SERPL-MCNC: 110 MG/DL (ref 65–99)
GLUCOSE UR STRIP-MCNC: NEGATIVE MG/DL
HCT VFR BLD AUTO: 38.7 % (ref 34–46.6)
HGB BLD-MCNC: 13.2 G/DL (ref 12–15.9)
HGB UR QL STRIP.AUTO: ABNORMAL
HOLD SPECIMEN: NORMAL
HOLD SPECIMEN: NORMAL
HYALINE CASTS UR QL AUTO: ABNORMAL /LPF
IMM GRANULOCYTES # BLD AUTO: 0.08 10*3/MM3 (ref 0–0.05)
IMM GRANULOCYTES NFR BLD AUTO: 0.5 % (ref 0–0.5)
KETONES UR QL STRIP: ABNORMAL
LEUKOCYTE ESTERASE UR QL STRIP.AUTO: ABNORMAL
LIPASE SERPL-CCNC: 144 U/L (ref 13–60)
LYMPHOCYTES # BLD AUTO: 1.15 10*3/MM3 (ref 0.7–3.1)
LYMPHOCYTES NFR BLD AUTO: 6.6 % (ref 19.6–45.3)
MCH RBC QN AUTO: 32.5 PG (ref 26.6–33)
MCHC RBC AUTO-ENTMCNC: 34.1 G/DL (ref 31.5–35.7)
MCV RBC AUTO: 95.3 FL (ref 79–97)
MONOCYTES # BLD AUTO: 1.09 10*3/MM3 (ref 0.1–0.9)
MONOCYTES NFR BLD AUTO: 6.3 % (ref 5–12)
NEUTROPHILS NFR BLD AUTO: 14.93 10*3/MM3 (ref 1.7–7)
NEUTROPHILS NFR BLD AUTO: 86.3 % (ref 42.7–76)
NITRITE UR QL STRIP: NEGATIVE
NRBC BLD AUTO-RTO: 0 /100 WBC (ref 0–0.2)
PH UR STRIP.AUTO: 5.5 [PH] (ref 5–8)
PLATELET # BLD AUTO: 198 10*3/MM3 (ref 140–450)
PMV BLD AUTO: 10.6 FL (ref 6–12)
POTASSIUM SERPL-SCNC: 3.6 MMOL/L (ref 3.5–5.2)
PROT SERPL-MCNC: 6.8 G/DL (ref 6–8.5)
PROT UR QL STRIP: ABNORMAL
RBC # BLD AUTO: 4.06 10*6/MM3 (ref 3.77–5.28)
RBC # UR STRIP: ABNORMAL /HPF
REF LAB TEST METHOD: ABNORMAL
SODIUM SERPL-SCNC: 134 MMOL/L (ref 136–145)
SP GR UR STRIP: 1.01 (ref 1–1.03)
SQUAMOUS #/AREA URNS HPF: ABNORMAL /HPF
UROBILINOGEN UR QL STRIP: ABNORMAL
WBC # UR STRIP: ABNORMAL /HPF
WBC NRBC COR # BLD: 17.3 10*3/MM3 (ref 3.4–10.8)
WHOLE BLOOD HOLD COAG: NORMAL
WHOLE BLOOD HOLD SPECIMEN: NORMAL

## 2022-06-06 PROCEDURE — 74176 CT ABD & PELVIS W/O CONTRAST: CPT

## 2022-06-06 PROCEDURE — 96374 THER/PROPH/DIAG INJ IV PUSH: CPT

## 2022-06-06 PROCEDURE — 83690 ASSAY OF LIPASE: CPT | Performed by: EMERGENCY MEDICINE

## 2022-06-06 PROCEDURE — 80053 COMPREHEN METABOLIC PANEL: CPT | Performed by: EMERGENCY MEDICINE

## 2022-06-06 PROCEDURE — 85025 COMPLETE CBC W/AUTO DIFF WBC: CPT | Performed by: EMERGENCY MEDICINE

## 2022-06-06 PROCEDURE — 81001 URINALYSIS AUTO W/SCOPE: CPT | Performed by: EMERGENCY MEDICINE

## 2022-06-06 PROCEDURE — 99284 EMERGENCY DEPT VISIT MOD MDM: CPT

## 2022-06-06 PROCEDURE — 99283 EMERGENCY DEPT VISIT LOW MDM: CPT

## 2022-06-06 PROCEDURE — 83605 ASSAY OF LACTIC ACID: CPT

## 2022-06-06 PROCEDURE — 36415 COLL VENOUS BLD VENIPUNCTURE: CPT

## 2022-06-06 PROCEDURE — 25010000002 HEPARIN LOCK FLUSH PER 10 UNITS

## 2022-06-06 RX ORDER — PREDNISONE 20 MG/1
20 TABLET ORAL 2 TIMES DAILY
Qty: 10 TABLET | Refills: 0 | Status: SHIPPED | OUTPATIENT
Start: 2022-06-06 | End: 2022-06-16 | Stop reason: HOSPADM

## 2022-06-06 RX ORDER — HEPARIN SODIUM (PORCINE) LOCK FLUSH IV SOLN 100 UNIT/ML 100 UNIT/ML
500 SOLUTION INTRAVENOUS ONCE
Status: COMPLETED | OUTPATIENT
Start: 2022-06-06 | End: 2022-06-06

## 2022-06-06 RX ORDER — LOPERAMIDE HYDROCHLORIDE 2 MG/1
4 CAPSULE ORAL ONCE
Status: COMPLETED | OUTPATIENT
Start: 2022-06-06 | End: 2022-06-06

## 2022-06-06 RX ORDER — SODIUM CHLORIDE 0.9 % (FLUSH) 0.9 %
10 SYRINGE (ML) INJECTION AS NEEDED
Status: DISCONTINUED | OUTPATIENT
Start: 2022-06-06 | End: 2022-06-06 | Stop reason: HOSPADM

## 2022-06-06 RX ADMIN — SODIUM CHLORIDE 500 ML: 9 INJECTION, SOLUTION INTRAVENOUS at 18:14

## 2022-06-06 RX ADMIN — HEPARIN SODIUM (PORCINE) LOCK FLUSH IV SOLN 100 UNIT/ML 500 UNITS: 100 SOLUTION at 21:09

## 2022-06-06 RX ADMIN — LOPERAMIDE HYDROCHLORIDE 4 MG: 2 CAPSULE ORAL at 18:16

## 2022-06-07 ENCOUNTER — TELEPHONE (OUTPATIENT)
Dept: ONCOLOGY | Facility: HOSPITAL | Age: 87
End: 2022-06-07

## 2022-06-07 RX ORDER — MEPERIDINE HYDROCHLORIDE 25 MG/ML
25 INJECTION INTRAMUSCULAR; INTRAVENOUS; SUBCUTANEOUS
Status: CANCELLED | OUTPATIENT
Start: 2022-06-07

## 2022-06-07 RX ORDER — SODIUM CHLORIDE 9 MG/ML
250 INJECTION, SOLUTION INTRAVENOUS ONCE
Status: CANCELLED | OUTPATIENT
Start: 2022-06-07

## 2022-06-07 RX ORDER — FAMOTIDINE 10 MG/ML
20 INJECTION, SOLUTION INTRAVENOUS AS NEEDED
Status: CANCELLED | OUTPATIENT
Start: 2022-06-07

## 2022-06-07 RX ORDER — ACETAMINOPHEN 325 MG/1
650 TABLET ORAL ONCE
Status: CANCELLED | OUTPATIENT
Start: 2022-06-07

## 2022-06-07 RX ORDER — DIPHENHYDRAMINE HYDROCHLORIDE 50 MG/ML
50 INJECTION INTRAMUSCULAR; INTRAVENOUS AS NEEDED
Status: CANCELLED | OUTPATIENT
Start: 2022-06-07

## 2022-06-07 NOTE — TELEPHONE ENCOUNTER
Caller: ROYCE    Relationship to patient: DAUGHTER    Best call back number: [phone]    Patient is needing: TO KNOW IF PT REALLY NEEDS TO COME IN ON Thursday, 6-9-2022. PT HAS HAD BAD DIARRHEA AND HAD A FEVER .4. PT WAS SENT TO ER YESTERDAY. SHE IS CONSTANTLY HAVING DIARRHEA. THEY ARE NOT SURE IF SHE CAN MAKE IT IN ON 6-9-2022 OR IF IT WOULD BE BENEFICIAL WITH THE MEDICATIONS SHE IS ON RIGHT NOW. PT'S DAUGHTER, ROYCE, REQUEST CALL BACK TO DISCUSS.

## 2022-06-07 NOTE — TELEPHONE ENCOUNTER
Spoke with daughter. Patient is still having diarrhea today and is currently taking imodium and steroids. Advised we will reschedule her treatment for this week. Advised to contact PCP Thursday if she is not better. Patient has [facility] and family will contact them for nurse to make a visit. We will reschedule her treatment for next week, but if patient is still not feeling better, instructed to call and we will push it back another week.

## 2022-06-08 ENCOUNTER — HOSPITAL ENCOUNTER (INPATIENT)
Facility: HOSPITAL | Age: 87
LOS: 8 days | Discharge: NURSING, REHAB OR LONG-TERM CARE | End: 2022-06-16
Attending: EMERGENCY MEDICINE | Admitting: INTERNAL MEDICINE

## 2022-06-08 ENCOUNTER — APPOINTMENT (OUTPATIENT)
Dept: GENERAL RADIOLOGY | Facility: HOSPITAL | Age: 87
End: 2022-06-08

## 2022-06-08 DIAGNOSIS — E86.0 DEHYDRATION: ICD-10-CM

## 2022-06-08 DIAGNOSIS — R26.2 DIFFICULTY WALKING: ICD-10-CM

## 2022-06-08 DIAGNOSIS — R19.7 DIARRHEA, UNSPECIFIED TYPE: Primary | ICD-10-CM

## 2022-06-08 DIAGNOSIS — Z78.9 DECREASED ACTIVITIES OF DAILY LIVING (ADL): ICD-10-CM

## 2022-06-08 LAB
ALBUMIN SERPL-MCNC: 3.8 G/DL (ref 3.5–5.2)
ALBUMIN/GLOB SERPL: 1.3 G/DL
ALP SERPL-CCNC: 62 U/L (ref 39–117)
ALT SERPL W P-5'-P-CCNC: 21 U/L (ref 1–33)
ANION GAP SERPL CALCULATED.3IONS-SCNC: 13 MMOL/L (ref 5–15)
AST SERPL-CCNC: 37 U/L (ref 1–32)
BACTERIA UR QL AUTO: ABNORMAL /HPF
BASOPHILS # BLD AUTO: 0.04 10*3/MM3 (ref 0–0.2)
BASOPHILS NFR BLD AUTO: 0.3 % (ref 0–1.5)
BILIRUB SERPL-MCNC: 0.9 MG/DL (ref 0–1.2)
BILIRUB UR QL STRIP: NEGATIVE
BUN SERPL-MCNC: 36 MG/DL (ref 8–23)
BUN/CREAT SERPL: 33.3 (ref 7–25)
CALCIUM SPEC-SCNC: 9.3 MG/DL (ref 8.2–9.6)
CHLORIDE SERPL-SCNC: 94 MMOL/L (ref 98–107)
CLARITY UR: CLEAR
CO2 SERPL-SCNC: 25 MMOL/L (ref 22–29)
COLOR UR: YELLOW
CREAT SERPL-MCNC: 1.08 MG/DL (ref 0.57–1)
D-LACTATE SERPL-SCNC: 1.2 MMOL/L (ref 0.5–2)
DEPRECATED RDW RBC AUTO: 45.4 FL (ref 37–54)
EGFRCR SERPLBLD CKD-EPI 2021: 48 ML/MIN/1.73
EOSINOPHIL # BLD AUTO: 0 10*3/MM3 (ref 0–0.4)
EOSINOPHIL NFR BLD AUTO: 0 % (ref 0.3–6.2)
ERYTHROCYTE [DISTWIDTH] IN BLOOD BY AUTOMATED COUNT: 13.3 % (ref 12.3–15.4)
GLOBULIN UR ELPH-MCNC: 2.9 GM/DL
GLUCOSE SERPL-MCNC: 123 MG/DL (ref 65–99)
GLUCOSE UR STRIP-MCNC: NEGATIVE MG/DL
HCT VFR BLD AUTO: 38.2 % (ref 34–46.6)
HGB BLD-MCNC: 13 G/DL (ref 12–15.9)
HGB UR QL STRIP.AUTO: ABNORMAL
HOLD SPECIMEN: NORMAL
HOLD SPECIMEN: NORMAL
HYALINE CASTS UR QL AUTO: ABNORMAL /LPF
IMM GRANULOCYTES # BLD AUTO: 0.05 10*3/MM3 (ref 0–0.05)
IMM GRANULOCYTES NFR BLD AUTO: 0.3 % (ref 0–0.5)
KETONES UR QL STRIP: NEGATIVE
LEUKOCYTE ESTERASE UR QL STRIP.AUTO: ABNORMAL
LIPASE SERPL-CCNC: 87 U/L (ref 13–60)
LYMPHOCYTES # BLD AUTO: 0.6 10*3/MM3 (ref 0.7–3.1)
LYMPHOCYTES NFR BLD AUTO: 3.9 % (ref 19.6–45.3)
MCH RBC QN AUTO: 31.6 PG (ref 26.6–33)
MCHC RBC AUTO-ENTMCNC: 34 G/DL (ref 31.5–35.7)
MCV RBC AUTO: 92.9 FL (ref 79–97)
MONOCYTES # BLD AUTO: 0.86 10*3/MM3 (ref 0.1–0.9)
MONOCYTES NFR BLD AUTO: 5.6 % (ref 5–12)
NEUTROPHILS NFR BLD AUTO: 13.68 10*3/MM3 (ref 1.7–7)
NEUTROPHILS NFR BLD AUTO: 89.9 % (ref 42.7–76)
NITRITE UR QL STRIP: NEGATIVE
NRBC BLD AUTO-RTO: 0 /100 WBC (ref 0–0.2)
PH UR STRIP.AUTO: <=5 [PH] (ref 5–8)
PLATELET # BLD AUTO: 221 10*3/MM3 (ref 140–450)
PMV BLD AUTO: 10.6 FL (ref 6–12)
POTASSIUM SERPL-SCNC: 3.4 MMOL/L (ref 3.5–5.2)
PROT SERPL-MCNC: 6.7 G/DL (ref 6–8.5)
PROT UR QL STRIP: ABNORMAL
RBC # BLD AUTO: 4.11 10*6/MM3 (ref 3.77–5.28)
RBC # UR STRIP: ABNORMAL /HPF
REF LAB TEST METHOD: ABNORMAL
SODIUM SERPL-SCNC: 132 MMOL/L (ref 136–145)
SP GR UR STRIP: 1.01 (ref 1–1.03)
SQUAMOUS #/AREA URNS HPF: ABNORMAL /HPF
UROBILINOGEN UR QL STRIP: ABNORMAL
WBC # UR STRIP: ABNORMAL /HPF
WBC NRBC COR # BLD: 15.23 10*3/MM3 (ref 3.4–10.8)
WHOLE BLOOD HOLD COAG: NORMAL
WHOLE BLOOD HOLD SPECIMEN: NORMAL

## 2022-06-08 PROCEDURE — 93005 ELECTROCARDIOGRAM TRACING: CPT | Performed by: EMERGENCY MEDICINE

## 2022-06-08 PROCEDURE — 93010 ELECTROCARDIOGRAM REPORT: CPT | Performed by: INTERNAL MEDICINE

## 2022-06-08 PROCEDURE — 99223 1ST HOSP IP/OBS HIGH 75: CPT | Performed by: INTERNAL MEDICINE

## 2022-06-08 PROCEDURE — 36415 COLL VENOUS BLD VENIPUNCTURE: CPT

## 2022-06-08 PROCEDURE — 83690 ASSAY OF LIPASE: CPT

## 2022-06-08 PROCEDURE — 99284 EMERGENCY DEPT VISIT MOD MDM: CPT

## 2022-06-08 PROCEDURE — 25010000002 LEVOFLOXACIN PER 250 MG: Performed by: EMERGENCY MEDICINE

## 2022-06-08 PROCEDURE — 81001 URINALYSIS AUTO W/SCOPE: CPT | Performed by: EMERGENCY MEDICINE

## 2022-06-08 PROCEDURE — 85025 COMPLETE CBC W/AUTO DIFF WBC: CPT

## 2022-06-08 PROCEDURE — 80053 COMPREHEN METABOLIC PANEL: CPT

## 2022-06-08 PROCEDURE — 25010000002 FUROSEMIDE PER 20 MG: Performed by: EMERGENCY MEDICINE

## 2022-06-08 PROCEDURE — 83880 ASSAY OF NATRIURETIC PEPTIDE: CPT | Performed by: EMERGENCY MEDICINE

## 2022-06-08 PROCEDURE — 74022 RADEX COMPL AQT ABD SERIES: CPT

## 2022-06-08 PROCEDURE — 83605 ASSAY OF LACTIC ACID: CPT | Performed by: EMERGENCY MEDICINE

## 2022-06-08 RX ORDER — NICOTINE POLACRILEX 4 MG
24 LOZENGE BUCCAL
Status: DISCONTINUED | OUTPATIENT
Start: 2022-06-08 | End: 2022-06-16 | Stop reason: HOSPADM

## 2022-06-08 RX ORDER — LEVOFLOXACIN 5 MG/ML
500 INJECTION, SOLUTION INTRAVENOUS EVERY 24 HOURS
Status: DISCONTINUED | OUTPATIENT
Start: 2022-06-09 | End: 2022-06-09

## 2022-06-08 RX ORDER — SODIUM CHLORIDE 0.9 % (FLUSH) 0.9 %
10 SYRINGE (ML) INJECTION AS NEEDED
Status: DISCONTINUED | OUTPATIENT
Start: 2022-06-08 | End: 2022-06-16 | Stop reason: HOSPADM

## 2022-06-08 RX ORDER — POLYETHYLENE GLYCOL 3350 17 G/17G
17 POWDER, FOR SOLUTION ORAL DAILY PRN
Status: DISCONTINUED | OUTPATIENT
Start: 2022-06-08 | End: 2022-06-16 | Stop reason: HOSPADM

## 2022-06-08 RX ORDER — DILTIAZEM HYDROCHLORIDE 5 MG/ML
10 INJECTION INTRAVENOUS ONCE
Status: COMPLETED | OUTPATIENT
Start: 2022-06-08 | End: 2022-06-08

## 2022-06-08 RX ORDER — DEXTROSE MONOHYDRATE 25 G/50ML
25 INJECTION, SOLUTION INTRAVENOUS
Status: DISCONTINUED | OUTPATIENT
Start: 2022-06-08 | End: 2022-06-16 | Stop reason: HOSPADM

## 2022-06-08 RX ORDER — RALOXIFENE HYDROCHLORIDE 60 MG/1
60 TABLET, FILM COATED ORAL DAILY
Status: DISCONTINUED | OUTPATIENT
Start: 2022-06-09 | End: 2022-06-09

## 2022-06-08 RX ORDER — ASPIRIN 81 MG/1
81 TABLET ORAL DAILY
Status: DISCONTINUED | OUTPATIENT
Start: 2022-06-09 | End: 2022-06-16 | Stop reason: HOSPADM

## 2022-06-08 RX ORDER — IPRATROPIUM BROMIDE AND ALBUTEROL SULFATE 2.5; .5 MG/3ML; MG/3ML
3 SOLUTION RESPIRATORY (INHALATION)
Status: DISCONTINUED | OUTPATIENT
Start: 2022-06-09 | End: 2022-06-16 | Stop reason: HOSPADM

## 2022-06-08 RX ORDER — METRONIDAZOLE 500 MG/100ML
500 INJECTION, SOLUTION INTRAVENOUS EVERY 8 HOURS
Status: DISCONTINUED | OUTPATIENT
Start: 2022-06-09 | End: 2022-06-09

## 2022-06-08 RX ORDER — SODIUM CHLORIDE 0.9 % (FLUSH) 0.9 %
10 SYRINGE (ML) INJECTION EVERY 12 HOURS SCHEDULED
Status: DISCONTINUED | OUTPATIENT
Start: 2022-06-09 | End: 2022-06-16 | Stop reason: HOSPADM

## 2022-06-08 RX ORDER — MULTIPLE VITAMINS W/ MINERALS TAB 9MG-400MCG
1 TAB ORAL DAILY
Status: DISCONTINUED | OUTPATIENT
Start: 2022-06-09 | End: 2022-06-16 | Stop reason: HOSPADM

## 2022-06-08 RX ORDER — CHOLECALCIFEROL (VITAMIN D3) 125 MCG
5 CAPSULE ORAL NIGHTLY PRN
Status: DISCONTINUED | OUTPATIENT
Start: 2022-06-08 | End: 2022-06-16 | Stop reason: HOSPADM

## 2022-06-08 RX ORDER — LEVOFLOXACIN 5 MG/ML
250 INJECTION, SOLUTION INTRAVENOUS EVERY 24 HOURS
Status: COMPLETED | OUTPATIENT
Start: 2022-06-08 | End: 2022-06-08

## 2022-06-08 RX ORDER — DABIGATRAN ETEXILATE 75 MG/1
75 CAPSULE ORAL 2 TIMES DAILY
Status: DISCONTINUED | OUTPATIENT
Start: 2022-06-09 | End: 2022-06-16 | Stop reason: HOSPADM

## 2022-06-08 RX ORDER — CARVEDILOL 6.25 MG/1
6.25 TABLET ORAL 2 TIMES DAILY WITH MEALS
Status: DISCONTINUED | OUTPATIENT
Start: 2022-06-09 | End: 2022-06-16 | Stop reason: HOSPADM

## 2022-06-08 RX ORDER — FUROSEMIDE 10 MG/ML
40 INJECTION INTRAMUSCULAR; INTRAVENOUS ONCE
Status: COMPLETED | OUTPATIENT
Start: 2022-06-08 | End: 2022-06-08

## 2022-06-08 RX ORDER — INSULIN LISPRO 100 [IU]/ML
0-9 INJECTION, SOLUTION INTRAVENOUS; SUBCUTANEOUS
Status: DISCONTINUED | OUTPATIENT
Start: 2022-06-09 | End: 2022-06-16 | Stop reason: HOSPADM

## 2022-06-08 RX ORDER — BISACODYL 10 MG
10 SUPPOSITORY, RECTAL RECTAL DAILY PRN
Status: DISCONTINUED | OUTPATIENT
Start: 2022-06-08 | End: 2022-06-16 | Stop reason: HOSPADM

## 2022-06-08 RX ORDER — LOPERAMIDE HYDROCHLORIDE 2 MG/1
2 CAPSULE ORAL 4 TIMES DAILY PRN
COMMUNITY
End: 2022-06-16 | Stop reason: HOSPADM

## 2022-06-08 RX ORDER — SPIRONOLACTONE 25 MG/1
12.5 TABLET ORAL DAILY
Status: DISCONTINUED | OUTPATIENT
Start: 2022-06-09 | End: 2022-06-16 | Stop reason: HOSPADM

## 2022-06-08 RX ORDER — METRONIDAZOLE 500 MG/1
500 TABLET ORAL ONCE
Status: COMPLETED | OUTPATIENT
Start: 2022-06-08 | End: 2022-06-08

## 2022-06-08 RX ORDER — ATORVASTATIN CALCIUM 40 MG/1
40 TABLET, FILM COATED ORAL DAILY
Status: DISCONTINUED | OUTPATIENT
Start: 2022-06-09 | End: 2022-06-16 | Stop reason: HOSPADM

## 2022-06-08 RX ORDER — BISACODYL 5 MG/1
5 TABLET, DELAYED RELEASE ORAL DAILY PRN
Status: DISCONTINUED | OUTPATIENT
Start: 2022-06-08 | End: 2022-06-16 | Stop reason: HOSPADM

## 2022-06-08 RX ORDER — AMOXICILLIN 250 MG
2 CAPSULE ORAL 2 TIMES DAILY
Status: DISCONTINUED | OUTPATIENT
Start: 2022-06-09 | End: 2022-06-16 | Stop reason: HOSPADM

## 2022-06-08 RX ORDER — ONDANSETRON 2 MG/ML
4 INJECTION INTRAMUSCULAR; INTRAVENOUS EVERY 6 HOURS PRN
Status: DISCONTINUED | OUTPATIENT
Start: 2022-06-08 | End: 2022-06-16 | Stop reason: HOSPADM

## 2022-06-08 RX ORDER — FAMOTIDINE 20 MG/1
40 TABLET, FILM COATED ORAL DAILY
Status: DISCONTINUED | OUTPATIENT
Start: 2022-06-09 | End: 2022-06-16 | Stop reason: HOSPADM

## 2022-06-08 RX ADMIN — METRONIDAZOLE 500 MG: 500 TABLET, FILM COATED ORAL at 22:14

## 2022-06-08 RX ADMIN — DILTIAZEM HYDROCHLORIDE 10 MG: 5 INJECTION INTRAVENOUS at 21:36

## 2022-06-08 RX ADMIN — FUROSEMIDE 40 MG: 10 INJECTION, SOLUTION INTRAMUSCULAR; INTRAVENOUS at 21:36

## 2022-06-08 RX ADMIN — SODIUM CHLORIDE 1000 ML: 9 INJECTION, SOLUTION INTRAVENOUS at 19:15

## 2022-06-08 RX ADMIN — LEVOFLOXACIN 250 MG: 250 INJECTION, SOLUTION INTRAVENOUS at 22:14

## 2022-06-09 ENCOUNTER — HOSPITAL ENCOUNTER (OUTPATIENT)
Dept: ONCOLOGY | Facility: HOSPITAL | Age: 87
Setting detail: INFUSION SERIES
Discharge: HOME | End: 2022-06-09

## 2022-06-09 ENCOUNTER — APPOINTMENT (OUTPATIENT)
Dept: ONCOLOGY | Facility: HOSPITAL | Age: 87
End: 2022-06-09

## 2022-06-09 DIAGNOSIS — C82.08 FOLLICULAR LYMPHOMA GRADE I OF LYMPH NODES OF MULTIPLE SITES: ICD-10-CM

## 2022-06-09 LAB
027 TOXIN: ABNORMAL
ALBUMIN SERPL-MCNC: 3 G/DL (ref 3.5–5.2)
ALBUMIN/GLOB SERPL: 1.1 G/DL
ALP SERPL-CCNC: 53 U/L (ref 39–117)
ALT SERPL W P-5'-P-CCNC: 19 U/L (ref 1–33)
ANION GAP SERPL CALCULATED.3IONS-SCNC: 13.4 MMOL/L (ref 5–15)
AST SERPL-CCNC: 36 U/L (ref 1–32)
BASOPHILS # BLD AUTO: 0.04 10*3/MM3 (ref 0–0.2)
BASOPHILS NFR BLD AUTO: 0.3 % (ref 0–1.5)
BILIRUB SERPL-MCNC: 0.8 MG/DL (ref 0–1.2)
BUN SERPL-MCNC: 28 MG/DL (ref 8–23)
BUN/CREAT SERPL: 24.8 (ref 7–25)
C COLI+JEJ+UPSA DNA STL QL NAA+NON-PROBE: NOT DETECTED
C DIFF TOX GENS STL QL NAA+PROBE: POSITIVE
CALCIUM SPEC-SCNC: 8.3 MG/DL (ref 8.2–9.6)
CHLORIDE SERPL-SCNC: 96 MMOL/L (ref 98–107)
CO2 SERPL-SCNC: 23.6 MMOL/L (ref 22–29)
CREAT SERPL-MCNC: 1.13 MG/DL (ref 0.57–1)
DEPRECATED RDW RBC AUTO: 45.9 FL (ref 37–54)
EC STX1+STX2 GENES STL QL NAA+NON-PROBE: NOT DETECTED
EGFRCR SERPLBLD CKD-EPI 2021: 45.5 ML/MIN/1.73
EOSINOPHIL # BLD AUTO: 0.02 10*3/MM3 (ref 0–0.4)
EOSINOPHIL NFR BLD AUTO: 0.1 % (ref 0.3–6.2)
ERYTHROCYTE [DISTWIDTH] IN BLOOD BY AUTOMATED COUNT: 13.4 % (ref 12.3–15.4)
GLOBULIN UR ELPH-MCNC: 2.7 GM/DL
GLUCOSE BLDC GLUCOMTR-MCNC: 109 MG/DL (ref 70–99)
GLUCOSE BLDC GLUCOMTR-MCNC: 114 MG/DL (ref 70–99)
GLUCOSE BLDC GLUCOMTR-MCNC: 115 MG/DL (ref 70–99)
GLUCOSE SERPL-MCNC: 118 MG/DL (ref 65–99)
HCT VFR BLD AUTO: 35 % (ref 34–46.6)
HGB BLD-MCNC: 12 G/DL (ref 12–15.9)
IMM GRANULOCYTES # BLD AUTO: 0.08 10*3/MM3 (ref 0–0.05)
IMM GRANULOCYTES NFR BLD AUTO: 0.5 % (ref 0–0.5)
LYMPHOCYTES # BLD AUTO: 0.42 10*3/MM3 (ref 0.7–3.1)
LYMPHOCYTES NFR BLD AUTO: 2.7 % (ref 19.6–45.3)
MAGNESIUM SERPL-MCNC: 1.8 MG/DL (ref 1.7–2.3)
MCH RBC QN AUTO: 32.1 PG (ref 26.6–33)
MCHC RBC AUTO-ENTMCNC: 34.3 G/DL (ref 31.5–35.7)
MCV RBC AUTO: 93.6 FL (ref 79–97)
MONOCYTES # BLD AUTO: 0.89 10*3/MM3 (ref 0.1–0.9)
MONOCYTES NFR BLD AUTO: 5.8 % (ref 5–12)
NEUTROPHILS NFR BLD AUTO: 13.99 10*3/MM3 (ref 1.7–7)
NEUTROPHILS NFR BLD AUTO: 90.6 % (ref 42.7–76)
NRBC BLD AUTO-RTO: 0 /100 WBC (ref 0–0.2)
NT-PROBNP SERPL-MCNC: 5420 PG/ML (ref 0–1800)
PHOSPHATE SERPL-MCNC: 2.2 MG/DL (ref 2.5–4.5)
PLATELET # BLD AUTO: 196 10*3/MM3 (ref 140–450)
PMV BLD AUTO: 10.8 FL (ref 6–12)
POTASSIUM SERPL-SCNC: 3.1 MMOL/L (ref 3.5–5.2)
PROT SERPL-MCNC: 5.7 G/DL (ref 6–8.5)
QT INTERVAL: 391 MS
RBC # BLD AUTO: 3.74 10*6/MM3 (ref 3.77–5.28)
S ENT+BONG DNA STL QL NAA+NON-PROBE: NOT DETECTED
SHIGELLA SP+EIEC IPAH ST NAA+NON-PROBE: NOT DETECTED
SODIUM SERPL-SCNC: 133 MMOL/L (ref 136–145)
WBC NRBC COR # BLD: 15.44 10*3/MM3 (ref 3.4–10.8)

## 2022-06-09 PROCEDURE — 94760 N-INVAS EAR/PLS OXIMETRY 1: CPT

## 2022-06-09 PROCEDURE — 94664 DEMO&/EVAL PT USE INHALER: CPT

## 2022-06-09 PROCEDURE — 94799 UNLISTED PULMONARY SVC/PX: CPT

## 2022-06-09 PROCEDURE — 84100 ASSAY OF PHOSPHORUS: CPT | Performed by: INTERNAL MEDICINE

## 2022-06-09 PROCEDURE — 25010000002 LEVOFLOXACIN PER 250 MG: Performed by: INTERNAL MEDICINE

## 2022-06-09 PROCEDURE — 80053 COMPREHEN METABOLIC PANEL: CPT | Performed by: INTERNAL MEDICINE

## 2022-06-09 PROCEDURE — 87505 NFCT AGENT DETECTION GI: CPT | Performed by: INTERNAL MEDICINE

## 2022-06-09 PROCEDURE — 94640 AIRWAY INHALATION TREATMENT: CPT

## 2022-06-09 PROCEDURE — 85025 COMPLETE CBC W/AUTO DIFF WBC: CPT | Performed by: INTERNAL MEDICINE

## 2022-06-09 PROCEDURE — 87493 C DIFF AMPLIFIED PROBE: CPT | Performed by: INTERNAL MEDICINE

## 2022-06-09 PROCEDURE — 83735 ASSAY OF MAGNESIUM: CPT | Performed by: INTERNAL MEDICINE

## 2022-06-09 PROCEDURE — 99233 SBSQ HOSP IP/OBS HIGH 50: CPT | Performed by: INTERNAL MEDICINE

## 2022-06-09 PROCEDURE — 82962 GLUCOSE BLOOD TEST: CPT

## 2022-06-09 RX ORDER — LEVOFLOXACIN 5 MG/ML
250 INJECTION, SOLUTION INTRAVENOUS NIGHTLY
Status: DISCONTINUED | OUTPATIENT
Start: 2022-06-09 | End: 2022-06-09

## 2022-06-09 RX ORDER — LEVOFLOXACIN 5 MG/ML
250 INJECTION, SOLUTION INTRAVENOUS ONCE
Status: COMPLETED | OUTPATIENT
Start: 2022-06-09 | End: 2022-06-09

## 2022-06-09 RX ORDER — VANCOMYCIN HYDROCHLORIDE 125 MG/1
125 CAPSULE ORAL EVERY 6 HOURS SCHEDULED
Status: DISCONTINUED | OUTPATIENT
Start: 2022-06-09 | End: 2022-06-16 | Stop reason: HOSPADM

## 2022-06-09 RX ADMIN — LEVOFLOXACIN 250 MG: 250 INJECTION, SOLUTION INTRAVENOUS at 02:38

## 2022-06-09 RX ADMIN — METRONIDAZOLE 500 MG: 500 INJECTION, SOLUTION INTRAVENOUS at 01:03

## 2022-06-09 RX ADMIN — ATORVASTATIN CALCIUM 40 MG: 40 TABLET, FILM COATED ORAL at 10:42

## 2022-06-09 RX ADMIN — Medication 10 ML: at 10:43

## 2022-06-09 RX ADMIN — DABIGATRAN ETEXILATE MESYLATE 75 MG: 75 CAPSULE ORAL at 10:41

## 2022-06-09 RX ADMIN — VANCOMYCIN HYDROCHLORIDE 125 MG: 125 CAPSULE ORAL at 02:37

## 2022-06-09 RX ADMIN — FAMOTIDINE 40 MG: 20 TABLET ORAL at 10:41

## 2022-06-09 RX ADMIN — IPRATROPIUM BROMIDE AND ALBUTEROL SULFATE 3 ML: 2.5; .5 SOLUTION RESPIRATORY (INHALATION) at 00:45

## 2022-06-09 RX ADMIN — DABIGATRAN ETEXILATE MESYLATE 75 MG: 75 CAPSULE ORAL at 21:10

## 2022-06-09 RX ADMIN — ASPIRIN 81 MG: 81 TABLET, COATED ORAL at 10:41

## 2022-06-09 RX ADMIN — Medication 10 ML: at 01:03

## 2022-06-09 RX ADMIN — Medication 10 ML: at 21:11

## 2022-06-09 RX ADMIN — MULTIPLE VITAMINS W/ MINERALS TAB 1 TABLET: TAB at 10:42

## 2022-06-09 RX ADMIN — DABIGATRAN ETEXILATE MESYLATE 75 MG: 75 CAPSULE ORAL at 01:03

## 2022-06-09 RX ADMIN — SPIRONOLACTONE 12.5 MG: 25 TABLET, FILM COATED ORAL at 10:42

## 2022-06-09 RX ADMIN — IPRATROPIUM BROMIDE AND ALBUTEROL SULFATE 3 ML: 2.5; .5 SOLUTION RESPIRATORY (INHALATION) at 06:37

## 2022-06-09 RX ADMIN — VANCOMYCIN HYDROCHLORIDE 125 MG: 125 CAPSULE ORAL at 21:11

## 2022-06-09 RX ADMIN — VANCOMYCIN HYDROCHLORIDE 125 MG: 125 CAPSULE ORAL at 07:51

## 2022-06-09 RX ADMIN — CARVEDILOL 6.25 MG: 6.25 TABLET, FILM COATED ORAL at 10:41

## 2022-06-09 RX ADMIN — IPRATROPIUM BROMIDE AND ALBUTEROL SULFATE 3 ML: 2.5; .5 SOLUTION RESPIRATORY (INHALATION) at 18:30

## 2022-06-09 RX ADMIN — IPRATROPIUM BROMIDE AND ALBUTEROL SULFATE 3 ML: 2.5; .5 SOLUTION RESPIRATORY (INHALATION) at 14:30

## 2022-06-09 RX ADMIN — VANCOMYCIN HYDROCHLORIDE 125 MG: 125 CAPSULE ORAL at 15:05

## 2022-06-10 LAB
ANION GAP SERPL CALCULATED.3IONS-SCNC: 10.6 MMOL/L (ref 5–15)
BASOPHILS # BLD AUTO: 0.07 10*3/MM3 (ref 0–0.2)
BASOPHILS NFR BLD AUTO: 0.4 % (ref 0–1.5)
BUN SERPL-MCNC: 32 MG/DL (ref 8–23)
BUN/CREAT SERPL: 31.1 (ref 7–25)
CALCIUM SPEC-SCNC: 8.8 MG/DL (ref 8.2–9.6)
CHLORIDE SERPL-SCNC: 97 MMOL/L (ref 98–107)
CO2 SERPL-SCNC: 22.4 MMOL/L (ref 22–29)
CREAT SERPL-MCNC: 1.03 MG/DL (ref 0.57–1)
DEPRECATED RDW RBC AUTO: 45.9 FL (ref 37–54)
EGFRCR SERPLBLD CKD-EPI 2021: 50.8 ML/MIN/1.73
EOSINOPHIL # BLD AUTO: 0.02 10*3/MM3 (ref 0–0.4)
EOSINOPHIL NFR BLD AUTO: 0.1 % (ref 0.3–6.2)
ERYTHROCYTE [DISTWIDTH] IN BLOOD BY AUTOMATED COUNT: 13.5 % (ref 12.3–15.4)
GLUCOSE BLDC GLUCOMTR-MCNC: 118 MG/DL (ref 70–99)
GLUCOSE BLDC GLUCOMTR-MCNC: 158 MG/DL (ref 70–99)
GLUCOSE BLDC GLUCOMTR-MCNC: 160 MG/DL (ref 70–99)
GLUCOSE SERPL-MCNC: 127 MG/DL (ref 65–99)
HCT VFR BLD AUTO: 37.5 % (ref 34–46.6)
HGB BLD-MCNC: 12.9 G/DL (ref 12–15.9)
IMM GRANULOCYTES # BLD AUTO: 0.1 10*3/MM3 (ref 0–0.05)
IMM GRANULOCYTES NFR BLD AUTO: 0.6 % (ref 0–0.5)
LYMPHOCYTES # BLD AUTO: 0.92 10*3/MM3 (ref 0.7–3.1)
LYMPHOCYTES NFR BLD AUTO: 5.3 % (ref 19.6–45.3)
MCH RBC QN AUTO: 32.2 PG (ref 26.6–33)
MCHC RBC AUTO-ENTMCNC: 34.4 G/DL (ref 31.5–35.7)
MCV RBC AUTO: 93.5 FL (ref 79–97)
MONOCYTES # BLD AUTO: 0.76 10*3/MM3 (ref 0.1–0.9)
MONOCYTES NFR BLD AUTO: 4.4 % (ref 5–12)
NEUTROPHILS NFR BLD AUTO: 15.39 10*3/MM3 (ref 1.7–7)
NEUTROPHILS NFR BLD AUTO: 89.2 % (ref 42.7–76)
NRBC BLD AUTO-RTO: 0 /100 WBC (ref 0–0.2)
PLATELET # BLD AUTO: 210 10*3/MM3 (ref 140–450)
PMV BLD AUTO: 10.6 FL (ref 6–12)
POTASSIUM SERPL-SCNC: 3.1 MMOL/L (ref 3.5–5.2)
RBC # BLD AUTO: 4.01 10*6/MM3 (ref 3.77–5.28)
SODIUM SERPL-SCNC: 130 MMOL/L (ref 136–145)
WBC NRBC COR # BLD: 17.26 10*3/MM3 (ref 3.4–10.8)

## 2022-06-10 PROCEDURE — 97165 OT EVAL LOW COMPLEX 30 MIN: CPT

## 2022-06-10 PROCEDURE — 94799 UNLISTED PULMONARY SVC/PX: CPT

## 2022-06-10 PROCEDURE — 99233 SBSQ HOSP IP/OBS HIGH 50: CPT | Performed by: INTERNAL MEDICINE

## 2022-06-10 PROCEDURE — 97161 PT EVAL LOW COMPLEX 20 MIN: CPT

## 2022-06-10 PROCEDURE — 85025 COMPLETE CBC W/AUTO DIFF WBC: CPT | Performed by: INTERNAL MEDICINE

## 2022-06-10 PROCEDURE — 80048 BASIC METABOLIC PNL TOTAL CA: CPT | Performed by: INTERNAL MEDICINE

## 2022-06-10 PROCEDURE — 82962 GLUCOSE BLOOD TEST: CPT

## 2022-06-10 PROCEDURE — 94664 DEMO&/EVAL PT USE INHALER: CPT

## 2022-06-10 PROCEDURE — 63710000001 INSULIN LISPRO (HUMAN) PER 5 UNITS: Performed by: INTERNAL MEDICINE

## 2022-06-10 RX ORDER — POTASSIUM CHLORIDE 750 MG/1
40 CAPSULE, EXTENDED RELEASE ORAL
Status: COMPLETED | OUTPATIENT
Start: 2022-06-10 | End: 2022-06-10

## 2022-06-10 RX ADMIN — DABIGATRAN ETEXILATE MESYLATE 75 MG: 75 CAPSULE ORAL at 09:37

## 2022-06-10 RX ADMIN — Medication 10 ML: at 22:07

## 2022-06-10 RX ADMIN — IPRATROPIUM BROMIDE AND ALBUTEROL SULFATE 3 ML: 2.5; .5 SOLUTION RESPIRATORY (INHALATION) at 07:43

## 2022-06-10 RX ADMIN — POTASSIUM CHLORIDE 40 MEQ: 750 CAPSULE, EXTENDED RELEASE ORAL at 11:13

## 2022-06-10 RX ADMIN — VANCOMYCIN HYDROCHLORIDE 125 MG: 125 CAPSULE ORAL at 17:01

## 2022-06-10 RX ADMIN — DABIGATRAN ETEXILATE MESYLATE 75 MG: 75 CAPSULE ORAL at 22:07

## 2022-06-10 RX ADMIN — ATORVASTATIN CALCIUM 40 MG: 40 TABLET, FILM COATED ORAL at 08:26

## 2022-06-10 RX ADMIN — CARVEDILOL 6.25 MG: 6.25 TABLET, FILM COATED ORAL at 08:26

## 2022-06-10 RX ADMIN — SPIRONOLACTONE 12.5 MG: 25 TABLET, FILM COATED ORAL at 08:27

## 2022-06-10 RX ADMIN — IPRATROPIUM BROMIDE AND ALBUTEROL SULFATE 3 ML: 2.5; .5 SOLUTION RESPIRATORY (INHALATION) at 19:08

## 2022-06-10 RX ADMIN — MULTIPLE VITAMINS W/ MINERALS TAB 1 TABLET: TAB at 08:27

## 2022-06-10 RX ADMIN — IPRATROPIUM BROMIDE AND ALBUTEROL SULFATE 3 ML: 2.5; .5 SOLUTION RESPIRATORY (INHALATION) at 00:21

## 2022-06-10 RX ADMIN — VANCOMYCIN HYDROCHLORIDE 125 MG: 125 CAPSULE ORAL at 22:12

## 2022-06-10 RX ADMIN — ASPIRIN 81 MG: 81 TABLET, COATED ORAL at 08:27

## 2022-06-10 RX ADMIN — INSULIN LISPRO 2 UNITS: 100 INJECTION, SOLUTION INTRAVENOUS; SUBCUTANEOUS at 12:53

## 2022-06-10 RX ADMIN — Medication 10 ML: at 08:28

## 2022-06-10 RX ADMIN — VANCOMYCIN HYDROCHLORIDE 125 MG: 125 CAPSULE ORAL at 01:27

## 2022-06-10 RX ADMIN — VANCOMYCIN HYDROCHLORIDE 125 MG: 125 CAPSULE ORAL at 08:27

## 2022-06-10 RX ADMIN — POTASSIUM CHLORIDE 40 MEQ: 750 CAPSULE, EXTENDED RELEASE ORAL at 08:26

## 2022-06-10 RX ADMIN — FAMOTIDINE 40 MG: 20 TABLET ORAL at 08:26

## 2022-06-10 RX ADMIN — CARVEDILOL 6.25 MG: 6.25 TABLET, FILM COATED ORAL at 17:01

## 2022-06-11 LAB
ANION GAP SERPL CALCULATED.3IONS-SCNC: 11.7 MMOL/L (ref 5–15)
BUN SERPL-MCNC: 30 MG/DL (ref 8–23)
BUN/CREAT SERPL: 36.6 (ref 7–25)
CALCIUM SPEC-SCNC: 8.5 MG/DL (ref 8.2–9.6)
CHLORIDE SERPL-SCNC: 99 MMOL/L (ref 98–107)
CO2 SERPL-SCNC: 22.3 MMOL/L (ref 22–29)
CREAT SERPL-MCNC: 0.82 MG/DL (ref 0.57–1)
DEPRECATED RDW RBC AUTO: 45.1 FL (ref 37–54)
EGFRCR SERPLBLD CKD-EPI 2021: 66.8 ML/MIN/1.73
ERYTHROCYTE [DISTWIDTH] IN BLOOD BY AUTOMATED COUNT: 13.2 % (ref 12.3–15.4)
GLUCOSE BLDC GLUCOMTR-MCNC: 119 MG/DL (ref 70–99)
GLUCOSE BLDC GLUCOMTR-MCNC: 129 MG/DL (ref 70–99)
GLUCOSE BLDC GLUCOMTR-MCNC: 171 MG/DL (ref 70–99)
GLUCOSE SERPL-MCNC: 135 MG/DL (ref 65–99)
HCT VFR BLD AUTO: 37.9 % (ref 34–46.6)
HGB BLD-MCNC: 12.9 G/DL (ref 12–15.9)
MCH RBC QN AUTO: 31.5 PG (ref 26.6–33)
MCHC RBC AUTO-ENTMCNC: 34 G/DL (ref 31.5–35.7)
MCV RBC AUTO: 92.7 FL (ref 79–97)
PLATELET # BLD AUTO: 228 10*3/MM3 (ref 140–450)
PMV BLD AUTO: 10.5 FL (ref 6–12)
POTASSIUM SERPL-SCNC: 3.8 MMOL/L (ref 3.5–5.2)
RBC # BLD AUTO: 4.09 10*6/MM3 (ref 3.77–5.28)
SODIUM SERPL-SCNC: 133 MMOL/L (ref 136–145)
WBC NRBC COR # BLD: 10.82 10*3/MM3 (ref 3.4–10.8)

## 2022-06-11 PROCEDURE — 94664 DEMO&/EVAL PT USE INHALER: CPT

## 2022-06-11 PROCEDURE — 82962 GLUCOSE BLOOD TEST: CPT

## 2022-06-11 PROCEDURE — 94799 UNLISTED PULMONARY SVC/PX: CPT

## 2022-06-11 PROCEDURE — 63710000001 INSULIN LISPRO (HUMAN) PER 5 UNITS: Performed by: INTERNAL MEDICINE

## 2022-06-11 PROCEDURE — 99233 SBSQ HOSP IP/OBS HIGH 50: CPT | Performed by: INTERNAL MEDICINE

## 2022-06-11 PROCEDURE — 80048 BASIC METABOLIC PNL TOTAL CA: CPT | Performed by: INTERNAL MEDICINE

## 2022-06-11 PROCEDURE — 85027 COMPLETE CBC AUTOMATED: CPT | Performed by: INTERNAL MEDICINE

## 2022-06-11 RX ADMIN — CARVEDILOL 6.25 MG: 6.25 TABLET, FILM COATED ORAL at 08:16

## 2022-06-11 RX ADMIN — VANCOMYCIN HYDROCHLORIDE 125 MG: 125 CAPSULE ORAL at 21:05

## 2022-06-11 RX ADMIN — Medication 10 ML: at 21:05

## 2022-06-11 RX ADMIN — IPRATROPIUM BROMIDE AND ALBUTEROL SULFATE 3 ML: 2.5; .5 SOLUTION RESPIRATORY (INHALATION) at 07:35

## 2022-06-11 RX ADMIN — IPRATROPIUM BROMIDE AND ALBUTEROL SULFATE 3 ML: 2.5; .5 SOLUTION RESPIRATORY (INHALATION) at 19:18

## 2022-06-11 RX ADMIN — Medication 10 ML: at 08:52

## 2022-06-11 RX ADMIN — CARVEDILOL 6.25 MG: 6.25 TABLET, FILM COATED ORAL at 17:46

## 2022-06-11 RX ADMIN — IPRATROPIUM BROMIDE AND ALBUTEROL SULFATE 3 ML: 2.5; .5 SOLUTION RESPIRATORY (INHALATION) at 00:52

## 2022-06-11 RX ADMIN — FAMOTIDINE 40 MG: 20 TABLET ORAL at 08:15

## 2022-06-11 RX ADMIN — Medication 5 MG: at 21:04

## 2022-06-11 RX ADMIN — SPIRONOLACTONE 12.5 MG: 25 TABLET, FILM COATED ORAL at 08:17

## 2022-06-11 RX ADMIN — IPRATROPIUM BROMIDE AND ALBUTEROL SULFATE 3 ML: 2.5; .5 SOLUTION RESPIRATORY (INHALATION) at 12:01

## 2022-06-11 RX ADMIN — DABIGATRAN ETEXILATE MESYLATE 75 MG: 75 CAPSULE ORAL at 21:04

## 2022-06-11 RX ADMIN — VANCOMYCIN HYDROCHLORIDE 125 MG: 125 CAPSULE ORAL at 03:28

## 2022-06-11 RX ADMIN — INSULIN LISPRO 2 UNITS: 100 INJECTION, SOLUTION INTRAVENOUS; SUBCUTANEOUS at 12:18

## 2022-06-11 RX ADMIN — VANCOMYCIN HYDROCHLORIDE 125 MG: 125 CAPSULE ORAL at 08:18

## 2022-06-11 RX ADMIN — ASPIRIN 81 MG: 81 TABLET, COATED ORAL at 08:18

## 2022-06-11 RX ADMIN — DABIGATRAN ETEXILATE MESYLATE 75 MG: 75 CAPSULE ORAL at 08:16

## 2022-06-11 RX ADMIN — VANCOMYCIN HYDROCHLORIDE 125 MG: 125 CAPSULE ORAL at 13:35

## 2022-06-11 RX ADMIN — ATORVASTATIN CALCIUM 40 MG: 40 TABLET, FILM COATED ORAL at 08:17

## 2022-06-11 RX ADMIN — MULTIPLE VITAMINS W/ MINERALS TAB 1 TABLET: TAB at 08:16

## 2022-06-12 LAB
ANION GAP SERPL CALCULATED.3IONS-SCNC: 9.1 MMOL/L (ref 5–15)
BUN SERPL-MCNC: 23 MG/DL (ref 8–23)
BUN/CREAT SERPL: 31.1 (ref 7–25)
CALCIUM SPEC-SCNC: 8.5 MG/DL (ref 8.2–9.6)
CHLORIDE SERPL-SCNC: 102 MMOL/L (ref 98–107)
CO2 SERPL-SCNC: 20.9 MMOL/L (ref 22–29)
CREAT SERPL-MCNC: 0.74 MG/DL (ref 0.57–1)
EGFRCR SERPLBLD CKD-EPI 2021: 75.5 ML/MIN/1.73
GLUCOSE BLDC GLUCOMTR-MCNC: 116 MG/DL (ref 70–99)
GLUCOSE BLDC GLUCOMTR-MCNC: 129 MG/DL (ref 70–99)
GLUCOSE BLDC GLUCOMTR-MCNC: 156 MG/DL (ref 70–99)
GLUCOSE SERPL-MCNC: 118 MG/DL (ref 65–99)
POTASSIUM SERPL-SCNC: 3.9 MMOL/L (ref 3.5–5.2)
SODIUM SERPL-SCNC: 132 MMOL/L (ref 136–145)
WHOLE BLOOD HOLD SPECIMEN: NORMAL

## 2022-06-12 PROCEDURE — 94799 UNLISTED PULMONARY SVC/PX: CPT

## 2022-06-12 PROCEDURE — 80048 BASIC METABOLIC PNL TOTAL CA: CPT | Performed by: INTERNAL MEDICINE

## 2022-06-12 PROCEDURE — 82962 GLUCOSE BLOOD TEST: CPT

## 2022-06-12 PROCEDURE — 94760 N-INVAS EAR/PLS OXIMETRY 1: CPT

## 2022-06-12 PROCEDURE — 99232 SBSQ HOSP IP/OBS MODERATE 35: CPT | Performed by: INTERNAL MEDICINE

## 2022-06-12 PROCEDURE — 63710000001 INSULIN LISPRO (HUMAN) PER 5 UNITS: Performed by: INTERNAL MEDICINE

## 2022-06-12 RX ORDER — HEPARIN SODIUM (PORCINE) LOCK FLUSH IV SOLN 100 UNIT/ML 100 UNIT/ML
100 SOLUTION INTRAVENOUS DAILY PRN
Status: DISCONTINUED | OUTPATIENT
Start: 2022-06-12 | End: 2022-06-16 | Stop reason: HOSPADM

## 2022-06-12 RX ORDER — IPRATROPIUM BROMIDE AND ALBUTEROL SULFATE 2.5; .5 MG/3ML; MG/3ML
SOLUTION RESPIRATORY (INHALATION)
Status: DISPENSED
Start: 2022-06-12 | End: 2022-06-12

## 2022-06-12 RX ADMIN — IPRATROPIUM BROMIDE AND ALBUTEROL SULFATE 3 ML: 2.5; .5 SOLUTION RESPIRATORY (INHALATION) at 12:50

## 2022-06-12 RX ADMIN — VANCOMYCIN HYDROCHLORIDE 125 MG: 125 CAPSULE ORAL at 08:13

## 2022-06-12 RX ADMIN — CARVEDILOL 6.25 MG: 6.25 TABLET, FILM COATED ORAL at 08:14

## 2022-06-12 RX ADMIN — FAMOTIDINE 40 MG: 20 TABLET ORAL at 08:14

## 2022-06-12 RX ADMIN — Medication 10 ML: at 08:20

## 2022-06-12 RX ADMIN — SPIRONOLACTONE 12.5 MG: 25 TABLET, FILM COATED ORAL at 08:14

## 2022-06-12 RX ADMIN — DABIGATRAN ETEXILATE MESYLATE 75 MG: 75 CAPSULE ORAL at 08:13

## 2022-06-12 RX ADMIN — VANCOMYCIN HYDROCHLORIDE 125 MG: 125 CAPSULE ORAL at 02:39

## 2022-06-12 RX ADMIN — IPRATROPIUM BROMIDE AND ALBUTEROL SULFATE 3 ML: 2.5; .5 SOLUTION RESPIRATORY (INHALATION) at 19:34

## 2022-06-12 RX ADMIN — ASPIRIN 81 MG: 81 TABLET, COATED ORAL at 08:13

## 2022-06-12 RX ADMIN — CARVEDILOL 6.25 MG: 6.25 TABLET, FILM COATED ORAL at 17:44

## 2022-06-12 RX ADMIN — DABIGATRAN ETEXILATE MESYLATE 75 MG: 75 CAPSULE ORAL at 22:06

## 2022-06-12 RX ADMIN — IPRATROPIUM BROMIDE AND ALBUTEROL SULFATE 3 ML: 2.5; .5 SOLUTION RESPIRATORY (INHALATION) at 08:37

## 2022-06-12 RX ADMIN — VANCOMYCIN HYDROCHLORIDE 125 MG: 125 CAPSULE ORAL at 22:06

## 2022-06-12 RX ADMIN — VANCOMYCIN HYDROCHLORIDE 125 MG: 125 CAPSULE ORAL at 15:27

## 2022-06-12 RX ADMIN — MULTIPLE VITAMINS W/ MINERALS TAB 1 TABLET: TAB at 08:13

## 2022-06-12 RX ADMIN — INSULIN LISPRO 2 UNITS: 100 INJECTION, SOLUTION INTRAVENOUS; SUBCUTANEOUS at 11:57

## 2022-06-12 RX ADMIN — Medication 10 ML: at 22:00

## 2022-06-12 RX ADMIN — ATORVASTATIN CALCIUM 40 MG: 40 TABLET, FILM COATED ORAL at 08:13

## 2022-06-13 ENCOUNTER — APPOINTMENT (OUTPATIENT)
Dept: GENERAL RADIOLOGY | Facility: HOSPITAL | Age: 87
End: 2022-06-13

## 2022-06-13 LAB
ANION GAP SERPL CALCULATED.3IONS-SCNC: 6.1 MMOL/L (ref 5–15)
BUN SERPL-MCNC: 23 MG/DL (ref 8–23)
BUN/CREAT SERPL: 30.7 (ref 7–25)
CALCIUM SPEC-SCNC: 9 MG/DL (ref 8.2–9.6)
CHLORIDE SERPL-SCNC: 104 MMOL/L (ref 98–107)
CO2 SERPL-SCNC: 23.9 MMOL/L (ref 22–29)
CREAT SERPL-MCNC: 0.75 MG/DL (ref 0.57–1)
DEPRECATED RDW RBC AUTO: 48.4 FL (ref 37–54)
EGFRCR SERPLBLD CKD-EPI 2021: 74.3 ML/MIN/1.73
ERYTHROCYTE [DISTWIDTH] IN BLOOD BY AUTOMATED COUNT: 13.9 % (ref 12.3–15.4)
GLUCOSE BLDC GLUCOMTR-MCNC: 127 MG/DL (ref 70–99)
GLUCOSE BLDC GLUCOMTR-MCNC: 132 MG/DL (ref 70–99)
GLUCOSE BLDC GLUCOMTR-MCNC: 194 MG/DL (ref 70–99)
GLUCOSE SERPL-MCNC: 130 MG/DL (ref 65–99)
HCT VFR BLD AUTO: 42.3 % (ref 34–46.6)
HGB BLD-MCNC: 14 G/DL (ref 12–15.9)
MAGNESIUM SERPL-MCNC: 2 MG/DL (ref 1.7–2.3)
MCH RBC QN AUTO: 31.3 PG (ref 26.6–33)
MCHC RBC AUTO-ENTMCNC: 33.1 G/DL (ref 31.5–35.7)
MCV RBC AUTO: 94.4 FL (ref 79–97)
PLATELET # BLD AUTO: 304 10*3/MM3 (ref 140–450)
PMV BLD AUTO: 9.8 FL (ref 6–12)
POTASSIUM SERPL-SCNC: 4.2 MMOL/L (ref 3.5–5.2)
RBC # BLD AUTO: 4.48 10*6/MM3 (ref 3.77–5.28)
SODIUM SERPL-SCNC: 134 MMOL/L (ref 136–145)
WBC NRBC COR # BLD: 13.72 10*3/MM3 (ref 3.4–10.8)

## 2022-06-13 PROCEDURE — 63710000001 INSULIN LISPRO (HUMAN) PER 5 UNITS: Performed by: INTERNAL MEDICINE

## 2022-06-13 PROCEDURE — 74018 RADEX ABDOMEN 1 VIEW: CPT

## 2022-06-13 PROCEDURE — 94799 UNLISTED PULMONARY SVC/PX: CPT

## 2022-06-13 PROCEDURE — 82962 GLUCOSE BLOOD TEST: CPT

## 2022-06-13 PROCEDURE — 99232 SBSQ HOSP IP/OBS MODERATE 35: CPT | Performed by: INTERNAL MEDICINE

## 2022-06-13 PROCEDURE — 85027 COMPLETE CBC AUTOMATED: CPT | Performed by: INTERNAL MEDICINE

## 2022-06-13 PROCEDURE — 83735 ASSAY OF MAGNESIUM: CPT | Performed by: INTERNAL MEDICINE

## 2022-06-13 PROCEDURE — 80048 BASIC METABOLIC PNL TOTAL CA: CPT | Performed by: INTERNAL MEDICINE

## 2022-06-13 RX ADMIN — IPRATROPIUM BROMIDE AND ALBUTEROL SULFATE 3 ML: 2.5; .5 SOLUTION RESPIRATORY (INHALATION) at 20:09

## 2022-06-13 RX ADMIN — VANCOMYCIN HYDROCHLORIDE 125 MG: 125 CAPSULE ORAL at 20:59

## 2022-06-13 RX ADMIN — IPRATROPIUM BROMIDE AND ALBUTEROL SULFATE 3 ML: 2.5; .5 SOLUTION RESPIRATORY (INHALATION) at 14:12

## 2022-06-13 RX ADMIN — INSULIN LISPRO 2 UNITS: 100 INJECTION, SOLUTION INTRAVENOUS; SUBCUTANEOUS at 13:03

## 2022-06-13 RX ADMIN — DABIGATRAN ETEXILATE MESYLATE 75 MG: 75 CAPSULE ORAL at 09:48

## 2022-06-13 RX ADMIN — SPIRONOLACTONE 12.5 MG: 25 TABLET, FILM COATED ORAL at 09:13

## 2022-06-13 RX ADMIN — CARVEDILOL 6.25 MG: 6.25 TABLET, FILM COATED ORAL at 09:14

## 2022-06-13 RX ADMIN — FAMOTIDINE 40 MG: 20 TABLET ORAL at 09:13

## 2022-06-13 RX ADMIN — VANCOMYCIN HYDROCHLORIDE 125 MG: 125 CAPSULE ORAL at 09:15

## 2022-06-13 RX ADMIN — IPRATROPIUM BROMIDE AND ALBUTEROL SULFATE 3 ML: 2.5; .5 SOLUTION RESPIRATORY (INHALATION) at 07:45

## 2022-06-13 RX ADMIN — MULTIPLE VITAMINS W/ MINERALS TAB 1 TABLET: TAB at 09:15

## 2022-06-13 RX ADMIN — VANCOMYCIN HYDROCHLORIDE 125 MG: 125 CAPSULE ORAL at 01:10

## 2022-06-13 RX ADMIN — IPRATROPIUM BROMIDE AND ALBUTEROL SULFATE 3 ML: 2.5; .5 SOLUTION RESPIRATORY (INHALATION) at 00:56

## 2022-06-13 RX ADMIN — ATORVASTATIN CALCIUM 40 MG: 40 TABLET, FILM COATED ORAL at 09:14

## 2022-06-13 RX ADMIN — VANCOMYCIN HYDROCHLORIDE 125 MG: 125 CAPSULE ORAL at 14:35

## 2022-06-13 RX ADMIN — ASPIRIN 81 MG: 81 TABLET, COATED ORAL at 09:15

## 2022-06-13 RX ADMIN — DABIGATRAN ETEXILATE MESYLATE 75 MG: 75 CAPSULE ORAL at 20:59

## 2022-06-13 RX ADMIN — CARVEDILOL 6.25 MG: 6.25 TABLET, FILM COATED ORAL at 18:17

## 2022-06-13 RX ADMIN — Medication 10 ML: at 09:15

## 2022-06-13 RX ADMIN — Medication 5 MG: at 01:10

## 2022-06-13 RX ADMIN — Medication 10 ML: at 20:59

## 2022-06-14 ENCOUNTER — TELEPHONE (OUTPATIENT)
Dept: ONCOLOGY | Facility: HOSPITAL | Age: 87
End: 2022-06-14

## 2022-06-14 LAB
GLUCOSE BLDC GLUCOMTR-MCNC: 101 MG/DL (ref 70–99)
GLUCOSE BLDC GLUCOMTR-MCNC: 121 MG/DL (ref 70–99)
GLUCOSE BLDC GLUCOMTR-MCNC: 175 MG/DL (ref 70–99)

## 2022-06-14 PROCEDURE — 99232 SBSQ HOSP IP/OBS MODERATE 35: CPT | Performed by: INTERNAL MEDICINE

## 2022-06-14 PROCEDURE — 82962 GLUCOSE BLOOD TEST: CPT

## 2022-06-14 PROCEDURE — 97110 THERAPEUTIC EXERCISES: CPT

## 2022-06-14 PROCEDURE — 94799 UNLISTED PULMONARY SVC/PX: CPT

## 2022-06-14 PROCEDURE — 63710000001 INSULIN LISPRO (HUMAN) PER 5 UNITS: Performed by: INTERNAL MEDICINE

## 2022-06-14 PROCEDURE — 97530 THERAPEUTIC ACTIVITIES: CPT

## 2022-06-14 RX ADMIN — VANCOMYCIN HYDROCHLORIDE 125 MG: 125 CAPSULE ORAL at 02:57

## 2022-06-14 RX ADMIN — IPRATROPIUM BROMIDE AND ALBUTEROL SULFATE 3 ML: 2.5; .5 SOLUTION RESPIRATORY (INHALATION) at 18:37

## 2022-06-14 RX ADMIN — ATORVASTATIN CALCIUM 40 MG: 40 TABLET, FILM COATED ORAL at 08:13

## 2022-06-14 RX ADMIN — Medication 10 ML: at 20:34

## 2022-06-14 RX ADMIN — INSULIN LISPRO 2 UNITS: 100 INJECTION, SOLUTION INTRAVENOUS; SUBCUTANEOUS at 13:15

## 2022-06-14 RX ADMIN — VANCOMYCIN HYDROCHLORIDE 125 MG: 125 CAPSULE ORAL at 20:34

## 2022-06-14 RX ADMIN — DABIGATRAN ETEXILATE MESYLATE 75 MG: 75 CAPSULE ORAL at 20:34

## 2022-06-14 RX ADMIN — FAMOTIDINE 40 MG: 20 TABLET ORAL at 08:13

## 2022-06-14 RX ADMIN — CARVEDILOL 6.25 MG: 6.25 TABLET, FILM COATED ORAL at 17:42

## 2022-06-14 RX ADMIN — ASPIRIN 81 MG: 81 TABLET, COATED ORAL at 08:13

## 2022-06-14 RX ADMIN — IPRATROPIUM BROMIDE AND ALBUTEROL SULFATE 3 ML: 2.5; .5 SOLUTION RESPIRATORY (INHALATION) at 06:20

## 2022-06-14 RX ADMIN — VANCOMYCIN HYDROCHLORIDE 125 MG: 125 CAPSULE ORAL at 13:15

## 2022-06-14 RX ADMIN — DABIGATRAN ETEXILATE MESYLATE 75 MG: 75 CAPSULE ORAL at 08:13

## 2022-06-14 RX ADMIN — SPIRONOLACTONE 12.5 MG: 25 TABLET, FILM COATED ORAL at 08:13

## 2022-06-14 RX ADMIN — VANCOMYCIN HYDROCHLORIDE 125 MG: 125 CAPSULE ORAL at 08:13

## 2022-06-14 RX ADMIN — IPRATROPIUM BROMIDE AND ALBUTEROL SULFATE 3 ML: 2.5; .5 SOLUTION RESPIRATORY (INHALATION) at 12:32

## 2022-06-14 RX ADMIN — MULTIPLE VITAMINS W/ MINERALS TAB 1 TABLET: TAB at 08:13

## 2022-06-14 RX ADMIN — IPRATROPIUM BROMIDE AND ALBUTEROL SULFATE 3 ML: 2.5; .5 SOLUTION RESPIRATORY (INHALATION) at 23:55

## 2022-06-14 RX ADMIN — CARVEDILOL 6.25 MG: 6.25 TABLET, FILM COATED ORAL at 08:13

## 2022-06-14 RX ADMIN — Medication 10 ML: at 08:12

## 2022-06-14 RX ADMIN — IPRATROPIUM BROMIDE AND ALBUTEROL SULFATE 3 ML: 2.5; .5 SOLUTION RESPIRATORY (INHALATION) at 00:40

## 2022-06-14 NOTE — TELEPHONE ENCOUNTER
Caller: JORGITO CHRISTIANSON    Relationship: Emergency Contact    Best call back number: [phone]    What is the best time to reach you: ANYTIME    Who are you requesting to speak with (clinical staff, provider,  specific staff member): DR RUANO OR NURSE    What was the call regarding: PT IS STILL INPATIENT IN THE HOSP AND WILL GO TO REHAB AFTER DISCHARGE. PTS DAUGHTER WANTED TO KNOW IF PT COULD HAVE HER CT THAT IS SCHED FOR 6/21 PERFORMED WHILE IN THE HOSP. PLEASE CALL TO ADVISE. STATED APPTS WILL NEED TO BE R/S BUT NOT SURE ON REHAB TIMELINE YET.     Do you require a callback: YES

## 2022-06-15 ENCOUNTER — TELEPHONE (OUTPATIENT)
Dept: ONCOLOGY | Facility: HOSPITAL | Age: 87
End: 2022-06-15

## 2022-06-15 LAB
ANION GAP SERPL CALCULATED.3IONS-SCNC: 8.8 MMOL/L (ref 5–15)
BUN SERPL-MCNC: 23 MG/DL (ref 8–23)
BUN/CREAT SERPL: 41.1 (ref 7–25)
CALCIUM SPEC-SCNC: 8.9 MG/DL (ref 8.2–9.6)
CHLORIDE SERPL-SCNC: 103 MMOL/L (ref 98–107)
CO2 SERPL-SCNC: 24.2 MMOL/L (ref 22–29)
CREAT SERPL-MCNC: 0.56 MG/DL (ref 0.57–1)
DEPRECATED RDW RBC AUTO: 47.3 FL (ref 37–54)
EGFRCR SERPLBLD CKD-EPI 2021: 85.2 ML/MIN/1.73
ERYTHROCYTE [DISTWIDTH] IN BLOOD BY AUTOMATED COUNT: 13.8 % (ref 12.3–15.4)
GLUCOSE BLDC GLUCOMTR-MCNC: 101 MG/DL (ref 70–99)
GLUCOSE BLDC GLUCOMTR-MCNC: 148 MG/DL (ref 70–99)
GLUCOSE BLDC GLUCOMTR-MCNC: 168 MG/DL (ref 70–99)
GLUCOSE SERPL-MCNC: 120 MG/DL (ref 65–99)
HCT VFR BLD AUTO: 40.3 % (ref 34–46.6)
HGB BLD-MCNC: 13.6 G/DL (ref 12–15.9)
MAGNESIUM SERPL-MCNC: 1.9 MG/DL (ref 1.7–2.3)
MCH RBC QN AUTO: 31.7 PG (ref 26.6–33)
MCHC RBC AUTO-ENTMCNC: 33.7 G/DL (ref 31.5–35.7)
MCV RBC AUTO: 93.9 FL (ref 79–97)
PHOSPHATE SERPL-MCNC: 3.5 MG/DL (ref 2.5–4.5)
PLATELET # BLD AUTO: 332 10*3/MM3 (ref 140–450)
PMV BLD AUTO: 9.4 FL (ref 6–12)
POTASSIUM SERPL-SCNC: 4.3 MMOL/L (ref 3.5–5.2)
RBC # BLD AUTO: 4.29 10*6/MM3 (ref 3.77–5.28)
SODIUM SERPL-SCNC: 136 MMOL/L (ref 136–145)
WBC NRBC COR # BLD: 15.09 10*3/MM3 (ref 3.4–10.8)

## 2022-06-15 PROCEDURE — 83735 ASSAY OF MAGNESIUM: CPT | Performed by: INTERNAL MEDICINE

## 2022-06-15 PROCEDURE — 85027 COMPLETE CBC AUTOMATED: CPT | Performed by: INTERNAL MEDICINE

## 2022-06-15 PROCEDURE — 94799 UNLISTED PULMONARY SVC/PX: CPT

## 2022-06-15 PROCEDURE — 97110 THERAPEUTIC EXERCISES: CPT

## 2022-06-15 PROCEDURE — 84100 ASSAY OF PHOSPHORUS: CPT | Performed by: INTERNAL MEDICINE

## 2022-06-15 PROCEDURE — 99232 SBSQ HOSP IP/OBS MODERATE 35: CPT | Performed by: INTERNAL MEDICINE

## 2022-06-15 PROCEDURE — 82962 GLUCOSE BLOOD TEST: CPT

## 2022-06-15 PROCEDURE — 94760 N-INVAS EAR/PLS OXIMETRY 1: CPT

## 2022-06-15 PROCEDURE — 97530 THERAPEUTIC ACTIVITIES: CPT

## 2022-06-15 PROCEDURE — 63710000001 INSULIN LISPRO (HUMAN) PER 5 UNITS: Performed by: INTERNAL MEDICINE

## 2022-06-15 PROCEDURE — 80048 BASIC METABOLIC PNL TOTAL CA: CPT | Performed by: INTERNAL MEDICINE

## 2022-06-15 RX ADMIN — Medication 10 ML: at 20:29

## 2022-06-15 RX ADMIN — SPIRONOLACTONE 12.5 MG: 25 TABLET, FILM COATED ORAL at 09:01

## 2022-06-15 RX ADMIN — IPRATROPIUM BROMIDE AND ALBUTEROL SULFATE 3 ML: 2.5; .5 SOLUTION RESPIRATORY (INHALATION) at 06:53

## 2022-06-15 RX ADMIN — INSULIN LISPRO 2 UNITS: 100 INJECTION, SOLUTION INTRAVENOUS; SUBCUTANEOUS at 13:35

## 2022-06-15 RX ADMIN — Medication 5 MG: at 20:47

## 2022-06-15 RX ADMIN — VANCOMYCIN HYDROCHLORIDE 125 MG: 125 CAPSULE ORAL at 20:29

## 2022-06-15 RX ADMIN — MULTIPLE VITAMINS W/ MINERALS TAB 1 TABLET: TAB at 09:01

## 2022-06-15 RX ADMIN — DABIGATRAN ETEXILATE MESYLATE 75 MG: 75 CAPSULE ORAL at 20:29

## 2022-06-15 RX ADMIN — DABIGATRAN ETEXILATE MESYLATE 75 MG: 75 CAPSULE ORAL at 09:00

## 2022-06-15 RX ADMIN — FAMOTIDINE 40 MG: 20 TABLET ORAL at 09:01

## 2022-06-15 RX ADMIN — Medication 10 ML: at 09:00

## 2022-06-15 RX ADMIN — ASPIRIN 81 MG: 81 TABLET, COATED ORAL at 09:01

## 2022-06-15 RX ADMIN — VANCOMYCIN HYDROCHLORIDE 125 MG: 125 CAPSULE ORAL at 01:05

## 2022-06-15 RX ADMIN — ATORVASTATIN CALCIUM 40 MG: 40 TABLET, FILM COATED ORAL at 09:01

## 2022-06-15 RX ADMIN — ZINC OXIDE 1 APPLICATION: 200 OINTMENT TOPICAL at 13:36

## 2022-06-15 RX ADMIN — IPRATROPIUM BROMIDE AND ALBUTEROL SULFATE 3 ML: 2.5; .5 SOLUTION RESPIRATORY (INHALATION) at 18:42

## 2022-06-15 RX ADMIN — VANCOMYCIN HYDROCHLORIDE 125 MG: 125 CAPSULE ORAL at 09:01

## 2022-06-15 RX ADMIN — VANCOMYCIN HYDROCHLORIDE 125 MG: 125 CAPSULE ORAL at 13:36

## 2022-06-15 RX ADMIN — IPRATROPIUM BROMIDE AND ALBUTEROL SULFATE 3 ML: 2.5; .5 SOLUTION RESPIRATORY (INHALATION) at 12:01

## 2022-06-15 RX ADMIN — CARVEDILOL 6.25 MG: 6.25 TABLET, FILM COATED ORAL at 09:01

## 2022-06-15 RX ADMIN — CARVEDILOL 6.25 MG: 6.25 TABLET, FILM COATED ORAL at 17:09

## 2022-06-15 NOTE — TELEPHONE ENCOUNTER
Caller: ROYCE MURRIETA    Relationship: Emergency Contact    Best call back number: [phone]    What was the call regarding: PATIENT IS IN THE HOSPITAL AND WILL BE SENT TO REHAB AFTER, DAUGHTER WANTED TO KNOW IF DR RUANO COULD HAVE THE CT SCAN DONE BEFORE SHE LEAVE THE HOSPITAL     Do you require a callback: YES

## 2022-06-15 NOTE — TELEPHONE ENCOUNTER
Maureen Arias we do not need to do the scans now. We will follow up with her when she gets out of rehab.

## 2022-06-16 VITALS
HEIGHT: 64 IN | SYSTOLIC BLOOD PRESSURE: 116 MMHG | TEMPERATURE: 97.7 F | HEART RATE: 82 BPM | BODY MASS INDEX: 23.6 KG/M2 | RESPIRATION RATE: 16 BRPM | WEIGHT: 138.23 LBS | OXYGEN SATURATION: 97 % | DIASTOLIC BLOOD PRESSURE: 56 MMHG

## 2022-06-16 LAB
ANION GAP SERPL CALCULATED.3IONS-SCNC: 7 MMOL/L (ref 5–15)
BUN SERPL-MCNC: 22 MG/DL (ref 8–23)
BUN/CREAT SERPL: 36.1 (ref 7–25)
CALCIUM SPEC-SCNC: 9.4 MG/DL (ref 8.2–9.6)
CHLORIDE SERPL-SCNC: 104 MMOL/L (ref 98–107)
CO2 SERPL-SCNC: 25 MMOL/L (ref 22–29)
CREAT SERPL-MCNC: 0.61 MG/DL (ref 0.57–1)
DEPRECATED RDW RBC AUTO: 48.8 FL (ref 37–54)
EGFRCR SERPLBLD CKD-EPI 2021: 83.5 ML/MIN/1.73
ERYTHROCYTE [DISTWIDTH] IN BLOOD BY AUTOMATED COUNT: 14.1 % (ref 12.3–15.4)
GLUCOSE BLDC GLUCOMTR-MCNC: 163 MG/DL (ref 70–99)
GLUCOSE BLDC GLUCOMTR-MCNC: 84 MG/DL (ref 70–99)
GLUCOSE SERPL-MCNC: 116 MG/DL (ref 65–99)
HCT VFR BLD AUTO: 39.5 % (ref 34–46.6)
HGB BLD-MCNC: 13.3 G/DL (ref 12–15.9)
MAGNESIUM SERPL-MCNC: 2 MG/DL (ref 1.7–2.3)
MCH RBC QN AUTO: 31.9 PG (ref 26.6–33)
MCHC RBC AUTO-ENTMCNC: 33.7 G/DL (ref 31.5–35.7)
MCV RBC AUTO: 94.7 FL (ref 79–97)
PLATELET # BLD AUTO: 340 10*3/MM3 (ref 140–450)
PMV BLD AUTO: 9.2 FL (ref 6–12)
POTASSIUM SERPL-SCNC: 4.7 MMOL/L (ref 3.5–5.2)
RBC # BLD AUTO: 4.17 10*6/MM3 (ref 3.77–5.28)
SODIUM SERPL-SCNC: 136 MMOL/L (ref 136–145)
WBC NRBC COR # BLD: 15.57 10*3/MM3 (ref 3.4–10.8)

## 2022-06-16 PROCEDURE — 97110 THERAPEUTIC EXERCISES: CPT

## 2022-06-16 PROCEDURE — 99239 HOSP IP/OBS DSCHRG MGMT >30: CPT | Performed by: INTERNAL MEDICINE

## 2022-06-16 PROCEDURE — 94760 N-INVAS EAR/PLS OXIMETRY 1: CPT

## 2022-06-16 PROCEDURE — 94799 UNLISTED PULMONARY SVC/PX: CPT

## 2022-06-16 PROCEDURE — 63710000001 INSULIN LISPRO (HUMAN) PER 5 UNITS: Performed by: INTERNAL MEDICINE

## 2022-06-16 PROCEDURE — 82962 GLUCOSE BLOOD TEST: CPT

## 2022-06-16 PROCEDURE — 83735 ASSAY OF MAGNESIUM: CPT | Performed by: INTERNAL MEDICINE

## 2022-06-16 PROCEDURE — 25010000002 HEPARIN LOCK FLUSH PER 10 UNITS: Performed by: INTERNAL MEDICINE

## 2022-06-16 PROCEDURE — 85027 COMPLETE CBC AUTOMATED: CPT | Performed by: INTERNAL MEDICINE

## 2022-06-16 PROCEDURE — 80048 BASIC METABOLIC PNL TOTAL CA: CPT | Performed by: INTERNAL MEDICINE

## 2022-06-16 PROCEDURE — 97530 THERAPEUTIC ACTIVITIES: CPT

## 2022-06-16 RX ORDER — VANCOMYCIN HYDROCHLORIDE 125 MG/1
125 CAPSULE ORAL EVERY 6 HOURS SCHEDULED
Qty: 11 CAPSULE | Refills: 0 | Status: SHIPPED | OUTPATIENT
Start: 2022-06-16 | End: 2022-06-19

## 2022-06-16 RX ORDER — CARVEDILOL 6.25 MG/1
TABLET ORAL
Qty: 60 TABLET | Refills: 6 | Status: SHIPPED | OUTPATIENT
Start: 2022-06-16

## 2022-06-16 RX ORDER — HEPARIN SODIUM (PORCINE) LOCK FLUSH IV SOLN 100 UNIT/ML 100 UNIT/ML
500 SOLUTION INTRAVENOUS ONCE
Status: COMPLETED | OUTPATIENT
Start: 2022-06-16 | End: 2022-06-16

## 2022-06-16 RX ADMIN — ATORVASTATIN CALCIUM 40 MG: 40 TABLET, FILM COATED ORAL at 09:56

## 2022-06-16 RX ADMIN — ASPIRIN 81 MG: 81 TABLET, COATED ORAL at 09:56

## 2022-06-16 RX ADMIN — CARVEDILOL 6.25 MG: 6.25 TABLET, FILM COATED ORAL at 09:56

## 2022-06-16 RX ADMIN — MULTIPLE VITAMINS W/ MINERALS TAB 1 TABLET: TAB at 09:56

## 2022-06-16 RX ADMIN — FAMOTIDINE 40 MG: 20 TABLET ORAL at 09:56

## 2022-06-16 RX ADMIN — HEPARIN SODIUM (PORCINE) LOCK FLUSH IV SOLN 100 UNIT/ML 500 UNITS: 100 SOLUTION at 11:34

## 2022-06-16 RX ADMIN — IPRATROPIUM BROMIDE AND ALBUTEROL SULFATE 3 ML: 2.5; .5 SOLUTION RESPIRATORY (INHALATION) at 07:22

## 2022-06-16 RX ADMIN — IPRATROPIUM BROMIDE AND ALBUTEROL SULFATE 3 ML: 2.5; .5 SOLUTION RESPIRATORY (INHALATION) at 00:30

## 2022-06-16 RX ADMIN — INSULIN LISPRO 2 UNITS: 100 INJECTION, SOLUTION INTRAVENOUS; SUBCUTANEOUS at 13:08

## 2022-06-16 RX ADMIN — DABIGATRAN ETEXILATE MESYLATE 75 MG: 75 CAPSULE ORAL at 09:55

## 2022-06-16 RX ADMIN — Medication 10 ML: at 09:56

## 2022-06-16 RX ADMIN — VANCOMYCIN HYDROCHLORIDE 125 MG: 125 CAPSULE ORAL at 01:53

## 2022-06-16 RX ADMIN — SPIRONOLACTONE 12.5 MG: 25 TABLET, FILM COATED ORAL at 09:56

## 2022-06-16 RX ADMIN — VANCOMYCIN HYDROCHLORIDE 125 MG: 125 CAPSULE ORAL at 09:55

## 2022-06-17 ENCOUNTER — APPOINTMENT (OUTPATIENT)
Dept: CT IMAGING | Facility: HOSPITAL | Age: 87
End: 2022-06-17

## 2022-06-21 ENCOUNTER — APPOINTMENT (OUTPATIENT)
Dept: CT IMAGING | Facility: HOSPITAL | Age: 87
End: 2022-06-21

## 2022-07-25 ENCOUNTER — TELEPHONE (OUTPATIENT)
Dept: ONCOLOGY | Facility: HOSPITAL | Age: 87
End: 2022-07-25

## 2022-07-25 NOTE — TELEPHONE ENCOUNTER
Caller: ROYCE MURRIETA    Relationship: Emergency Contact    Best call back number: [phone]    What was the call regarding: ROYCE CALLED TO SAY THAT [facility] IS GETTING OUT OF REHAB ON Wednesday 07/27. SHE IS WANTING DR. RUANO'S OPINION ON WHETHER THEY SHOULD RESCHEDULE HER APPT FOR 07/28 OR NOT.    Do you require a callback: YES

## 2022-07-25 NOTE — TELEPHONE ENCOUNTER
Left message for [place]: patient's choice if she feels up for treatment on Thursday, she can come. If not, she can reschedule 2-3 weeks to allow more time to recover.

## 2022-07-26 ENCOUNTER — TELEPHONE (OUTPATIENT)
Dept: ONCOLOGY | Facility: HOSPITAL | Age: 87
End: 2022-07-26

## 2022-07-26 NOTE — TELEPHONE ENCOUNTER
Caller: ROYCE MURRIETA    Relationship to patient: Emergency Contact    Best call back number: [phone]    Chief complaint: THEY NEED TO RESCHEDULE APPOINTMENT DUE TO PATIENT JUST GETTING OUT OF REHAB AND THEY WANT TO PUSH IT OUT  A FEW WEEKS    Type of visit: INFUSION AND FOLLOW UP      If rescheduling, when is the original appointment: 7-28-22     Additional notes:PLEASE CALL TO CONFIRM WITH PATIENT

## 2022-07-27 NOTE — TELEPHONE ENCOUNTER
SPOKE TO PATIENT DAUGHTER ROYCE, SHE REQUESTS TO RESCHEDULE APPOINTMENTS DUE TO PATIENT JUST GETTING OUT OF REHAB AND SHE ALSO HAS APPOINTMENTS. APPOINTMENTS HAVE BEEN RESCHEDULED PER DAUGHTER REQUEST TO 08/11/2022 AT 10:15 AM.

## 2022-07-28 ENCOUNTER — APPOINTMENT (OUTPATIENT)
Dept: ONCOLOGY | Facility: HOSPITAL | Age: 87
End: 2022-07-28

## 2022-07-29 ENCOUNTER — OUTSIDE FACILITY SERVICE (OUTPATIENT)
Dept: INTERNAL MEDICINE | Facility: CLINIC | Age: 87
End: 2022-07-29

## 2022-08-02 ENCOUNTER — TELEPHONE (OUTPATIENT)
Dept: INTERNAL MEDICINE | Facility: CLINIC | Age: 87
End: 2022-08-02

## 2022-08-02 DIAGNOSIS — I10 PRIMARY HYPERTENSION: Primary | ICD-10-CM

## 2022-08-02 NOTE — TELEPHONE ENCOUNTER
Caller: PHONG    Relationship: [facility]    Best call back number: [phone]    What is the best time to reach you: ANY     Who are you requesting to speak with CLINICAL     Do you know the name of the person who called: PHONG     What was the call regarding: [facility] CALLED REGARDING BMP ORDER     NURSE TRIED TO COMPLETE YESTERDAY ANDTODAY BUT WAS UNSUCCESSFUL  AND REQUESTING PATIENT TO GO TO A  LAB    PLEASE ADVISE     Do you require a callback: YES

## 2022-08-02 NOTE — TELEPHONE ENCOUNTER
CALLED AND SPOKE TO PHONG THEN CALLED AND SPOKE TO DAUGHTER JORGITO. PT WILL TRY TO GET BMP THIS WEEK IF PT CAN FIND TRANSPORTATION

## 2022-08-04 ENCOUNTER — LAB (OUTPATIENT)
Dept: LAB | Facility: HOSPITAL | Age: 87
End: 2022-08-04

## 2022-08-04 DIAGNOSIS — I10 PRIMARY HYPERTENSION: ICD-10-CM

## 2022-08-04 DIAGNOSIS — I35.0 NONRHEUMATIC AORTIC VALVE STENOSIS: ICD-10-CM

## 2022-08-04 DIAGNOSIS — C82.08 FOLLICULAR LYMPHOMA GRADE I OF LYMPH NODES OF MULTIPLE SITES: ICD-10-CM

## 2022-08-04 DIAGNOSIS — I48.11 LONGSTANDING PERSISTENT ATRIAL FIBRILLATION: ICD-10-CM

## 2022-08-04 LAB
ALBUMIN SERPL-MCNC: 4 G/DL (ref 3.5–5.2)
ALBUMIN/GLOB SERPL: 1.3 G/DL
ALP SERPL-CCNC: 70 U/L (ref 39–117)
ALT SERPL W P-5'-P-CCNC: 14 U/L (ref 1–33)
ANION GAP SERPL CALCULATED.3IONS-SCNC: 8.8 MMOL/L (ref 5–15)
AST SERPL-CCNC: 28 U/L (ref 1–32)
BASOPHILS # BLD AUTO: 0.04 10*3/MM3 (ref 0–0.2)
BASOPHILS NFR BLD AUTO: 0.6 % (ref 0–1.5)
BILIRUB SERPL-MCNC: 0.7 MG/DL (ref 0–1.2)
BUN SERPL-MCNC: 26 MG/DL (ref 8–23)
BUN/CREAT SERPL: 28.9 (ref 7–25)
CALCIUM SPEC-SCNC: 10 MG/DL (ref 8.2–9.6)
CHLORIDE SERPL-SCNC: 103 MMOL/L (ref 98–107)
CO2 SERPL-SCNC: 29.2 MMOL/L (ref 22–29)
CREAT SERPL-MCNC: 0.9 MG/DL (ref 0.57–1)
DEPRECATED RDW RBC AUTO: 52.2 FL (ref 37–54)
EGFRCR SERPLBLD CKD-EPI 2021: 59.7 ML/MIN/1.73
EOSINOPHIL # BLD AUTO: 0.09 10*3/MM3 (ref 0–0.4)
EOSINOPHIL NFR BLD AUTO: 1.3 % (ref 0.3–6.2)
ERYTHROCYTE [DISTWIDTH] IN BLOOD BY AUTOMATED COUNT: 14.6 % (ref 12.3–15.4)
GLOBULIN UR ELPH-MCNC: 3 GM/DL
GLUCOSE SERPL-MCNC: 98 MG/DL (ref 65–99)
HCT VFR BLD AUTO: 36.5 % (ref 34–46.6)
HGB BLD-MCNC: 11.8 G/DL (ref 12–15.9)
IMM GRANULOCYTES # BLD AUTO: 0.01 10*3/MM3 (ref 0–0.05)
IMM GRANULOCYTES NFR BLD AUTO: 0.1 % (ref 0–0.5)
LYMPHOCYTES # BLD AUTO: 2.13 10*3/MM3 (ref 0.7–3.1)
LYMPHOCYTES NFR BLD AUTO: 30.1 % (ref 19.6–45.3)
MCH RBC QN AUTO: 31.6 PG (ref 26.6–33)
MCHC RBC AUTO-ENTMCNC: 32.3 G/DL (ref 31.5–35.7)
MCV RBC AUTO: 97.9 FL (ref 79–97)
MONOCYTES # BLD AUTO: 0.7 10*3/MM3 (ref 0.1–0.9)
MONOCYTES NFR BLD AUTO: 9.9 % (ref 5–12)
NEUTROPHILS NFR BLD AUTO: 4.11 10*3/MM3 (ref 1.7–7)
NEUTROPHILS NFR BLD AUTO: 58 % (ref 42.7–76)
NRBC BLD AUTO-RTO: 0 /100 WBC (ref 0–0.2)
PLATELET # BLD AUTO: 283 10*3/MM3 (ref 140–450)
PMV BLD AUTO: 10.9 FL (ref 6–12)
POTASSIUM SERPL-SCNC: 4.3 MMOL/L (ref 3.5–5.2)
PROT SERPL-MCNC: 7 G/DL (ref 6–8.5)
RBC # BLD AUTO: 3.73 10*6/MM3 (ref 3.77–5.28)
SODIUM SERPL-SCNC: 141 MMOL/L (ref 136–145)
WBC NRBC COR # BLD: 7.08 10*3/MM3 (ref 3.4–10.8)

## 2022-08-04 PROCEDURE — 36415 COLL VENOUS BLD VENIPUNCTURE: CPT

## 2022-08-04 PROCEDURE — 80053 COMPREHEN METABOLIC PANEL: CPT

## 2022-08-04 PROCEDURE — 85025 COMPLETE CBC W/AUTO DIFF WBC: CPT

## 2022-08-04 PROCEDURE — G0180 MD CERTIFICATION HHA PATIENT: HCPCS | Performed by: INTERNAL MEDICINE

## 2022-08-05 ENCOUNTER — TELEPHONE (OUTPATIENT)
Dept: CARDIOLOGY | Facility: CLINIC | Age: 87
End: 2022-08-05

## 2022-08-05 NOTE — TELEPHONE ENCOUNTER
----- Message from FANY Amor sent at 8/5/2022  9:19 AM EDT -----  Notify pt sodium/potassium are in normal range and her renal function is stable

## 2022-08-07 PROBLEM — E83.52 HYPERCALCEMIA DUE TO A DRUG: Status: ACTIVE | Noted: 2022-08-07

## 2022-08-07 PROBLEM — T50.905A HYPERCALCEMIA DUE TO A DRUG: Status: ACTIVE | Noted: 2022-08-07

## 2022-08-07 NOTE — PROGRESS NOTES
"CHIEF COMPLAINT  Radha Aparicio presents to [facility] for follow-up of Hospital Follow Up Visit ([facility] 06/08-16/2022, Dx: Diarrhea, unspecified type, pt was release from Rehab Facility and back at home, reported to have been positive for C-Diff.).    HPI  Here with daughter Juana who lives with her.and friend     Radha Aparicio is a 89 y.o. female  seen in ED 6/8/22. Here for follow-up.  She presented with weakness and diarrhea.  Diagnosed with C. difficile colitis and A. fib with RVR.  A. fib resolved with IV fluids and oral medications, patient is currently on oral vancomycin.Patient seen on date of discharge, clinically and hemodynamically stable.    Patient is discharged to SNF 7/27/22.for continued strengthening, patient profoundly weak from her hospitalization.      Records reviewed, labs reviewed, meds reviewed I detail. Plan of care as follows.    Afib--est dx--on pradaxa/carvedilol-sees Dr Fina SCHAFER dif colitis--resolved.  Labs with elev Ca--decrease dose to once daily    c/o memory loss-told to cut down on montelukast-  CHF--stable-taking meds    Chronic LE edema-on lasix 40 mg and aldactone 25 mg 1/2 daily  PFSH reviewed.  ROS -no more diarrhea,fatigue, edema    OBJECTIVE  Vital Signs  Vitals:    08/08/22 1047 08/08/22 1217   BP: 105/72 118/60   BP Location: Left arm Left arm   Patient Position: Sitting Sitting   Cuff Size: Adult Adult   Pulse: 72    Temp: 98.6 °F (37 °C)    TempSrc: Infrared    SpO2: 95%    Weight: 44.7 kg (98 lb 9.6 oz)    Height: 162.6 cm (64\")       Body mass index is 16.92 kg/m².    Physical Exam  Vitals and nursing note reviewed.   Constitutional:       Appearance: Normal appearance.   HENT:      Head: Normocephalic and atraumatic.   Cardiovascular:      Rate and Rhythm: Normal rate and regular rhythm.      Heart sounds: Murmur heard.      Comments: Loud 3/6b systolic murmur  Laurens throughout precordium and rad to carotids  Pulmonary:      Effort: " Pulmonary effort is normal.      Breath sounds: Normal breath sounds.   Abdominal:      Palpations: Abdomen is soft.   Musculoskeletal:      Cervical back: Normal range of motion and neck supple.      Left lower leg: Edema present.      Comments: +2 pitting in LE   Neurological:      General: No focal deficit present.      Mental Status: She is alert and oriented to person, place, and time.          RESULTS REVIEW  Lab Results   Component Value Date    PROBNP 5,420.0 (H) 06/08/2022    PROBNP 2,895.0 (H) 06/02/2022    PROBNP 2,180.0 (H) 03/05/2022    BNP 1016 06/02/2021     11/14/2019     05/02/2019     CMP    CMP 6/15/22 6/16/22 8/4/22   Glucose 120 (A) 116 (A) 98   BUN 23 22 26 (A)   Creatinine 0.56 (A) 0.61 0.90   Sodium 136 136 141   Potassium 4.3 4.7 4.3   Chloride 103 104 103   Calcium 8.9 9.4 10.0 (A)   Albumin   4.00   Total Bilirubin   0.7   Alkaline Phosphatase   70   AST (SGOT)   28   ALT (SGPT)   14   (A) Abnormal value            CBC w/diff    CBC w/Diff 6/15/22 6/16/22 8/4/22   WBC 15.09 (A) 15.57 (A) 7.08   RBC 4.29 4.17 3.73 (A)   Hemoglobin 13.6 13.3 11.8 (A)   Hematocrit 40.3 39.5 36.5   MCV 93.9 94.7 97.9 (A)   MCH 31.7 31.9 31.6   MCHC 33.7 33.7 32.3   RDW 13.8 14.1 14.6   Platelets 332 340 283   Neutrophil Rel %   58.0   Immature Granulocyte Rel %   0.1   Lymphocyte Rel %   30.1   Monocyte Rel %   9.9   Eosinophil Rel %   1.3   Basophil Rel %   0.6   (A) Abnormal value             Lipid Panel    Lipid Panel 9/2/21 12/13/21 3/28/22   Total Cholesterol 154 145 156   Triglycerides 53 36 55   HDL Cholesterol 75 (A) 73 (A) 69 (A)   VLDL Cholesterol 11 9 11   LDL Cholesterol  68 63 76   LDL/HDL Ratio 0.91 0.89 1.10   (A) Abnormal value             Lab Results   Component Value Date    TSH 1.240 06/02/2022    TSH 3.540 03/28/2022    TSH 1.130 09/02/2021      Lab Results   Component Value Date    FREET4 1.22 06/02/2022    FREET4 1.07 03/28/2022    FREET4 1.23 09/02/2021      A1C Last 3  Results    HGBA1C Last 3 Results 12/13/21 3/28/22 6/2/22   Hemoglobin A1C 6.08 (A) 6.40 (A) 5.60   (A) Abnormal value             Lab Results   Component Value Date    BDSJYBSB64 1,436 (H) 06/02/2022    IHCV99PA 59.0 06/02/2022    MG 2.0 06/16/2022        CT Abdomen Pelvis Without Contrast    Result Date: 6/6/2022    1. Suspicion of thickening of the wall of the transverse colon and descending colon and sigmoid colon might be caused by contraction or possibly inflammation or infection and less likely ischemia is a new finding compared to the previous study 2. Questionable thickening of the wall of the fundus and body of the stomach might be caused by contraction of the stomach.  Clinical correlation would be helpful.     COURTNEY HUNTER MD       Electronically Signed and Approved By: COURTNEY HUNTER MD on 6/06/2022 at 18:10             XR Abdomen 2+ VW with Chest 1 VW    Result Date: 6/8/2022    1. No acute process identified within abdomen. 2. Coarse bilateral interstitial markings within lungs, concerning for interstitial pulmonary edema or atypical pneumonia. 3. Cardiomegaly.     NAILA BARRIENTOS MD       Electronically Signed and Approved By: NAILA BARRIENTOS MD on 6/08/2022 at 20:19             XR Abdomen KUB    Result Date: 6/13/2022    KUB demonstrating unremarkable bowel gas pattern.     ISABELLA PRIDE MD       Electronically Signed and Approved By: ISABELLA PRIDE MD on 6/13/2022 at 18:44                        ASSESSMENT & PLAN  Diagnoses and all orders for this visit:    1. Essential hypertension (Primary)  Assessment & Plan:  Stable with spironolactone 25 mg one half daily, furosemide 40 mg one half daily, carvedilol 6.25 mg twice daily.  Goal is systolic blood pressure around 140/90 monitor blood pressure and record we will need to adjust meds if systolic goes less than 100      2. Chronic heart failure with preserved ejection fraction (HFpEF)  Assessment & Plan:  Continue furosemide 40 mg 1  1/2 daily (was on 2 tabs of furosemide at rehab) and [id number] mg 1/2 daily-      3. Localized edema  Comments:  Likely more dependent edema can use furosemide 40 mg one half daily and potassium chloride 10 mEq daily continue with spironolactone 25 mg one half daily  Assessment & Plan:  Use HAIM hose stockings or wrap Ace bandage around tightly.  Do not recommend using the stockings with the zipper due to fragility of the skin.      4. High cholesterol  Comments:  Stable with atorvastatin 40 mg daily check lipids at next visit    5. Longstanding persistent atrial fibrillation (HCC)  Assessment & Plan:  Continue carvedilol and Pradaxa.  Follow-up with Dr. Harden in a few months.      6. Hypercalcemia due to a drug  Comments:  Decrease calcium tablets to one daily     7. Colitis, Clostridium difficile  Comments:  resolved    8. Vitamin D deficiency  -     ergocalciferol (ERGOCALCIFEROL) 1.25 MG (22985 UT) capsule; One every 2 weeks  Dispense: 6 capsule; Refill: 3    9. Osteopenia of both hips  Assessment & Plan:  Patient is on Evista 60 mg daily for osteopenia.  Assessment: Osteopenia.  Plan: Continue raloxifene 60 mg daily.        10. Severe aortic stenosis  Assessment & Plan:  Patient with severe aortic stenosis.  No chest pain shortness of breath dizziness or presyncope.  She has declined TAVR.  Assessment: Severe aortic stenosis.  Tolerating.  No symptoms.  Plan: Watch for worsening CHF.                Plan  Able to do clock drawing-1/3 word recall  Take furosemide 40 mg 1 1/2 daily-use ace bandage-  Decrease calcium to once daily  Needs AWV -in one month with labs prior-also check potassium and lower extremity edema.    FOLLOW UP  Return in about 4 weeks (around 9/5/2022) for Recheck, Next scheduled follow up.    Patient was given instructions and counseling regarding her condition or for health maintenance advice. Please see specific information pulled into the AVS if appropriate.

## 2022-08-08 ENCOUNTER — OFFICE VISIT (OUTPATIENT)
Dept: INTERNAL MEDICINE | Facility: CLINIC | Age: 87
End: 2022-08-08

## 2022-08-08 VITALS
DIASTOLIC BLOOD PRESSURE: 60 MMHG | TEMPERATURE: 98.6 F | HEART RATE: 72 BPM | HEIGHT: 64 IN | SYSTOLIC BLOOD PRESSURE: 118 MMHG | OXYGEN SATURATION: 95 % | WEIGHT: 98.6 LBS | BODY MASS INDEX: 16.83 KG/M2

## 2022-08-08 DIAGNOSIS — M85.852 OSTEOPENIA OF BOTH HIPS: ICD-10-CM

## 2022-08-08 DIAGNOSIS — E55.9 VITAMIN D DEFICIENCY: ICD-10-CM

## 2022-08-08 DIAGNOSIS — A04.72 COLITIS, CLOSTRIDIUM DIFFICILE: ICD-10-CM

## 2022-08-08 DIAGNOSIS — I50.32 CHRONIC HEART FAILURE WITH PRESERVED EJECTION FRACTION (HFPEF): ICD-10-CM

## 2022-08-08 DIAGNOSIS — M85.851 OSTEOPENIA OF BOTH HIPS: ICD-10-CM

## 2022-08-08 DIAGNOSIS — E78.00 HIGH CHOLESTEROL: ICD-10-CM

## 2022-08-08 DIAGNOSIS — I48.11 LONGSTANDING PERSISTENT ATRIAL FIBRILLATION: ICD-10-CM

## 2022-08-08 DIAGNOSIS — E83.52 HYPERCALCEMIA DUE TO A DRUG: ICD-10-CM

## 2022-08-08 DIAGNOSIS — T50.905A HYPERCALCEMIA DUE TO A DRUG: ICD-10-CM

## 2022-08-08 DIAGNOSIS — R60.0 LOCALIZED EDEMA: ICD-10-CM

## 2022-08-08 DIAGNOSIS — I10 ESSENTIAL HYPERTENSION: Primary | ICD-10-CM

## 2022-08-08 DIAGNOSIS — I35.0 NONRHEUMATIC AORTIC VALVE STENOSIS: ICD-10-CM

## 2022-08-08 PROCEDURE — 99214 OFFICE O/P EST MOD 30 MIN: CPT | Performed by: INTERNAL MEDICINE

## 2022-08-08 RX ORDER — ZINC GLUCONATE 50 MG
50 TABLET ORAL DAILY
COMMUNITY

## 2022-08-08 RX ORDER — FUROSEMIDE 40 MG/1
40 TABLET ORAL DAILY
COMMUNITY
End: 2022-09-13 | Stop reason: SDUPTHER

## 2022-08-08 RX ORDER — MULTIPLE VITAMINS W/ MINERALS TAB 9MG-400MCG
1 TAB ORAL DAILY
COMMUNITY

## 2022-08-08 RX ORDER — ERGOCALCIFEROL 1.25 MG/1
CAPSULE ORAL
Qty: 6 CAPSULE | Refills: 3 | Status: SHIPPED | OUTPATIENT
Start: 2022-08-08

## 2022-08-08 NOTE — ASSESSMENT & PLAN NOTE
Use HAIM hose stockings or wrap Ace bandage around tightly.  Do not recommend using the stockings with the zipper due to fragility of the skin.

## 2022-08-08 NOTE — ASSESSMENT & PLAN NOTE
Continue furosemide 40 mg 1 1/2 daily (was on 2 tabs of furosemide at rehab) and [id number] mg 1/2 daily-

## 2022-08-08 NOTE — ASSESSMENT & PLAN NOTE
Stable with spironolactone 25 mg one half daily, furosemide 40 mg one half daily, carvedilol 6.25 mg twice daily.  Goal is systolic blood pressure around 140/90 monitor blood pressure and record we will need to adjust meds if systolic goes less than 100

## 2022-08-08 NOTE — PATIENT INSTRUCTIONS
Able to do clock drawing-1/3 word recall  Take furosemide 40 mg 1 1/2 daily-use ace bandage-  Decrease calcium to once daily  Needs AWV -in one month

## 2022-08-10 RX ORDER — MEPERIDINE HYDROCHLORIDE 25 MG/ML
25 INJECTION INTRAMUSCULAR; INTRAVENOUS; SUBCUTANEOUS
Status: CANCELLED | OUTPATIENT
Start: 2022-08-11

## 2022-08-10 RX ORDER — DIPHENHYDRAMINE HYDROCHLORIDE 50 MG/ML
50 INJECTION INTRAMUSCULAR; INTRAVENOUS AS NEEDED
Status: CANCELLED | OUTPATIENT
Start: 2022-08-11

## 2022-08-10 RX ORDER — FAMOTIDINE 10 MG/ML
20 INJECTION, SOLUTION INTRAVENOUS AS NEEDED
Status: CANCELLED | OUTPATIENT
Start: 2022-08-11

## 2022-08-11 ENCOUNTER — HOSPITAL ENCOUNTER (OUTPATIENT)
Dept: ONCOLOGY | Facility: HOSPITAL | Age: 87
Setting detail: INFUSION SERIES
Discharge: HOME | End: 2022-08-11

## 2022-08-11 ENCOUNTER — OFFICE VISIT (OUTPATIENT)
Dept: ONCOLOGY | Facility: HOSPITAL | Age: 87
End: 2022-08-11

## 2022-08-11 VITALS
DIASTOLIC BLOOD PRESSURE: 56 MMHG | HEART RATE: 60 BPM | HEIGHT: 64 IN | RESPIRATION RATE: 18 BRPM | WEIGHT: 133.16 LBS | OXYGEN SATURATION: 99 % | BODY MASS INDEX: 22.73 KG/M2 | SYSTOLIC BLOOD PRESSURE: 107 MMHG | TEMPERATURE: 97.4 F

## 2022-08-11 VITALS
HEART RATE: 79 BPM | DIASTOLIC BLOOD PRESSURE: 73 MMHG | TEMPERATURE: 97.4 F | OXYGEN SATURATION: 99 % | BODY MASS INDEX: 22.85 KG/M2 | RESPIRATION RATE: 18 BRPM | WEIGHT: 133.16 LBS | SYSTOLIC BLOOD PRESSURE: 106 MMHG

## 2022-08-11 DIAGNOSIS — D64.9 ANEMIA, UNSPECIFIED TYPE: ICD-10-CM

## 2022-08-11 DIAGNOSIS — Z45.2 ADJUSTMENT AND MANAGEMENT OF VASCULAR ACCESS DEVICE: ICD-10-CM

## 2022-08-11 DIAGNOSIS — C82.08 FOLLICULAR LYMPHOMA GRADE I OF LYMPH NODES OF MULTIPLE SITES: Primary | ICD-10-CM

## 2022-08-11 LAB
ALBUMIN SERPL-MCNC: 4.02 G/DL (ref 3.5–5.2)
ALBUMIN/GLOB SERPL: 1.6 G/DL
ALP SERPL-CCNC: 70 U/L (ref 39–117)
ALT SERPL W P-5'-P-CCNC: 16 U/L (ref 1–33)
ANION GAP SERPL CALCULATED.3IONS-SCNC: 12.5 MMOL/L (ref 5–15)
AST SERPL-CCNC: 28 U/L (ref 1–32)
BASOPHILS # BLD AUTO: 0.02 10*3/MM3 (ref 0–0.2)
BASOPHILS NFR BLD AUTO: 0.3 % (ref 0–1.5)
BILIRUB SERPL-MCNC: 0.7 MG/DL (ref 0–1.2)
BUN SERPL-MCNC: 30 MG/DL (ref 8–23)
BUN/CREAT SERPL: 35.7 (ref 7–25)
CALCIUM SPEC-SCNC: 9.3 MG/DL (ref 8.2–9.6)
CHLORIDE SERPL-SCNC: 100 MMOL/L (ref 98–107)
CO2 SERPL-SCNC: 26.5 MMOL/L (ref 22–29)
CREAT SERPL-MCNC: 0.84 MG/DL (ref 0.57–1)
DEPRECATED RDW RBC AUTO: 53.9 FL (ref 37–54)
EGFRCR SERPLBLD CKD-EPI 2021: 64.9 ML/MIN/1.73
EOSINOPHIL # BLD AUTO: 0.13 10*3/MM3 (ref 0–0.4)
EOSINOPHIL NFR BLD AUTO: 2 % (ref 0.3–6.2)
ERYTHROCYTE [DISTWIDTH] IN BLOOD BY AUTOMATED COUNT: 15 % (ref 12.3–15.4)
GLOBULIN UR ELPH-MCNC: 2.6 GM/DL
GLUCOSE SERPL-MCNC: 124 MG/DL (ref 65–99)
HCT VFR BLD AUTO: 35.9 % (ref 34–46.6)
HGB BLD-MCNC: 11.5 G/DL (ref 12–15.9)
IMM GRANULOCYTES # BLD AUTO: 0.01 10*3/MM3 (ref 0–0.05)
IMM GRANULOCYTES NFR BLD AUTO: 0.2 % (ref 0–0.5)
LYMPHOCYTES # BLD AUTO: 1.49 10*3/MM3 (ref 0.7–3.1)
LYMPHOCYTES NFR BLD AUTO: 22.5 % (ref 19.6–45.3)
MCH RBC QN AUTO: 31 PG (ref 26.6–33)
MCHC RBC AUTO-ENTMCNC: 32 G/DL (ref 31.5–35.7)
MCV RBC AUTO: 96.8 FL (ref 79–97)
MONOCYTES # BLD AUTO: 0.59 10*3/MM3 (ref 0.1–0.9)
MONOCYTES NFR BLD AUTO: 8.9 % (ref 5–12)
NEUTROPHILS NFR BLD AUTO: 4.39 10*3/MM3 (ref 1.7–7)
NEUTROPHILS NFR BLD AUTO: 66.1 % (ref 42.7–76)
PLATELET # BLD AUTO: 219 10*3/MM3 (ref 140–450)
PMV BLD AUTO: 9.8 FL (ref 6–12)
POTASSIUM SERPL-SCNC: 4.2 MMOL/L (ref 3.5–5.2)
PROT SERPL-MCNC: 6.6 G/DL (ref 6–8.5)
RBC # BLD AUTO: 3.71 10*6/MM3 (ref 3.77–5.28)
SODIUM SERPL-SCNC: 139 MMOL/L (ref 136–145)
WBC NRBC COR # BLD: 6.63 10*3/MM3 (ref 3.4–10.8)

## 2022-08-11 PROCEDURE — 96413 CHEMO IV INFUSION 1 HR: CPT

## 2022-08-11 PROCEDURE — 85025 COMPLETE CBC W/AUTO DIFF WBC: CPT | Performed by: INTERNAL MEDICINE

## 2022-08-11 PROCEDURE — 99214 OFFICE O/P EST MOD 30 MIN: CPT | Performed by: NURSE PRACTITIONER

## 2022-08-11 PROCEDURE — 80053 COMPREHEN METABOLIC PANEL: CPT | Performed by: INTERNAL MEDICINE

## 2022-08-11 PROCEDURE — 25010000002 DIPHENHYDRAMINE PER 50 MG: Performed by: INTERNAL MEDICINE

## 2022-08-11 PROCEDURE — 25010000002 RITUXIMAB 10 MG/ML SOLUTION 10 ML VIAL: Performed by: INTERNAL MEDICINE

## 2022-08-11 PROCEDURE — 25010000002 HEPARIN LOCK FLUSH PER 10 UNITS: Performed by: INTERNAL MEDICINE

## 2022-08-11 PROCEDURE — 25010000002 RITUXIMAB 10 MG/ML SOLUTION 50 ML VIAL: Performed by: INTERNAL MEDICINE

## 2022-08-11 PROCEDURE — 96375 TX/PRO/DX INJ NEW DRUG ADDON: CPT

## 2022-08-11 RX ORDER — SODIUM CHLORIDE 0.9 % (FLUSH) 0.9 %
20 SYRINGE (ML) INJECTION AS NEEDED
Status: CANCELLED | OUTPATIENT
Start: 2022-08-11

## 2022-08-11 RX ORDER — SODIUM CHLORIDE 0.9 % (FLUSH) 0.9 %
20 SYRINGE (ML) INJECTION AS NEEDED
Status: DISCONTINUED | OUTPATIENT
Start: 2022-08-11 | End: 2022-08-12 | Stop reason: HOSPADM

## 2022-08-11 RX ORDER — HEPARIN SODIUM (PORCINE) LOCK FLUSH IV SOLN 100 UNIT/ML 100 UNIT/ML
500 SOLUTION INTRAVENOUS AS NEEDED
Status: DISCONTINUED | OUTPATIENT
Start: 2022-08-11 | End: 2022-08-12 | Stop reason: HOSPADM

## 2022-08-11 RX ORDER — SODIUM CHLORIDE 9 MG/ML
250 INJECTION, SOLUTION INTRAVENOUS ONCE
Status: COMPLETED | OUTPATIENT
Start: 2022-08-11 | End: 2022-08-11

## 2022-08-11 RX ORDER — HEPARIN SODIUM (PORCINE) LOCK FLUSH IV SOLN 100 UNIT/ML 100 UNIT/ML
500 SOLUTION INTRAVENOUS AS NEEDED
Status: CANCELLED | OUTPATIENT
Start: 2022-08-11

## 2022-08-11 RX ORDER — ACETAMINOPHEN 325 MG/1
650 TABLET ORAL ONCE
Status: COMPLETED | OUTPATIENT
Start: 2022-08-11 | End: 2022-08-11

## 2022-08-11 RX ADMIN — DIPHENHYDRAMINE HYDROCHLORIDE 25 MG: 50 INJECTION, SOLUTION INTRAMUSCULAR; INTRAVENOUS at 11:45

## 2022-08-11 RX ADMIN — HEPARIN SODIUM (PORCINE) LOCK FLUSH IV SOLN 100 UNIT/ML 500 UNITS: 100 SOLUTION at 14:07

## 2022-08-11 RX ADMIN — ACETAMINOPHEN 650 MG: 325 TABLET ORAL at 11:31

## 2022-08-11 RX ADMIN — SODIUM CHLORIDE 250 ML: 9 INJECTION, SOLUTION INTRAVENOUS at 11:31

## 2022-08-11 RX ADMIN — RITUXIMAB 600 MG: 10 INJECTION, SOLUTION INTRAVENOUS at 12:24

## 2022-08-11 RX ADMIN — Medication 20 ML: at 14:07

## 2022-08-11 NOTE — PROGRESS NOTES
Chief Complaint  Follicular Lymphoma     Gala Rao MD Bella-Oropilla, Beata Suero MD      Subjective          Radha Aparicio presents to [facility] for follicular lymphoma     History of Present Illness   Ms. Aparicio presents with her daughter for follow up and toxicity check prior to receiving Rituxan today. She reports she was hospitalized in June for C diff and Atral fib and after hospital stay was sent to 2 different rehab's.     Report she is still fatigued. Also reports she was told she has heart murmur and CHF. She does have lower extremity swelling. Lungs are clear but diminished today.     She is due for cycle 10 Rituxan on 8/12/22. Labs reviewed: BUN 30, creatinine .84, WBC 6.63, Hemoglobin 11.5, platelet count 219K.         Oncology/Hematology History Overview Note   Ms. Radha Aparicio is a pleasant 89 year old female who presents with NHL, follicular type diagnosed in 2019.  Ms. Aparicio has been receiving her oncological care with Dr. Jenn Zaragoza who recently retired.  Ms. Aparicio initially presented RLQ pain to her PCP, Dr. Gala Rao.  In light of her bladder cancer, CT of Abdomen and Pelvis was obtained on January 16, 2019.  Findings indicated retroperitoneal adenopathy measuring up to 4 cm x 2cm, thought either to be lymphoma or metastatic disease.  Incidently mild bilateral hydronephrosis was noted without urolithiasis.     CT guided biopsy of the retroperitoneal lymph node was obtained.  Pathology (S-) indicated non-hodgkins's b-cell lymphoma; Follicular lymphoma (grade 1).  Flow cytometryrevealed diffuse positivity with CD 20, BCL2, CD10 and patchy decoration with BCL-6.  A few days after biopsy she was admitted to hospital with acute respiratory hypoxemia secondary to influenza.  Repeat imaging with CT Abdomen and Pelvis with heterogeneous hyperdense mass in the anterior abdominal wass musculature 8 cm x 12.6 cm consistent wtih rectus sheath  hematoma.  Retroperitoneal adenopathy is redemonstrated.  Index left periaortic mass measured 3.1 x 2.2 cm, previously 3.9 x 2.4. Mildly enlarged retrocrural lymph nodes.  She was discharged from the hospital on 2/20/2019.     Ms. Aparicio was evaluated by Dr. Jenn Zaragoza on March 13, 2019. PET CT was ordered and obtained.  It indicated metabolic activity within the lymph node at the base of upper chest and neck (SUV 3.85)  as well as the left axilla (SUV 5.4) in addition to 2 lymph nodes within the retroperitoneal area with SUV (8.76). .  Staging for her follicular carcinoma grade 1 is a clinical stage III with no B symptoms no significant laboratory abnormalities. At this time it was decided pursue active surveillance and close follow.     On October 17, 2019, PETCT  indicated interval progression of disease with previous noted lymph adenopathy larger and more metabolic. The left periaortic lymph node mass noted to be compressing not only the left renal vein but resulting in mild left hydronephrosis which is new. .   Single agent RITUXAN was initiated on October 9, 2019 and completed 4 weekly doses of RITUXAN.  Ms. Aparicio continues to have no B-symptoms.     Repeat PETCT obtained December 11, 2019 indicated overall improvement from previous study.  The areas of hypermetabolic lymphadenopathy supraclavicular on the left and left paraaortic have diminshed both in size and degree of metabolic activity with no new areas noted. .      PETCT done on July 2, 2020 indicated enlargement of left infraclavicular / subpectoral lynph node measuring up to 1.8 cm with SUV 5.7, increase in size and SUV of left axillary lymph node, small left pleural effusion slightly larger than prior PET, left paraaortic lymph node conglomerate slightly larger and slightly increased in SUV.  .  It was decided at that time to restart RITUXAN every 8 weeks.  Ms. Aparicio continues to have no B-symptoms.     Maintenance rituximab  started November 2020, every 8 weeks.    CT CAP on 12/16/2020 showed decrease in the size of left retropectoral lymph nodes and left periaortic lymph nodes.  No new or enlarging lymph nodes in the chest, abdomen, or pelvis.  There are stable subcentimeter noncalcified pulmonary nodules as well.       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 Negative for appetite change, diaphoresis, fever, unexpected weight gain and unexpected weight loss.   HENT: Negative for hearing loss, sore throat and voice change.    Eyes: Negative for blurred vision, double vision, pain, redness and visual disturbance.   Respiratory: Negative for cough, shortness of breath and wheezing.    Cardiovascular: Negative for chest pain, palpitations and leg swelling.   Endocrine: Negative for cold intolerance, heat intolerance, polydipsia and polyuria.   Genitourinary: Negative for decreased urine volume, difficulty urinating, frequency and urinary incontinence.   Musculoskeletal: Negative for arthralgias, back pain, joint swelling and myalgias.   Skin: Negative for color change, rash, skin lesions and wound.   Neurological: Negative for dizziness, seizures, numbness and headache.   Hematological: Negative for adenopathy. Does not bruise/bleed easily.   Psychiatric/Behavioral: Negative for depressed mood. The patient is not nervous/anxious.    All other systems reviewed and are negative.      Current Outpatient Medications on File Prior to Visit   Medication Sig Dispense Refill   •  acetaminophen (TYLENOL) 325 MG tablet 650 mg As Needed.     • albuterol sulfate  (90 Base) MCG/ACT inhaler USE 2 PUFFS BY MOUTH EVERY 4 HOURS AS NEEDED FOR WHEEZING (Patient taking differently: Inhale 2 puffs Every 4 (Four) Hours As Needed.) 6.7 g 1   • aspirin 81 MG EC tablet Take 81 mg by mouth Daily.     • atorvastatin (LIPITOR) 40 MG tablet Take 1 tablet by mouth Daily. 90 tablet 3   • Calcium Carbonate-Vitamin D 600-200 MG-UNIT capsule Take 1 capsule by mouth 2 (Two) Times a Day.     • carvedilol (COREG) 6.25 MG tablet TAKE 1 TABLET BY MOUTH TWICE DAILY WITH FOOD 60 tablet 6   • ergocalciferol (ERGOCALCIFEROL) 1.25 MG (96293 UT) capsule One every 2 weeks 6 capsule 3   • furosemide (LASIX) 40 MG tablet Take 40 mg by mouth Daily. 1.5 tabs daily     • hydrocortisone-bacitracin-zinc oxide-nystatin (MAGIC BARRIER) Apply 1 application topically to the appropriate area as directed As Needed (due on bottom). 60 g 1   • iron polysaccharides (NIFEREX) 150 MG capsule Take 1 capsule by mouth 2 (Two) Times a Day. 180 capsule 3   • montelukast (SINGULAIR) 10 MG tablet TAKE 1 TABLET(10 MG) BY MOUTH EVERY DAY IN THE EVENING (Patient taking differently: Take 10 mg by mouth Every Night.) 90 tablet 3   • multivitamin with minerals tablet tablet Take 1 tablet by mouth Daily.     • Polysaccharide Iron Complex (FERREX 150 PO) Take 150 mg by mouth 2 (Two) Times a Day.     • potassium chloride 10 MEQ CR tablet Take 1 tablet by mouth Daily. 90 tablet 0   • Pradaxa 75 MG capsule TAKE 1 CAPSULE BY MOUTH TWICE DAILY 60 capsule 5   • raloxifene (EVISTA) 60 MG tablet Take 1 tablet by mouth Daily. 90 tablet 3   • spironolactone (Aldactone) 25 MG tablet Take 1/2 tablet every day (Patient taking differently: Take 12.5 mg by mouth Daily. Take 1/2 tablet every day) 45 tablet 3   • Zinc 50 MG tablet Take 50 mg by mouth Daily.       Current Facility-Administered Medications on File Prior to Visit   Medication Dose Route Frequency Provider  Last Rate Last Admin   • [COMPLETED] acetaminophen (TYLENOL) tablet 650 mg  650 mg Oral Once Lily Byrd MD PhD   650 mg at 08/11/22 1131   • [COMPLETED] diphenhydrAMINE (BENADRYL) IVPB 25 mg  25 mg Intravenous Once Lily Byrd MD PhD   25 mg at 08/11/22 1145   • heparin injection 500 Units  500 Units Intravenous PRN Lily Byrd MD PhD   500 Units at 08/11/22 1407   • [COMPLETED] riTUXimab (RITUXAN) 600 mg in sodium chloride 0.9 % 335 mL IVPB Rapid Infusion  600 mg Intravenous Once Lily Byrd MD PhD   Stopped at 08/11/22 1354   • sodium chloride 0.9 % flush 20 mL  20 mL Intravenous PRN Lily Byrd MD PhD   20 mL at 08/11/22 1407   • [COMPLETED] sodium chloride 0.9 % infusion 250 mL  250 mL Intravenous Once Lily Byrd MD PhD 20 mL/hr at 08/11/22 1131 250 mL at 08/11/22 1131       Allergies   Allergen Reactions   • Contrast Dye Unknown - Low Severity   • Iodinated Diagnostic Agents Hives and Other (See Comments)     IODINATED CONTRAST MEDIA (Verified  Allergy, Unknown, HIVES,SNEEZING,ITCHY EYES)   • Iodine Unknown - Low Severity   • Penicillins Rash   • Shellfish Allergy Unknown - Low Severity   • Spinach Other (See Comments)      SPINACH (Verified  Allergy, Unknown, 2/22/21)      SHOWED UP ON ALLERGY TESTING   • Sulfa Antibiotics Other (See Comments)     (Verified  Allergy, Unknown, RASH)     Past Medical History:   Diagnosis Date   • Acute congestive heart failure (HCC)     improved   • Arthritis    • Bladder cancer (HCC)    • Bleeding disorder (HCC)     (CONDITION DUE TO BLOOD THINNERS)   • Chronic atrial fibrillation (HCC)    • Chronic heart failure with preserved ejection fraction (HFpEF) 9/23/2021   • High cholesterol    • Hypertension    • Lymphoma (HCC)    • Moderate to severe aortic stenosis     Patient declines to have transcatheter   • Non Hodgkin's lymphoma (HCC)     2008 treated 2008 2009.   • Rectus sheath hematoma     Right rectus sheath hematoma, off  anticoagulation because of the hematoma.   • Skin cancer    • Type 2 diabetes mellitus (HCC)      Past Surgical History:   Procedure Laterality Date   • BLADDER SURGERY     • CATARACT EXTRACTION     • DILATION AND CURETTAGE, DIAGNOSTIC / THERAPEUTIC     • LEFT VENTRICULAR ASSIST DEVICE     • LYMPH NODE BIOPSY      Biopsy of retroperitoneal lymph node or mass    • SKIN CANCER DESTRUCTION     • TONSILLECTOMY       Social History     Socioeconomic History   • Marital status:    • Number of children: 2   Tobacco Use   • Smoking status: Never Smoker   • Smokeless tobacco: Never Used   Vaping Use   • Vaping Use: Never used   Substance and Sexual Activity   • Alcohol use: Never   • Drug use: Never   • Sexual activity: Defer     Family History   Problem Relation Age of Onset   • Heart attack Father    • Heart attack Brother    • Stomach cancer Paternal Great-Grandfather      Immunization History   Administered Date(s) Administered   • COVID-19 (PFIZER) PURPLE CAP 03/23/2021, 04/13/2021, 09/16/2021   • Fluzone High-Dose 65+yrs 12/16/2021   • Influenza, Unspecified 11/02/2020       Objective   Physical Exam  Vitals and nursing note reviewed.   Constitutional:       Appearance: She is obese.   HENT:      Head: Normocephalic.      Nose: Nose normal.      Mouth/Throat:      Mouth: Mucous membranes are moist.   Eyes:      Pupils: Pupils are equal, round, and reactive to light.   Cardiovascular:      Rate and Rhythm: Rhythm irregular.      Heart sounds: Murmur heard.   Pulmonary:      Effort: Pulmonary effort is normal. No respiratory distress.      Breath sounds: Normal breath sounds. No wheezing, rhonchi or rales.   Abdominal:      General: Bowel sounds are normal. There is no distension.      Palpations: Abdomen is soft.      Tenderness: There is no abdominal tenderness.   Musculoskeletal:         General: Normal range of motion.      Cervical back: Normal range of motion and neck supple.   Skin:     General: Skin is  warm and dry.      Capillary Refill: Capillary refill takes less than 2 seconds.   Neurological:      Mental Status: She is alert and oriented to person, place, and time.   Psychiatric:         Mood and Affect: Mood normal.         Behavior: Behavior normal.         Thought Content: Thought content normal.         Judgment: Judgment normal.         Vitals:    08/11/22 1051   BP: 106/73   Pulse: 79   Resp: 18   Temp: 97.4 °F (36.3 °C)   SpO2: 99%   Weight: 60.4 kg (133 lb 2.5 oz)   PainSc: 0-No pain     ECOG score: 0         ECOG: (0) Fully Active - Able to Carry On All Pre-disease Performance Without Restriction  Fall Risk Assessment was completed, and patient is at low risk for falls.  PHQ-9 Total Score:         The patient is  experiencing fatigue. Fatigue score: 8    PT/OT Functional Screening: PT fx screen: No needs identified  Speech Functional Screening: Speech fx screen: No needs identified  Rehab to be ordered: Rehab to be ordered: No needs identified        Result Review :   The following data was reviewed by: FANY Carrillo on 08/11/2022:  Lab Results   Component Value Date    HGB 11.5 (L) 08/11/2022    HCT 35.9 08/11/2022    MCV 96.8 08/11/2022     08/11/2022    WBC 6.63 08/11/2022    NEUTROABS 4.39 08/11/2022    LYMPHSABS 1.49 08/11/2022    MONOSABS 0.59 08/11/2022    EOSABS 0.13 08/11/2022    BASOSABS 0.02 08/11/2022     Lab Results   Component Value Date    GLUCOSE 124 (H) 08/11/2022    BUN 30 (H) 08/11/2022    CREATININE 0.84 08/11/2022     08/11/2022    K 4.2 08/11/2022     08/11/2022    CO2 26.5 08/11/2022    CALCIUM 9.3 08/11/2022    PROTEINTOT 6.6 08/11/2022    ALBUMIN 4.02 08/11/2022    BILITOT 0.7 08/11/2022    ALKPHOS 70 08/11/2022    AST 28 08/11/2022    ALT 16 08/11/2022          Assessment and Plan {CC Problem List  Visit Diagnosis   ROS  Review (Popup)  Health Maintenance  Quality  BestPractice  Medications  SmartSets  SnapShot Encounters  Media  :23}   Diagnoses and all orders for this visit:    1. 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 Anemia, unspecified type    Follicular lymphoma with toxicity check for Rituxan. Missed June dose due to hospitalization for C diff and Atrial fib. She was given treatment for the Cdiff and symptoms have resolved. She is on meds for Atrial fib. She has history of CHF with lower extremity swelling. She has a loud systolic heart murmur present.     Labs reviewed and is acceptable for treatment on 8/12/22. She will follow up in October with Dr. Byrd then and discuss further plan with the Rituxan maintenance.         Patient Follow Up: October for next treatment of Rituxan and follow up with Dr. Byrd.     Patient was given instructions and counseling regarding her condition or for health maintenance advice. Please see specific information pulled into the AVS if appropriate.     Emilia Spicer, APRN    8/11/2022

## 2022-09-12 PROBLEM — I48.19 OTHER PERSISTENT ATRIAL FIBRILLATION: Status: ACTIVE | Noted: 2021-09-16

## 2022-09-13 ENCOUNTER — OFFICE VISIT (OUTPATIENT)
Dept: INTERNAL MEDICINE | Facility: CLINIC | Age: 87
End: 2022-09-13

## 2022-09-13 ENCOUNTER — TELEPHONE (OUTPATIENT)
Dept: INTERNAL MEDICINE | Facility: CLINIC | Age: 87
End: 2022-09-13

## 2022-09-13 VITALS
OXYGEN SATURATION: 98 % | WEIGHT: 130.8 LBS | BODY MASS INDEX: 22.33 KG/M2 | DIASTOLIC BLOOD PRESSURE: 55 MMHG | TEMPERATURE: 98.4 F | SYSTOLIC BLOOD PRESSURE: 137 MMHG | HEIGHT: 64 IN | HEART RATE: 76 BPM

## 2022-09-13 DIAGNOSIS — C82.08 FOLLICULAR LYMPHOMA GRADE I OF LYMPH NODES OF MULTIPLE SITES: ICD-10-CM

## 2022-09-13 DIAGNOSIS — E11.9 TYPE 2 DIABETES MELLITUS WITHOUT COMPLICATION, WITHOUT LONG-TERM CURRENT USE OF INSULIN: ICD-10-CM

## 2022-09-13 DIAGNOSIS — H91.90 DECREASED HEARING, UNSPECIFIED LATERALITY: ICD-10-CM

## 2022-09-13 DIAGNOSIS — I35.0 NONRHEUMATIC AORTIC VALVE STENOSIS: ICD-10-CM

## 2022-09-13 DIAGNOSIS — I50.32 CHRONIC HEART FAILURE WITH PRESERVED EJECTION FRACTION (HFPEF): ICD-10-CM

## 2022-09-13 DIAGNOSIS — I10 ESSENTIAL HYPERTENSION: ICD-10-CM

## 2022-09-13 DIAGNOSIS — I48.19 OTHER PERSISTENT ATRIAL FIBRILLATION: ICD-10-CM

## 2022-09-13 DIAGNOSIS — Z00.00 ENCOUNTER FOR SUBSEQUENT ANNUAL WELLNESS VISIT (AWV) IN MEDICARE PATIENT: Primary | ICD-10-CM

## 2022-09-13 DIAGNOSIS — E55.9 VITAMIN D DEFICIENCY: ICD-10-CM

## 2022-09-13 DIAGNOSIS — J45.20 MILD INTERMITTENT ASTHMA WITHOUT COMPLICATION: ICD-10-CM

## 2022-09-13 DIAGNOSIS — M85.859 OSTEOPENIA OF HIP, UNSPECIFIED LATERALITY: ICD-10-CM

## 2022-09-13 DIAGNOSIS — E53.8 VITAMIN B 12 DEFICIENCY: ICD-10-CM

## 2022-09-13 PROCEDURE — G0439 PPPS, SUBSEQ VISIT: HCPCS | Performed by: INTERNAL MEDICINE

## 2022-09-13 PROCEDURE — 1170F FXNL STATUS ASSESSED: CPT | Performed by: INTERNAL MEDICINE

## 2022-09-13 PROCEDURE — 1126F AMNT PAIN NOTED NONE PRSNT: CPT | Performed by: INTERNAL MEDICINE

## 2022-09-13 PROCEDURE — 1159F MED LIST DOCD IN RCRD: CPT | Performed by: INTERNAL MEDICINE

## 2022-09-13 RX ORDER — FUROSEMIDE 40 MG/1
40 TABLET ORAL DAILY
Qty: 150 TABLET | Refills: 1 | Status: SHIPPED | OUTPATIENT
Start: 2022-09-13

## 2022-09-13 RX ORDER — DILTIAZEM HYDROCHLORIDE 60 MG/1
2 TABLET, FILM COATED ORAL
Qty: 10.2 G | Refills: 2 | Status: SHIPPED | OUTPATIENT
Start: 2022-09-13

## 2022-09-13 NOTE — TELEPHONE ENCOUNTER
[facility] WAS UNABLE TO WARM TRANSFER       Pharmacy Name:  [id number] - [place], [place] - [address]  AT [facility] [address] - [phone]  - [phone]     Pharmacy representative name: DELANO     Pharmacy representative phone number: [phone]     What medication are you calling in regards to: furosemide (LASIX) 40 MG tablet    What question does the pharmacy have: NEEDING CLARIFICATION AND DIRECTIONS OF THE MEDICATION.     Who is the provider that prescribed the medication: Beata Lancaster MD    Additional notes: PHARMACY CALLING REQUESTING FOR THE DIRECTION OF THE ABOVE MEDICATION TO BE FILLED.

## 2022-09-13 NOTE — PROGRESS NOTES
The ABCs of the Annual Wellness Visit  Subsequent Medicare Wellness Visit    Chief Complaint   Patient presents with   • Medicare Wellness-Initial Visit     Pt would like to discuss homehealth.    • Shortness of Breath      Subjective    History of Present Illness:  Radha Aparicio is a 89 y.o. female who presents for a Subsequent Medicare Wellness Visit.    Here with daughter Juana who lives with her.and friend  Home Health Jennifer is coming to her house already-        Radha Aparicio is a 89 y.o. .      Fell 11/2021-infection RLE-cellulitis s/p doxy     In 6/2022-Diagnosed with C. difficile colitis and A. fib with RVR.  A. fib resolved with IV fluids and oral medications, patient is currently on oral vancomycin.Patient seen on date of discharge, clinically and hemodynamically stable.    Patient is discharged to SNF 7/27/22.for continued strengthening, patient profoundly weak from her hospitalization.       Other  multiple comorbidities.  She still lives at home with the assistance of family.  She had a hard time recovering from COVID earlier this year.2/2022  Then just recently had acute bronchitis.  She does have a history of some mild bronchospasm.  She has albuterol to use as needed.  She is on Singulair 10 mg daily.  Chronic combined CHF.  Severe aortic stenosis.  Patient has declined TAVR.  Chronic atrial fibrillation.  Hypertension i  Hyperlipidemia.  History of iron deficiency.  Non-Hodgkin's lymphoma followed by Dr. Byrd.  Recently diagnosed about 2010 and resolved.  Then relapsed.  Getting chemotherapy every 2 months IV.  Tolerating fair.  Diabetes has resolved since she is lost weight.  Low-grade bladder cancer diagnosed in March 2017 and seen by Dr. Grubbs.  History of sinus surgery and tonsillectomy.  History of UTIs.  Cataract extractions.  Osteopenia.  History of skin cancers and has seen Dr. Danis Linda for both basal and squamous cell skin cancers       The following portions of the  patient's history were reviewed and   updated as appropriate: allergies, current medications, past family history, past medical history, past social history, past surgical history and problem list.    Compared to one year ago, the patient feels her physical   health is worse-fell 11/8/22-and recovering .    Compared to one year ago, the patient feels her mental   health is worse.    Recent Hospitalizations:  This patient has had a [facility] admission record on file within the last 365 days.    Current Medical Providers:  Patient Care Team:  Beata Lancaster MD as PCP - General (Internal Medicine)  Liyl Byrd MD PhD as Consulting Physician (Hematology and Oncology)  Dariana Grubbs MD as Consulting Physician (Urology)  Dr Harden    Outpatient Medications Prior to Visit   Medication Sig Dispense Refill   • acetaminophen (TYLENOL) 325 MG tablet 650 mg As Needed.     • albuterol sulfate  (90 Base) MCG/ACT inhaler USE 2 PUFFS BY MOUTH EVERY 4 HOURS AS NEEDED FOR WHEEZING (Patient taking differently: Inhale 2 puffs Every 4 (Four) Hours As Needed.) 6.7 g 1   • aspirin 81 MG EC tablet Take 81 mg by mouth Daily.     • atorvastatin (LIPITOR) 40 MG tablet Take 1 tablet by mouth Daily. 90 tablet 3   • Calcium Carbonate-Vitamin D 600-200 MG-UNIT capsule Take 1 capsule by mouth 2 (Two) Times a Day.     • carvedilol (COREG) 6.25 MG tablet TAKE 1 TABLET BY MOUTH TWICE DAILY WITH FOOD 60 tablet 6   • ergocalciferol (ERGOCALCIFEROL) 1.25 MG (45132 UT) capsule One every 2 weeks 6 capsule 3   • hydrocortisone-bacitracin-zinc oxide-nystatin (MAGIC BARRIER) Apply 1 application topically to the appropriate area as directed As Needed (due on bottom). 60 g 1   • iron polysaccharides (NIFEREX) 150 MG capsule Take 1 capsule by mouth 2 (Two) Times a Day. 180 capsule 3   • montelukast (SINGULAIR) 10 MG tablet TAKE 1 TABLET(10 MG) BY MOUTH EVERY DAY IN THE EVENING (Patient taking differently: Take 10 mg by  mouth Every Night.) 90 tablet 3   • multivitamin with minerals tablet tablet Take 1 tablet by mouth Daily.     • Polysaccharide Iron Complex (FERREX 150 PO) Take 150 mg by mouth 2 (Two) Times a Day.     • potassium chloride 10 MEQ CR tablet Take 1 tablet by mouth Daily. 90 tablet 0   • Pradaxa 75 MG capsule TAKE 1 CAPSULE BY MOUTH TWICE DAILY 60 capsule 5   • raloxifene (EVISTA) 60 MG tablet Take 1 tablet by mouth Daily. 90 tablet 3   • spironolactone (Aldactone) 25 MG tablet Take 1/2 tablet every day (Patient taking differently: Take 12.5 mg by mouth Daily. Take 1/2 tablet every day) 45 tablet 3   • Zinc 50 MG tablet Take 50 mg by mouth Daily.     • furosemide (LASIX) 40 MG tablet Take 40 mg by mouth Daily. 1.5 tabs daily       No facility-administered medications prior to visit.       No opioid medication identified on active medication list. I have reviewed chart for other potential  high risk medication/s and harmful drug interactions in the elderly.          Aspirin is on active medication list. Aspirin use is indicated based on review of current medical condition/s. Pros and cons of this therapy have been discussed today. Benefits of this medication outweigh potential harm.  Patient has been encouraged to continue taking this medication.  .      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)   • Bladder cancer (HCC)   • Adjustment and management of vascular access device   • Heart problem   • Hematuria   • High cholesterol   • Ingrowing toenail   • Other acute sinusitis   • Foot pain, left   • Pressure ulcer of foot, stage 1   • Tinea unguium   • Type 2 diabetes mellitus without complication (HCC)   • Bladder cancer (HCC)   • Severe aortic stenosis   • Other persistent atrial fibrillation (HCC)   • Osteopenia of hip   • Essential hypertension   • Chronic heart failure with preserved ejection fraction (HFpEF)   • Acute bronchitis due to other  "specified organisms   • Moderate asthma with exacerbation   • Cellulitis of right lower extremity without foot   • 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  ACP discussion was held with the patient during this visit. Patient has an advance directive in EMR which is still valid.           Objective    Vitals:    09/13/22 1306   BP: 137/55   BP Location: Right arm   Patient Position: Sitting   Cuff Size: Adult   Pulse: 76   Temp: 98.4 °F (36.9 °C)   TempSrc: Temporal   SpO2: 98%   Weight: 59.3 kg (130 lb 12.8 oz)   Height: 162.6 cm (64.02\")   PainSc: 0-No pain     Estimated body mass index is 22.44 kg/m² as calculated from the following:    Height as of this encounter: 162.6 cm (64.02\").    Weight as of this encounter: 59.3 kg (130 lb 12.8 oz).    BMI is within normal parameters. No other follow-up for BMI required.      Does the patient have evidence of cognitive impairment? No    Physical Exam  Vitals and nursing note reviewed.   Constitutional:       Appearance: Normal appearance.   HENT:      Head: Normocephalic and atraumatic.      Right Ear: Tympanic membrane normal.      Left Ear: Tympanic membrane normal. There is no impacted cerumen.      Nose: No rhinorrhea.   Cardiovascular:      Rate and Rhythm: Normal rate and regular rhythm.      Heart sounds: Murmur heard.   Pulmonary:      Effort: Pulmonary effort is normal.      Breath sounds: Normal breath sounds.   Abdominal:      Palpations: Abdomen is soft.   Musculoskeletal:         General: Swelling present.      Cervical back: Normal range of motion and neck supple.      Comments: Tr edema bilaterally   Neurological:      General: No focal deficit present. "      Mental Status: She is alert and oriented to person, place, and time.                 HEALTH RISK ASSESSMENT    Smoking Status:  Social History     Tobacco Use   Smoking Status Never Smoker   Smokeless Tobacco Never Used     Alcohol Consumption:  Social History     Substance and Sexual Activity   Alcohol Use Never     Fall Risk Screen:    KAILEE Fall Risk Assessment was completed, and patient is at HIGH risk for falls. Assessment completed on:9/13/2022    Depression Screening:  PHQ-2/PHQ-9 Depression Screening 9/13/2022   Retired PHQ-9 Total Score -   Retired Total Score -   Little Interest or Pleasure in Doing Things 0-->not at all   Feeling Down, Depressed or Hopeless 0-->not at all   PHQ-9: Brief Depression Severity Measure Score 0       Health Habits and Functional and Cognitive Screening:  Functional & Cognitive Status 9/13/2022   Do you have difficulty preparing food and eating? Yes   Do you have difficulty bathing yourself, getting dressed or grooming yourself? No   Do you have difficulty using the toilet? No   Do you have difficulty moving around from place to place? No   Do you have trouble with steps or getting out of a bed or a chair? Yes   Current Diet Diabetic Diet   Dental Exam Up to date   Eye Exam Up to date   Exercise (times per week) Other        Exercise Frequency Comment WALKS TO MAILBOX EVERYDAY ABOUT 70 FT   Current Exercises Include Walking   Do you need help using the phone?  Yes   Are you deaf or do you have serious difficulty hearing?  Yes   Do you need help with transportation? Yes   Do you need help shopping? No   Do you need help preparing meals?  No   Do you need help with housework?  No   Do you need help with laundry? No   Do you need help taking your medications? No   Do you need help managing money? No   Do you ever drive or ride in a car without wearing a seat belt? No   Have you felt unusual stress, anger or loneliness in the last month? No   Who do you live with? Child   If  you need help, do you have trouble finding someone available to you? No   Have you been bothered in the last four weeks by sexual problems? (No Data)   Do you have difficulty concentrating, remembering or making decisions? Yes       Age-appropriate Screening Schedule:  Refer to the list below for future screening recommendations based on patient's age, sex and/or medical conditions. Orders for these recommended tests are listed in the plan section. The patient has been provided with a written plan.    Health Maintenance   Topic Date Due   • TDAP/TD VACCINES (1 - Tdap) Never done   • ZOSTER VACCINE (1 of 2) Never done   • INFLUENZA VACCINE  10/01/2022   • DIABETIC EYE EXAM  11/02/2022   • HEMOGLOBIN A1C  12/02/2022   • LIPID PANEL  03/28/2023   • URINE MICROALBUMIN  06/02/2023   • DXA SCAN  11/04/2023              Assessment & Plan   CMS Preventative Services Quick Reference  Risk Factors Identified D uring Encounter  Fall Risk-High or Moderate  Hearing Problem  Immunizations Discussed/Encouraged (specific Immunizations; Tdap, Influenza and COVID19  Urinary Incontinence because can't get to bathroom in time-    Needs eval for decreased hearing-bu  worse due to mask-able to answer me appropriately  Can't get shingrix due to lymphoma-per     Already getting PT at home    Declined taking any meds like aricept--      The above risks/problems have been discussed with the patient.  Follow up actions/plans if indicated are seen below in the Assessment/Plan Section.  Pertinent information has been shared with the patient in the After Visit Summary.    Able to do clock drawing  1/3 word recall  Diagnoses and all orders for this visit:    1. Encounter for subsequent annual wellness visit (AWV) in Medicare patient (Primary)    2. Essential hypertension  Assessment & Plan:  Stable with spironolactone 25 mg one half daily, furosemide 40 mg one half daily, carvedilol 6.25 mg twice daily.  Goal is systolic blood  pressure around 140/90 monitor blood pressure and record we will need to adjust meds if systolic goes less than 100          Orders:  -     Comprehensive Metabolic Panel; Future  -     Lipid Panel; Future  -     TSH+Free T4; Future  -     T3, Free; Future  -     Vitamin B12; Future  -     Folate; Future  -     Magnesium; Future  -     Vitamin D 25 Hydroxy; Future  -     CBC & Differential; Future    3. Chronic heart failure with preserved ejection fraction (HFpEF)  Assessment & Plan:  Continue furosemide 40 mg 1 1/2 daily (was on 2 tabs of furosemide at rehab) and [id number] mg 1/2 daily-       Orders:  -     furosemide (LASIX) 40 MG tablet; Take 1 tablet by mouth Daily. 1.5 tabs daily  Dispense: 150 tablet; Refill: 1    4. Type 2 diabetes mellitus without complication, without long-term current use of insulin ([facility])  -     Hemoglobin A1c; Future    5. Osteopenia of hip, unspecified laterality  Assessment & Plan:  Patient is on Evista 60 mg daily for osteopenia.  Assessment: Osteopenia.  Plan: Continue raloxifene 60 mg daily.         6. Severe aortic stenosis  Assessment & Plan:  Patient with severe aortic stenosis.  No chest pain shortness of breath dizziness or presyncope.  She has declined TAVR.  Assessment: Severe aortic stenosis.  Tolerating.  No symptoms.  Plan: Watch for worsening CHF.            7. Other persistent atrial fibrillation (HCC)  Assessment & Plan:  Continue carvedilol and Pradaxa.  Follow-up with Dr. Harden in a few months.         8. Decreased hearing, unspecified laterality  Comments:  no cerumen--declined audiologist/ENT due to too many doctors and afraid of cost of hearing aids-declined referral-    9. Mild intermittent asthma without complication  -     Symbicort 80-4.5 MCG/ACT inhaler; Inhale 2 puffs 2 (Two) Times a Day.  Dispense: 10.2 g; Refill: 2    10. Vitamin B 12 deficiency  -     Vitamin B12; Future  -     Folate; Future    11. Vitamin D deficiency  -     Vitamin D 25 Hydroxy;  Future    12. 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     An After Visit Summary and PPPS were made available to the patient.

## 2022-09-13 NOTE — ASSESSMENT & PLAN NOTE
Continue furosemide 40 mg 1 1/2 daily (was on 2 tabs of furosemide at rehab) and [id number] mg 1/2 daily-

## 2022-09-15 ENCOUNTER — TELEPHONE (OUTPATIENT)
Dept: INTERNAL MEDICINE | Facility: CLINIC | Age: 87
End: 2022-09-15

## 2022-09-15 NOTE — TELEPHONE ENCOUNTER
Pharmacy faxed over request requesting to fill pt's SYMBICORT as the generic alternative for cost saving purposes.     Ok to give generic

## 2022-09-23 RX ORDER — DABIGATRAN ETEXILATE 75 MG/1
75 CAPSULE ORAL 2 TIMES DAILY
Qty: 60 CAPSULE | Refills: 5 | Status: SHIPPED | OUTPATIENT
Start: 2022-09-23

## 2022-10-05 RX ORDER — MEPERIDINE HYDROCHLORIDE 25 MG/ML
25 INJECTION INTRAMUSCULAR; INTRAVENOUS; SUBCUTANEOUS
Status: CANCELLED | OUTPATIENT
Start: 2022-10-06

## 2022-10-05 RX ORDER — DIPHENHYDRAMINE HYDROCHLORIDE 50 MG/ML
50 INJECTION INTRAMUSCULAR; INTRAVENOUS AS NEEDED
Status: CANCELLED | OUTPATIENT
Start: 2022-10-06

## 2022-10-05 RX ORDER — FAMOTIDINE 10 MG/ML
20 INJECTION, SOLUTION INTRAVENOUS AS NEEDED
Status: CANCELLED | OUTPATIENT
Start: 2022-10-06

## 2022-10-06 ENCOUNTER — DOCUMENTATION (OUTPATIENT)
Dept: ONCOLOGY | Facility: HOSPITAL | Age: 87
End: 2022-10-06

## 2022-10-06 ENCOUNTER — OFFICE VISIT (OUTPATIENT)
Dept: ONCOLOGY | Facility: HOSPITAL | Age: 87
End: 2022-10-06

## 2022-10-06 ENCOUNTER — HOSPITAL ENCOUNTER (OUTPATIENT)
Dept: ONCOLOGY | Facility: HOSPITAL | Age: 87
Setting detail: INFUSION SERIES
Discharge: HOME | End: 2022-10-06

## 2022-10-06 VITALS
OXYGEN SATURATION: 97 % | DIASTOLIC BLOOD PRESSURE: 56 MMHG | BODY MASS INDEX: 22.66 KG/M2 | HEART RATE: 75 BPM | WEIGHT: 132.72 LBS | HEIGHT: 64 IN | TEMPERATURE: 98.2 F | RESPIRATION RATE: 20 BRPM | SYSTOLIC BLOOD PRESSURE: 121 MMHG

## 2022-10-06 VITALS
WEIGHT: 132.72 LBS | HEART RATE: 79 BPM | BODY MASS INDEX: 22.66 KG/M2 | SYSTOLIC BLOOD PRESSURE: 111 MMHG | HEIGHT: 64 IN | RESPIRATION RATE: 20 BRPM | TEMPERATURE: 98.2 F | OXYGEN SATURATION: 97 % | DIASTOLIC BLOOD PRESSURE: 66 MMHG

## 2022-10-06 DIAGNOSIS — Z45.2 ADJUSTMENT AND MANAGEMENT OF VASCULAR ACCESS DEVICE: ICD-10-CM

## 2022-10-06 DIAGNOSIS — C82.08 FOLLICULAR LYMPHOMA GRADE I OF LYMPH NODES OF MULTIPLE SITES: Primary | ICD-10-CM

## 2022-10-06 DIAGNOSIS — C82.03 FOLLICULAR LYMPHOMA GRADE I OF INTRA-ABDOMINAL LYMPH NODES: Primary | ICD-10-CM

## 2022-10-06 LAB
ALBUMIN SERPL-MCNC: 4.03 G/DL (ref 3.5–5.2)
ALBUMIN/GLOB SERPL: 1.5 G/DL
ALP SERPL-CCNC: 77 U/L (ref 39–117)
ALT SERPL W P-5'-P-CCNC: 18 U/L (ref 1–33)
ANION GAP SERPL CALCULATED.3IONS-SCNC: 5.7 MMOL/L (ref 5–15)
AST SERPL-CCNC: 29 U/L (ref 1–32)
BASOPHILS # BLD AUTO: 0.02 10*3/MM3 (ref 0–0.2)
BASOPHILS NFR BLD AUTO: 0.3 % (ref 0–1.5)
BILIRUB SERPL-MCNC: 0.6 MG/DL (ref 0–1.2)
BUN SERPL-MCNC: 36 MG/DL (ref 8–23)
BUN/CREAT SERPL: 35.3 (ref 7–25)
CALCIUM SPEC-SCNC: 9.4 MG/DL (ref 8.2–9.6)
CHLORIDE SERPL-SCNC: 99 MMOL/L (ref 98–107)
CO2 SERPL-SCNC: 34.3 MMOL/L (ref 22–29)
CREAT SERPL-MCNC: 1.02 MG/DL (ref 0.57–1)
DEPRECATED RDW RBC AUTO: 49.4 FL (ref 37–54)
EGFRCR SERPLBLD CKD-EPI 2021: 51.1 ML/MIN/1.73
EOSINOPHIL # BLD AUTO: 0.45 10*3/MM3 (ref 0–0.4)
EOSINOPHIL NFR BLD AUTO: 6.6 % (ref 0.3–6.2)
ERYTHROCYTE [DISTWIDTH] IN BLOOD BY AUTOMATED COUNT: 13.4 % (ref 12.3–15.4)
GLOBULIN UR ELPH-MCNC: 2.8 GM/DL
GLUCOSE SERPL-MCNC: 181 MG/DL (ref 65–99)
HCT VFR BLD AUTO: 39.1 % (ref 34–46.6)
HGB BLD-MCNC: 12.6 G/DL (ref 12–15.9)
IMM GRANULOCYTES # BLD AUTO: 0.01 10*3/MM3 (ref 0–0.05)
IMM GRANULOCYTES NFR BLD AUTO: 0.1 % (ref 0–0.5)
LYMPHOCYTES # BLD AUTO: 1.3 10*3/MM3 (ref 0.7–3.1)
LYMPHOCYTES NFR BLD AUTO: 18.9 % (ref 19.6–45.3)
MCH RBC QN AUTO: 31.6 PG (ref 26.6–33)
MCHC RBC AUTO-ENTMCNC: 32.2 G/DL (ref 31.5–35.7)
MCV RBC AUTO: 98 FL (ref 79–97)
MONOCYTES # BLD AUTO: 0.64 10*3/MM3 (ref 0.1–0.9)
MONOCYTES NFR BLD AUTO: 9.3 % (ref 5–12)
NEUTROPHILS NFR BLD AUTO: 4.45 10*3/MM3 (ref 1.7–7)
NEUTROPHILS NFR BLD AUTO: 64.8 % (ref 42.7–76)
PLATELET # BLD AUTO: 194 10*3/MM3 (ref 140–450)
PMV BLD AUTO: 10 FL (ref 6–12)
POTASSIUM SERPL-SCNC: 4.2 MMOL/L (ref 3.5–5.2)
PROT SERPL-MCNC: 6.8 G/DL (ref 6–8.5)
RBC # BLD AUTO: 3.99 10*6/MM3 (ref 3.77–5.28)
SODIUM SERPL-SCNC: 139 MMOL/L (ref 136–145)
WBC NRBC COR # BLD: 6.87 10*3/MM3 (ref 3.4–10.8)

## 2022-10-06 PROCEDURE — 96413 CHEMO IV INFUSION 1 HR: CPT

## 2022-10-06 PROCEDURE — 99214 OFFICE O/P EST MOD 30 MIN: CPT | Performed by: INTERNAL MEDICINE

## 2022-10-06 PROCEDURE — 25010000002 DIPHENHYDRAMINE PER 50 MG: Performed by: INTERNAL MEDICINE

## 2022-10-06 PROCEDURE — 80053 COMPREHEN METABOLIC PANEL: CPT | Performed by: INTERNAL MEDICINE

## 2022-10-06 PROCEDURE — 25010000002 RITUXIMAB 10 MG/ML SOLUTION 10 ML VIAL: Performed by: INTERNAL MEDICINE

## 2022-10-06 PROCEDURE — 25010000002 HEPARIN LOCK FLUSH PER 10 UNITS: Performed by: INTERNAL MEDICINE

## 2022-10-06 PROCEDURE — 85025 COMPLETE CBC W/AUTO DIFF WBC: CPT | Performed by: INTERNAL MEDICINE

## 2022-10-06 PROCEDURE — 96375 TX/PRO/DX INJ NEW DRUG ADDON: CPT

## 2022-10-06 PROCEDURE — 25010000002 RITUXIMAB 10 MG/ML SOLUTION 50 ML VIAL: Performed by: INTERNAL MEDICINE

## 2022-10-06 RX ORDER — HEPARIN SODIUM (PORCINE) LOCK FLUSH IV SOLN 100 UNIT/ML 100 UNIT/ML
500 SOLUTION INTRAVENOUS AS NEEDED
Status: DISCONTINUED | OUTPATIENT
Start: 2022-10-06 | End: 2022-10-07 | Stop reason: HOSPADM

## 2022-10-06 RX ORDER — SODIUM CHLORIDE 9 MG/ML
250 INJECTION, SOLUTION INTRAVENOUS ONCE
Status: COMPLETED | OUTPATIENT
Start: 2022-10-06 | End: 2022-10-06

## 2022-10-06 RX ORDER — POTASSIUM CHLORIDE 750 MG/1
10 TABLET, FILM COATED, EXTENDED RELEASE ORAL DAILY
Qty: 90 TABLET | Refills: 3 | Status: SHIPPED | OUTPATIENT
Start: 2022-10-06

## 2022-10-06 RX ORDER — SODIUM CHLORIDE 0.9 % (FLUSH) 0.9 %
20 SYRINGE (ML) INJECTION AS NEEDED
Status: CANCELLED | OUTPATIENT
Start: 2022-10-06

## 2022-10-06 RX ORDER — SODIUM CHLORIDE 0.9 % (FLUSH) 0.9 %
20 SYRINGE (ML) INJECTION AS NEEDED
Status: DISCONTINUED | OUTPATIENT
Start: 2022-10-06 | End: 2022-10-07 | Stop reason: HOSPADM

## 2022-10-06 RX ORDER — ATORVASTATIN CALCIUM 40 MG/1
40 TABLET, FILM COATED ORAL DAILY
Qty: 90 TABLET | Refills: 3 | Status: SHIPPED | OUTPATIENT
Start: 2022-10-06

## 2022-10-06 RX ORDER — HEPARIN SODIUM (PORCINE) LOCK FLUSH IV SOLN 100 UNIT/ML 100 UNIT/ML
500 SOLUTION INTRAVENOUS AS NEEDED
Status: CANCELLED | OUTPATIENT
Start: 2022-10-06

## 2022-10-06 RX ORDER — ACETAMINOPHEN 325 MG/1
650 TABLET ORAL ONCE
Status: COMPLETED | OUTPATIENT
Start: 2022-10-06 | End: 2022-10-06

## 2022-10-06 RX ADMIN — ACETAMINOPHEN 650 MG: 325 TABLET ORAL at 11:42

## 2022-10-06 RX ADMIN — RITUXIMAB 600 MG: 10 INJECTION, SOLUTION INTRAVENOUS at 12:11

## 2022-10-06 RX ADMIN — Medication 20 ML: at 13:52

## 2022-10-06 RX ADMIN — HEPARIN 500 UNITS: 100 SYRINGE at 13:52

## 2022-10-06 RX ADMIN — SODIUM CHLORIDE 250 ML: 9 INJECTION, SOLUTION INTRAVENOUS at 11:41

## 2022-10-06 RX ADMIN — DIPHENHYDRAMINE HYDROCHLORIDE 25 MG: 50 INJECTION, SOLUTION INTRAMUSCULAR; INTRAVENOUS at 11:43

## 2022-10-06 NOTE — PROGRESS NOTES
Diagnosis: Lymphoma    Reason for Referral: Clinical nurse identified patient needing assistance with accessing in home PCP     INSURANCE: Medicare and [employer] Healthcare options    Distress Score: not completed    PHQ Score: 0    Mood: Patient was very pleasant and able to engage in conversation. She was oriented X 3. She reported she was worried about her PCP giving her a January appointment and wondered if a PCP would come to her home. Patient had a fall last year and does not wish to get out of the house in the winter. She was very proud of her age and forward thinking. She stated prayers have gotten her this far into surviving.    Previous Cancer [place]: Patient reported she has survived 2 types of lymphoma, 2 types of skin cancer, and 3 bladder cancers.     Hobbies: Patient enjoys being in her home.     Support systems: Patient lives with one of her daughters. She has her emergency contact as her daughter who lives in [place]. She has a strong support system.    Finances: Patient has no identified financial needs. She is in need of an in home PCP.    Residential status/Who lives in the home: Patient lives in her home with her daughter.    Home Care Needs: Needs a PCP to come to the home. OSW arranged a call with [facility] based out of [place]. [facility] reported they will run her insurance and contact the patient to discuss the cost and if patient wants to schedule a in home visit with the PCP in the [place], [place] area.     Resources/Referrals: OSW provided her business card and instructions for how the referral to in home PCP would inform her of the cost of in home PCP and then allow her to make an informed decision. Patient stated she has seen the PCP Dr. Lala that took on her retired PCP's patients and liked her. Patient wants to explore in home PCP care as an option to not leaving her home in the winter.    Additional Comment: OSW has arranged for patient to receive an in home PCP care  consultation call with [facility].

## 2022-10-20 ENCOUNTER — OFFICE VISIT (OUTPATIENT)
Dept: CARDIOLOGY | Facility: CLINIC | Age: 87
End: 2022-10-20

## 2022-10-20 VITALS
SYSTOLIC BLOOD PRESSURE: 99 MMHG | WEIGHT: 135.6 LBS | BODY MASS INDEX: 23.15 KG/M2 | DIASTOLIC BLOOD PRESSURE: 59 MMHG | HEART RATE: 74 BPM | HEIGHT: 64 IN

## 2022-10-20 DIAGNOSIS — I48.19 OTHER PERSISTENT ATRIAL FIBRILLATION: ICD-10-CM

## 2022-10-20 DIAGNOSIS — I35.0 NONRHEUMATIC AORTIC VALVE STENOSIS: Primary | ICD-10-CM

## 2022-10-20 DIAGNOSIS — I10 ESSENTIAL HYPERTENSION: ICD-10-CM

## 2022-10-20 DIAGNOSIS — I50.32 CHRONIC HEART FAILURE WITH PRESERVED EJECTION FRACTION (HFPEF): ICD-10-CM

## 2022-10-20 PROCEDURE — 99214 OFFICE O/P EST MOD 30 MIN: CPT | Performed by: INTERNAL MEDICINE

## 2022-10-20 NOTE — PROGRESS NOTES
Chief Complaint  Hypertension, Hyperlipidemia, HFpEF, Atrial Fibrillation, and Severe aortic stenosis    Subjective    Patient follows up today she persistent having shortness of breath with exertion symptoms and was admitted in June for an episode of C. difficile with a rapid ventricular rate but has been back home since then.  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   • Rectus sheath hematoma     Right rectus sheath hematoma, off anticoagulation because of the hematoma.   • Skin cancer    • Type 2 diabetes mellitus (HCC)          Current Outpatient Medications:   •  acetaminophen (TYLENOL) 325 MG tablet, 650 mg As Needed., Disp: , Rfl:   •  albuterol sulfate  (90 Base) MCG/ACT inhaler, USE 2 PUFFS BY MOUTH EVERY 4 HOURS AS NEEDED FOR WHEEZING (Patient taking differently: Inhale 2 puffs Every 4 (Four) Hours As Needed.), Disp: 6.7 g, Rfl: 1  •  aspirin 81 MG EC tablet, Take 81 mg by mouth Daily., Disp: , Rfl:   •  atorvastatin (LIPITOR) 40 MG tablet, Take 1 tablet by mouth Daily., Disp: 90 tablet, Rfl: 3  •  Calcium Carbonate-Vitamin D 600-200 MG-UNIT capsule, Take 1 capsule by mouth 2 (Two) Times a Day., Disp: , Rfl:   •  carvedilol (COREG) 6.25 MG tablet, TAKE 1 TABLET BY MOUTH TWICE DAILY WITH FOOD, Disp: 60 tablet, Rfl: 6  •   dabigatran etexilate (Pradaxa) 75 MG capsule, Take 1 capsule by mouth 2 (Two) Times a Day., Disp: 60 capsule, Rfl: 5  •  ergocalciferol (ERGOCALCIFEROL) 1.25 MG (47499 UT) capsule, One every 2 weeks, Disp: 6 capsule, Rfl: 3  •  furosemide (LASIX) 40 MG tablet, Take 1 tablet by mouth Daily. 1.5 tabs daily (Patient taking differently: Take 1.5 tablets by mouth Daily.), Disp: 150 tablet, Rfl: 1  •  hydrocortisone-bacitracin-zinc oxide-nystatin (MAGIC BARRIER), Apply 1 application topically to the appropriate area as directed As Needed (due on bottom)., Disp: 60 g, Rfl: 1  •  montelukast (SINGULAIR) 10 MG tablet, TAKE 1 TABLET(10 MG) BY MOUTH EVERY DAY IN THE EVENING (Patient taking differently: Take 1 tablet by mouth Every Night.), Disp: 90 tablet, Rfl: 3  •  multivitamin with minerals tablet tablet, Take 1 tablet by mouth Daily., Disp: , Rfl:   •  Polysaccharide Iron Complex (FERREX 150 PO), Take 150 mg by mouth 2 (Two) Times a Day., Disp: , Rfl:   •  potassium chloride 10 MEQ CR tablet, Take 1 tablet by mouth Daily., Disp: 90 tablet, Rfl: 3  •  raloxifene (EVISTA) 60 MG tablet, Take 1 tablet by mouth Daily., Disp: 90 tablet, Rfl: 3  •  spironolactone (Aldactone) 25 MG tablet, Take 1/2 tablet every day (Patient taking differently: Take 12.5 mg by mouth Daily. Take 1/2 tablet every day), Disp: 45 tablet, Rfl: 3  •  Symbicort 80-4.5 MCG/ACT inhaler, Inhale 2 puffs 2 (Two) Times a Day., Disp: 10.2 g, Rfl: 2  •  Zinc 50 MG tablet, Take 50 mg by mouth Daily., Disp: , Rfl:     Medications Discontinued During This Encounter   Medication Reason   • iron polysaccharides (NIFEREX) 150 MG capsule *Therapy completed     Allergies   Allergen Reactions   • Contrast Dye Unknown - Low Severity   • Iodinated Diagnostic Agents Hives and Other (See Comments)     IODINATED CONTRAST MEDIA (Verified  Allergy, Unknown, HIVES,SNEEZING,ITCHY EYES)   • Iodine Unknown - Low Severity   • Penicillins Rash   • Shellfish Allergy Unknown - Low  "Severity   • Spinach Other (See Comments)      SPINACH (Verified  Allergy, Unknown, 2/22/21)      SHOWED UP ON ALLERGY TESTING   • Sulfa Antibiotics Other (See Comments)     (Verified  Allergy, Unknown, RASH)        Social History     Tobacco Use   • Smoking status: Never   • Smokeless tobacco: Never   Vaping Use   • Vaping Use: Never used   Substance Use Topics   • Alcohol use: Never   • Drug use: Never       Family History   Problem Relation Age of Onset   • Heart attack Father    • Heart attack Brother    • Stomach cancer Paternal Great-Grandfather         Objective     BP 99/59   Pulse 74   Ht 162.6 cm (64.02\")   Wt 61.5 kg (135 lb 9.6 oz)   BMI 23.26 kg/m²       Physical Exam    General Appearance:   · no acute distress  · Alert and oriented x3  HENT:   · lips not cyanotic  · Atraumatic  Neck:  · No jvd   · supple  Respiratory:  · no respiratory distress  · normal breath sounds  · no rales  Cardiovascular:  · Regular rate and rhythm  · no S3, no S4   · 2/6 systolic late peaking murmur  · no rub  Extremities  · No cyanosis  · lower extremity edema: +1  Skin:   · warm, dry  · No rashes      Result Review :     proBNP   Date Value Ref Range Status   06/08/2022 5,420.0 (H) 0.0 - 1,800.0 pg/mL Final     CMP    CMP 8/4/22 8/11/22 10/6/22   Glucose 98 124 (A) 181 (A)   BUN 26 (A) 30 (A) 36 (A)   Creatinine 0.90 0.84 1.02 (A)   Sodium 141 139 139   Potassium 4.3 4.2 4.2   Chloride 103 100 99   Calcium 10.0 (A) 9.3 9.4   Albumin 4.00 4.02 4.03   Total Bilirubin 0.7 0.7 0.6   Alkaline Phosphatase 70 70 77   AST (SGOT) 28 28 29   ALT (SGPT) 14 16 18   (A) Abnormal value            CBC w/diff    CBC w/Diff 8/4/22 8/11/22 10/6/22   WBC 7.08 6.63 6.87   RBC 3.73 (A) 3.71 (A) 3.99   Hemoglobin 11.8 (A) 11.5 (A) 12.6   Hematocrit 36.5 35.9 39.1   MCV 97.9 (A) 96.8 98.0 (A)   MCH 31.6 31.0 31.6   MCHC 32.3 32.0 32.2   RDW 14.6 15.0 13.4   Platelets 283 219 194   Neutrophil Rel % 58.0 66.1 64.8   Immature Granulocyte Rel % " 0.1 0.2 0.1   Lymphocyte Rel % 30.1 22.5 18.9 (A)   Monocyte Rel % 9.9 8.9 9.3   Eosinophil Rel % 1.3 2.0 6.6 (A)   Basophil Rel % 0.6 0.3 0.3   (A) Abnormal value             Lab Results   Component Value Date    TSH 1.240 06/02/2022      Lab Results   Component Value Date    FREET4 1.22 06/02/2022      No results found for: DDIMERQUANT  Magnesium   Date Value Ref Range Status   06/16/2022 2.0 1.7 - 2.3 mg/dL Final      No results found for: DIGOXIN   Lab Results   Component Value Date    TROPONINT 0.026 02/21/2022           Lipid Panel    Lipid Panel 12/13/21 3/28/22   Total Cholesterol 145 156   Triglycerides 36 55   HDL Cholesterol 73 (A) 69 (A)   VLDL Cholesterol 9 11   LDL Cholesterol  63 76   LDL/HDL Ratio 0.89 1.10   (A) Abnormal value            Lab Results   Component Value Date    POCTROP 0.02 03/21/2021                      Diagnoses and all orders for this visit:    1. Severe aortic stenosis (Primary)  Assessment & Plan:  Patient with severe symptomatic aortic stenosis talked to her again about the possibility of TAVR given her breathing issues this may be more for palliative reasons given her advanced age and follicular cancer but still may provide some quality of life benefits.  She however felt that because of how poorly she does with anesthesia as well as her current quality of life that she did not want to pursue this.  Also after discussing advanced her life directives she continues in her wish to be DNR should anything happen in the future      2. Other persistent atrial fibrillation (HCC)  Assessment & Plan:  Patient remains rate controlled on Coreg 6.25 twice daily.  In addition continue with Pradaxa 5 twice daily for CVA prevention      3. Chronic heart failure with preserved ejection fraction (HFpEF)  Assessment & Plan:  Feel clinically patient is stable discussed with the fact that given the aortic stenosis that symptoms likely will become increasingly difficult to control and maintain  proper volume status patient for the time being discontinued on Lasix 60 daily her weight is down about 6 pounds from last visit      4. Essential hypertension          Follow Up     Return in about 6 months (around 4/20/2023).          Patient was given instructions and counseling regarding her condition or for health maintenance advice. Please see specific information pulled into the AVS if appropriate.

## 2022-10-20 NOTE — ASSESSMENT & PLAN NOTE
Feel clinically patient is stable discussed with the fact that given the aortic stenosis that symptoms likely will become increasingly difficult to control and maintain proper volume status patient for the time being discontinued on Lasix 60 daily her weight is down about 6 pounds from last visit

## 2022-10-20 NOTE — ASSESSMENT & PLAN NOTE
Patient with severe symptomatic aortic stenosis talked to her again about the possibility of TAVR given her breathing issues this may be more for palliative reasons given her advanced age and follicular cancer but still may provide some quality of life benefits.  She however felt that because of how poorly she does with anesthesia as well as her current quality of life that she did not want to pursue this.  Also after discussing advanced her life directives she continues in her wish to be DNR should anything happen in the future

## 2022-11-30 RX ORDER — MEPERIDINE HYDROCHLORIDE 25 MG/ML
25 INJECTION INTRAMUSCULAR; INTRAVENOUS; SUBCUTANEOUS
Status: CANCELLED | OUTPATIENT
Start: 2022-12-01

## 2022-11-30 RX ORDER — DIPHENHYDRAMINE HYDROCHLORIDE 50 MG/ML
50 INJECTION INTRAMUSCULAR; INTRAVENOUS AS NEEDED
Status: CANCELLED | OUTPATIENT
Start: 2022-12-01

## 2022-11-30 RX ORDER — FAMOTIDINE 10 MG/ML
20 INJECTION, SOLUTION INTRAVENOUS AS NEEDED
Status: CANCELLED | OUTPATIENT
Start: 2022-12-01

## 2022-12-01 ENCOUNTER — HOSPITAL ENCOUNTER (OUTPATIENT)
Dept: ONCOLOGY | Facility: HOSPITAL | Age: 87
Setting detail: INFUSION SERIES
Discharge: HOME | End: 2022-12-01

## 2022-12-01 ENCOUNTER — OFFICE VISIT (OUTPATIENT)
Dept: ONCOLOGY | Facility: HOSPITAL | Age: 87
End: 2022-12-01

## 2022-12-01 VITALS
SYSTOLIC BLOOD PRESSURE: 103 MMHG | HEART RATE: 57 BPM | WEIGHT: 134.7 LBS | OXYGEN SATURATION: 98 % | BODY MASS INDEX: 23.11 KG/M2 | DIASTOLIC BLOOD PRESSURE: 46 MMHG | RESPIRATION RATE: 20 BRPM | TEMPERATURE: 97.6 F

## 2022-12-01 VITALS
HEART RATE: 57 BPM | OXYGEN SATURATION: 98 % | SYSTOLIC BLOOD PRESSURE: 105 MMHG | BODY MASS INDEX: 23.11 KG/M2 | TEMPERATURE: 97.6 F | DIASTOLIC BLOOD PRESSURE: 65 MMHG | RESPIRATION RATE: 20 BRPM | WEIGHT: 134.7 LBS

## 2022-12-01 DIAGNOSIS — Z45.2 ADJUSTMENT AND MANAGEMENT OF VASCULAR ACCESS DEVICE: ICD-10-CM

## 2022-12-01 DIAGNOSIS — C82.08 FOLLICULAR LYMPHOMA GRADE I OF LYMPH NODES OF MULTIPLE SITES: Primary | ICD-10-CM

## 2022-12-01 DIAGNOSIS — C82.08 FOLLICULAR LYMPHOMA GRADE I OF LYMPH NODES OF MULTIPLE SITES: ICD-10-CM

## 2022-12-01 DIAGNOSIS — C82.03 FOLLICULAR LYMPHOMA GRADE I OF INTRA-ABDOMINAL LYMPH NODES: ICD-10-CM

## 2022-12-01 LAB
ALBUMIN SERPL-MCNC: 3.99 G/DL (ref 3.5–5.2)
ALBUMIN/GLOB SERPL: 1.4 G/DL
ALP SERPL-CCNC: 82 U/L (ref 39–117)
ALT SERPL W P-5'-P-CCNC: 19 U/L (ref 1–33)
ANION GAP SERPL CALCULATED.3IONS-SCNC: 4.4 MMOL/L (ref 5–15)
AST SERPL-CCNC: 31 U/L (ref 1–32)
BASOPHILS # BLD AUTO: 0.02 10*3/MM3 (ref 0–0.2)
BASOPHILS NFR BLD AUTO: 0.3 % (ref 0–1.5)
BILIRUB SERPL-MCNC: 0.7 MG/DL (ref 0–1.2)
BUN SERPL-MCNC: 33 MG/DL (ref 8–23)
BUN/CREAT SERPL: 33.7 (ref 7–25)
CALCIUM SPEC-SCNC: 9.9 MG/DL (ref 8.2–9.6)
CHLORIDE SERPL-SCNC: 100 MMOL/L (ref 98–107)
CO2 SERPL-SCNC: 34.6 MMOL/L (ref 22–29)
CREAT SERPL-MCNC: 0.98 MG/DL (ref 0.57–1)
DEPRECATED RDW RBC AUTO: 49 FL (ref 37–54)
EGFRCR SERPLBLD CKD-EPI 2021: 53.6 ML/MIN/1.73
EOSINOPHIL # BLD AUTO: 0.17 10*3/MM3 (ref 0–0.4)
EOSINOPHIL NFR BLD AUTO: 2.9 % (ref 0.3–6.2)
ERYTHROCYTE [DISTWIDTH] IN BLOOD BY AUTOMATED COUNT: 13.4 % (ref 12.3–15.4)
GLOBULIN UR ELPH-MCNC: 2.8 GM/DL
GLUCOSE SERPL-MCNC: 141 MG/DL (ref 65–99)
HCT VFR BLD AUTO: 39.6 % (ref 34–46.6)
HGB BLD-MCNC: 13 G/DL (ref 12–15.9)
IMM GRANULOCYTES # BLD AUTO: 0 10*3/MM3 (ref 0–0.05)
IMM GRANULOCYTES NFR BLD AUTO: 0 % (ref 0–0.5)
LYMPHOCYTES # BLD AUTO: 0.96 10*3/MM3 (ref 0.7–3.1)
LYMPHOCYTES NFR BLD AUTO: 16.5 % (ref 19.6–45.3)
MCH RBC QN AUTO: 31.9 PG (ref 26.6–33)
MCHC RBC AUTO-ENTMCNC: 32.8 G/DL (ref 31.5–35.7)
MCV RBC AUTO: 97.3 FL (ref 79–97)
MONOCYTES # BLD AUTO: 0.65 10*3/MM3 (ref 0.1–0.9)
MONOCYTES NFR BLD AUTO: 11.2 % (ref 5–12)
NEUTROPHILS NFR BLD AUTO: 4.01 10*3/MM3 (ref 1.7–7)
NEUTROPHILS NFR BLD AUTO: 69.1 % (ref 42.7–76)
PLATELET # BLD AUTO: 205 10*3/MM3 (ref 140–450)
PMV BLD AUTO: 10 FL (ref 6–12)
POTASSIUM SERPL-SCNC: 4.2 MMOL/L (ref 3.5–5.2)
PROT SERPL-MCNC: 6.8 G/DL (ref 6–8.5)
RBC # BLD AUTO: 4.07 10*6/MM3 (ref 3.77–5.28)
SODIUM SERPL-SCNC: 139 MMOL/L (ref 136–145)
WBC NRBC COR # BLD: 5.81 10*3/MM3 (ref 3.4–10.8)

## 2022-12-01 PROCEDURE — 96413 CHEMO IV INFUSION 1 HR: CPT

## 2022-12-01 PROCEDURE — 25010000002 RITUXIMAB 10 MG/ML SOLUTION 50 ML VIAL: Performed by: INTERNAL MEDICINE

## 2022-12-01 PROCEDURE — 25010000002 HEPARIN LOCK FLUSH PER 10 UNITS: Performed by: INTERNAL MEDICINE

## 2022-12-01 PROCEDURE — 96375 TX/PRO/DX INJ NEW DRUG ADDON: CPT

## 2022-12-01 PROCEDURE — 80053 COMPREHEN METABOLIC PANEL: CPT | Performed by: INTERNAL MEDICINE

## 2022-12-01 PROCEDURE — 25010000002 DIPHENHYDRAMINE PER 50 MG: Performed by: INTERNAL MEDICINE

## 2022-12-01 PROCEDURE — 99214 OFFICE O/P EST MOD 30 MIN: CPT | Performed by: INTERNAL MEDICINE

## 2022-12-01 PROCEDURE — 25010000002 RITUXIMAB 10 MG/ML SOLUTION 10 ML VIAL: Performed by: INTERNAL MEDICINE

## 2022-12-01 PROCEDURE — 96415 CHEMO IV INFUSION ADDL HR: CPT

## 2022-12-01 PROCEDURE — 85025 COMPLETE CBC W/AUTO DIFF WBC: CPT | Performed by: INTERNAL MEDICINE

## 2022-12-01 RX ORDER — HEPARIN SODIUM (PORCINE) LOCK FLUSH IV SOLN 100 UNIT/ML 100 UNIT/ML
500 SOLUTION INTRAVENOUS AS NEEDED
Status: CANCELLED | OUTPATIENT
Start: 2022-12-01

## 2022-12-01 RX ORDER — SODIUM CHLORIDE 9 MG/ML
250 INJECTION, SOLUTION INTRAVENOUS ONCE
Status: COMPLETED | OUTPATIENT
Start: 2022-12-01 | End: 2022-12-01

## 2022-12-01 RX ORDER — HEPARIN SODIUM (PORCINE) LOCK FLUSH IV SOLN 100 UNIT/ML 100 UNIT/ML
500 SOLUTION INTRAVENOUS AS NEEDED
Status: DISCONTINUED | OUTPATIENT
Start: 2022-12-01 | End: 2022-12-02 | Stop reason: HOSPADM

## 2022-12-01 RX ORDER — SODIUM CHLORIDE 0.9 % (FLUSH) 0.9 %
20 SYRINGE (ML) INJECTION AS NEEDED
Status: CANCELLED | OUTPATIENT
Start: 2022-12-01

## 2022-12-01 RX ORDER — ACETAMINOPHEN 325 MG/1
650 TABLET ORAL ONCE
Status: COMPLETED | OUTPATIENT
Start: 2022-12-01 | End: 2022-12-01

## 2022-12-01 RX ORDER — SODIUM CHLORIDE 0.9 % (FLUSH) 0.9 %
20 SYRINGE (ML) INJECTION AS NEEDED
Status: DISCONTINUED | OUTPATIENT
Start: 2022-12-01 | End: 2022-12-02 | Stop reason: HOSPADM

## 2022-12-01 RX ADMIN — DIPHENHYDRAMINE HYDROCHLORIDE 25 MG: 50 INJECTION, SOLUTION INTRAMUSCULAR; INTRAVENOUS at 11:40

## 2022-12-01 RX ADMIN — Medication 20 ML: at 14:08

## 2022-12-01 RX ADMIN — SODIUM CHLORIDE 250 ML: 9 INJECTION, SOLUTION INTRAVENOUS at 11:37

## 2022-12-01 RX ADMIN — RITUXIMAB 600 MG: 10 INJECTION, SOLUTION INTRAVENOUS at 11:56

## 2022-12-01 RX ADMIN — ACETAMINOPHEN 650 MG: 325 TABLET ORAL at 11:38

## 2022-12-01 RX ADMIN — HEPARIN 500 UNITS: 100 SYRINGE at 14:09

## 2022-12-01 NOTE — PROGRESS NOTES
Patient  Radha Aparicio    Location  [facility] HEMATOLOGY & ONCOLOGY    Chief Complaint  Follicular Lymphoma (Trt/)    Referring Provider: Gala Rao MD  PCP: Beata Lancaster MD    Subjective          Oncology/Hematology History Overview Note   Ms. Radha Aparicio is a pleasant 89 year old female who presents with NHL, follicular type diagnosed in 2019.  Ms. Aparicio has been receiving her oncological care with Dr. Jenn Zaragoza who recently retired.  Ms. Aparicio initially presented RLQ pain to her PCP, Dr. Gala Rao.  In light of her bladder cancer, CT of Abdomen and Pelvis was obtained on January 16, 2019.  Findings indicated retroperitoneal adenopathy measuring up to 4 cm x 2cm, thought either to be lymphoma or metastatic disease.  Incidently mild bilateral hydronephrosis was noted without urolithiasis.     CT guided biopsy of the retroperitoneal lymph node was obtained.  Pathology (S-) indicated non-hodgkins's b-cell lymphoma; Follicular lymphoma (grade 1).  Flow cytometryrevealed diffuse positivity with CD 20, BCL2, CD10 and patchy decoration with BCL-6.  A few days after biopsy she was admitted to hospital with acute respiratory hypoxemia secondary to influenza.  Repeat imaging with CT Abdomen and Pelvis with heterogeneous hyperdense mass in the anterior abdominal wass musculature 8 cm x 12.6 cm consistent wtih rectus sheath hematoma.  Retroperitoneal adenopathy is redemonstrated.  Index left periaortic mass measured 3.1 x 2.2 cm, previously 3.9 x 2.4. Mildly enlarged retrocrural lymph nodes.  She was discharged from the hospital on 2/20/2019.     Ms. Aparicio was evaluated by Dr. Jenn Zaragoza on March 13, 2019. PET CT was ordered and obtained.  It indicated metabolic activity within the lymph node at the base of upper chest and neck (SUV 3.85)  as well as the left axilla (SUV 5.4) in addition to 2 lymph nodes within the retroperitoneal area with SUV (8.76). .  Staging for  her follicular carcinoma grade 1 is a clinical stage III with no B symptoms no significant laboratory abnormalities. At this time it was decided pursue active surveillance and close follow.     On October 17, 2019, PETCT  indicated interval progression of disease with previous noted lymph adenopathy larger and more metabolic. The left periaortic lymph node mass noted to be compressing not only the left renal vein but resulting in mild left hydronephrosis which is new. .   Single agent RITUXAN was initiated on October 9, 2019 and completed 4 weekly doses of RITUXAN.  Ms. Aparicio continues to have no B-symptoms.     Repeat PETCT obtained December 11, 2019 indicated overall improvement from previous study.  The areas of hypermetabolic lymphadenopathy supraclavicular on the left and left paraaortic have diminshed both in size and degree of metabolic activity with no new areas noted. .      PETCT done on July 2, 2020 indicated enlargement of left infraclavicular / subpectoral lynph node measuring up to 1.8 cm with SUV 5.7, increase in size and SUV of left axillary lymph node, small left pleural effusion slightly larger than prior PET, left paraaortic lymph node conglomerate slightly larger and slightly increased in SUV.  .  It was decided at that time to restart RITUXAN every 8 weeks.  Ms. Aparicio continues to have no B-symptoms.     Maintenance rituximab started November 2020, every 8 weeks.    CT CAP on 12/16/2020 showed decrease in the size of left retropectoral lymph nodes and left periaortic lymph nodes.  No new or enlarging lymph nodes in the chest, abdomen, or pelvis.  There are stable subcentimeter noncalcified pulmonary nodules as well.       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  She is not having any evidence of toxicity with treatment.  We discussed her general health and goals of care.  She is considering getting a primary care provider that comes into her assisted living.    Review of Systems   Constitutional: Positive for fatigue. Negative for appetite change, diaphoresis, fever, unexpected weight gain and unexpected weight loss.   HENT: Negative for hearing loss, sore throat and voice change.    Eyes: Negative for blurred vision, double vision, pain, redness and visual disturbance.   Respiratory: Positive for shortness of breath. Negative for cough and wheezing.    Cardiovascular: Negative for chest pain, palpitations and leg swelling.   Endocrine: Negative for cold intolerance, heat intolerance, polydipsia and polyuria.   Genitourinary: Negative for decreased urine volume, difficulty urinating, frequency and urinary incontinence.   Musculoskeletal: Negative for arthralgias, back pain, joint swelling and myalgias.   Skin: Negative for color change, rash, skin lesions and wound.   Neurological: Negative for dizziness, seizures, numbness and headache.   Hematological: Negative for adenopathy. Does not bruise/bleed easily.   Psychiatric/Behavioral: Negative for depressed mood. The patient is not nervous/anxious.        Past Medical History:   Diagnosis Date   • Acute congestive heart failure (HCC)     improved   • Arthritis    • Bladder cancer (HCC)    • Bleeding disorder (HCC)     (CONDITION DUE TO BLOOD THINNERS)   • Chronic atrial fibrillation (HCC)    • Chronic heart failure with preserved ejection fraction (HFpEF) 9/23/2021   • High cholesterol    • Hypertension    • Lymphoma (HCC)    • Moderate to severe aortic stenosis     Patient  declines to have transcatheter   • Non Hodgkin's lymphoma (HCC)     2008 treated 2008 2009.   • Rectus sheath hematoma     Right rectus sheath hematoma, off anticoagulation because of the hematoma.   • Skin cancer    • Type 2 diabetes mellitus (HCC)      Past Surgical History:   Procedure Laterality Date   • BLADDER SURGERY     • CATARACT EXTRACTION     • DILATION AND CURETTAGE, DIAGNOSTIC / THERAPEUTIC     • LEFT VENTRICULAR ASSIST DEVICE     • LYMPH NODE BIOPSY      Biopsy of retroperitoneal lymph node or mass    • SKIN CANCER DESTRUCTION     • TONSILLECTOMY       Social History     Socioeconomic History   • Marital status:    • Number of children: 2   Tobacco Use   • Smoking status: Never   • Smokeless tobacco: Never   Vaping Use   • Vaping Use: Never used   Substance and Sexual Activity   • Alcohol use: Never   • Drug use: Never   • Sexual activity: Defer     Family History   Problem Relation Age of Onset   • Heart attack Father    • Heart attack Brother    • Stomach cancer Paternal Great-Grandfather        Objective   Physical Exam  General: Alert, cooperative, no acute distress  Eyes: Anicteric sclera, PERRLA  Respiratory: normal respiratory effort  Cardiovascular: no lower extremity edema  Skin: Normal tone, no rash, no lesions  Psychiatric: Appropriate affect, intact judgment  Neurologic: No focal sensory or motor deficits, normal cognition   Musculoskeletal: Normal muscle strength and tone  Extremities: No clubbing, cyanosis, or deformities    Vitals:    12/01/22 1055   BP: 105/65   Pulse: 57   Resp: 20   Temp: 97.6 °F (36.4 °C)   TempSrc: Temporal   SpO2: 98%   Weight: 61.1 kg (134 lb 11.2 oz)   PainSc: 0-No pain     ECOG score: 1         PHQ-9 Total Score:         Result Review :   The following data was reviewed by: Lily Byrd MD PhD on 12/01/2022:  Lab Results   Component Value Date    HGB 13.0 12/01/2022    HCT 39.6 12/01/2022    MCV 97.3 (H) 12/01/2022     12/01/2022    WBC 5.81  12/01/2022    NEUTROABS 4.01 12/01/2022    LYMPHSABS 0.96 12/01/2022    MONOSABS 0.65 12/01/2022    EOSABS 0.17 12/01/2022    BASOSABS 0.02 12/01/2022     Lab Results   Component Value Date    GLUCOSE 141 (H) 12/01/2022    BUN 33 (H) 12/01/2022    CREATININE 0.98 12/01/2022     12/01/2022    K 4.2 12/01/2022     12/01/2022    CO2 34.6 (H) 12/01/2022    CALCIUM 9.9 (H) 12/01/2022    PROTEINTOT 6.8 12/01/2022    ALBUMIN 3.99 12/01/2022    BILITOT 0.7 12/01/2022    ALKPHOS 82 12/01/2022    AST 31 12/01/2022    ALT 19 12/01/2022          Assessment and Plan    Diagnoses and all orders for this visit:    1. 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 Follicular lymphoma grade I of intra-abdominal lymph nodes (HCC)      Grade 1 follicular lymphoma: Patient has no evidence of toxicity with ongoing rituximab.  She will continue treatment as planned.  I reviewed her CBC and CMP.  We decided to forego further CT CAP restaging images in order to help the patient avoid further medical appointments.  I will consider repeating these images only if she develops symptoms such as weight loss or severe fatigue.  She return to clinic in 4 months for repeat labs and toxicity check.      Patient was given instructions and counseling regarding her condition or for health maintenance advice. Please see specific information pulled into the AVS if appropriate.

## 2023-01-12 RX ORDER — IRON POLYSACCHARIDE COMPLEX 150 MG
150 CAPSULE ORAL 2 TIMES DAILY
Qty: 180 CAPSULE | Refills: 1 | Status: SHIPPED | OUTPATIENT
Start: 2023-01-12 | End: 2023-01-13

## 2023-01-13 ENCOUNTER — TELEPHONE (OUTPATIENT)
Dept: INTERNAL MEDICINE | Facility: CLINIC | Age: 88
End: 2023-01-13
Payer: MEDICARE

## 2023-01-13 RX ORDER — IRON POLYSACCHARIDE COMPLEX 150 MG
CAPSULE ORAL
Qty: 60 CAPSULE | Refills: 0 | Status: SHIPPED | OUTPATIENT
Start: 2023-01-13

## 2023-01-13 RX ORDER — IRON POLYSACCHARIDE COMPLEX 150 MG
CAPSULE ORAL
Qty: 60 CAPSULE | Refills: 1 | Status: SHIPPED | OUTPATIENT
Start: 2023-01-13 | End: 2023-01-13

## 2023-01-13 NOTE — TELEPHONE ENCOUNTER
Patient has an appointment with Dr. Lily Byrd June 26, 2022 and is being followed by her for her follicular lymphoma with CBC ordered regularly.  Currently the H&H is normal.  Patient is in the process of trying to get a PCP who is also going to her assisted living.  Will defer to Dr. Byrd regarding refill of iron tablets that she is aware of the dose and any changes that may be needed in the future.

## 2023-01-19 ENCOUNTER — TELEPHONE (OUTPATIENT)
Dept: CARDIOLOGY | Facility: CLINIC | Age: 88
End: 2023-01-19
Payer: MEDICARE

## 2023-01-19 ENCOUNTER — APPOINTMENT (OUTPATIENT)
Dept: GENERAL RADIOLOGY | Facility: HOSPITAL | Age: 88
End: 2023-01-19
Payer: MEDICARE

## 2023-01-19 ENCOUNTER — HOSPITAL ENCOUNTER (EMERGENCY)
Facility: HOSPITAL | Age: 88
Discharge: HOME | End: 2023-01-19
Attending: EMERGENCY MEDICINE | Admitting: EMERGENCY MEDICINE
Payer: MEDICARE

## 2023-01-19 VITALS
OXYGEN SATURATION: 93 % | WEIGHT: 134.7 LBS | DIASTOLIC BLOOD PRESSURE: 53 MMHG | RESPIRATION RATE: 18 BRPM | BODY MASS INDEX: 21.14 KG/M2 | HEIGHT: 67 IN | HEART RATE: 63 BPM | TEMPERATURE: 97.9 F | SYSTOLIC BLOOD PRESSURE: 125 MMHG

## 2023-01-19 DIAGNOSIS — R60.0 PEDAL EDEMA: ICD-10-CM

## 2023-01-19 DIAGNOSIS — I48.20 CHRONIC ATRIAL FIBRILLATION: Primary | ICD-10-CM

## 2023-01-19 DIAGNOSIS — I38 VALVULAR HEART DISEASE: ICD-10-CM

## 2023-01-19 LAB
ALBUMIN SERPL-MCNC: 4.1 G/DL (ref 3.5–5.2)
ALBUMIN/GLOB SERPL: 1.2 G/DL
ALP SERPL-CCNC: 86 U/L (ref 39–117)
ALT SERPL W P-5'-P-CCNC: 28 U/L (ref 1–33)
ANION GAP SERPL CALCULATED.3IONS-SCNC: 10.4 MMOL/L (ref 5–15)
AST SERPL-CCNC: 38 U/L (ref 1–32)
BASOPHILS # BLD AUTO: 0.04 10*3/MM3 (ref 0–0.2)
BASOPHILS NFR BLD AUTO: 0.5 % (ref 0–1.5)
BILIRUB SERPL-MCNC: 0.8 MG/DL (ref 0–1.2)
BILIRUB UR QL STRIP: NEGATIVE
BUN SERPL-MCNC: 27 MG/DL (ref 8–23)
BUN/CREAT SERPL: 25.7 (ref 7–25)
CALCIUM SPEC-SCNC: 10.3 MG/DL (ref 8.2–9.6)
CHLORIDE SERPL-SCNC: 98 MMOL/L (ref 98–107)
CLARITY UR: CLEAR
CO2 SERPL-SCNC: 31.6 MMOL/L (ref 22–29)
COLOR UR: YELLOW
CREAT SERPL-MCNC: 1.05 MG/DL (ref 0.57–1)
DEPRECATED RDW RBC AUTO: 49.1 FL (ref 37–54)
EGFRCR SERPLBLD CKD-EPI 2021: 49.3 ML/MIN/1.73
EOSINOPHIL # BLD AUTO: 0.09 10*3/MM3 (ref 0–0.4)
EOSINOPHIL NFR BLD AUTO: 1.1 % (ref 0.3–6.2)
ERYTHROCYTE [DISTWIDTH] IN BLOOD BY AUTOMATED COUNT: 14 % (ref 12.3–15.4)
GLOBULIN UR ELPH-MCNC: 3.3 GM/DL
GLUCOSE SERPL-MCNC: 92 MG/DL (ref 65–99)
GLUCOSE UR STRIP-MCNC: NEGATIVE MG/DL
HCT VFR BLD AUTO: 40.8 % (ref 34–46.6)
HGB BLD-MCNC: 13.6 G/DL (ref 12–15.9)
HGB UR QL STRIP.AUTO: NEGATIVE
HOLD SPECIMEN: NORMAL
HOLD SPECIMEN: NORMAL
IMM GRANULOCYTES # BLD AUTO: 0.02 10*3/MM3 (ref 0–0.05)
IMM GRANULOCYTES NFR BLD AUTO: 0.3 % (ref 0–0.5)
KETONES UR QL STRIP: NEGATIVE
LEUKOCYTE ESTERASE UR QL STRIP.AUTO: NEGATIVE
LYMPHOCYTES # BLD AUTO: 2.09 10*3/MM3 (ref 0.7–3.1)
LYMPHOCYTES NFR BLD AUTO: 26.3 % (ref 19.6–45.3)
MCH RBC QN AUTO: 32.2 PG (ref 26.6–33)
MCHC RBC AUTO-ENTMCNC: 33.3 G/DL (ref 31.5–35.7)
MCV RBC AUTO: 96.5 FL (ref 79–97)
MONOCYTES # BLD AUTO: 0.92 10*3/MM3 (ref 0.1–0.9)
MONOCYTES NFR BLD AUTO: 11.6 % (ref 5–12)
NEUTROPHILS NFR BLD AUTO: 4.78 10*3/MM3 (ref 1.7–7)
NEUTROPHILS NFR BLD AUTO: 60.2 % (ref 42.7–76)
NITRITE UR QL STRIP: NEGATIVE
NRBC BLD AUTO-RTO: 0 /100 WBC (ref 0–0.2)
NT-PROBNP SERPL-MCNC: 6403 PG/ML (ref 0–1800)
PH UR STRIP.AUTO: 7.5 [PH] (ref 5–8)
PLATELET # BLD AUTO: 274 10*3/MM3 (ref 140–450)
PMV BLD AUTO: 10.4 FL (ref 6–12)
POTASSIUM SERPL-SCNC: 3.9 MMOL/L (ref 3.5–5.2)
PROT SERPL-MCNC: 7.4 G/DL (ref 6–8.5)
PROT UR QL STRIP: NEGATIVE
RBC # BLD AUTO: 4.23 10*6/MM3 (ref 3.77–5.28)
SODIUM SERPL-SCNC: 140 MMOL/L (ref 136–145)
SP GR UR STRIP: 1.01 (ref 1–1.03)
TROPONIN T SERPL-MCNC: <0.01 NG/ML (ref 0–0.03)
UROBILINOGEN UR QL STRIP: NORMAL
WBC NRBC COR # BLD: 7.94 10*3/MM3 (ref 3.4–10.8)
WHOLE BLOOD HOLD COAG: NORMAL
WHOLE BLOOD HOLD SPECIMEN: NORMAL

## 2023-01-19 PROCEDURE — 99284 EMERGENCY DEPT VISIT MOD MDM: CPT

## 2023-01-19 PROCEDURE — 36415 COLL VENOUS BLD VENIPUNCTURE: CPT

## 2023-01-19 PROCEDURE — 80053 COMPREHEN METABOLIC PANEL: CPT

## 2023-01-19 PROCEDURE — 83880 ASSAY OF NATRIURETIC PEPTIDE: CPT

## 2023-01-19 PROCEDURE — 84484 ASSAY OF TROPONIN QUANT: CPT

## 2023-01-19 PROCEDURE — 93010 ELECTROCARDIOGRAM REPORT: CPT | Performed by: INTERNAL MEDICINE

## 2023-01-19 PROCEDURE — 71045 X-RAY EXAM CHEST 1 VIEW: CPT

## 2023-01-19 PROCEDURE — 93005 ELECTROCARDIOGRAM TRACING: CPT

## 2023-01-19 PROCEDURE — 85025 COMPLETE CBC W/AUTO DIFF WBC: CPT

## 2023-01-19 PROCEDURE — 93005 ELECTROCARDIOGRAM TRACING: CPT | Performed by: EMERGENCY MEDICINE

## 2023-01-19 PROCEDURE — 81003 URINALYSIS AUTO W/O SCOPE: CPT | Performed by: EMERGENCY MEDICINE

## 2023-01-19 RX ORDER — SODIUM CHLORIDE 0.9 % (FLUSH) 0.9 %
10 SYRINGE (ML) INJECTION AS NEEDED
Status: DISCONTINUED | OUTPATIENT
Start: 2023-01-19 | End: 2023-01-20 | Stop reason: HOSPADM

## 2023-01-19 RX ORDER — LEVOFLOXACIN 500 MG/1
500 TABLET, FILM COATED ORAL DAILY
COMMUNITY
Start: 2023-01-17

## 2023-01-19 NOTE — TELEPHONE ENCOUNTER
Received VM from Adriane with [facility] stating patient HR in 40s very dizzy weak and falling.    HEAVENLY Forrest advised to go to ER

## 2023-01-19 NOTE — ED PROVIDER NOTES
Time: 5:07 PM EST  Date of encounter:  1/19/2023  Independent Historian/Clinical History and Information was obtained by:   Patient  Chief Complaint   Patient presents with   • Chest Pain     Patient states that she is having SOA and her home health nurse called Dr. Harden and he said for her to come in.         History is limited by: N/A    History of Present Illness:  Patient is a 89 y.o. year old female who presents to the emergency department for evaluation of SOB, recently dx with pneumonia.    The patient presents to the ED with her daughter at the bedside. She provided the patient's history for her due to patient's age.     She states the patient was referred to the ED by her cardiologist after her home health nurse called in with concerns regarding the patient's low HR. The patient was chasing her cat, who had gotten out from the home. When the home health nurse encountered her thereafter and measured her vitals, the patient was experiencing weakness and dizziness. The patient feels well in the ED. Her daughter notes she has not had any bradycardic episodes while she has been present. She states the patient is at her baseline now.     The patient and her daughter report she also has chronic SOB and swelling of the lower extremities which have both been unchanged. She has a history of vulvular heart disease and CHF.          Patient Care Team  Primary Care Provider: Beata Lancaster MD    Past Medical History:     Allergies   Allergen Reactions   • Contrast Dye (Echo Or Unknown Ct/Mr) Unknown - Low Severity   • Iodinated Contrast Media Hives and Other (See Comments)     IODINATED CONTRAST MEDIA (Verified  Allergy, Unknown, HIVES,SNEEZING,ITCHY EYES)   • Iodine Unknown - Low Severity   • Penicillins Rash   • Shellfish Allergy Unknown - Low Severity   • Spinach Other (See Comments)      SPINACH (Verified  Allergy, Unknown, 2/22/21)      SHOWED UP ON ALLERGY TESTING   • Sulfa Antibiotics Other (See  Comments)     (Verified  Allergy, Unknown, RASH)     Past Medical History:   Diagnosis Date   • Acute congestive heart failure (HCC)     improved   • Arthritis    • Bladder cancer (HCC)    • Bleeding disorder (HCC)     (CONDITION DUE TO BLOOD THINNERS)   • Chronic atrial fibrillation (HCC)    • Chronic heart failure with preserved ejection fraction (HFpEF) 9/23/2021   • High cholesterol    • Hypertension    • Lymphoma (HCC)    • Moderate to severe aortic stenosis     Patient declines to have transcatheter   • Non Hodgkin's lymphoma (HCC)     2008 treated 2008 2009.   • Rectus sheath hematoma     Right rectus sheath hematoma, off anticoagulation because of the hematoma.   • Skin cancer    • Type 2 diabetes mellitus (HCC)      Past Surgical History:   Procedure Laterality Date   • BLADDER SURGERY     • CATARACT EXTRACTION     • DILATION AND CURETTAGE, DIAGNOSTIC / THERAPEUTIC     • LEFT VENTRICULAR ASSIST DEVICE     • LYMPH NODE BIOPSY      Biopsy of retroperitoneal lymph node or mass    • SKIN CANCER DESTRUCTION     • TONSILLECTOMY       Family History   Problem Relation Age of Onset   • Heart attack Father    • Heart attack Brother    • Stomach cancer Paternal Great-Grandfather        Home Medications:  Prior to Admission medications    Medication Sig Start Date End Date Taking? Authorizing Provider   acetaminophen (TYLENOL) 325 MG tablet 650 mg As Needed. 3/9/22   ProviderMary Kate MD   albuterol sulfate  (90 Base) MCG/ACT inhaler USE 2 PUFFS BY MOUTH EVERY 4 HOURS AS NEEDED FOR WHEEZING  Patient taking differently: Inhale 2 puffs Every 4 (Four) Hours As Needed. 12/21/21   Gala Rao MD   aspirin 81 MG EC tablet Take 81 mg by mouth Daily.    ProviderMary Kate MD   atorvastatin (LIPITOR) 40 MG tablet Take 1 tablet by mouth Daily. 10/6/22   Lily Byrd MD PhD   Calcium Carbonate-Vitamin D 600-200 MG-UNIT capsule Take 1 capsule by mouth 2 (Two) Times a Day.    ProviderMary Kate MD    carvedilol (COREG) 6.25 MG tablet TAKE 1 TABLET BY MOUTH TWICE DAILY WITH FOOD 6/16/22   Gala Rao MD   dabigatran etexilate (Pradaxa) 75 MG capsule Take 1 capsule by mouth 2 (Two) Times a Day. 9/23/22   Beata Lancaster MD   ergocalciferol (ERGOCALCIFEROL) 1.25 MG (29127 UT) capsule One every 2 weeks 8/8/22   Beata Lancaster MD   furosemide (LASIX) 40 MG tablet Take 1 tablet by mouth Daily. 1.5 tabs daily  Patient taking differently: Take 60 mg by mouth Daily. 9/13/22   Beata Lancaster MD   hydrocortisone-bacitracin-zinc oxide-nystatin (MAGIC BARRIER) Apply 1 application topically to the appropriate area as directed As Needed (due on bottom). 6/16/22   Musa Hughes MD   iron polysaccharides (Ferrex 150) 150 MG capsule 1 tablet p.o. twice daily 1/13/23   Beata Lancaster MD   montelukast (SINGULAIR) 10 MG tablet TAKE 1 TABLET(10 MG) BY MOUTH EVERY DAY IN THE EVENING  Patient taking differently: Take 10 mg by mouth Every Night. 4/8/22   Gala Rao MD   multivitamin with minerals tablet tablet Take 1 tablet by mouth Daily.    Provider, MD Mary Kate   potassium chloride 10 MEQ CR tablet Take 1 tablet by mouth Daily. 10/6/22   Lily Byrd MD PhD   raloxifene (EVISTA) 60 MG tablet Take 1 tablet by mouth Daily. 1/11/22   Gala Rao MD   spironolactone (Aldactone) 25 MG tablet Take 1/2 tablet every day  Patient taking differently: Take 12.5 mg by mouth Daily. Take 1/2 tablet every day 2/7/22   Gala Rao MD   Symbicort 80-4.5 MCG/ACT inhaler Inhale 2 puffs 2 (Two) Times a Day. 9/13/22   Beata Lancaster MD   Zinc 50 MG tablet Take 50 mg by mouth Daily.    Provider, MD Mary Kate        Social History:   Social History     Tobacco Use   • Smoking status: Never   • Smokeless tobacco: Never   Vaping Use   • Vaping Use: Never used   Substance Use Topics   • Alcohol use: Never   • Drug use: Never  "        Review of Systems:  Review of Systems   Constitutional: Negative for chills and fever.   HENT: Negative for congestion, ear pain and sore throat.    Eyes: Negative for pain.   Respiratory: Positive for shortness of breath. Negative for cough and chest tightness.    Cardiovascular: Positive for leg swelling. Negative for chest pain.   Gastrointestinal: Negative for abdominal pain, diarrhea, nausea and vomiting.   Genitourinary: Negative for flank pain and hematuria.   Musculoskeletal: Negative for joint swelling.   Skin: Negative for pallor.   Neurological: Positive for dizziness and weakness. Negative for seizures and headaches.   All other systems reviewed and are negative.       Physical Exam:  /45   Pulse 79   Temp 97.9 °F (36.6 °C) (Oral)   Resp 18   Ht 170.2 cm (67\")   Wt 61.1 kg (134 lb 11.2 oz)   SpO2 95%   BMI 21.10 kg/m²     Physical Exam  Vitals and nursing note reviewed.   Constitutional:       General: She is awake. She is not in acute distress.     Appearance: Normal appearance. She is not toxic-appearing.   HENT:      Head: Normocephalic and atraumatic.      Mouth/Throat:      Mouth: Mucous membranes are moist.   Eyes:      General: No scleral icterus.     Pupils: Pupils are equal, round, and reactive to light.   Cardiovascular:      Rate and Rhythm: Normal rate and regular rhythm.      Pulses: Normal pulses.      Heart sounds: Murmur (holosystolic) heard.    Systolic murmur is present with a grade of 4/6.  Pulmonary:      Effort: Pulmonary effort is normal. No respiratory distress.      Breath sounds: Normal breath sounds.   Abdominal:      General: Abdomen is flat. There is no distension.      Palpations: Abdomen is soft.      Tenderness: There is no abdominal tenderness.   Musculoskeletal:         General: Normal range of motion.      Cervical back: Normal range of motion and neck supple.      Right lower le+ Edema present.      Left lower le+ Edema present.   Skin:     " General: Skin is warm and dry.   Neurological:      General: No focal deficit present.      Mental Status: She is alert and oriented to person, place, and time. Mental status is at baseline.   Psychiatric:         Mood and Affect: Mood normal.         Behavior: Behavior normal.                  Procedures:  Procedures      Medical Decision Making:      Comorbidities that affect care:    Valvular Heart Disease, Atrial Fibrillation, Congestive Heart Failure, Diabetes, Hypertension    External Notes reviewed:    Previous EKG and Previous Labs      The following orders were placed and all results were independently analyzed by me:  Orders Placed This Encounter   Procedures   • XR Chest 1 View   • Glide Draw   • Comprehensive Metabolic Panel   • BNP   • Troponin   • CBC Auto Differential   • Urinalysis With Microscopic If Indicated (No Culture) - Urine, Clean Catch   • NPO Diet NPO Type: Strict NPO   • Undress & Gown   • Cardiac Monitoring   • Continuous Pulse Oximetry   • Vital Signs   • Straight cath   • Oxygen Therapy- Nasal Cannula; 2 LPM; Titrate for SPO2: equal to or greater than, 92%   • ECG 12 Lead ED Triage Standing Order; SOA   • Insert Peripheral IV   • CBC & Differential   • Green Top (Gel)   • Lavender Top   • Gold Top - SST   • Light Blue Top       Medications Given in the Emergency Department:  Medications   sodium chloride 0.9 % flush 10 mL (has no administration in time range)        ED Course:    The patient was initially evaluated in the triage area where orders were placed. The patient was later dispositioned by Roberto Carlos Amaya DO.      The patient was advised to stay for completion of workup which includes but is not limited to communication of labs and radiological results, reassessment and plan. The patient was advised that leaving prior to disposition by a provider could result in critical findings that are not communicated to the patient.     ED Course as of 01/19/23 2202   u Jan 19, 2023   0135  --- PROVIDER IN TRIAGE NOTE ---    The patient was evaluated my me, Tori Law in triage. Orders were placed and the patient is currently awaiting disposition.    [AJ]   2156 EKG performed at 1710 was interpreted by me to show atrial fibrillation with a ventricular rate of 77 bpm.  There is no discernible P waves.  Patient has more of a flutter wave appearance.  Patient has a left bundle branch block with a wide QRS of 155 ms.  Axis is leftward -60 degrees.  There are some nonspecific ST-T wave change identified.  Patient also has frequent PVCs noted. [TB]      ED Course User Index  [AJ] Tori Law PA-C  [TB] Roberto Carlos Amaya DO     The patient was seen and evaluated the ED by me.  The above history and physical examination was performed as document.  Diagnostic data was obtained.  Results reviewed.  Discussed with the patient and her daughter.  Patient has had no evidence of any bradycardia while here in the ER for 5 hours.  Patient denies any acute complaints.  Patient may have some slight fluid retention however both the patient and the daughter states this is pretty stable for her and noted that there is a continual alteration of the patient's water pills.  They were concerned to increase her pills.  I advised that if she has increasing edema they can contact Dr. Harden for a 3-day burst treatment.  They were agreeable to this.  No changes will be made at this time.  Patient for discharge home with outpatient follow-up.    Labs:    Lab Results (last 24 hours)     Procedure Component Value Units Date/Time    CBC & Differential [[id number]]  (Abnormal) Collected: 01/19/23 1721    Specimen: Blood Updated: 01/19/23 1748    Narrative:      The following orders were created for panel order CBC & Differential.  Procedure                               Abnormality         Status                     ---------                               -----------         ------                     CBC Auto  Differential[[id number]]        Abnormal            Final result                 Please view results for these tests on the individual orders.    Comprehensive Metabolic Panel [[id number]]  (Abnormal) Collected: 01/19/23 1721    Specimen: Blood Updated: 01/19/23 1810     Glucose 92 mg/dL      BUN 27 mg/dL      Creatinine 1.05 mg/dL      Sodium 140 mmol/L      Potassium 3.9 mmol/L      Comment: Slight hemolysis detected by analyzer. Results may be affected.        Chloride 98 mmol/L      CO2 31.6 mmol/L      Calcium 10.3 mg/dL      Total Protein 7.4 g/dL      Albumin 4.1 g/dL      ALT (SGPT) 28 U/L      AST (SGOT) 38 U/L      Alkaline Phosphatase 86 U/L      Total Bilirubin 0.8 mg/dL      Globulin 3.3 gm/dL      A/G Ratio 1.2 g/dL      BUN/Creatinine Ratio 25.7     Anion Gap 10.4 mmol/L      eGFR 49.3 mL/min/1.73     Narrative:      GFR Normal >60  Chronic Kidney Disease <60  Kidney Failure <15    The GFR formula is only valid for adults with stable renal function between ages 18 and 70.    BNP [[id number]]  (Abnormal) Collected: 01/19/23 1721    Specimen: Blood Updated: 01/19/23 1804     proBNP 6,403.0 pg/mL     Narrative:      Among patients with dyspnea, NT-proBNP is highly sensitive for the detection of acute congestive heart failure. In addition NT-proBNP of <300 pg/ml effectively rules out acute congestive heart failure with 99% negative predictive value.      Troponin [[id number]]  (Normal) Collected: 01/19/23 1721    Specimen: Blood Updated: 01/19/23 1810     Troponin T <0.010 ng/mL     Narrative:      Troponin T Reference Range:  <= 0.03 ng/mL-   Negative for AMI  >0.03 ng/mL-     Abnormal for myocardial necrosis.  Clinicians would have to utilize clinical acumen, EKG, Troponin and serial changes to determine if it is an Acute Myocardial Infarction or myocardial injury due to an underlying chronic condition.       Results may be falsely decreased if patient taking Biotin.      CBC Auto Differential [[id number]]   (Abnormal) Collected: 01/19/23 1721    Specimen: Blood Updated: 01/19/23 1748     WBC 7.94 10*3/mm3      RBC 4.23 10*6/mm3      Hemoglobin 13.6 g/dL      Hematocrit 40.8 %      MCV 96.5 fL      MCH 32.2 pg      MCHC 33.3 g/dL      RDW 14.0 %      RDW-SD 49.1 fl      MPV 10.4 fL      Platelets 274 10*3/mm3      Neutrophil % 60.2 %      Lymphocyte % 26.3 %      Monocyte % 11.6 %      Eosinophil % 1.1 %      Basophil % 0.5 %      Immature Grans % 0.3 %      Neutrophils, Absolute 4.78 10*3/mm3      Lymphocytes, Absolute 2.09 10*3/mm3      Monocytes, Absolute 0.92 10*3/mm3      Eosinophils, Absolute 0.09 10*3/mm3      Basophils, Absolute 0.04 10*3/mm3      Immature Grans, Absolute 0.02 10*3/mm3      nRBC 0.0 /100 WBC     Urinalysis With Microscopic If Indicated (No Culture) - Urine, Clean Catch [[id number]]  (Normal) Collected: 01/19/23 2115    Specimen: Urine, Clean Catch Updated: 01/19/23 2128     Color, UA Yellow     Appearance, UA Clear     pH, UA 7.5     Specific Gravity, UA 1.007     Glucose, UA Negative     Ketones, UA Negative     Bilirubin, UA Negative     Blood, UA Negative     Protein, UA Negative     Leuk Esterase, UA Negative     Nitrite, UA Negative     Urobilinogen, UA 0.2 E.U./dL    Narrative:      Urine microscopic not indicated.           Imaging:    XR Chest 1 View    Result Date: 1/19/2023  PROCEDURE: XR CHEST 1 VW  COMPARISON: [facility], CR, XR ABDOMEN 2+ VIEWS W CHEST 1 VW, 6/08/2022, 19:52.  INDICATIONS: SOA Triage Protocol  FINDINGS:   Stable enlarged cardiac silhouette.  Stable increased interstitial markings.  No evidence of significant focal consolidation.  No evidence of pneumothorax.  There is a right IJ Port-A-Cath.  Surgical clips are present in the left axilla.  IMPRESSION: Stable cardiomegaly.  Increased interstitial markings may be secondary to pulmonary edema.   AGUSTÍN LEACH MD       Electronically Signed and Approved By: AGUSTÍN LEACH MD on 1/19/2023 at 18:19                  Differential Diagnosis and Discussion:      Dyspnea: Differential diagnosis includes but is not limited to metabolic acidosis, neurological disorders, psychogenic, asthma, pneumothorax, upper airway obstruction, COPD, pneumonia, noncardiogenic pulmonary edema, interstitial lung disease, anemia, congestive heart failure, and pulmonary embolism  Weakness: Based on the patient's history, signs, and symptoms, the diffential diagnosis includes but is not limited to meningitis, stroke, sepsis, subarachnoid hemorrhage, intracranial bleeding, encephalitis, acute uti, dehydration, MS, myasthenia gravis, Gu[place]an [place], migraine variant, neuromuscular disorders vertigo, electrolyte imbalance, and metabolic disorders.    All labs were reviewed and analyzed by me.  All X-rays were independently reviewed by me.  EKG was interpreted by me.    MDM         Patient Care Considerations:          Consultants/Shared Management Plan:    None    Social Determinants of Health:    Patient has presented with family members who are responsible, reliable and will ensure follow up care.      Disposition and Care Coordination:    Discharged: The patient is suitable and stable for discharge with no need for consideration of observation or admission.    I have explained the patient´s condition, diagnoses and treatment plan based on the information available to me at this time. I have answered questions and addressed any concerns. The patient has a good  understanding of the patient´s diagnosis, condition, and treatment plan as can be expected at this point. The vital signs have been stable. The patient´s condition is stable and appropriate for discharge from the emergency department.      The patient will pursue further outpatient evaluation with the primary care physician or other designated or consulting physician as outlined in the discharge instructions. They are agreeable to this plan of care and follow-up instructions have been  explained in detail. The patient has received these instructions in written format and have expressed an understanding of the discharge instructions. The patient is aware that any significant change in condition or worsening of symptoms should prompt an immediate return to this or the closest emergency department or call to 911.  I have explained discharge medications and the need for follow up with the patient/caretakers. This was also printed in the discharge instructions. Patient was discharged with the following medications and follow up:      Medication List      Changed    albuterol sulfate  (90 Base) MCG/ACT inhaler  Commonly known as: PROVENTIL HFA;VENTOLIN HFA;PROAIR HFA  USE 2 PUFFS BY MOUTH EVERY 4 HOURS AS NEEDED FOR WHEEZING  What changed: See the new instructions.     furosemide 40 MG tablet  Commonly known as: LASIX  Take 1 tablet by mouth Daily. 1.5 tabs daily  What changed:   · how much to take  · additional instructions     montelukast 10 MG tablet  Commonly known as: SINGULAIR  TAKE 1 TABLET(10 MG) BY MOUTH EVERY DAY IN THE EVENING  What changed: See the new instructions.     spironolactone 25 MG tablet  Commonly known as: Aldactone  Take 1/2 tablet every day  What changed:   · how much to take  · how to take this  · when to take this         Beata Lancaster MD  [address]  [address]  [place] [facility] [address]  [phone]    In 5 days      Paulie Harden MD  [address]  [address]  [place] [facility] [address]  [phone]    In 1 week         Final diagnoses:   Chronic atrial fibrillation (HCC)   Pedal edema   Valvular heart disease        ED Disposition     ED Disposition   Discharge    Condition   Stable    Comment   --             This medical record created using voice recognition software.      Roberto Carlos ALVARADO DO, am scribing for and in the presence of Roberto Carlos Amaya DO. 1/19/2023 22:02 EST    Monique ALVARADO Todd, DO, personally performed the services described in this  documentation, as scribed by Roberto Carlos Amaya,  in my presence, and it is both accurate and complete.        Anyi Sadler  01/19/23 4729       Roberto Carlos Amaya DO  01/19/23 220

## 2023-01-20 NOTE — ED NOTES
Pt denies chest pain at this time but is soa at all times. Pt was brought in for bradycardia. Started antibiotic 1/17 for pneumonia

## 2023-01-20 NOTE — DISCHARGE INSTRUCTIONS
Keep feet elevated when sitting.  Continue current home medications as prescribed.  Follow-up with Dr. Harden as scheduled.  Call his office however if you are having increasing shortness of breath or swelling of your legs to see about increasing your water pills even for a couple day course.  Follow-up with your primary care provider as needed.  Return to the ER for increasing swelling of the legs, increased shortness of breath, persistent low heart rate, or any other concerns issues that may arise.

## 2023-01-23 ENCOUNTER — TELEPHONE (OUTPATIENT)
Dept: ONCOLOGY | Facility: HOSPITAL | Age: 88
End: 2023-01-23

## 2023-01-23 NOTE — TELEPHONE ENCOUNTER
Caller: ROYCE MURRIETA    Relationship to patient: Emergency Contact    Best call back number: [phone]    Chief complaint: PATIENT TO RESCHEDULE  1/26/23 APPT    Type of visit: INFUSION AND FU    Requested date:A TUES OR THURS OR 2/9/23

## 2023-01-25 RX ORDER — MEPERIDINE HYDROCHLORIDE 25 MG/ML
25 INJECTION INTRAMUSCULAR; INTRAVENOUS; SUBCUTANEOUS
Status: CANCELLED | OUTPATIENT
Start: 2023-01-26

## 2023-01-25 RX ORDER — FAMOTIDINE 10 MG/ML
20 INJECTION, SOLUTION INTRAVENOUS AS NEEDED
Status: CANCELLED | OUTPATIENT
Start: 2023-01-26

## 2023-01-25 RX ORDER — DIPHENHYDRAMINE HYDROCHLORIDE 50 MG/ML
50 INJECTION INTRAMUSCULAR; INTRAVENOUS AS NEEDED
Status: CANCELLED | OUTPATIENT
Start: 2023-01-26

## 2023-01-25 NOTE — TELEPHONE ENCOUNTER
TRIED REACHING ROYCE IN REGARDS TO PATIENTS APPOINTMENTS ON 01/26/2023 FOR RESCHEDULING, NOT ABLE TO LEAVE VOICEMAIL DUE TO MAILBOX FULL.

## 2023-01-25 NOTE — TELEPHONE ENCOUNTER
ROYCE IS CALLING STATES THAT QUEENIE WILL BE ABLE TO COME TO HER APPT TOMORROW 1-26    PLEASE ADVISE

## 2023-01-25 NOTE — TELEPHONE ENCOUNTER
SPOKE TO PATIENTS DAUGHTER (ROYCE), STATES PATIENT WILL BE HERE FOR INFUSION AND APPT TOMORROW 01/26/2023 AS SCHEDULED.

## 2023-01-26 ENCOUNTER — OFFICE VISIT (OUTPATIENT)
Dept: ONCOLOGY | Facility: HOSPITAL | Age: 88
End: 2023-01-26
Payer: MEDICARE

## 2023-01-26 ENCOUNTER — HOSPITAL ENCOUNTER (OUTPATIENT)
Dept: ONCOLOGY | Facility: HOSPITAL | Age: 88
Setting detail: INFUSION SERIES
Discharge: HOME | End: 2023-01-26
Payer: MEDICARE

## 2023-01-26 VITALS
WEIGHT: 134.26 LBS | DIASTOLIC BLOOD PRESSURE: 43 MMHG | HEIGHT: 67 IN | RESPIRATION RATE: 18 BRPM | OXYGEN SATURATION: 96 % | BODY MASS INDEX: 21.07 KG/M2 | TEMPERATURE: 98 F | SYSTOLIC BLOOD PRESSURE: 98 MMHG | HEART RATE: 62 BPM

## 2023-01-26 VITALS
HEART RATE: 59 BPM | OXYGEN SATURATION: 96 % | WEIGHT: 134.26 LBS | TEMPERATURE: 98 F | RESPIRATION RATE: 20 BRPM | DIASTOLIC BLOOD PRESSURE: 37 MMHG | SYSTOLIC BLOOD PRESSURE: 106 MMHG | BODY MASS INDEX: 21.02 KG/M2

## 2023-01-26 DIAGNOSIS — C82.03 FOLLICULAR LYMPHOMA GRADE I OF INTRA-ABDOMINAL LYMPH NODES: Primary | ICD-10-CM

## 2023-01-26 DIAGNOSIS — C82.08 FOLLICULAR LYMPHOMA GRADE I OF LYMPH NODES OF MULTIPLE SITES: Primary | ICD-10-CM

## 2023-01-26 DIAGNOSIS — Z45.2 ADJUSTMENT AND MANAGEMENT OF VASCULAR ACCESS DEVICE: ICD-10-CM

## 2023-01-26 LAB
ALBUMIN SERPL-MCNC: 3.8 G/DL (ref 3.5–5.2)
ALBUMIN/GLOB SERPL: 1.5 G/DL
ALP SERPL-CCNC: 72 U/L (ref 39–117)
ALT SERPL W P-5'-P-CCNC: 18 U/L (ref 1–33)
ANION GAP SERPL CALCULATED.3IONS-SCNC: 7.8 MMOL/L (ref 5–15)
AST SERPL-CCNC: 29 U/L (ref 1–32)
BASOPHILS # BLD AUTO: 0.02 10*3/MM3 (ref 0–0.2)
BASOPHILS NFR BLD AUTO: 0.3 % (ref 0–1.5)
BILIRUB SERPL-MCNC: 0.7 MG/DL (ref 0–1.2)
BUN SERPL-MCNC: 29 MG/DL (ref 8–23)
BUN/CREAT SERPL: 30.2 (ref 7–25)
CALCIUM SPEC-SCNC: 9.4 MG/DL (ref 8.2–9.6)
CHLORIDE SERPL-SCNC: 100 MMOL/L (ref 98–107)
CO2 SERPL-SCNC: 32.2 MMOL/L (ref 22–29)
CREAT SERPL-MCNC: 0.96 MG/DL (ref 0.57–1)
DEPRECATED RDW RBC AUTO: 50.7 FL (ref 37–54)
EGFRCR SERPLBLD CKD-EPI 2021: 54.9 ML/MIN/1.73
EOSINOPHIL # BLD AUTO: 0.11 10*3/MM3 (ref 0–0.4)
EOSINOPHIL NFR BLD AUTO: 1.9 % (ref 0.3–6.2)
ERYTHROCYTE [DISTWIDTH] IN BLOOD BY AUTOMATED COUNT: 14.1 % (ref 12.3–15.4)
GLOBULIN UR ELPH-MCNC: 2.6 GM/DL
GLUCOSE SERPL-MCNC: 155 MG/DL (ref 65–99)
HCT VFR BLD AUTO: 39.7 % (ref 34–46.6)
HGB BLD-MCNC: 12.7 G/DL (ref 12–15.9)
IMM GRANULOCYTES # BLD AUTO: 0.01 10*3/MM3 (ref 0–0.05)
IMM GRANULOCYTES NFR BLD AUTO: 0.2 % (ref 0–0.5)
LYMPHOCYTES # BLD AUTO: 1.2 10*3/MM3 (ref 0.7–3.1)
LYMPHOCYTES NFR BLD AUTO: 20.8 % (ref 19.6–45.3)
MCH RBC QN AUTO: 31.3 PG (ref 26.6–33)
MCHC RBC AUTO-ENTMCNC: 32 G/DL (ref 31.5–35.7)
MCV RBC AUTO: 97.8 FL (ref 79–97)
MONOCYTES # BLD AUTO: 0.58 10*3/MM3 (ref 0.1–0.9)
MONOCYTES NFR BLD AUTO: 10.1 % (ref 5–12)
NEUTROPHILS NFR BLD AUTO: 3.84 10*3/MM3 (ref 1.7–7)
NEUTROPHILS NFR BLD AUTO: 66.7 % (ref 42.7–76)
PLATELET # BLD AUTO: 226 10*3/MM3 (ref 140–450)
PMV BLD AUTO: 10.1 FL (ref 6–12)
POTASSIUM SERPL-SCNC: 4 MMOL/L (ref 3.5–5.2)
PROT SERPL-MCNC: 6.4 G/DL (ref 6–8.5)
RBC # BLD AUTO: 4.06 10*6/MM3 (ref 3.77–5.28)
SODIUM SERPL-SCNC: 140 MMOL/L (ref 136–145)
WBC NRBC COR # BLD: 5.76 10*3/MM3 (ref 3.4–10.8)

## 2023-01-26 PROCEDURE — 80053 COMPREHEN METABOLIC PANEL: CPT | Performed by: INTERNAL MEDICINE

## 2023-01-26 PROCEDURE — 25010000002 HEPARIN LOCK FLUSH PER 10 UNITS: Performed by: INTERNAL MEDICINE

## 2023-01-26 PROCEDURE — 25010000002 RITUXIMAB 10 MG/ML SOLUTION 50 ML VIAL: Performed by: INTERNAL MEDICINE

## 2023-01-26 PROCEDURE — 25010000002 DIPHENHYDRAMINE PER 50 MG: Performed by: INTERNAL MEDICINE

## 2023-01-26 PROCEDURE — 85025 COMPLETE CBC W/AUTO DIFF WBC: CPT | Performed by: INTERNAL MEDICINE

## 2023-01-26 PROCEDURE — 99214 OFFICE O/P EST MOD 30 MIN: CPT | Performed by: INTERNAL MEDICINE

## 2023-01-26 PROCEDURE — 96375 TX/PRO/DX INJ NEW DRUG ADDON: CPT

## 2023-01-26 PROCEDURE — 96413 CHEMO IV INFUSION 1 HR: CPT

## 2023-01-26 PROCEDURE — 25010000002 RITUXIMAB 10 MG/ML SOLUTION 10 ML VIAL: Performed by: INTERNAL MEDICINE

## 2023-01-26 RX ORDER — SODIUM CHLORIDE 9 MG/ML
250 INJECTION, SOLUTION INTRAVENOUS ONCE
Status: COMPLETED | OUTPATIENT
Start: 2023-01-26 | End: 2023-01-26

## 2023-01-26 RX ORDER — L.ACID,CASEI/B.ANIMAL/S.THERMO 16B CELL
1 CAPSULE ORAL DAILY
COMMUNITY
Start: 2023-01-19

## 2023-01-26 RX ORDER — LATANOPROST 50 UG/ML
SOLUTION/ DROPS OPHTHALMIC
COMMUNITY
Start: 2023-01-12

## 2023-01-26 RX ORDER — ACETAMINOPHEN 325 MG/1
650 TABLET ORAL ONCE
Status: COMPLETED | OUTPATIENT
Start: 2023-01-26 | End: 2023-01-26

## 2023-01-26 RX ORDER — HEPARIN SODIUM (PORCINE) LOCK FLUSH IV SOLN 100 UNIT/ML 100 UNIT/ML
500 SOLUTION INTRAVENOUS AS NEEDED
Status: DISCONTINUED | OUTPATIENT
Start: 2023-01-26 | End: 2023-01-28 | Stop reason: HOSPADM

## 2023-01-26 RX ORDER — LANCETS 28 GAUGE
1 EACH MISCELLANEOUS DAILY
COMMUNITY
Start: 2022-12-28

## 2023-01-26 RX ORDER — SODIUM CHLORIDE 0.9 % (FLUSH) 0.9 %
20 SYRINGE (ML) INJECTION AS NEEDED
Status: DISCONTINUED | OUTPATIENT
Start: 2023-01-26 | End: 2023-01-28 | Stop reason: HOSPADM

## 2023-01-26 RX ORDER — HEPARIN SODIUM (PORCINE) LOCK FLUSH IV SOLN 100 UNIT/ML 100 UNIT/ML
500 SOLUTION INTRAVENOUS AS NEEDED
Status: CANCELLED | OUTPATIENT
Start: 2023-01-26

## 2023-01-26 RX ORDER — SODIUM CHLORIDE 0.9 % (FLUSH) 0.9 %
20 SYRINGE (ML) INJECTION AS NEEDED
Status: CANCELLED | OUTPATIENT
Start: 2023-01-26

## 2023-01-26 RX ORDER — APIXABAN 2.5 MG/1
1 TABLET, FILM COATED ORAL EVERY 12 HOURS SCHEDULED
COMMUNITY
Start: 2023-01-04

## 2023-01-26 RX ADMIN — HEPARIN SODIUM (PORCINE) LOCK FLUSH IV SOLN 100 UNIT/ML 500 UNITS: 100 SOLUTION at 14:33

## 2023-01-26 RX ADMIN — SODIUM CHLORIDE 250 ML: 9 INJECTION, SOLUTION INTRAVENOUS at 12:06

## 2023-01-26 RX ADMIN — DIPHENHYDRAMINE HYDROCHLORIDE 25 MG: 50 INJECTION, SOLUTION INTRAMUSCULAR; INTRAVENOUS at 12:12

## 2023-01-26 RX ADMIN — Medication 20 ML: at 14:32

## 2023-01-26 RX ADMIN — ACETAMINOPHEN 650 MG: 325 TABLET ORAL at 12:08

## 2023-01-26 RX ADMIN — RITUXIMAB 600 MG: 10 INJECTION, SOLUTION INTRAVENOUS at 12:38

## 2023-01-26 NOTE — PROGRESS NOTES
Patient  Radha Aparicio    Location  [facility] HEMATOLOGY & ONCOLOGY    Chief Complaint  Chemotherapy    Referring Provider: Gala Rao MD  PCP: Beata Lancaster MD    Subjective          Oncology/Hematology History Overview Note   Ms. Radha Aparicio is a pleasant 89 year old female who presents with NHL, follicular type diagnosed in 2019.  Ms. Aparicio has been receiving her oncological care with Dr. Jenn Zaragoza who recently retired.  Ms. Aparicio initially presented RLQ pain to her PCP, Dr. Gala Rao.  In light of her bladder cancer, CT of Abdomen and Pelvis was obtained on January 16, 2019.  Findings indicated retroperitoneal adenopathy measuring up to 4 cm x 2cm, thought either to be lymphoma or metastatic disease.  Incidently mild bilateral hydronephrosis was noted without urolithiasis.     CT guided biopsy of the retroperitoneal lymph node was obtained.  Pathology (S-) indicated non-hodgkins's b-cell lymphoma; Follicular lymphoma (grade 1).  Flow cytometryrevealed diffuse positivity with CD 20, BCL2, CD10 and patchy decoration with BCL-6.  A few days after biopsy she was admitted to hospital with acute respiratory hypoxemia secondary to influenza.  Repeat imaging with CT Abdomen and Pelvis with heterogeneous hyperdense mass in the anterior abdominal wass musculature 8 cm x 12.6 cm consistent wtih rectus sheath hematoma.  Retroperitoneal adenopathy is redemonstrated.  Index left periaortic mass measured 3.1 x 2.2 cm, previously 3.9 x 2.4. Mildly enlarged retrocrural lymph nodes.  She was discharged from the hospital on 2/20/2019.     Ms. Aparicio was evaluated by Dr. Jenn Zaragoza on March 13, 2019. PET CT was ordered and obtained.  It indicated metabolic activity within the lymph node at the base of upper chest and neck (SUV 3.85)  as well as the left axilla (SUV 5.4) in addition to 2 lymph nodes within the retroperitoneal area with SUV (8.76). .  Staging for her follicular  carcinoma grade 1 is a clinical stage III with no B symptoms no significant laboratory abnormalities. At this time it was decided pursue active surveillance and close follow.     On October 17, 2019, PETCT  indicated interval progression of disease with previous noted lymph adenopathy larger and more metabolic. The left periaortic lymph node mass noted to be compressing not only the left renal vein but resulting in mild left hydronephrosis which is new. .   Single agent RITUXAN was initiated on October 9, 2019 and completed 4 weekly doses of RITUXAN.  Ms. Aparicio continues to have no B-symptoms.     Repeat PETCT obtained December 11, 2019 indicated overall improvement from previous study.  The areas of hypermetabolic lymphadenopathy supraclavicular on the left and left paraaortic have diminshed both in size and degree of metabolic activity with no new areas noted. .      PETCT done on July 2, 2020 indicated enlargement of left infraclavicular / subpectoral lynph node measuring up to 1.8 cm with SUV 5.7, increase in size and SUV of left axillary lymph node, small left pleural effusion slightly larger than prior PET, left paraaortic lymph node conglomerate slightly larger and slightly increased in SUV.  .  It was decided at that time to restart RITUXAN every 8 weeks.  Ms. Aparicio continues to have no B-symptoms.     Maintenance rituximab started November 2020, every 8 weeks.    CT CAP on 12/16/2020 showed decrease in the size of left retropectoral lymph nodes and left periaortic lymph nodes.  No new or enlarging lymph nodes in the chest, abdomen, or pelvis.  There are stable subcentimeter noncalcified pulmonary nodules as well.       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  She was recently hospitalized again for CHF exacerbation.  They were able to get a lot of fluid off her and she is feeling much better.  She has no new complaints or concerns.    Review of Systems   Constitutional: Positive for fatigue (8/10). Negative for appetite change, diaphoresis, fever, unexpected weight gain and unexpected weight loss.   HENT: Negative for hearing loss, mouth sores, sore throat, swollen glands, trouble swallowing and voice change.    Eyes: Negative for blurred vision.   Respiratory: Negative for cough, shortness of breath and wheezing.    Cardiovascular: Negative for chest pain and palpitations.   Gastrointestinal: Negative for abdominal pain, blood in stool, constipation, diarrhea, nausea and vomiting.   Endocrine: Negative for cold intolerance and heat intolerance.   Genitourinary: Negative for difficulty urinating, dysuria, frequency, hematuria and urinary incontinence.   Musculoskeletal: Negative for arthralgias, back pain and myalgias.   Skin: Negative for rash, skin lesions and wound.   Neurological: Negative for dizziness, seizures, weakness, numbness and headache.   Hematological: Does not bruise/bleed easily.   Psychiatric/Behavioral: Negative for depressed mood. The patient is not nervous/anxious.    All other systems reviewed and are negative.      Past Medical History:   Diagnosis Date   • Acute congestive heart failure (HCC)     improved   • Arthritis    • Bladder cancer (HCC)    • Bleeding disorder (HCC)     (CONDITION DUE TO BLOOD THINNERS)   • Chronic atrial fibrillation (HCC)    • Chronic heart failure with preserved ejection fraction (HFpEF) 9/23/2021   • High cholesterol    • Hypertension    • Lymphoma (HCC)     • Moderate to severe aortic stenosis     Patient declines to have transcatheter   • Non Hodgkin's lymphoma (HCC)     2008 treated 2008 2009.   • Rectus sheath hematoma     Right rectus sheath hematoma, off anticoagulation because of the hematoma.   • Skin cancer    • Type 2 diabetes mellitus (HCC)      Past Surgical History:   Procedure Laterality Date   • BLADDER SURGERY     • CATARACT EXTRACTION     • DILATION AND CURETTAGE, DIAGNOSTIC / THERAPEUTIC     • LEFT VENTRICULAR ASSIST DEVICE     • LYMPH NODE BIOPSY      Biopsy of retroperitoneal lymph node or mass    • SKIN CANCER DESTRUCTION     • TONSILLECTOMY       Social History     Socioeconomic History   • Marital status:    • Number of children: 2   Tobacco Use   • Smoking status: Never   • Smokeless tobacco: Never   Vaping Use   • Vaping Use: Never used   Substance and Sexual Activity   • Alcohol use: Never   • Drug use: Never   • Sexual activity: Defer     Family History   Problem Relation Age of Onset   • Heart attack Father    • Heart attack Brother    • Stomach cancer Paternal Great-Grandfather        Objective   Physical Exam  General: Alert, cooperative, no acute distress  Eyes: Anicteric sclera, PERRLA  Respiratory: normal respiratory effort  Cardiovascular: no lower extremity edema  Skin: Normal tone, no rash, no lesions  Psychiatric: Appropriate affect, intact judgment  Neurologic: No focal sensory or motor deficits, normal cognition   Musculoskeletal: Reduced muscle strength and tone, but ambulatory without assistance  Extremities: No clubbing, cyanosis, or deformities      Vitals:    01/26/23 1038   BP: (!) 106/37   Pulse: 59   Resp: 20   Temp: 98 °F (36.7 °C)   TempSrc: Temporal   SpO2: 96%   Weight: 60.9 kg (134 lb 4.2 oz)   PainSc: 0-No pain     ECOG score: 1         PHQ-9 Total Score:         Result Review :   The following data was reviewed by: Lily Byrd MD PhD on 01/26/2023:  Lab Results   Component Value Date    HGB 12.7  01/26/2023    HCT 39.7 01/26/2023    MCV 97.8 (H) 01/26/2023     01/26/2023    WBC 5.76 01/26/2023    NEUTROABS 3.84 01/26/2023    LYMPHSABS 1.20 01/26/2023    MONOSABS 0.58 01/26/2023    EOSABS 0.11 01/26/2023    BASOSABS 0.02 01/26/2023     Lab Results   Component Value Date    GLUCOSE 155 (H) 01/26/2023    BUN 29 (H) 01/26/2023    CREATININE 0.96 01/26/2023     01/26/2023    K 4.0 01/26/2023     01/26/2023    CO2 32.2 (H) 01/26/2023    CALCIUM 9.4 01/26/2023    PROTEINTOT 6.4 01/26/2023    ALBUMIN 3.8 01/26/2023    BILITOT 0.7 01/26/2023    ALKPHOS 72 01/26/2023    AST 29 01/26/2023    ALT 18 01/26/2023          Assessment and Plan    Diagnoses and all orders for this visit:    1. Follicular lymphoma grade I of intra-abdominal lymph nodes (HCC) (Primary)          Patient was given instructions and counseling regarding her condition or for health maintenance advice. Please see specific information pulled into the AVS if appropriate.         Recurrent follicular lymphoma: Patient has no evidence of toxicity with ongoing rituximab.  She will continue treatment as planned.  I reviewed her CBC and CMP and she has not evidence of toxicity.  She will only get CT CAP scans if there are clinical concerns for progression.  She has lost some weight but it may be due to recent treatment of volume overload and CHF.  She return to clinic in 4 months for repeat labs and toxicity check.

## 2023-01-28 LAB — QT INTERVAL: 456 MS

## 2023-03-22 RX ORDER — FAMOTIDINE 10 MG/ML
20 INJECTION, SOLUTION INTRAVENOUS AS NEEDED
Status: CANCELLED | OUTPATIENT
Start: 2023-03-23

## 2023-03-22 RX ORDER — DIPHENHYDRAMINE HYDROCHLORIDE 50 MG/ML
50 INJECTION INTRAMUSCULAR; INTRAVENOUS AS NEEDED
Status: CANCELLED | OUTPATIENT
Start: 2023-03-23

## 2023-03-22 RX ORDER — MEPERIDINE HYDROCHLORIDE 25 MG/ML
25 INJECTION INTRAMUSCULAR; INTRAVENOUS; SUBCUTANEOUS
Status: CANCELLED | OUTPATIENT
Start: 2023-03-23

## 2023-03-23 ENCOUNTER — OFFICE VISIT (OUTPATIENT)
Dept: ONCOLOGY | Facility: HOSPITAL | Age: 88
End: 2023-03-23
Payer: MEDICARE

## 2023-03-23 ENCOUNTER — HOSPITAL ENCOUNTER (OUTPATIENT)
Dept: ONCOLOGY | Facility: HOSPITAL | Age: 88
Discharge: HOME | End: 2023-03-23
Admitting: INTERNAL MEDICINE
Payer: MEDICARE

## 2023-03-23 VITALS
BODY MASS INDEX: 21.31 KG/M2 | WEIGHT: 135.8 LBS | TEMPERATURE: 98.8 F | OXYGEN SATURATION: 100 % | HEART RATE: 56 BPM | RESPIRATION RATE: 22 BRPM | SYSTOLIC BLOOD PRESSURE: 90 MMHG | HEIGHT: 67 IN | DIASTOLIC BLOOD PRESSURE: 44 MMHG

## 2023-03-23 VITALS
TEMPERATURE: 98.8 F | OXYGEN SATURATION: 100 % | SYSTOLIC BLOOD PRESSURE: 113 MMHG | HEART RATE: 61 BPM | RESPIRATION RATE: 22 BRPM | WEIGHT: 135.8 LBS | HEIGHT: 67 IN | BODY MASS INDEX: 21.31 KG/M2 | DIASTOLIC BLOOD PRESSURE: 47 MMHG

## 2023-03-23 DIAGNOSIS — C82.08 FOLLICULAR LYMPHOMA GRADE I OF LYMPH NODES OF MULTIPLE SITES: Primary | ICD-10-CM

## 2023-03-23 DIAGNOSIS — Z45.2 ADJUSTMENT AND MANAGEMENT OF VASCULAR ACCESS DEVICE: ICD-10-CM

## 2023-03-23 LAB
ALBUMIN SERPL-MCNC: 3.9 G/DL (ref 3.5–5.2)
ALBUMIN/GLOB SERPL: 1.6 G/DL
ALP SERPL-CCNC: 83 U/L (ref 39–117)
ALT SERPL W P-5'-P-CCNC: 24 U/L (ref 1–33)
ANION GAP SERPL CALCULATED.3IONS-SCNC: 8.8 MMOL/L (ref 5–15)
AST SERPL-CCNC: 34 U/L (ref 1–32)
BASOPHILS # BLD AUTO: 0.03 10*3/MM3 (ref 0–0.2)
BASOPHILS NFR BLD AUTO: 0.5 % (ref 0–1.5)
BILIRUB SERPL-MCNC: 0.7 MG/DL (ref 0–1.2)
BUN SERPL-MCNC: 34 MG/DL (ref 8–23)
BUN/CREAT SERPL: 37.4 (ref 7–25)
CALCIUM SPEC-SCNC: 9.7 MG/DL (ref 8.2–9.6)
CHLORIDE SERPL-SCNC: 100 MMOL/L (ref 98–107)
CO2 SERPL-SCNC: 31.2 MMOL/L (ref 22–29)
CREAT SERPL-MCNC: 0.91 MG/DL (ref 0.57–1)
DEPRECATED RDW RBC AUTO: 49.5 FL (ref 37–54)
EGFRCR SERPLBLD CKD-EPI 2021: 58.6 ML/MIN/1.73
EOSINOPHIL # BLD AUTO: 0.11 10*3/MM3 (ref 0–0.4)
EOSINOPHIL NFR BLD AUTO: 1.7 % (ref 0.3–6.2)
ERYTHROCYTE [DISTWIDTH] IN BLOOD BY AUTOMATED COUNT: 13.6 % (ref 12.3–15.4)
GLOBULIN UR ELPH-MCNC: 2.5 GM/DL
GLUCOSE SERPL-MCNC: 152 MG/DL (ref 65–99)
HCT VFR BLD AUTO: 39.4 % (ref 34–46.6)
HGB BLD-MCNC: 12.7 G/DL (ref 12–15.9)
IMM GRANULOCYTES # BLD AUTO: 0.06 10*3/MM3 (ref 0–0.05)
IMM GRANULOCYTES NFR BLD AUTO: 0.9 % (ref 0–0.5)
LYMPHOCYTES # BLD AUTO: 1.26 10*3/MM3 (ref 0.7–3.1)
LYMPHOCYTES NFR BLD AUTO: 19.2 % (ref 19.6–45.3)
MCH RBC QN AUTO: 31.5 PG (ref 26.6–33)
MCHC RBC AUTO-ENTMCNC: 32.2 G/DL (ref 31.5–35.7)
MCV RBC AUTO: 97.8 FL (ref 79–97)
MONOCYTES # BLD AUTO: 0.69 10*3/MM3 (ref 0.1–0.9)
MONOCYTES NFR BLD AUTO: 10.5 % (ref 5–12)
NEUTROPHILS NFR BLD AUTO: 4.4 10*3/MM3 (ref 1.7–7)
NEUTROPHILS NFR BLD AUTO: 67.2 % (ref 42.7–76)
PLATELET # BLD AUTO: 183 10*3/MM3 (ref 140–450)
PMV BLD AUTO: 10.6 FL (ref 6–12)
POTASSIUM SERPL-SCNC: 4.1 MMOL/L (ref 3.5–5.2)
PROT SERPL-MCNC: 6.4 G/DL (ref 6–8.5)
RBC # BLD AUTO: 4.03 10*6/MM3 (ref 3.77–5.28)
SODIUM SERPL-SCNC: 140 MMOL/L (ref 136–145)
WBC NRBC COR # BLD: 6.55 10*3/MM3 (ref 3.4–10.8)

## 2023-03-23 PROCEDURE — 25010000002 DIPHENHYDRAMINE PER 50 MG: Performed by: INTERNAL MEDICINE

## 2023-03-23 PROCEDURE — 96413 CHEMO IV INFUSION 1 HR: CPT

## 2023-03-23 PROCEDURE — A9270 NON-COVERED ITEM OR SERVICE: HCPCS | Performed by: INTERNAL MEDICINE

## 2023-03-23 PROCEDURE — 25010000002 RITUXIMAB 10 MG/ML SOLUTION 10 ML VIAL: Performed by: INTERNAL MEDICINE

## 2023-03-23 PROCEDURE — 63710000001 ACETAMINOPHEN 325 MG TABLET: Performed by: INTERNAL MEDICINE

## 2023-03-23 PROCEDURE — 96375 TX/PRO/DX INJ NEW DRUG ADDON: CPT

## 2023-03-23 PROCEDURE — 99214 OFFICE O/P EST MOD 30 MIN: CPT | Performed by: INTERNAL MEDICINE

## 2023-03-23 PROCEDURE — 80053 COMPREHEN METABOLIC PANEL: CPT | Performed by: INTERNAL MEDICINE

## 2023-03-23 PROCEDURE — 85025 COMPLETE CBC W/AUTO DIFF WBC: CPT | Performed by: INTERNAL MEDICINE

## 2023-03-23 PROCEDURE — 96415 CHEMO IV INFUSION ADDL HR: CPT

## 2023-03-23 PROCEDURE — 25010000002 HEPARIN LOCK FLUSH PER 10 UNITS: Performed by: INTERNAL MEDICINE

## 2023-03-23 PROCEDURE — 25010000002 RITUXIMAB 10 MG/ML SOLUTION 50 ML VIAL: Performed by: INTERNAL MEDICINE

## 2023-03-23 PROCEDURE — 1126F AMNT PAIN NOTED NONE PRSNT: CPT | Performed by: INTERNAL MEDICINE

## 2023-03-23 RX ORDER — SODIUM CHLORIDE 0.9 % (FLUSH) 0.9 %
20 SYRINGE (ML) INJECTION AS NEEDED
Status: DISCONTINUED | OUTPATIENT
Start: 2023-03-23 | End: 2023-03-24 | Stop reason: HOSPADM

## 2023-03-23 RX ORDER — ACETAMINOPHEN 325 MG/1
650 TABLET ORAL ONCE
Status: COMPLETED | OUTPATIENT
Start: 2023-03-23 | End: 2023-03-23

## 2023-03-23 RX ORDER — BLOOD-GLUCOSE METER
KIT MISCELLANEOUS
COMMUNITY
Start: 2023-02-22

## 2023-03-23 RX ORDER — SODIUM CHLORIDE 0.9 % (FLUSH) 0.9 %
20 SYRINGE (ML) INJECTION AS NEEDED
OUTPATIENT
Start: 2023-03-23

## 2023-03-23 RX ORDER — HEPARIN SODIUM (PORCINE) LOCK FLUSH IV SOLN 100 UNIT/ML 100 UNIT/ML
500 SOLUTION INTRAVENOUS AS NEEDED
Status: DISCONTINUED | OUTPATIENT
Start: 2023-03-23 | End: 2023-03-24 | Stop reason: HOSPADM

## 2023-03-23 RX ORDER — HEPARIN SODIUM (PORCINE) LOCK FLUSH IV SOLN 100 UNIT/ML 100 UNIT/ML
500 SOLUTION INTRAVENOUS AS NEEDED
OUTPATIENT
Start: 2023-03-23

## 2023-03-23 RX ORDER — SODIUM CHLORIDE 9 MG/ML
250 INJECTION, SOLUTION INTRAVENOUS ONCE
Status: COMPLETED | OUTPATIENT
Start: 2023-03-23 | End: 2023-03-23

## 2023-03-23 RX ADMIN — ACETAMINOPHEN 650 MG: 325 TABLET ORAL at 11:38

## 2023-03-23 RX ADMIN — HEPARIN SODIUM (PORCINE) LOCK FLUSH IV SOLN 100 UNIT/ML 500 UNITS: 100 SOLUTION at 13:51

## 2023-03-23 RX ADMIN — Medication 20 ML: at 13:51

## 2023-03-23 RX ADMIN — SODIUM CHLORIDE 250 ML: 9 INJECTION, SOLUTION INTRAVENOUS at 11:38

## 2023-03-23 RX ADMIN — RITUXIMAB 600 MG: 10 INJECTION, SOLUTION INTRAVENOUS at 12:06

## 2023-03-23 RX ADMIN — DIPHENHYDRAMINE HYDROCHLORIDE 25 MG: 50 INJECTION, SOLUTION INTRAMUSCULAR; INTRAVENOUS at 11:41

## 2023-03-23 NOTE — PROGRESS NOTES
Patient  Radha Aparicio    Location  [facility] HEMATOLOGY & ONCOLOGY    Chief Complaint  Follicular lymphoma grade I (HCC)    Referring Provider: Gala Rao MD  PCP: Beata Lancaster MD    Subjective          Oncology/Hematology History Overview Note   Ms. Radha Aparicio is a pleasant 89 year old female who presents with NHL, follicular type diagnosed in 2019.  Ms. Aparicio has been receiving her oncological care with Dr. Jenn Zaragoza who recently retired.  Ms. Aparicio initially presented RLQ pain to her PCP, Dr. Gala Rao.  In light of her bladder cancer, CT of Abdomen and Pelvis was obtained on January 16, 2019.  Findings indicated retroperitoneal adenopathy measuring up to 4 cm x 2cm, thought either to be lymphoma or metastatic disease.  Incidently mild bilateral hydronephrosis was noted without urolithiasis.     CT guided biopsy of the retroperitoneal lymph node was obtained.  Pathology (S-) indicated non-hodgkins's b-cell lymphoma; Follicular lymphoma (grade 1).  Flow cytometryrevealed diffuse positivity with CD 20, BCL2, CD10 and patchy decoration with BCL-6.  A few days after biopsy she was admitted to hospital with acute respiratory hypoxemia secondary to influenza.  Repeat imaging with CT Abdomen and Pelvis with heterogeneous hyperdense mass in the anterior abdominal wass musculature 8 cm x 12.6 cm consistent wtih rectus sheath hematoma.  Retroperitoneal adenopathy is redemonstrated.  Index left periaortic mass measured 3.1 x 2.2 cm, previously 3.9 x 2.4. Mildly enlarged retrocrural lymph nodes.  She was discharged from the hospital on 2/20/2019.     Ms. Aparicio was evaluated by Dr. Jenn Zaragoza on March 13, 2019. PET CT was ordered and obtained.  It indicated metabolic activity within the lymph node at the base of upper chest and neck (SUV 3.85)  as well as the left axilla (SUV 5.4) in addition to 2 lymph nodes within the retroperitoneal area with SUV (8.76). .   Staging for her follicular carcinoma grade 1 is a clinical stage III with no B symptoms no significant laboratory abnormalities. At this time it was decided pursue active surveillance and close follow.     On October 17, 2019, PETCT  indicated interval progression of disease with previous noted lymph adenopathy larger and more metabolic. The left periaortic lymph node mass noted to be compressing not only the left renal vein but resulting in mild left hydronephrosis which is new. .   Single agent RITUXAN was initiated on October 9, 2019 and completed 4 weekly doses of RITUXAN.  Ms. Aparicio continues to have no B-symptoms.     Repeat PETCT obtained December 11, 2019 indicated overall improvement from previous study.  The areas of hypermetabolic lymphadenopathy supraclavicular on the left and left paraaortic have diminshed both in size and degree of metabolic activity with no new areas noted. .      PETCT done on July 2, 2020 indicated enlargement of left infraclavicular / subpectoral lynph node measuring up to 1.8 cm with SUV 5.7, increase in size and SUV of left axillary lymph node, small left pleural effusion slightly larger than prior PET, left paraaortic lymph node conglomerate slightly larger and slightly increased in SUV.  .  It was decided at that time to restart RITUXAN every 8 weeks.  Ms. Aparicio continues to have no B-symptoms.     Maintenance rituximab started November 2020, every 8 weeks.    CT CAP on 12/16/2020 showed decrease in the size of left retropectoral lymph nodes and left periaortic lymph nodes.  No new or enlarging lymph nodes in the chest, abdomen, or pelvis.  There are stable subcentimeter noncalcified pulmonary nodules as well.       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  She continues to tolerate treatment well.  She does not report any significant changes in her health except for skin rash.  She believes could be related to poison ivy, brought in by the cat during the winter storm.    Review of Systems   Constitutional: Positive for fatigue (5/10). Negative for appetite change, diaphoresis, fever, unexpected weight gain and unexpected weight loss.   HENT: Negative for hearing loss, mouth sores, sore throat, swollen glands, trouble swallowing and voice change.    Eyes: Negative for blurred vision.   Respiratory: Positive for shortness of breath. Negative for cough and wheezing.    Cardiovascular: Negative for chest pain and palpitations.   Gastrointestinal: Negative for abdominal pain, blood in stool, constipation, diarrhea, nausea and vomiting.   Endocrine: Negative for cold intolerance and heat intolerance.   Genitourinary: Negative for difficulty urinating, dysuria, frequency, hematuria and urinary incontinence.   Musculoskeletal: Negative for arthralgias, back pain and myalgias.   Skin: Negative for rash, skin lesions and wound.   Neurological: Negative for dizziness, seizures, weakness, numbness and headache.   Hematological: Does not bruise/bleed easily.   Psychiatric/Behavioral: Negative for depressed mood. The patient is not nervous/anxious.    All other systems reviewed and are negative.      Past Medical History:   Diagnosis Date   • Acute congestive heart failure (HCC)     improved   • Arthritis    • Bladder cancer (HCC)    • Bleeding disorder (HCC)     (CONDITION DUE TO BLOOD THINNERS)   • Chronic atrial fibrillation (HCC)    • Chronic heart failure with preserved ejection fraction (HFpEF) 9/23/2021   •  "High cholesterol    • Hypertension    • Lymphoma (HCC)    • Moderate to severe aortic stenosis     Patient declines to have transcatheter   • Non Hodgkin's lymphoma (HCC)     2008 treated 2008 2009.   • Rectus sheath hematoma     Right rectus sheath hematoma, off anticoagulation because of the hematoma.   • Skin cancer    • Type 2 diabetes mellitus (HCC)      Past Surgical History:   Procedure Laterality Date   • BLADDER SURGERY     • CATARACT EXTRACTION     • DILATION AND CURETTAGE, DIAGNOSTIC / THERAPEUTIC     • LEFT VENTRICULAR ASSIST DEVICE     • LYMPH NODE BIOPSY      Biopsy of retroperitoneal lymph node or mass    • SKIN CANCER DESTRUCTION     • TONSILLECTOMY       Social History     Socioeconomic History   • Marital status:    • Number of children: 2   Tobacco Use   • Smoking status: Never   • Smokeless tobacco: Never   Vaping Use   • Vaping Use: Never used   Substance and Sexual Activity   • Alcohol use: Never   • Drug use: Never   • Sexual activity: Defer     Family History   Problem Relation Age of Onset   • Heart attack Father    • Heart attack Brother    • Stomach cancer Paternal Great-Grandfather        Objective   Physical Exam  General: Alert, cooperative, no acute distress  Eyes: Anicteric sclera, PERRLA  Respiratory: normal respiratory effort  Cardiovascular: no lower extremity edema  Skin: Normal tone, no rash, no lesions  Psychiatric: Appropriate affect, intact judgment  Neurologic: No focal sensory or motor deficits, normal cognition   Musculoskeletal: Normal muscle strength and tone  Extremities: No clubbing, cyanosis, or deformities    Vitals:    03/23/23 1054   BP: 113/47   Pulse: 61   Resp: 22   Temp: 98.8 °F (37.1 °C)   SpO2: 100%   Weight: 61.6 kg (135 lb 12.9 oz)   Height: 170.2 cm (67.01\")   PainSc: 0-No pain     ECOG score: 1         PHQ-9 Total Score:         Result Review :   The following data was reviewed by: Lily Byrd MD PhD on 03/23/2023:  Lab Results   Component " Value Date    HGB 12.7 03/23/2023    HCT 39.4 03/23/2023    MCV 97.8 (H) 03/23/2023     03/23/2023    WBC 6.55 03/23/2023    NEUTROABS 4.40 03/23/2023    LYMPHSABS 1.26 03/23/2023    MONOSABS 0.69 03/23/2023    EOSABS 0.11 03/23/2023    BASOSABS 0.03 03/23/2023     Lab Results   Component Value Date    GLUCOSE 152 (H) 03/23/2023    BUN 34 (H) 03/23/2023    CREATININE 0.91 03/23/2023     03/23/2023    K 4.1 03/23/2023     03/23/2023    CO2 31.2 (H) 03/23/2023    CALCIUM 9.7 (H) 03/23/2023    PROTEINTOT 6.4 03/23/2023    ALBUMIN 3.9 03/23/2023    BILITOT 0.7 03/23/2023    ALKPHOS 83 03/23/2023    AST 34 (H) 03/23/2023    ALT 24 03/23/2023          Assessment and Plan    Diagnoses and all orders for this visit:    1. 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  She will continue treatment as planned every 8 weeks.  I reviewed her CBC and CMP.  She will only get CT CAP scans if there are clinical concerns for progression.  She return to clinic in 16 weeks for repeat labs and toxicity check.      Patient was given instructions and counseling regarding her condition or for health maintenance advice. Please see specific information pulled into the AVS if appropriate.

## 2023-05-16 RX ORDER — DIPHENHYDRAMINE HYDROCHLORIDE 50 MG/ML
50 INJECTION INTRAMUSCULAR; INTRAVENOUS AS NEEDED
Status: CANCELLED | OUTPATIENT
Start: 2023-05-18

## 2023-05-16 RX ORDER — MEPERIDINE HYDROCHLORIDE 25 MG/ML
25 INJECTION INTRAMUSCULAR; INTRAVENOUS; SUBCUTANEOUS
Status: CANCELLED | OUTPATIENT
Start: 2023-05-18

## 2023-05-16 RX ORDER — FAMOTIDINE 10 MG/ML
20 INJECTION, SOLUTION INTRAVENOUS AS NEEDED
Status: CANCELLED | OUTPATIENT
Start: 2023-05-18

## 2023-05-18 ENCOUNTER — HOSPITAL ENCOUNTER (OUTPATIENT)
Dept: ONCOLOGY | Facility: HOSPITAL | Age: 88
Discharge: HOME | End: 2023-05-18
Admitting: INTERNAL MEDICINE
Payer: MEDICARE

## 2023-05-18 ENCOUNTER — OFFICE VISIT (OUTPATIENT)
Dept: ONCOLOGY | Facility: HOSPITAL | Age: 88
End: 2023-05-18
Payer: MEDICARE

## 2023-05-18 VITALS
BODY MASS INDEX: 21.31 KG/M2 | SYSTOLIC BLOOD PRESSURE: 90 MMHG | TEMPERATURE: 98.8 F | RESPIRATION RATE: 22 BRPM | OXYGEN SATURATION: 100 % | HEART RATE: 56 BPM | WEIGHT: 135.8 LBS | HEIGHT: 67 IN | DIASTOLIC BLOOD PRESSURE: 44 MMHG

## 2023-05-18 VITALS — SYSTOLIC BLOOD PRESSURE: 95 MMHG | DIASTOLIC BLOOD PRESSURE: 47 MMHG | HEART RATE: 87 BPM

## 2023-05-18 DIAGNOSIS — Z45.2 ADJUSTMENT AND MANAGEMENT OF VASCULAR ACCESS DEVICE: ICD-10-CM

## 2023-05-18 DIAGNOSIS — R21 RASH AND NONSPECIFIC SKIN ERUPTION: ICD-10-CM

## 2023-05-18 DIAGNOSIS — C82.08 FOLLICULAR LYMPHOMA GRADE I OF LYMPH NODES OF MULTIPLE SITES: Primary | ICD-10-CM

## 2023-05-18 LAB
ALBUMIN SERPL-MCNC: 3.6 G/DL (ref 3.5–5.2)
ALBUMIN/GLOB SERPL: 1.4 G/DL
ALP SERPL-CCNC: 91 U/L (ref 39–117)
ALT SERPL W P-5'-P-CCNC: 21 U/L (ref 1–33)
ANION GAP SERPL CALCULATED.3IONS-SCNC: 6.8 MMOL/L (ref 5–15)
AST SERPL-CCNC: 35 U/L (ref 1–32)
BASOPHILS # BLD AUTO: 0.07 10*3/MM3 (ref 0–0.2)
BASOPHILS NFR BLD AUTO: 1 % (ref 0–1.5)
BILIRUB SERPL-MCNC: 0.7 MG/DL (ref 0–1.2)
BUN SERPL-MCNC: 34 MG/DL (ref 8–23)
BUN/CREAT SERPL: 36.2 (ref 7–25)
CALCIUM SPEC-SCNC: 9.6 MG/DL (ref 8.2–9.6)
CHLORIDE SERPL-SCNC: 99 MMOL/L (ref 98–107)
CO2 SERPL-SCNC: 32.2 MMOL/L (ref 22–29)
CREAT SERPL-MCNC: 0.94 MG/DL (ref 0.57–1)
DEPRECATED RDW RBC AUTO: 51.3 FL (ref 37–54)
EGFRCR SERPLBLD CKD-EPI 2021: 56.3 ML/MIN/1.73
EOSINOPHIL # BLD AUTO: 0.32 10*3/MM3 (ref 0–0.4)
EOSINOPHIL NFR BLD AUTO: 4.5 % (ref 0.3–6.2)
ERYTHROCYTE [DISTWIDTH] IN BLOOD BY AUTOMATED COUNT: 14.3 % (ref 12.3–15.4)
GLOBULIN UR ELPH-MCNC: 2.5 GM/DL
GLUCOSE SERPL-MCNC: 189 MG/DL (ref 65–99)
HCT VFR BLD AUTO: 38.1 % (ref 34–46.6)
HGB BLD-MCNC: 12.4 G/DL (ref 12–15.9)
IMM GRANULOCYTES # BLD AUTO: 0.09 10*3/MM3 (ref 0–0.05)
IMM GRANULOCYTES NFR BLD AUTO: 1.3 % (ref 0–0.5)
LYMPHOCYTES # BLD AUTO: 0.94 10*3/MM3 (ref 0.7–3.1)
LYMPHOCYTES NFR BLD AUTO: 13.2 % (ref 19.6–45.3)
MCH RBC QN AUTO: 32 PG (ref 26.6–33)
MCHC RBC AUTO-ENTMCNC: 32.5 G/DL (ref 31.5–35.7)
MCV RBC AUTO: 98.4 FL (ref 79–97)
MONOCYTES # BLD AUTO: 0.65 10*3/MM3 (ref 0.1–0.9)
MONOCYTES NFR BLD AUTO: 9.1 % (ref 5–12)
NEUTROPHILS NFR BLD AUTO: 5.04 10*3/MM3 (ref 1.7–7)
NEUTROPHILS NFR BLD AUTO: 70.9 % (ref 42.7–76)
PLATELET # BLD AUTO: 196 10*3/MM3 (ref 140–450)
PMV BLD AUTO: 10.5 FL (ref 6–12)
POTASSIUM SERPL-SCNC: 4 MMOL/L (ref 3.5–5.2)
PROT SERPL-MCNC: 6.1 G/DL (ref 6–8.5)
RBC # BLD AUTO: 3.87 10*6/MM3 (ref 3.77–5.28)
SODIUM SERPL-SCNC: 138 MMOL/L (ref 136–145)
WBC NRBC COR # BLD: 7.11 10*3/MM3 (ref 3.4–10.8)

## 2023-05-18 PROCEDURE — 96415 CHEMO IV INFUSION ADDL HR: CPT

## 2023-05-18 PROCEDURE — 25010000002 RITUXIMAB 10 MG/ML SOLUTION 10 ML VIAL: Performed by: INTERNAL MEDICINE

## 2023-05-18 PROCEDURE — 25010000002 DIPHENHYDRAMINE PER 50 MG: Performed by: INTERNAL MEDICINE

## 2023-05-18 PROCEDURE — A9270 NON-COVERED ITEM OR SERVICE: HCPCS | Performed by: INTERNAL MEDICINE

## 2023-05-18 PROCEDURE — 96375 TX/PRO/DX INJ NEW DRUG ADDON: CPT

## 2023-05-18 PROCEDURE — 25010000002 RITUXIMAB 10 MG/ML SOLUTION 50 ML VIAL: Performed by: INTERNAL MEDICINE

## 2023-05-18 PROCEDURE — 63710000001 ACETAMINOPHEN 325 MG TABLET: Performed by: INTERNAL MEDICINE

## 2023-05-18 PROCEDURE — 85025 COMPLETE CBC W/AUTO DIFF WBC: CPT | Performed by: INTERNAL MEDICINE

## 2023-05-18 PROCEDURE — 25010000002 HEPARIN LOCK FLUSH PER 10 UNITS: Performed by: INTERNAL MEDICINE

## 2023-05-18 PROCEDURE — 80053 COMPREHEN METABOLIC PANEL: CPT | Performed by: INTERNAL MEDICINE

## 2023-05-18 PROCEDURE — 96367 TX/PROPH/DG ADDL SEQ IV INF: CPT

## 2023-05-18 PROCEDURE — 96413 CHEMO IV INFUSION 1 HR: CPT

## 2023-05-18 RX ORDER — HEPARIN SODIUM (PORCINE) LOCK FLUSH IV SOLN 100 UNIT/ML 100 UNIT/ML
500 SOLUTION INTRAVENOUS AS NEEDED
OUTPATIENT
Start: 2023-05-18

## 2023-05-18 RX ORDER — ACETAMINOPHEN 325 MG/1
650 TABLET ORAL ONCE
Status: COMPLETED | OUTPATIENT
Start: 2023-05-18 | End: 2023-05-18

## 2023-05-18 RX ORDER — DOXEPIN HYDROCHLORIDE 10 MG/1
10 CAPSULE ORAL NIGHTLY
Qty: 30 CAPSULE | Refills: 2 | Status: SHIPPED | OUTPATIENT
Start: 2023-05-18

## 2023-05-18 RX ORDER — HEPARIN SODIUM (PORCINE) LOCK FLUSH IV SOLN 100 UNIT/ML 100 UNIT/ML
500 SOLUTION INTRAVENOUS AS NEEDED
Status: DISCONTINUED | OUTPATIENT
Start: 2023-05-18 | End: 2023-05-19 | Stop reason: HOSPADM

## 2023-05-18 RX ORDER — SODIUM CHLORIDE 9 MG/ML
250 INJECTION, SOLUTION INTRAVENOUS ONCE
Status: COMPLETED | OUTPATIENT
Start: 2023-05-18 | End: 2023-05-18

## 2023-05-18 RX ORDER — SODIUM CHLORIDE 0.9 % (FLUSH) 0.9 %
20 SYRINGE (ML) INJECTION AS NEEDED
OUTPATIENT
Start: 2023-05-18

## 2023-05-18 RX ORDER — SODIUM CHLORIDE 0.9 % (FLUSH) 0.9 %
20 SYRINGE (ML) INJECTION AS NEEDED
Status: DISCONTINUED | OUTPATIENT
Start: 2023-05-18 | End: 2023-05-19 | Stop reason: HOSPADM

## 2023-05-18 RX ADMIN — ACETAMINOPHEN 650 MG: 325 TABLET ORAL at 11:50

## 2023-05-18 RX ADMIN — SODIUM CHLORIDE 250 ML: 9 INJECTION, SOLUTION INTRAVENOUS at 11:45

## 2023-05-18 RX ADMIN — Medication 20 ML: at 13:48

## 2023-05-18 RX ADMIN — DIPHENHYDRAMINE HYDROCHLORIDE 25 MG: 50 INJECTION, SOLUTION INTRAMUSCULAR; INTRAVENOUS at 11:48

## 2023-05-18 RX ADMIN — RITUXIMAB 600 MG: 10 INJECTION, SOLUTION INTRAVENOUS at 12:14

## 2023-05-18 RX ADMIN — HEPARIN SODIUM (PORCINE) LOCK FLUSH IV SOLN 100 UNIT/ML 500 UNITS: 100 SOLUTION at 13:48

## 2023-05-18 NOTE — PROGRESS NOTES
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 Radha Aparicio is a pleasant 89 year old female who presents with NHL, follicular type diagnosed in 2019.  Ms. Aparicio has been receiving her oncological care with Dr. Jenn Zaragoza who recently retired.  Ms. Aparicio initially presented RLQ pain to her PCP, Dr. Gala Rao.  In light of her bladder cancer, CT of Abdomen and Pelvis was obtained on January 16, 2019.  Findings indicated retroperitoneal adenopathy measuring up to 4 cm x 2cm, thought either to be lymphoma or metastatic disease.  Incidently mild bilateral hydronephrosis was noted without urolithiasis.     CT guided biopsy of the retroperitoneal lymph node was obtained.  Pathology (S-) indicated non-hodgkins's b-cell lymphoma; Follicular lymphoma (grade 1).  Flow cytometryrevealed diffuse positivity with CD 20, BCL2, CD10 and patchy decoration with BCL-6.  A few days after biopsy she was admitted to hospital with acute respiratory hypoxemia secondary to influenza.  Repeat imaging with CT Abdomen and Pelvis with heterogeneous hyperdense mass in the anterior abdominal wass musculature 8 cm x 12.6 cm consistent wtih rectus sheath hematoma.  Retroperitoneal adenopathy is redemonstrated.  Index left periaortic mass measured 3.1 x 2.2 cm, previously 3.9 x 2.4. Mildly enlarged retrocrural lymph nodes.  She was discharged from the hospital on 2/20/2019.     Ms. Aparicio was evaluated by Dr. Jenn Zaragoza on March 13, 2019. PET CT was ordered and obtained.  It indicated metabolic activity within the lymph node at the base of upper chest and neck (SUV 3.85)  as well as the left axilla (SUV 5.4) in addition to 2 lymph nodes within the retroperitoneal area with SUV (8.76).  .  Staging for her follicular carcinoma grade 1 is a clinical stage III with no B symptoms no significant laboratory abnormalities. At this time it was decided pursue active surveillance and close follow.     On October 17, 2019, PETCT  indicated interval progression of disease with previous noted lymph adenopathy larger and more metabolic. The left periaortic lymph node mass noted to be compressing not only the left renal vein but resulting in mild left hydronephrosis which is new. .   Single agent RITUXAN was initiated on October 9, 2019 and completed 4 weekly doses of RITUXAN.  Ms. Aparicio continues to have no B-symptoms.     Repeat PETCT obtained December 11, 2019 indicated overall improvement from previous study.  The areas of hypermetabolic lymphadenopathy supraclavicular on the left and left paraaortic have diminshed both in size and degree of metabolic activity with no new areas noted. .      PETCT done on July 2, 2020 indicated enlargement of left infraclavicular / subpectoral lynph node measuring up to 1.8 cm with SUV 5.7, increase in size and SUV of left axillary lymph node, small left pleural effusion slightly larger than prior PET, left paraaortic lymph node conglomerate slightly larger and slightly increased in SUV.  .  It was decided at that time to restart RITUXAN every 8 weeks.  Ms. Aparicio continues to have no B-symptoms.     Maintenance rituximab started November 2020, every 8 weeks.    CT CAP on 12/16/2020 showed decrease in the size of left retropectoral lymph nodes and left periaortic lymph nodes.  No new or enlarging lymph nodes in the chest, abdomen, or pelvis.  There are stable subcentimeter noncalcified pulmonary nodules as well.       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  She is not aware of any significant side effects associated with the medication except a possible rash.  She has had a diffuse itchy rash for several months.  Topical medications and Benadryl have not helped.    Review of Systems   Constitutional:  Negative for appetite change, diaphoresis, fatigue, fever, unexpected weight gain and unexpected weight loss.   HENT:  Negative for hearing loss, mouth sores, sore throat, swollen glands, trouble swallowing and voice change.    Eyes:  Negative for blurred vision.   Respiratory:  Negative for cough, shortness of breath and wheezing.    Cardiovascular:  Negative for chest pain and palpitations.   Gastrointestinal:  Negative for abdominal pain, blood in stool, constipation, diarrhea, nausea and vomiting.   Endocrine: Negative for cold intolerance and heat intolerance.   Genitourinary:  Negative for difficulty urinating, dysuria, frequency, hematuria and urinary incontinence.   Musculoskeletal:  Negative for arthralgias, back pain and myalgias.   Skin:  Positive for rash. Negative for skin lesions and wound.   Neurological:  Negative for dizziness, seizures, weakness, numbness and headache.   Hematological:  Does not bruise/bleed easily.   Psychiatric/Behavioral:  Negative for depressed mood. The patient is not nervous/anxious.    All other systems reviewed and are negative.    Past Medical History:   Diagnosis Date    Acute congestive heart failure     improved    Arthritis     Atrial fibrillation, persistent 09/16/2021    Bladder cancer     Bleeding disorder     (CONDITION DUE TO BLOOD THINNERS)    Chronic atrial fibrillation      "Chronic heart failure with preserved ejection fraction (HFpEF) 09/23/2021    High cholesterol     Hyperlipidemia LDL goal <100 06/16/2021    Hypertension     Lymphoma     Moderate to severe aortic stenosis     Patient declines to have transcatheter    Non Hodgkin's lymphoma     2008 treated 2008 2009.    Rectus sheath hematoma     Right rectus sheath hematoma, off anticoagulation because of the hematoma.    Skin cancer     Type 2 diabetes mellitus      Past Surgical History:   Procedure Laterality Date    BLADDER SURGERY      CATARACT EXTRACTION      DILATION AND CURETTAGE, DIAGNOSTIC / THERAPEUTIC      LEFT VENTRICULAR ASSIST DEVICE      LYMPH NODE BIOPSY      Biopsy of retroperitoneal lymph node or mass     SKIN CANCER DESTRUCTION      TONSILLECTOMY       Social History     Socioeconomic History    Marital status:     Number of children: 2   Tobacco Use    Smoking status: Never    Smokeless tobacco: Never   Vaping Use    Vaping Use: Never used   Substance and Sexual Activity    Alcohol use: Never    Drug use: Never    Sexual activity: Defer     Family History   Problem Relation Age of Onset    Heart attack Father     Heart attack Brother     Stomach cancer Paternal Great-Grandfather        Objective   Physical Exam  General: Alert, cooperative, no acute distress  Eyes: Anicteric sclera, PERRLA  Respiratory: normal respiratory effort  Cardiovascular: no lower extremity edema  Skin: Normal tone, diffuse maculopapular rash  Psychiatric: Appropriate affect, intact judgment  Neurologic: No focal sensory or motor deficits, normal cognition   Musculoskeletal: Normal muscle strength and tone  Extremities: No clubbing, cyanosis, or deformities    Vitals:    05/18/23 1109   BP: 90/44   Pulse: 56   Resp: 22   Temp: 98.8 °F (37.1 °C)   SpO2: 100%   Weight: 61.6 kg (135 lb 12.9 oz)   Height: 170.2 cm (67.01\")   PainSc: 0-No pain     ECOG score: 2         PHQ-9 Total Score:         Result Review :   The following data " was reviewed by: Lily Byrd MD PhD on 05/18/2023:  Lab Results   Component Value Date    HGB 12.4 05/18/2023    HCT 38.1 05/18/2023    MCV 98.4 (H) 05/18/2023     05/18/2023    WBC 7.11 05/18/2023    NEUTROABS 5.04 05/18/2023    LYMPHSABS 0.94 05/18/2023    MONOSABS 0.65 05/18/2023    EOSABS 0.32 05/18/2023    BASOSABS 0.07 05/18/2023     Lab Results   Component Value Date    GLUCOSE 189 (H) 05/18/2023    BUN 34 (H) 05/18/2023    CREATININE 0.94 05/18/2023     05/18/2023    K 4.0 05/18/2023    CL 99 05/18/2023    CO2 32.2 (H) 05/18/2023    CALCIUM 9.6 05/18/2023    PROTEINTOT 6.1 05/18/2023    ALBUMIN 3.6 05/18/2023    BILITOT 0.7 05/18/2023    ALKPHOS 91 05/18/2023    AST 35 (H) 05/18/2023    ALT 21 05/18/2023     Lab Results   Component Value Date    IRON 86 06/02/2022    LABIRON 20 06/02/2022    TRANSFERRIN 287 06/02/2022    TIBC 428 06/02/2022     Lab Results   Component Value Date     (H) 03/11/2020    FERRITIN 135.00 06/02/2022    VDXFRNQK75 1,436 (H) 06/02/2022    FOLATE >20.00 06/02/2022     No results found for: PSA, CEA, AFP, ,        Assessment and Plan    Diagnoses and all orders for this visit:    1. 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 Rash and nonspecific skin eruption  -     doxepin (SINEquan) 10 MG capsule; Take 1 capsule by mouth Every Night.  Dispense: 30 capsule; Refill: 2  -     Ambulatory Referral to Dermatology        Recurrent follicular lymphoma: Patient has no evidence of toxicity with ongoing rituximab.  She will continue treatment as planned every 8 weeks.  I reviewed her CBC and CMP.  I will plan for restaging CT CAP with without contrast in 2 months.    Skin rash: The etiology is unclear.  I will refer the patient to dermatology for evaluation and treatment.  In the meantime I will prescribe 10 mg of doxepin.    Patient was given  instructions and counseling regarding her condition or for health maintenance advice. Please see specific information pulled into the AVS if appropriate.     Lily Byrd MD PhD    6/19/2023

## 2023-06-06 ENCOUNTER — OFFICE VISIT (OUTPATIENT)
Dept: CARDIOLOGY | Facility: CLINIC | Age: 88
End: 2023-06-06
Payer: MEDICARE

## 2023-06-06 VITALS
HEART RATE: 64 BPM | HEIGHT: 67 IN | WEIGHT: 132 LBS | DIASTOLIC BLOOD PRESSURE: 51 MMHG | SYSTOLIC BLOOD PRESSURE: 102 MMHG | BODY MASS INDEX: 20.72 KG/M2

## 2023-06-06 DIAGNOSIS — I10 ESSENTIAL HYPERTENSION: ICD-10-CM

## 2023-06-06 DIAGNOSIS — I50.32 CHRONIC HEART FAILURE WITH PRESERVED EJECTION FRACTION (HFPEF): Primary | ICD-10-CM

## 2023-06-06 DIAGNOSIS — I35.0 NONRHEUMATIC AORTIC VALVE STENOSIS: ICD-10-CM

## 2023-06-06 DIAGNOSIS — E78.5 HYPERLIPIDEMIA LDL GOAL <100: ICD-10-CM

## 2023-06-06 DIAGNOSIS — I48.19 ATRIAL FIBRILLATION, PERSISTENT: ICD-10-CM

## 2023-06-06 PROBLEM — C67.9 BLADDER CANCER: Status: RESOLVED | Noted: 2017-02-23 | Resolved: 2023-06-06

## 2023-06-06 PROBLEM — R60.0 LOCALIZED EDEMA: Status: RESOLVED | Noted: 2022-08-08 | Resolved: 2023-06-06

## 2023-06-06 PROBLEM — I51.9 HEART PROBLEM: Status: RESOLVED | Noted: 2021-06-16 | Resolved: 2023-06-06

## 2023-06-06 PROBLEM — C82.00 FOLLICULAR LYMPHOMA GRADE I: Status: RESOLVED | Noted: 2021-05-19 | Resolved: 2023-06-06

## 2023-06-06 PROBLEM — J01.80 OTHER ACUTE SINUSITIS: Status: RESOLVED | Noted: 2021-06-16 | Resolved: 2023-06-06

## 2023-06-06 PROBLEM — J45.20 MILD INTERMITTENT ASTHMA WITHOUT COMPLICATION: Status: RESOLVED | Noted: 2022-09-13 | Resolved: 2023-06-06

## 2023-06-06 PROBLEM — M79.672 FOOT PAIN, LEFT: Status: RESOLVED | Noted: 2021-06-16 | Resolved: 2023-06-06

## 2023-06-06 PROBLEM — R19.7 DIARRHEA, UNSPECIFIED TYPE: Status: RESOLVED | Noted: 2022-06-08 | Resolved: 2023-06-06

## 2023-06-06 PROBLEM — J20.8 ACUTE BRONCHITIS DUE TO OTHER SPECIFIED ORGANISMS: Status: RESOLVED | Noted: 2021-11-17 | Resolved: 2023-06-06

## 2023-06-06 RX ORDER — BUMETANIDE 2 MG/1
2 TABLET ORAL DAILY
Qty: 30 TABLET | Refills: 3 | Status: SHIPPED | OUTPATIENT
Start: 2023-06-06

## 2023-06-06 NOTE — PROGRESS NOTES
Chief Complaint  Follow-up and Hypertension    Subjective            History of Present Illness  Radha Aparicio is a 89-year-old white/ female patient who presents to the office today for follow-up.  She has chronic HFpEF, persistent atrial fibrillation, hyperlipidemia, hypertension, and severe aortic stenosis.  She admits to persistent fatigue and shortness of breath.  She increases her Lasix dose when she has bilateral lower extremity edema.  Over the last few weeks the edema has worsened even though she has increased her Lasix intake.  She is scheduled for CT of her chest on 6/13/2023.  She denies any chest pain, palpitations, or lightheadedness/dizziness.    PMH  Past Medical History:   Diagnosis Date    Acute congestive heart failure     improved    Arthritis     Atrial fibrillation, persistent 09/16/2021    Bladder cancer     Bleeding disorder     (CONDITION DUE TO BLOOD THINNERS)    Chronic atrial fibrillation     Chronic heart failure with preserved ejection fraction (HFpEF) 09/23/2021    High cholesterol     Hyperlipidemia LDL goal <100 06/16/2021    Hypertension     Lymphoma     Moderate to severe aortic stenosis     Patient declines to have transcatheter    Non Hodgkin's lymphoma     2008 treated 2008 2009.    Rectus sheath hematoma     Right rectus sheath hematoma, off anticoagulation because of the hematoma.    Skin cancer     Type 2 diabetes mellitus          ALLERGY  Allergies   Allergen Reactions    Contrast Dye (Echo Or Unknown Ct/Mr) Unknown - Low Severity    Iodinated Contrast Media Hives and Other (See Comments)     IODINATED CONTRAST MEDIA (Verified  Allergy, Unknown, HIVES,SNEEZING,ITCHY EYES)    Iodine Unknown - Low Severity    Penicillins Rash    Shellfish Allergy Unknown - Low Severity    Spinach Other (See Comments)      SPINACH (Verified  Allergy, Unknown, 2/22/21)      SHOWED UP ON ALLERGY TESTING    Sulfa Antibiotics Other (See Comments)     (Verified  Allergy, Unknown, RASH)           SURGICALHX  Past Surgical History:   Procedure Laterality Date    BLADDER SURGERY      CATARACT EXTRACTION      DILATION AND CURETTAGE, DIAGNOSTIC / THERAPEUTIC      LEFT VENTRICULAR ASSIST DEVICE      LYMPH NODE BIOPSY      Biopsy of retroperitoneal lymph node or mass     SKIN CANCER DESTRUCTION      TONSILLECTOMY            SOC  Social History     Socioeconomic History    Marital status:     Number of children: 2   Tobacco Use    Smoking status: Never    Smokeless tobacco: Never   Vaping Use    Vaping Use: Never used   Substance and Sexual Activity    Alcohol use: Never    Drug use: Never    Sexual activity: Defer         FAMHX  Family History   Problem Relation Age of Onset    Heart attack Father     Heart attack Brother     Stomach cancer Paternal Great-Grandfather           MEDSIGONLY  Current Outpatient Medications on File Prior to Visit   Medication Sig    acetaminophen (TYLENOL) 325 MG tablet 2 tablets As Needed.    albuterol sulfate  (90 Base) MCG/ACT inhaler USE 2 PUFFS BY MOUTH EVERY 4 HOURS AS NEEDED FOR WHEEZING (Patient taking differently: Inhale 2 puffs Every 4 (Four) Hours As Needed.)    aspirin 81 MG EC tablet Take 1 tablet by mouth Daily.    atorvastatin (LIPITOR) 40 MG tablet Take 1 tablet by mouth Daily.    Calcium Carbonate-Vitamin D 600-200 MG-UNIT capsule Take 1 capsule by mouth 2 (Two) Times a Day.    carvedilol (COREG) 6.25 MG tablet TAKE 1 TABLET BY MOUTH TWICE DAILY WITH FOOD    dabigatran etexilate (Pradaxa) 75 MG capsule Take 1 capsule by mouth 2 (Two) Times a Day.    doxepin (SINEquan) 10 MG capsule Take 1 capsule by mouth Every Night.    Eliquis 2.5 MG tablet tablet Take 1 tablet by mouth Every 12 (Twelve) Hours.    ergocalciferol (ERGOCALCIFEROL) 1.25 MG (49925 UT) capsule One every 2 weeks    FREESTYLE LITE test strip USE AS DIRECTED TO TEST BLOOD SUGAR EVERY DAY    furosemide (LASIX) 40 MG tablet Take 1 tablet by mouth Daily. 1.5 tabs daily (Patient taking  "differently: Take 1.5 tablets by mouth Daily.)    hydrocortisone-bacitracin-zinc oxide-nystatin (MAGIC BARRIER) Apply 1 application topically to the appropriate area as directed As Needed (due on bottom).    iron polysaccharides (Ferrex 150) 150 MG capsule 1 tablet p.o. twice daily    Lancets (freestyle) lancets 1 each by Other route Daily.    latanoprost (XALATAN) 0.005 % ophthalmic solution INSTILL 1 DROP IN RIGHT EYE AT BEDTIME    levoFLOXacin (LEVAQUIN) 500 MG tablet Take 1 tablet by mouth Daily.    montelukast (SINGULAIR) 10 MG tablet TAKE 1 TABLET(10 MG) BY MOUTH EVERY DAY IN THE EVENING (Patient taking differently: Take 1 tablet by mouth Every Night.)    multivitamin with minerals tablet tablet Take 1 tablet by mouth Daily.    potassium chloride 10 MEQ CR tablet Take 1 tablet by mouth Daily.    Probiotic Product (Risaquad-2) capsule capsule Take 1 capsule by mouth Daily.    raloxifene (EVISTA) 60 MG tablet Take 1 tablet by mouth Daily.    spironolactone (Aldactone) 25 MG tablet Take 1/2 tablet every day (Patient taking differently: Take 12.5 mg by mouth Daily. Taking one whole tablet every day)    Symbicort 80-4.5 MCG/ACT inhaler Inhale 2 puffs 2 (Two) Times a Day.    Zinc 50 MG tablet Take 1 tablet by mouth Daily.     No current facility-administered medications on file prior to visit.         Objective   /51   Pulse 64   Ht 170.2 cm (67.01\")   Wt 59.9 kg (132 lb)   BMI 20.67 kg/m²       Physical Exam  Constitutional:       Appearance: She is normal weight.   HENT:      Head: Normocephalic.   Neck:      Vascular: No carotid bruit.   Cardiovascular:      Rate and Rhythm: Normal rate. Rhythm irregular.      Pulses: Normal pulses.      Heart sounds: Normal heart sounds. No murmur heard.  Pulmonary:      Effort: Pulmonary effort is normal.      Breath sounds: Normal breath sounds.   Musculoskeletal:      Cervical back: Neck supple.      Right lower le+ Pitting Edema present.      Left lower le+ " Pitting Edema present.   Skin:     General: Skin is dry.   Neurological:      Mental Status: She is alert and oriented to person, place, and time.   Psychiatric:         Behavior: Behavior normal.     Result Review :   The following data was reviewed by: FANY Farooq on 06/06/2023:  proBNP   Date Value Ref Range Status   01/19/2023 6,403.0 (H) 0.0 - 1,800.0 pg/mL Final     CMP          5/18/2023    10:37   CMP   Glucose 189    BUN 34    Creatinine 0.94    EGFR 56.3    Sodium 138    Potassium 4.0    Chloride 99    Calcium 9.6    Total Protein 6.1    Albumin 3.6    Globulin 2.5    Total Bilirubin 0.7    Alkaline Phosphatase 91    AST (SGOT) 35    ALT (SGPT) 21    Albumin/Globulin Ratio 1.4    BUN/Creatinine Ratio 36.2    Anion Gap 6.8      CBC w/diff          5/18/2023    10:37   CBC w/Diff   WBC 7.11    RBC 3.87    Hemoglobin 12.4    Hematocrit 38.1    MCV 98.4    MCH 32.0    MCHC 32.5    RDW 14.3    Platelets 196    Neutrophil Rel % 70.9    Immature Granulocyte Rel % 1.3    Lymphocyte Rel % 13.2    Monocyte Rel % 9.1    Eosinophil Rel % 4.5    Basophil Rel % 1.0       Lab Results   Component Value Date    TSH 1.240 06/02/2022      Lab Results   Component Value Date    FREET4 1.22 06/02/2022      No results found for: DDIMERQUANT  Magnesium   Date Value Ref Range Status   06/16/2022 2.0 1.7 - 2.3 mg/dL Final      No results found for: DIGOXIN   Lab Results   Component Value Date    TROPONINT <0.010 01/19/2023             [id number] Score: 6        Assessment and Plan    Diagnoses and all orders for this visit:    1. Chronic heart failure with preserved ejection fraction (HFpEF) (Primary)  She has evidence of fluid overload in her bilateral lower extremities today, stop Lasix and replace with Bumex 2 mg daily.  Continue spironolactone 12.5 mg daily.  Reduce fluid intake and sodium intake.  Continue daily weight.  Advised compression socks.  Check BMP and proBNP in 2 weeks to assess renal function and  CHF.  -     Basic Metabolic Panel; Future  -     proBNP; Future    2. Atrial fibrillation, persistent  Symptomatically stable at this time, continue carvedilol 6.25 mg twice daily.  Continue Eliquis for CVA prevention.    3. Essential hypertension  Currently controlled and without adverse effects from medication, continue carvedilol 6.25 mg twice daily.  Low-sodium diet discussed.    4. Hyperlipidemia LDL goal <100  Last lipid panel is not available for review, continue atorvastatin 40 mg daily for now and obtain new fasting lipid and hepatic function panel to make sure that this dose is being effective for her.    5. Severe aortic stenosis  Symptomatically stable at this time, she has decided not to forego valve replacement at this time due to her age and state of her health.    Other orders  -     bumetanide (BUMEX) 2 MG tablet; Take 1 tablet by mouth Daily.  Dispense: 30 tablet; Refill: 3            Follow Up   Return in about 6 months (around 12/6/2023) for Follow up with Dr Harden.    Patient was given instructions and counseling regarding her condition or for health maintenance advice. Please see specific information pulled into the AVS if appropriate.     Radha Aparicio  reports that she has never smoked. She has never used smokeless tobacco.           Veronica Mckeon, FANY  06/06/23  13:43 EDT    Dictated Utilizing Dragon Dictation

## 2023-06-12 ENCOUNTER — TELEPHONE (OUTPATIENT)
Dept: CARDIOLOGY | Facility: CLINIC | Age: 88
End: 2023-06-12
Payer: MEDICARE

## 2023-06-12 NOTE — TELEPHONE ENCOUNTER
The [facility] received a fax that requires your attention. The document has been indexed to the patient’s chart for your review.      Reason for sending: EXTERNAL MEDICAL RECORD NOTIFICATION     Documents Description: SIGN & RETURN     Name of Sender: JANE TINSLEY    Date Indexed: 6.10.23

## 2023-06-13 ENCOUNTER — HOSPITAL ENCOUNTER (OUTPATIENT)
Dept: CT IMAGING | Facility: HOSPITAL | Age: 88
Discharge: HOME | End: 2023-06-13
Admitting: INTERNAL MEDICINE
Payer: MEDICARE

## 2023-06-13 DIAGNOSIS — C82.08 FOLLICULAR LYMPHOMA GRADE I OF LYMPH NODES OF MULTIPLE SITES: ICD-10-CM

## 2023-06-13 PROCEDURE — 74176 CT ABD & PELVIS W/O CONTRAST: CPT

## 2023-06-13 PROCEDURE — 71250 CT THORAX DX C-: CPT

## 2023-06-17 NOTE — ASSESSMENT & PLAN NOTE
Blood blister on the right lower extremity is resolved.  Its not weeping or draining fluid.  There is still edema.  Is still erythematous.  Not as red hot or tender though.  Assessment: Cellulitis of the right lower extremity.  Patient is immunosuppressed due to NHL and chemotherapy.  Plan: We will give 14 days of doxycycline.  Try to elevate the leg is much as possible.  Use Ace wraps for compression if possible.  Continue dressing change daily.  Does not have home health nurse.  She might benefit from that if she continues to have difficulty with the leg.  I would prefer she not get her next round of chemotherapy which is scheduled next week.  I asked her daughter to check with Dr. Byrd her oncologist.   Name band;

## 2023-07-13 ENCOUNTER — OFFICE VISIT (OUTPATIENT)
Dept: ONCOLOGY | Facility: HOSPITAL | Age: 88
End: 2023-07-13
Payer: MEDICARE

## 2023-07-13 VITALS
HEIGHT: 67 IN | HEART RATE: 66 BPM | BODY MASS INDEX: 19.86 KG/M2 | OXYGEN SATURATION: 95 % | SYSTOLIC BLOOD PRESSURE: 115 MMHG | RESPIRATION RATE: 20 BRPM | TEMPERATURE: 98.6 F | WEIGHT: 126.54 LBS | DIASTOLIC BLOOD PRESSURE: 50 MMHG

## 2023-07-13 DIAGNOSIS — I50.32 CHRONIC HEART FAILURE WITH PRESERVED EJECTION FRACTION (HFPEF): ICD-10-CM

## 2023-07-13 DIAGNOSIS — C82.08 FOLLICULAR LYMPHOMA GRADE I OF LYMPH NODES OF MULTIPLE SITES: Primary | ICD-10-CM

## 2023-07-13 DIAGNOSIS — D70.9 NEUTROPENIA, UNSPECIFIED TYPE: ICD-10-CM

## 2023-07-13 NOTE — PROGRESS NOTES
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 Radha Aparicio is a pleasant 89 year old female who presents with NHL, follicular type diagnosed in 2019.  Ms. Aparicio has been receiving her oncological care with Dr. Jenn Zaragoza who recently retired.  Ms. Aparicio initially presented RLQ pain to her PCP, Dr. Gala Rao.  In light of her bladder cancer, CT of Abdomen and Pelvis was obtained on January 16, 2019.  Findings indicated retroperitoneal adenopathy measuring up to 4 cm x 2cm, thought either to be lymphoma or metastatic disease.  Incidently mild bilateral hydronephrosis was noted without urolithiasis.     CT guided biopsy of the retroperitoneal lymph node was obtained.  Pathology (S-) indicated non-hodgkins's b-cell lymphoma; Follicular lymphoma (grade 1).  Flow cytometryrevealed diffuse positivity with CD 20, BCL2, CD10 and patchy decoration with BCL-6.  A few days after biopsy she was admitted to hospital with acute respiratory hypoxemia secondary to influenza.  Repeat imaging with CT Abdomen and Pelvis with heterogeneous hyperdense mass in the anterior abdominal wass musculature 8 cm x 12.6 cm consistent wtih rectus sheath hematoma.  Retroperitoneal adenopathy is redemonstrated.  Index left periaortic mass measured 3.1 x 2.2 cm, previously 3.9 x 2.4. Mildly enlarged retrocrural lymph nodes.  She was discharged from the hospital on 2/20/2019.     Ms. Aparicio was evaluated by Dr. Jenn Zaragoza on March 13, 2019. PET CT was ordered and obtained.  It indicated metabolic activity within the lymph node at the base of upper chest and neck (SUV 3.85)  as well as the left axilla (SUV 5.4) in addition to 2 lymph nodes within the retroperitoneal area with SUV (8.76).  .  Staging for her follicular carcinoma grade 1 is a clinical stage III with no B symptoms no significant laboratory abnormalities. At this time it was decided pursue active surveillance and close follow.     On October 17, 2019, PETCT  indicated interval progression of disease with previous noted lymph adenopathy larger and more metabolic. The left periaortic lymph node mass noted to be compressing not only the left renal vein but resulting in mild left hydronephrosis which is new. .   Single agent RITUXAN was initiated on October 9, 2019 and completed 4 weekly doses of RITUXAN.  Ms. Aparicio continues to have no B-symptoms.     Repeat PETCT obtained December 11, 2019 indicated overall improvement from previous study.  The areas of hypermetabolic lymphadenopathy supraclavicular on the left and left paraaortic have diminshed both in size and degree of metabolic activity with no new areas noted. .      PETCT done on July 2, 2020 indicated enlargement of left infraclavicular / subpectoral lynph node measuring up to 1.8 cm with SUV 5.7, increase in size and SUV of left axillary lymph node, small left pleural effusion slightly larger than prior PET, left paraaortic lymph node conglomerate slightly larger and slightly increased in SUV.  .  It was decided at that time to restart RITUXAN every 8 weeks.  Ms. Aparicio continues to have no B-symptoms.     Maintenance rituximab started November 2020, every 8 weeks.    CT CAP on 12/16/2020 showed decrease in the size of left retropectoral lymph nodes and left periaortic lymph nodes.  No new or enlarging lymph nodes in the chest, abdomen, or pelvis.  There are stable subcentimeter noncalcified pulmonary nodules as well.       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  She had a CT scan on 6/13/2023 which was negative for disease recurrence.  I reviewed her labs which are essentially normal with the exception of a low ANC of 1004 and 30.  She says she feels fairly well.  Doxepin which I previously prescribed has helped with the itching.  She tells me that her cardiologist has changed her diuretic to Bumex.    Review of Systems   Constitutional:  Positive for fatigue (7/10). Negative for appetite change, diaphoresis, fever, unexpected weight gain and unexpected weight loss.   HENT:  Negative for hearing loss, mouth sores, sore throat, swollen glands, trouble swallowing and voice change.    Eyes:  Negative for blurred vision.   Respiratory:  Positive for shortness of breath. Negative for cough and wheezing.    Cardiovascular:  Negative for chest pain and palpitations.   Gastrointestinal:  Negative for abdominal pain, blood in stool, constipation, diarrhea, nausea and vomiting.   Endocrine: Negative for cold intolerance and heat intolerance.   Genitourinary:  Negative for difficulty urinating, dysuria, frequency, hematuria and urinary incontinence.   Musculoskeletal:  Negative for arthralgias, back pain and myalgias.   Skin:  Negative for rash, skin lesions and wound.   Neurological:  Negative for dizziness, seizures, weakness, numbness and headache.   Hematological:  Does not bruise/bleed easily.   Psychiatric/Behavioral:  Negative for depressed mood. The patient is not nervous/anxious.    All other systems reviewed and are negative.    Past Medical History:   Diagnosis Date    Acute congestive heart failure     improved    Arthritis     Atrial fibrillation, persistent 09/16/2021    Bladder  "cancer     Bleeding disorder     (CONDITION DUE TO BLOOD THINNERS)    Chronic atrial fibrillation     Chronic heart failure with preserved ejection fraction (HFpEF) 09/23/2021    High cholesterol     Hyperlipidemia LDL goal <100 06/16/2021    Hypertension     Lymphoma     Moderate to severe aortic stenosis     Patient declines to have transcatheter    Non Hodgkin's lymphoma     2008 treated 2008 2009.    Rectus sheath hematoma     Right rectus sheath hematoma, off anticoagulation because of the hematoma.    Skin cancer     Type 2 diabetes mellitus      Past Surgical History:   Procedure Laterality Date    BLADDER SURGERY      CATARACT EXTRACTION      DILATION AND CURETTAGE, DIAGNOSTIC / THERAPEUTIC      LEFT VENTRICULAR ASSIST DEVICE      LYMPH NODE BIOPSY      Biopsy of retroperitoneal lymph node or mass     SKIN CANCER DESTRUCTION      TONSILLECTOMY       Social History     Socioeconomic History    Marital status:     Number of children: 2   Tobacco Use    Smoking status: Never    Smokeless tobacco: Never   Vaping Use    Vaping Use: Never used   Substance and Sexual Activity    Alcohol use: Never    Drug use: Never    Sexual activity: Defer     Family History   Problem Relation Age of Onset    Heart attack Father     Heart attack Brother     Stomach cancer Paternal Great-Grandfather        Objective   Physical Exam  General: Alert, cooperative, no acute distress  Eyes: Anicteric sclera, PERRLA  Respiratory: normal respiratory effort  Cardiovascular: 2+  lower extremity edema  Skin: Normal tone, no rash, no lesions  Psychiatric: Appropriate affect, intact judgment  Neurologic: No focal sensory or motor deficits, normal cognition   Musculoskeletal: Normal muscle strength and tone  Extremities: No clubbing, cyanosis, or deformities    Vitals:    07/13/23 1119   BP: 115/50   Pulse: 66   Resp: 20   Temp: 98.6 °F (37 °C)   SpO2: 95%   Weight: 57.4 kg (126 lb 8.7 oz)   Height: 170.2 cm (67.01\")   PainSc: 0-No pain "     ECOG score: 1         PHQ-9 Total Score:         Result Review :   The following data was reviewed by: Lily Byrd MD PhD on 07/13/2023:  Lab Results   Component Value Date    HGB 12.3 09/07/2023    HCT 38.1 09/07/2023    MCV 94.8 09/07/2023     09/07/2023    WBC 4.45 09/07/2023    NEUTROABS 2.69 09/07/2023    LYMPHSABS 0.96 09/07/2023    MONOSABS 0.55 09/07/2023    EOSABS 0.08 09/07/2023    BASOSABS 0.05 09/07/2023     Lab Results   Component Value Date    GLUCOSE 188 (H) 09/07/2023    BUN 49 (H) 09/07/2023    CREATININE 0.93 09/07/2023     09/07/2023    K 4.0 09/07/2023    CL 97 (L) 09/07/2023    CO2 31.4 (H) 09/07/2023    CALCIUM 9.4 09/07/2023    PROTEINTOT 6.5 09/07/2023    ALBUMIN 3.8 09/07/2023    BILITOT 0.7 09/07/2023    ALKPHOS 77 09/07/2023    AST 33 (H) 09/07/2023    ALT 14 09/07/2023     Lab Results   Component Value Date    IRON 86 06/02/2022    LABIRON 20 06/02/2022    TRANSFERRIN 287 06/02/2022    TIBC 428 06/02/2022     Lab Results   Component Value Date     (H) 03/11/2020    FERRITIN 135.00 06/02/2022    KVYGGLLF39 1,436 (H) 06/02/2022    FOLATE >20.00 06/02/2022     No results found for: PSA, CEA, AFP, ,        Assessment and Plan    Diagnoses and all orders for this visit:    1. 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 Chronic heart failure with preserved ejection fraction (HFpEF)  -     N-Terminal proBNP; Future    3. Neutropenia, unspecified type  -     CBC & Differential; Future  -     Peripheral Blood Smear; Future  -     Flow Cytometry, Blood; Future        Recurrent follicular lymphoma: Patient has no evidence of significant toxicity with ongoing rituximab.  CT restaging scans were negative.  She will continue treatment as planned every 8 weeks for maintenance rituximab.     Mild neutropenia: Possibly secondary to rituximab treatment.  I will add flow cytometry and peripheral smear to today's labs.  She will  follow-up in 2 weeks.      Patient was given instructions and counseling regarding her condition or for health maintenance advice. Please see specific information pulled into the AVS if appropriate.     Lily Byrd MD PhD    9/12/2023

## 2023-07-19 ENCOUNTER — TELEPHONE (OUTPATIENT)
Dept: ONCOLOGY | Facility: HOSPITAL | Age: 88
End: 2023-07-19

## 2023-07-19 NOTE — TELEPHONE ENCOUNTER
Caller: JANE    Best call back number: [phone]    What is the best time to reach you: ANYTIME    Who are you requesting to speak with (clinical staff, provider,  specific staff member): CLINICAL     Do you know the name of the person who called: OSKAR     What was the call regarding: OSKAR CALLING FROM JANE, WHEN WILL PATIENT NEED TO HAVE LABS DONE?  PLEASE FAX ORDERS OVER -[phone].    CALL TO DISCUSS

## 2023-08-16 DIAGNOSIS — R21 RASH AND NONSPECIFIC SKIN ERUPTION: ICD-10-CM

## 2023-08-16 RX ORDER — DOXEPIN HYDROCHLORIDE 10 MG/1
10 CAPSULE ORAL NIGHTLY
Qty: 30 CAPSULE | Refills: 2 | OUTPATIENT
Start: 2023-08-16

## 2023-09-01 ENCOUNTER — HOSPITAL ENCOUNTER (EMERGENCY)
Facility: HOSPITAL | Age: 88
Discharge: HOME | End: 2023-09-01
Attending: EMERGENCY MEDICINE
Payer: MEDICARE

## 2023-09-01 VITALS
WEIGHT: 128.97 LBS | TEMPERATURE: 97.7 F | OXYGEN SATURATION: 96 % | HEIGHT: 66 IN | BODY MASS INDEX: 20.73 KG/M2 | SYSTOLIC BLOOD PRESSURE: 122 MMHG | HEART RATE: 68 BPM | RESPIRATION RATE: 20 BRPM | DIASTOLIC BLOOD PRESSURE: 66 MMHG

## 2023-09-01 DIAGNOSIS — S81.811A LACERATION OF RIGHT LOWER EXTREMITY, INITIAL ENCOUNTER: Primary | ICD-10-CM

## 2023-09-01 PROCEDURE — 90471 IMMUNIZATION ADMIN: CPT

## 2023-09-01 PROCEDURE — 25010000002 TETANUS-DIPHTH-ACELL PERTUSSIS 5-2.5-18.5 LF-MCG/0.5 SUSPENSION PREFILLED SYRINGE

## 2023-09-01 PROCEDURE — 99282 EMERGENCY DEPT VISIT SF MDM: CPT

## 2023-09-01 PROCEDURE — 90715 TDAP VACCINE 7 YRS/> IM: CPT

## 2023-09-01 RX ORDER — LIDOCAINE HYDROCHLORIDE AND EPINEPHRINE 10; 10 MG/ML; UG/ML
10 INJECTION, SOLUTION INFILTRATION; PERINEURAL ONCE
Status: COMPLETED | OUTPATIENT
Start: 2023-09-01 | End: 2023-09-01

## 2023-09-01 RX ORDER — GINSENG 100 MG
1 CAPSULE ORAL ONCE
Status: COMPLETED | OUTPATIENT
Start: 2023-09-01 | End: 2023-09-01

## 2023-09-01 RX ORDER — CEPHALEXIN 500 MG/1
500 CAPSULE ORAL 2 TIMES DAILY
Qty: 14 CAPSULE | Refills: 0 | Status: SHIPPED | OUTPATIENT
Start: 2023-09-01 | End: 2023-09-08

## 2023-09-01 RX ADMIN — LIDOCAINE HYDROCHLORIDE,EPINEPHRINE BITARTRATE 10 ML: 10; .01 INJECTION, SOLUTION INFILTRATION; PERINEURAL at 20:29

## 2023-09-01 RX ADMIN — TETANUS TOXOID, REDUCED DIPHTHERIA TOXOID AND ACELLULAR PERTUSSIS VACCINE, ADSORBED 0.5 ML: 5; 2.5; 8; 8; 2.5 SUSPENSION INTRAMUSCULAR at 20:23

## 2023-09-01 RX ADMIN — BACITRACIN 0.9 G: 500 OINTMENT TOPICAL at 21:35

## 2023-09-02 NOTE — ED PROVIDER NOTES
Time: 8:10 PM EDT  Date of encounter:  9/1/2023  Independent Historian/Clinical History and Information was obtained by:   Patient    History is limited by: N/A    Chief Complaint: Laceration      History of Present Illness:  Patient is a 89 y.o. year old female who presents to the emergency department for evaluation of laceration.  Patient states that around 4:00 PM that she accidentally bumped her leg on a metal bed frame.  Patient states she initially did not come in because she thought she got the bleeding well controlled but the bleeding continued so she decided to come to the emergency department.  Patient is unsure when her last tetanus shot was.    HPI    Patient Care Team  Primary Care Provider: Jodee Rodriguez APRN    Past Medical History:     Allergies   Allergen Reactions    Contrast Dye (Echo Or Unknown Ct/Mr) Unknown - Low Severity    Iodinated Contrast Media Hives and Other (See Comments)     IODINATED CONTRAST MEDIA (Verified  Allergy, Unknown, HIVES,SNEEZING,ITCHY EYES)    Iodine Unknown - Low Severity    Penicillins Rash    Shellfish Allergy Unknown - Low Severity    Spinach Other (See Comments)      SPINACH (Verified  Allergy, Unknown, 2/22/21)      SHOWED UP ON ALLERGY TESTING    Sulfa Antibiotics Other (See Comments)     (Verified  Allergy, Unknown, RASH)     Past Medical History:   Diagnosis Date    Acute congestive heart failure     improved    Arthritis     Atrial fibrillation, persistent 09/16/2021    Bladder cancer     Bleeding disorder     (CONDITION DUE TO BLOOD THINNERS)    Chronic atrial fibrillation     Chronic heart failure with preserved ejection fraction (HFpEF) 09/23/2021    High cholesterol     Hyperlipidemia LDL goal <100 06/16/2021    Hypertension     Lymphoma     Moderate to severe aortic stenosis     Patient declines to have transcatheter    Non Hodgkin's lymphoma     2008 treated 2008 2009.    Rectus sheath hematoma     Right rectus sheath hematoma, off anticoagulation  because of the hematoma.    Skin cancer     Type 2 diabetes mellitus      Past Surgical History:   Procedure Laterality Date    BLADDER SURGERY      CATARACT EXTRACTION      DILATION AND CURETTAGE, DIAGNOSTIC / THERAPEUTIC      LEFT VENTRICULAR ASSIST DEVICE      LYMPH NODE BIOPSY      Biopsy of retroperitoneal lymph node or mass     SKIN CANCER DESTRUCTION      TONSILLECTOMY       Family History   Problem Relation Age of Onset    Heart attack Father     Heart attack Brother     Stomach cancer Paternal Great-Grandfather        Home Medications:  Prior to Admission medications    Medication Sig Start Date End Date Taking? Authorizing Provider   acetaminophen (TYLENOL) 325 MG tablet 2 tablets As Needed. 3/9/22   Mary Kate Perea MD   albuterol sulfate  (90 Base) MCG/ACT inhaler USE 2 PUFFS BY MOUTH EVERY 4 HOURS AS NEEDED FOR WHEEZING  Patient taking differently: Inhale 2 puffs Every 4 (Four) Hours As Needed. 12/21/21   Gala Rao MD   aspirin 81 MG EC tablet Take 1 tablet by mouth Daily.    Mary Kate Perea MD   atorvastatin (LIPITOR) 40 MG tablet Take 1 tablet by mouth Daily. 10/6/22   Lily Byrd MD PhD   bumetanide (BUMEX) 2 MG tablet Take 1 tablet by mouth 2 (Two) Times a Day.  Patient taking differently: Take 1 tablet by mouth Daily. 6/21/23   Veronica Mckeon APRN   Calcium Carbonate-Vitamin D 600-200 MG-UNIT capsule Take 1 capsule by mouth 2 (Two) Times a Day.    Mary Kate Perea MD   carvedilol (COREG) 6.25 MG tablet TAKE 1 TABLET BY MOUTH TWICE DAILY WITH FOOD  Patient taking differently: 2 (Two) Times a Day With Meals. 6/16/22   Gala Rao MD   diphenhydrAMINE HCl (BENADRYL ALLERGY PO) Take  by mouth.    Mary Kate Perea MD   doxepin (SINEquan) 10 MG capsule Take 1 capsule by mouth Every Night. 5/18/23   Lily Byrd MD PhD   Eliquis 2.5 MG tablet tablet Take 1 tablet by mouth Every 12 (Twelve) Hours. 1/4/23   Mary Kate Perea MD   ergocalciferol  (ERGOCALCIFEROL) 1.25 MG (01187 UT) capsule One every 2 weeks 8/8/22   Beata Lancaster MD FREESTYLE LITE test strip USE AS DIRECTED TO TEST BLOOD SUGAR EVERY DAY 2/22/23   Mary Kate Perea MD   hydrocortisone-bacitracin-zinc oxide-nystatin (MAGIC BARRIER) Apply 1 application topically to the appropriate area as directed As Needed (due on bottom). 6/16/22   Musa Hughes MD   iron polysaccharides (Ferrex 150) 150 MG capsule 1 tablet p.o. twice daily 1/13/23   Beata Lancaster MD   Lancets (freestyle) lancets 1 each by Other route Daily. 12/28/22   Mary Kate Perea MD   latanoprost (XALATAN) 0.005 % ophthalmic solution INSTILL 1 DROP IN RIGHT EYE AT BEDTIME 1/12/23   Mary Kate Perea MD   levoFLOXacin (LEVAQUIN) 500 MG tablet Take 1 tablet by mouth Daily. 1/17/23   Mary Kate Perea MD   montelukast (SINGULAIR) 10 MG tablet TAKE 1 TABLET(10 MG) BY MOUTH EVERY DAY IN THE EVENING  Patient taking differently: Take 1 tablet by mouth Every Night. 4/8/22   Gala Rao MD   multivitamin with minerals tablet tablet Take 1 tablet by mouth Daily.    Mary Kate Perea MD   potassium chloride 10 MEQ CR tablet Take 1 tablet by mouth Daily. 10/6/22   Lily Byrd MD PhD   Probiotic Product (Risaquad-2) capsule capsule Take 1 capsule by mouth Daily. 1/19/23   Mary Kate Perea MD   raloxifene (EVISTA) 60 MG tablet Take 1 tablet by mouth Daily. 1/11/22   Gala Rao MD   spironolactone (Aldactone) 25 MG tablet Take 1/2 tablet every day  Patient taking differently: Take 12.5 mg by mouth Daily. Taking one whole tablet every day 2/7/22   Gala Rao MD   Symbicort 80-4.5 MCG/ACT inhaler Inhale 2 puffs 2 (Two) Times a Day. 9/13/22   Beata Lancaster MD   Zinc 50 MG tablet Take 1 tablet by mouth Daily.    Provider, Historical, MD        Social History:   Social History     Tobacco Use    Smoking status: Never    Smokeless tobacco: Never  "  Vaping Use    Vaping Use: Never used   Substance Use Topics    Alcohol use: Never    Drug use: Never         Review of Systems:  Review of Systems   Musculoskeletal:  Negative for arthralgias.   Skin:  Positive for wound.      Physical Exam:  /66 (BP Location: Left arm, Patient Position: Sitting)   Pulse 68   Temp 97.7 °F (36.5 °C) (Oral)   Resp 20   Ht 167.6 cm (66\")   Wt 58.5 kg (128 lb 15.5 oz)   SpO2 96%   BMI 20.82 kg/m²     Physical Exam  Vitals and nursing note reviewed.   Constitutional:       Appearance: Normal appearance.   HENT:      Head: Normocephalic and atraumatic.      Nose: Nose normal.   Eyes:      Extraocular Movements: Extraocular movements intact.      Conjunctiva/sclera: Conjunctivae normal.      Pupils: Pupils are equal, round, and reactive to light.   Pulmonary:      Effort: Pulmonary effort is normal.   Abdominal:      General: Abdomen is flat. There is no distension.   Musculoskeletal:         General: Normal range of motion.      Cervical back: Normal range of motion and neck supple.   Skin:     General: Skin is warm and dry.          Neurological:      General: No focal deficit present.      Mental Status: She is alert and oriented to person, place, and time.   Psychiatric:         Mood and Affect: Mood normal.         Behavior: Behavior normal.         Thought Content: Thought content normal.         Judgment: Judgment normal.              Procedures:  Laceration Repair    Date/Time: 9/1/2023 9:40 PM  Performed by: Zain Looney PA-C  Authorized by: Richie Davis MD     Consent:     Consent obtained:  Verbal    Consent given by:  Patient    Risks, benefits, and alternatives were discussed: yes      Risks discussed:  Infection, poor cosmetic result, pain and poor wound healing  Universal protocol:     Patient identity confirmed:  Verbally with patient  Anesthesia:     Anesthesia method:  Local infiltration    Local anesthetic:  Lidocaine 1% WITH epi  Laceration " details:     Location:  Leg    Leg location:  L lower leg    Length (cm):  7  Pre-procedure details:     Preparation:  Patient was prepped and draped in usual sterile fashion  Exploration:     Hemostasis achieved with:  Direct pressure    Wound exploration: wound explored through full range of motion and entire depth of wound visualized      Contaminated: no    Treatment:     Area cleansed with:  Povidone-iodine    Amount of cleaning:  Standard    Irrigation solution:  Sterile water    Visualized foreign bodies/material removed: no    Skin repair:     Repair method:  Sutures    Suture size:  4-0    Suture material:  Nylon    Suture technique:  Simple interrupted    Number of sutures:  11  Approximation:     Approximation:  Close  Repair type:     Repair type:  Simple  Post-procedure details:     Dressing:  Antibiotic ointment and non-adherent dressing    Procedure completion:  Tolerated well, no immediate complications      Medical Decision Making:    Comorbidities that affect care:    Lymphoma, hyperlipidemia, Atrial Fibrillation, Congestive Heart Failure, Diabetes    External Notes reviewed:    None      The following orders were placed and all results were independently analyzed by me:  Orders Placed This Encounter   Procedures    Laceration Repair       Medications Given in the Emergency Department:  Medications   Tetanus-Diphth-Acell Pertussis (BOOSTRIX) injection 0.5 mL (0.5 mL Intramuscular Given 9/1/23 2023)   lidocaine 1% - EPINEPHrine 1:929239 (XYLOCAINE W/EPI) 1 %-1:372370 injection 10 mL (10 mL Injection Given 9/1/23 2029)   bacitracin 500 UNIT/GM ointment 0.9 g (0.9 g Topical Given 9/1/23 2135)        ED Course:         Labs:    Lab Results (last 24 hours)       ** No results found for the last 24 hours. **             Imaging:    No Radiology Exams Resulted Within Past 24 Hours      Differential Diagnosis and Discussion:    Laceration: Laceration was evaluated for arterial injury, ligamentous damage,  and other neurovascular injury.      MDM  Number of Diagnoses or Management Options  Diagnosis management comments: Patient presented to the emergency department today for evaluation of a laceration.  Laceration was repaired in the emergency department today with sutures.  Patient was given Tdap while in the emergency department as well.    Risk of Complications, Morbidity, and/or Mortality  Presenting problems: moderate  Diagnostic procedures: low  Management options: low    Patient Progress  Patient progress: stable     Consultants/Shared Management Plan:    None    Social Determinants of Health:    Patient is independent, reliable, and has access to care.       Disposition and Care Coordination:    Discharged: The patient is suitable and stable for discharge with no need for consideration of observation or admission.    I have explained the patient´s condition, diagnoses and treatment plan based on the information available to me at this time. I have answered questions and addressed any concerns. The patient has a good  understanding of the patient´s diagnosis, condition, and treatment plan as can be expected at this point. The vital signs have been stable. The patient´s condition is stable and appropriate for discharge from the emergency department.      The patient will pursue further outpatient evaluation with the primary care physician or other designated or consulting physician as outlined in the discharge instructions. They are agreeable to this plan of care and follow-up instructions have been explained in detail. The patient has received these instructions in written format and have expressed an understanding of the discharge instructions. The patient is aware that any significant change in condition or worsening of symptoms should prompt an immediate return to this or the closest emergency department or call to 911.  I have explained discharge medications and the need for follow up with the  patient/caretakers. This was also printed in the discharge instructions. Patient was discharged with the following medications and follow up:      Medication List        New Prescriptions      cephalexin 500 MG capsule  Commonly known as: KEFLEX  Take 1 capsule by mouth 2 (Two) Times a Day for 7 days.            Changed      albuterol sulfate  (90 Base) MCG/ACT inhaler  Commonly known as: PROVENTIL HFA;VENTOLIN HFA;PROAIR HFA  USE 2 PUFFS BY MOUTH EVERY 4 HOURS AS NEEDED FOR WHEEZING  What changed: See the new instructions.     bumetanide 2 MG tablet  Commonly known as: BUMEX  Take 1 tablet by mouth 2 (Two) Times a Day.  What changed: when to take this     carvedilol 6.25 MG tablet  Commonly known as: COREG  TAKE 1 TABLET BY MOUTH TWICE DAILY WITH FOOD  What changed:   how much to take  how to take this     montelukast 10 MG tablet  Commonly known as: SINGULAIR  TAKE 1 TABLET(10 MG) BY MOUTH EVERY DAY IN THE EVENING  What changed: See the new instructions.     spironolactone 25 MG tablet  Commonly known as: Aldactone  Take 1/2 tablet every day  What changed:   how much to take  how to take this  when to take this  additional instructions               Where to Get Your Medications        These medications were sent to [facility]/pharmacy #[id number] - [place], [place] - [address] - [phone]  - [phone] FX  [address] [place] [address]      Hours: 24-hours Phone: [phone]   cephalexin 500 MG capsule      Jodee Rodriguez P, APRN  [address] [place] [address] [phone]             Final diagnoses:   Laceration of right lower extremity, initial encounter        ED Disposition       ED Disposition   Discharge    Condition   Stable    Comment   --               This medical record created using voice recognition software.             Zain Looney PA-C  09/01/23 [id number]

## 2023-09-02 NOTE — DISCHARGE INSTRUCTIONS
Please keep the laceration clean and dry.  Take the full course of antibiotics as directed.  Monitor for any signs of infection such as redness or drainage.  Sutures will need removed in 10 days.

## 2023-09-06 RX ORDER — FAMOTIDINE 10 MG/ML
20 INJECTION, SOLUTION INTRAVENOUS AS NEEDED
Status: CANCELLED | OUTPATIENT
Start: 2023-09-07

## 2023-09-06 RX ORDER — MEPERIDINE HYDROCHLORIDE 25 MG/ML
25 INJECTION INTRAMUSCULAR; INTRAVENOUS; SUBCUTANEOUS
Status: CANCELLED | OUTPATIENT
Start: 2023-09-07

## 2023-09-06 RX ORDER — DIPHENHYDRAMINE HYDROCHLORIDE 50 MG/ML
50 INJECTION INTRAMUSCULAR; INTRAVENOUS AS NEEDED
Status: CANCELLED | OUTPATIENT
Start: 2023-09-07

## 2023-09-07 ENCOUNTER — HOSPITAL ENCOUNTER (OUTPATIENT)
Dept: ONCOLOGY | Facility: HOSPITAL | Age: 88
Discharge: HOME | End: 2023-09-07
Admitting: INTERNAL MEDICINE
Payer: MEDICARE

## 2023-09-07 ENCOUNTER — TELEPHONE (OUTPATIENT)
Dept: ONCOLOGY | Facility: HOSPITAL | Age: 88
End: 2023-09-07
Payer: MEDICARE

## 2023-09-07 ENCOUNTER — OFFICE VISIT (OUTPATIENT)
Dept: ONCOLOGY | Facility: HOSPITAL | Age: 88
End: 2023-09-07
Payer: MEDICARE

## 2023-09-07 VITALS
HEART RATE: 74 BPM | WEIGHT: 127.87 LBS | BODY MASS INDEX: 20.55 KG/M2 | TEMPERATURE: 97.9 F | OXYGEN SATURATION: 95 % | DIASTOLIC BLOOD PRESSURE: 63 MMHG | SYSTOLIC BLOOD PRESSURE: 89 MMHG | HEIGHT: 66 IN | RESPIRATION RATE: 20 BRPM

## 2023-09-07 VITALS
OXYGEN SATURATION: 95 % | WEIGHT: 127.87 LBS | DIASTOLIC BLOOD PRESSURE: 57 MMHG | SYSTOLIC BLOOD PRESSURE: 109 MMHG | TEMPERATURE: 97.9 F | HEIGHT: 66 IN | HEART RATE: 7 BPM | BODY MASS INDEX: 20.55 KG/M2 | RESPIRATION RATE: 20 BRPM

## 2023-09-07 DIAGNOSIS — I50.32 CHRONIC HEART FAILURE WITH PRESERVED EJECTION FRACTION (HFPEF): ICD-10-CM

## 2023-09-07 DIAGNOSIS — C82.08 FOLLICULAR LYMPHOMA GRADE I OF LYMPH NODES OF MULTIPLE SITES: Primary | ICD-10-CM

## 2023-09-07 DIAGNOSIS — Z45.2 ADJUSTMENT AND MANAGEMENT OF VASCULAR ACCESS DEVICE: ICD-10-CM

## 2023-09-07 DIAGNOSIS — S81.802A WOUND OF LEFT LOWER EXTREMITY, INITIAL ENCOUNTER: ICD-10-CM

## 2023-09-07 LAB
ALBUMIN SERPL-MCNC: 3.8 G/DL (ref 3.5–5.2)
ALBUMIN/GLOB SERPL: 1.4 G/DL
ALP SERPL-CCNC: 77 U/L (ref 39–117)
ALT SERPL W P-5'-P-CCNC: 14 U/L (ref 1–33)
ANION GAP SERPL CALCULATED.3IONS-SCNC: 9.6 MMOL/L (ref 5–15)
AST SERPL-CCNC: 33 U/L (ref 1–32)
BASOPHILS # BLD AUTO: 0.05 10*3/MM3 (ref 0–0.2)
BASOPHILS NFR BLD AUTO: 1.1 % (ref 0–1.5)
BILIRUB SERPL-MCNC: 0.7 MG/DL (ref 0–1.2)
BUN SERPL-MCNC: 49 MG/DL (ref 8–23)
BUN/CREAT SERPL: 52.7 (ref 7–25)
CALCIUM SPEC-SCNC: 9.4 MG/DL (ref 8.2–9.6)
CHLORIDE SERPL-SCNC: 97 MMOL/L (ref 98–107)
CO2 SERPL-SCNC: 31.4 MMOL/L (ref 22–29)
CREAT SERPL-MCNC: 0.93 MG/DL (ref 0.57–1)
DEPRECATED RDW RBC AUTO: 46.7 FL (ref 37–54)
EGFRCR SERPLBLD CKD-EPI 2021: 56.7 ML/MIN/1.73
EOSINOPHIL # BLD AUTO: 0.08 10*3/MM3 (ref 0–0.4)
EOSINOPHIL NFR BLD AUTO: 1.8 % (ref 0.3–6.2)
ERYTHROCYTE [DISTWIDTH] IN BLOOD BY AUTOMATED COUNT: 13.2 % (ref 12.3–15.4)
GLOBULIN UR ELPH-MCNC: 2.7 GM/DL
GLUCOSE SERPL-MCNC: 188 MG/DL (ref 65–99)
HCT VFR BLD AUTO: 38.1 % (ref 34–46.6)
HGB BLD-MCNC: 12.3 G/DL (ref 12–15.9)
IMM GRANULOCYTES # BLD AUTO: 0.12 10*3/MM3 (ref 0–0.05)
IMM GRANULOCYTES NFR BLD AUTO: 2.7 % (ref 0–0.5)
LYMPHOCYTES # BLD AUTO: 0.96 10*3/MM3 (ref 0.7–3.1)
LYMPHOCYTES NFR BLD AUTO: 21.6 % (ref 19.6–45.3)
MCH RBC QN AUTO: 30.6 PG (ref 26.6–33)
MCHC RBC AUTO-ENTMCNC: 32.3 G/DL (ref 31.5–35.7)
MCV RBC AUTO: 94.8 FL (ref 79–97)
MONOCYTES # BLD AUTO: 0.55 10*3/MM3 (ref 0.1–0.9)
MONOCYTES NFR BLD AUTO: 12.4 % (ref 5–12)
NEUTROPHILS NFR BLD AUTO: 2.69 10*3/MM3 (ref 1.7–7)
NEUTROPHILS NFR BLD AUTO: 60.4 % (ref 42.7–76)
PLATELET # BLD AUTO: 163 10*3/MM3 (ref 140–450)
PMV BLD AUTO: 10.5 FL (ref 6–12)
POTASSIUM SERPL-SCNC: 4 MMOL/L (ref 3.5–5.2)
PROT SERPL-MCNC: 6.5 G/DL (ref 6–8.5)
RBC # BLD AUTO: 4.02 10*6/MM3 (ref 3.77–5.28)
SODIUM SERPL-SCNC: 138 MMOL/L (ref 136–145)
WBC NRBC COR # BLD: 4.45 10*3/MM3 (ref 3.4–10.8)

## 2023-09-07 PROCEDURE — 25010000002 RITUXIMAB 10 MG/ML SOLUTION 50 ML VIAL: Performed by: INTERNAL MEDICINE

## 2023-09-07 PROCEDURE — 80053 COMPREHEN METABOLIC PANEL: CPT | Performed by: INTERNAL MEDICINE

## 2023-09-07 PROCEDURE — 83880 ASSAY OF NATRIURETIC PEPTIDE: CPT | Performed by: INTERNAL MEDICINE

## 2023-09-07 PROCEDURE — 25010000002 HEPARIN LOCK FLUSH PER 10 UNITS: Performed by: INTERNAL MEDICINE

## 2023-09-07 PROCEDURE — 96375 TX/PRO/DX INJ NEW DRUG ADDON: CPT

## 2023-09-07 PROCEDURE — 96366 THER/PROPH/DIAG IV INF ADDON: CPT

## 2023-09-07 PROCEDURE — 63710000001 ACETAMINOPHEN 325 MG TABLET: Performed by: INTERNAL MEDICINE

## 2023-09-07 PROCEDURE — 85025 COMPLETE CBC W/AUTO DIFF WBC: CPT | Performed by: INTERNAL MEDICINE

## 2023-09-07 PROCEDURE — 25010000002 RITUXIMAB 10 MG/ML SOLUTION 10 ML VIAL: Performed by: INTERNAL MEDICINE

## 2023-09-07 PROCEDURE — A9270 NON-COVERED ITEM OR SERVICE: HCPCS | Performed by: INTERNAL MEDICINE

## 2023-09-07 PROCEDURE — 25010000002 DIPHENHYDRAMINE PER 50 MG: Performed by: INTERNAL MEDICINE

## 2023-09-07 PROCEDURE — 96365 THER/PROPH/DIAG IV INF INIT: CPT

## 2023-09-07 PROCEDURE — 96413 CHEMO IV INFUSION 1 HR: CPT

## 2023-09-07 RX ORDER — HEPARIN SODIUM (PORCINE) LOCK FLUSH IV SOLN 100 UNIT/ML 100 UNIT/ML
500 SOLUTION INTRAVENOUS AS NEEDED
OUTPATIENT
Start: 2023-09-07

## 2023-09-07 RX ORDER — SODIUM CHLORIDE 9 MG/ML
250 INJECTION, SOLUTION INTRAVENOUS ONCE
Status: COMPLETED | OUTPATIENT
Start: 2023-09-07 | End: 2023-09-07

## 2023-09-07 RX ORDER — SODIUM CHLORIDE 0.9 % (FLUSH) 0.9 %
20 SYRINGE (ML) INJECTION AS NEEDED
Status: DISCONTINUED | OUTPATIENT
Start: 2023-09-07 | End: 2023-09-08 | Stop reason: HOSPADM

## 2023-09-07 RX ORDER — SODIUM CHLORIDE 0.9 % (FLUSH) 0.9 %
20 SYRINGE (ML) INJECTION AS NEEDED
OUTPATIENT
Start: 2023-09-07

## 2023-09-07 RX ORDER — ACETAMINOPHEN 325 MG/1
650 TABLET ORAL ONCE
Status: COMPLETED | OUTPATIENT
Start: 2023-09-07 | End: 2023-09-07

## 2023-09-07 RX ORDER — HEPARIN SODIUM (PORCINE) LOCK FLUSH IV SOLN 100 UNIT/ML 100 UNIT/ML
500 SOLUTION INTRAVENOUS AS NEEDED
Status: DISCONTINUED | OUTPATIENT
Start: 2023-09-07 | End: 2023-09-08 | Stop reason: HOSPADM

## 2023-09-07 RX ADMIN — HEPARIN SODIUM (PORCINE) LOCK FLUSH IV SOLN 100 UNIT/ML 500 UNITS: 100 SOLUTION at 15:15

## 2023-09-07 RX ADMIN — ACETAMINOPHEN 650 MG: 325 TABLET ORAL at 11:48

## 2023-09-07 RX ADMIN — DIPHENHYDRAMINE HYDROCHLORIDE 25 MG: 50 INJECTION, SOLUTION INTRAMUSCULAR; INTRAVENOUS at 11:56

## 2023-09-07 RX ADMIN — Medication 20 ML: at 15:14

## 2023-09-07 RX ADMIN — SODIUM CHLORIDE 250 ML: 9 INJECTION, SOLUTION INTRAVENOUS at 11:48

## 2023-09-07 RX ADMIN — RITUXIMAB 600 MG: 10 INJECTION, SOLUTION INTRAVENOUS at 12:29

## 2023-09-07 NOTE — PROGRESS NOTES
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 Radha Aparicio is a pleasant 89 year old female who presents with NHL, follicular type diagnosed in 2019.  Ms. Aparicio has been receiving her oncological care with Dr. Jenn Zaragoza who recently retired.  Ms. Aparicio initially presented RLQ pain to her PCP, Dr. Gala Rao.  In light of her bladder cancer, CT of Abdomen and Pelvis was obtained on January 16, 2019.  Findings indicated retroperitoneal adenopathy measuring up to 4 cm x 2cm, thought either to be lymphoma or metastatic disease.  Incidently mild bilateral hydronephrosis was noted without urolithiasis.     CT guided biopsy of the retroperitoneal lymph node was obtained.  Pathology (S-) indicated non-hodgkins's b-cell lymphoma; Follicular lymphoma (grade 1).  Flow cytometryrevealed diffuse positivity with CD 20, BCL2, CD10 and patchy decoration with BCL-6.  A few days after biopsy she was admitted to hospital with acute respiratory hypoxemia secondary to influenza.  Repeat imaging with CT Abdomen and Pelvis with heterogeneous hyperdense mass in the anterior abdominal wass musculature 8 cm x 12.6 cm consistent wtih rectus sheath hematoma.  Retroperitoneal adenopathy is redemonstrated.  Index left periaortic mass measured 3.1 x 2.2 cm, previously 3.9 x 2.4. Mildly enlarged retrocrural lymph nodes.  She was discharged from the hospital on 2/20/2019.     Ms. Aparicio was evaluated by Dr. Jenn Zaragoza on March 13, 2019. PET CT was ordered and obtained.  It indicated metabolic activity within the lymph node at the base of upper chest and neck (SUV 3.85)  as well as the left axilla (SUV 5.4) in addition to 2 lymph nodes within the retroperitoneal area with SUV (8.76).  .  Staging for her follicular carcinoma grade 1 is a clinical stage III with no B symptoms no significant laboratory abnormalities. At this time it was decided pursue active surveillance and close follow.     On October 17, 2019, PETCT  indicated interval progression of disease with previous noted lymph adenopathy larger and more metabolic. The left periaortic lymph node mass noted to be compressing not only the left renal vein but resulting in mild left hydronephrosis which is new. .   Single agent RITUXAN was initiated on October 9, 2019 and completed 4 weekly doses of RITUXAN.  Ms. Aparicio continues to have no B-symptoms.     Repeat PETCT obtained December 11, 2019 indicated overall improvement from previous study.  The areas of hypermetabolic lymphadenopathy supraclavicular on the left and left paraaortic have diminshed both in size and degree of metabolic activity with no new areas noted. .      PETCT done on July 2, 2020 indicated enlargement of left infraclavicular / subpectoral lynph node measuring up to 1.8 cm with SUV 5.7, increase in size and SUV of left axillary lymph node, small left pleural effusion slightly larger than prior PET, left paraaortic lymph node conglomerate slightly larger and slightly increased in SUV.  .  It was decided at that time to restart RITUXAN every 8 weeks.  Ms. Aparicio continues to have no B-symptoms.     Maintenance rituximab started November 2020, every 8 weeks.    CT CAP on 12/16/2020 showed decrease in the size of left retropectoral lymph nodes and left periaortic lymph nodes.  No new or enlarging lymph nodes in the chest, abdomen, or pelvis.  There are stable subcentimeter noncalcified pulmonary nodules as well.       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  She does seem to be tolerating treatment without any complications.  However, she is very upset because she had an home primary care provider but is losing that service very soon.  She is worried because she scraped her leg against a metal bed recently and was taken to the emergency room for stitches.  She still has swelling of that leg but the bandage is being changed every day.  She is concerned because it is healing slowly.  She is finishing up a prescription for Keflex.    Review of Systems   Constitutional:  Positive for fatigue. Negative for appetite change, diaphoresis, fever, unexpected weight gain and unexpected weight loss.   HENT:  Negative for hearing loss, mouth sores, sore throat, swollen glands, trouble swallowing and voice change.    Eyes:  Negative for blurred vision.   Respiratory:  Negative for cough, shortness of breath and wheezing.    Cardiovascular:  Negative for chest pain and palpitations.   Gastrointestinal:  Negative for abdominal pain, blood in stool, constipation, diarrhea, nausea and vomiting.   Endocrine: Negative for cold intolerance and heat intolerance.   Genitourinary:  Negative for difficulty urinating, dysuria, frequency, hematuria and urinary incontinence.   Musculoskeletal:  Negative for arthralgias, back pain and myalgias.   Skin:  Positive for wound (LLL). Negative for rash and skin lesions.   Neurological:  Negative for dizziness, seizures, weakness, numbness and headache.   Hematological:  Does not bruise/bleed easily.   Psychiatric/Behavioral:  Negative for depressed mood. The patient is not nervous/anxious.    All other systems  reviewed and are negative.    Past Medical History:   Diagnosis Date    Acute congestive heart failure     improved    Arthritis     Atrial fibrillation, persistent 09/16/2021    Bladder cancer     Bleeding disorder     (CONDITION DUE TO BLOOD THINNERS)    Chronic atrial fibrillation     Chronic heart failure with preserved ejection fraction (HFpEF) 09/23/2021    High cholesterol     Hyperlipidemia LDL goal <100 06/16/2021    Hypertension     Lymphoma     Moderate to severe aortic stenosis     Patient declines to have transcatheter    Non Hodgkin's lymphoma     2008 treated 2008 2009.    Rectus sheath hematoma     Right rectus sheath hematoma, off anticoagulation because of the hematoma.    Skin cancer     Type 2 diabetes mellitus      Past Surgical History:   Procedure Laterality Date    BLADDER SURGERY      CATARACT EXTRACTION      DILATION AND CURETTAGE, DIAGNOSTIC / THERAPEUTIC      LEFT VENTRICULAR ASSIST DEVICE      LYMPH NODE BIOPSY      Biopsy of retroperitoneal lymph node or mass     SKIN CANCER DESTRUCTION      TONSILLECTOMY       Social History     Socioeconomic History    Marital status:     Number of children: 2   Tobacco Use    Smoking status: Never    Smokeless tobacco: Never   Vaping Use    Vaping Use: Never used   Substance and Sexual Activity    Alcohol use: Never    Drug use: Never    Sexual activity: Defer     Family History   Problem Relation Age of Onset    Heart attack Father     Heart attack Brother     Stomach cancer Paternal Great-Grandfather        Objective   Physical Exam  General: Alert, cooperative, no acute distress  Eyes: Anicteric sclera, PERRLA  Respiratory: normal respiratory effort  Cardiovascular: no lower extremity edema  Skin: Lower left leg is more edematous than the right.  There is a deep gash with a scab overlying sutures.  The wound was bandaged with some bandage stuck into the scab but able to be removed.  There is no evidence of infection but there was some  "serosanguineous fluid on the bandages.  Psychiatric: Appropriate affect, intact judgment  Neurologic: No focal sensory or motor deficits, normal cognition   Musculoskeletal: Reduced muscle strength and tone, in wheelchair      Vitals:    09/07/23 1042   BP: (!) 89/63   Pulse: 74   Resp: 20   Temp: 97.9 °F (36.6 °C)   SpO2: 95%   Weight: 58 kg (127 lb 13.9 oz)   Height: 167.6 cm (65.98\")   PainSc:   7   PainLoc: Leg     ECOG score: 1         PHQ-9 Total Score:         Result Review :   The following data was reviewed by: Lily Byrd MD PhD on 09/07/2023:  Lab Results   Component Value Date    HGB 12.3 09/07/2023    HCT 38.1 09/07/2023    MCV 94.8 09/07/2023     09/07/2023    WBC 4.45 09/07/2023    NEUTROABS 2.69 09/07/2023    LYMPHSABS 0.96 09/07/2023    MONOSABS 0.55 09/07/2023    EOSABS 0.08 09/07/2023    BASOSABS 0.05 09/07/2023     Lab Results   Component Value Date    GLUCOSE 188 (H) 09/07/2023    BUN 49 (H) 09/07/2023    CREATININE 0.93 09/07/2023     09/07/2023    K 4.0 09/07/2023    CL 97 (L) 09/07/2023    CO2 31.4 (H) 09/07/2023    CALCIUM 9.4 09/07/2023    PROTEINTOT 6.5 09/07/2023    ALBUMIN 3.8 09/07/2023    BILITOT 0.7 09/07/2023    ALKPHOS 77 09/07/2023    AST 33 (H) 09/07/2023    ALT 14 09/07/2023     Lab Results   Component Value Date    IRON 86 06/02/2022    LABIRON 20 06/02/2022    TRANSFERRIN 287 06/02/2022    TIBC 428 06/02/2022     Lab Results   Component Value Date     (H) 03/11/2020    FERRITIN 135.00 06/02/2022    PUYLSCKK95 1,436 (H) 06/02/2022    FOLATE >20.00 06/02/2022     No results found for: PSA, CEA, AFP, ,        Assessment and Plan    Diagnoses and all orders for this visit:    1. 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 Wound of left lower extremity, initial encounter  -     Ambulatory Referral to Wound Clinic    3. Chronic heart failure with preserved ejection fraction (HFpEF)  -     N-Terminal proBNP; " Future        Recurrent follicular lymphoma: Patient has no evidence of toxicity with ongoing rituximab.  She will continue treatment as planned every 8 weeks, indefinitely.  She will need to transition to another provider and has selected Dr. Ellington.    Left leg wound: Patient was seen in the emergency room and received stitches.  She is currently on Keflex.  She has no sign of infection but I will refer her to wound care for ongoing management while she tries to reestablish with another primary care provider.    I spent 43 minutes caring for Radha on this date of service. This time includes time spent by me in the following activities: preparing for the visit, reviewing tests, performing a medically appropriate examination and/or evaluation, counseling and educating the patient/family/caregiver, ordering medications, tests, or procedures, referring and communicating with other health care professionals, and documenting information in the medical record     Patient was given instructions and counseling regarding her condition or for health maintenance advice. Please see specific information pulled into the AVS if appropriate.     Lily Byrd MD PhD    9/7/2023

## 2023-09-07 NOTE — TELEPHONE ENCOUNTER
Diagnosis: Follicular Lymphoma    Reason for Referral: In home PCP    Content of Visit: OSWs received a notification from clinical RN, Svetlana TAYLOR, that the in home PCP we previously set Mrs. Aparicio up with is going away and wants to establish with another provider. My colleague, Estefany ANNE, previously referred Mrs. Aparicio to [facility]. OSW contacted [facility], [facility], and [facility] this afternoon. Awaiting responses. OSW support remains available.    [facility] reports they do not serve [facility].    [facility] reports they serve [facility], however, only provide virtual appointments and do not see patients in the home.     Update 9/8/23: OSW has reached out to [facility] again this afternoon. Awaiting response. Also contacted [facility] and they do not provide in home care.    Update 9/11/23: OSW contacted [facility] again this afternoon and was advised it's taking up to 2-3 business days for intake to get back in touch. They suggested calling back if OSW does not hear back from intake by Wednesday. OSW support remains available.     Resources/Referrals Provided: In home PCP

## 2023-09-08 ENCOUNTER — TELEPHONE (OUTPATIENT)
Dept: ONCOLOGY | Facility: HOSPITAL | Age: 88
End: 2023-09-08

## 2023-09-08 LAB — NT-PROBNP SERPL-MCNC: ABNORMAL PG/ML (ref 0–1800)

## 2023-09-08 RX ORDER — BUMETANIDE 2 MG/1
TABLET ORAL
Qty: 90 TABLET | Refills: 1 | Status: SHIPPED | OUTPATIENT
Start: 2023-09-08

## 2023-09-08 NOTE — TELEPHONE ENCOUNTER
Caller: ROYCE MURRIETA    Relationship: Emergency Contact    Best call back number: [phone]      What was the call regarding: PT'S DAUGHTER CALLED TO CHECK STATUS OF HOME HEALTH

## 2023-09-11 NOTE — TELEPHONE ENCOUNTER
Attempted to call Juana, patient answered the phone. Juana is unavailable at this time. Confirmed with patient they are looking for the Doctor to come to her house to replace her former PCP. Will follow up with our social workers for update.

## 2023-09-11 NOTE — TELEPHONE ENCOUNTER
OSW contacted [facility], [facility], and [facility] on 9/7/23. [facility] reports they do not serve [facility]. [facility] reports they serve [facility], however, only provide virtual appointments and do not see patients in the home.      OSW reached out to [facility] again on 9/8/23. Awaiting response. Also contacted [facility] and they do not provide in home care.     OSW contacted [facility] again (third attempt) this afternoon on 9/11/23 and was advised it's taking up to 2-3 business days for intake to get back in touch. They suggested calling back if OSW does not hear back from intake by Wednesday.

## 2023-09-11 NOTE — TELEPHONE ENCOUNTER
Caller: ROYCE MURRIETA    Relationship to patient: Emergency Contact    Best call back number: [phone]    PT'S DAUGHTER IS CALLING TO CHECK ON HOME HEALTH.

## 2023-09-12 NOTE — TELEPHONE ENCOUNTER
OSW received response from [facility] that they are accepting patients in [facility], however, will not be available until after 10/1/23. Waiting to receive clarification on whether or not they are seeing patients in the home or only through telehealth. Will contact Mrs. Aparicio and/or daughter once I receive a response to see how she would like to proceed. OSW support remains available.

## 2023-09-13 ENCOUNTER — TELEPHONE (OUTPATIENT)
Dept: ONCOLOGY | Facility: HOSPITAL | Age: 88
End: 2023-09-13
Payer: MEDICARE

## 2023-09-13 NOTE — TELEPHONE ENCOUNTER
Diagnosis: Follicular Lymphoma    Reason for Referral: In home PCP    Content of Visit: OSW received a phone call from Mrs. Rivas' daughter, Juana, requesting an update on getting Mrs. Aparicio established with a new in home PCP provider. Provided Juana with an update that [facility] only offers telehealth appointments, which Juana confirmed is not a feasible option for Mrs. Aparicio due to being hard of hearing, etc. Advised that OSW received confirmation from [facility] that they're serving , however, are not available to see new patients until 10/1/23. OSW is waiting to receive clarification from [facility] whether or not they're seeing patients in the home or only offering telehealth. Juana v/ayah and reports Mrs. Aparicio is currently active with [facility] until 9/22/23. Provided Juana with my direct number, however, advised I will be back in touch once a definitive response is received from [facility].

## 2023-09-14 ENCOUNTER — OFFICE VISIT (OUTPATIENT)
Dept: WOUND CARE | Facility: HOSPITAL | Age: 88
End: 2023-09-14
Payer: MEDICARE

## 2023-09-14 VITALS
SYSTOLIC BLOOD PRESSURE: 98 MMHG | RESPIRATION RATE: 18 BRPM | DIASTOLIC BLOOD PRESSURE: 58 MMHG | HEART RATE: 79 BPM | TEMPERATURE: 97.1 F

## 2023-09-14 DIAGNOSIS — L03.116 CELLULITIS OF LEFT LOWER EXTREMITY: ICD-10-CM

## 2023-09-14 DIAGNOSIS — S81.802A TRAUMATIC OPEN WOUND OF LEFT LOWER LEG, INITIAL ENCOUNTER: Primary | ICD-10-CM

## 2023-09-14 DIAGNOSIS — I87.2 EDEMA OF BOTH LOWER EXTREMITIES DUE TO PERIPHERAL VENOUS INSUFFICIENCY: ICD-10-CM

## 2023-09-14 PROCEDURE — 87205 SMEAR GRAM STAIN: CPT | Performed by: EMERGENCY MEDICINE

## 2023-09-14 PROCEDURE — 1160F RVW MEDS BY RX/DR IN RCRD: CPT | Performed by: EMERGENCY MEDICINE

## 2023-09-14 PROCEDURE — G0463 HOSPITAL OUTPT CLINIC VISIT: HCPCS | Performed by: EMERGENCY MEDICINE

## 2023-09-14 PROCEDURE — 87070 CULTURE OTHR SPECIMN AEROBIC: CPT | Performed by: EMERGENCY MEDICINE

## 2023-09-14 PROCEDURE — 1159F MED LIST DOCD IN RCRD: CPT | Performed by: EMERGENCY MEDICINE

## 2023-09-14 RX ORDER — GUAIFENESIN 600 MG/1
600 TABLET, EXTENDED RELEASE ORAL 2 TIMES DAILY
COMMUNITY

## 2023-09-14 RX ORDER — DOXYCYCLINE HYCLATE 100 MG/1
100 CAPSULE ORAL 2 TIMES DAILY
Qty: 14 CAPSULE | Refills: 0 | Status: SHIPPED | OUTPATIENT
Start: 2023-09-14 | End: 2023-09-21

## 2023-09-14 NOTE — PROGRESS NOTES
Chief Complaint  Wound Check (NEW PT, WOUND TO LLE, PT STATES SHE HIT HER LEG ON THE BED FRAME 9/1/23, WENT TO URGENT CARE AND RECEIVED 11 SUTURES AND PLACED ON ANTIBIOTICS. PT NOW COMPLETED WITH ANTIBIOTICS AND TREATING WOUND WITH FELIX AND KEEPING CLEAN DRY AND COVERED. (CONTROLLED DIABETIC - ))    Subjective      History of Present Illness    Radha Aparicio  is a 89 y.o. female who bumped her leg on a metal bed frame on 09/01/2023 and sustained a large laceration.  She was not able to get the bleeding stopped as she is on Eliquis so she went to the ED for evaluation.  She had 11 sutures placed and was started on a 7-day course of Keflex which she completed.  They have been putting triple antibiotic ointment on it and keeping it covered.  The area is quite sensitive and she is having a lot of swelling in her leg.  She says she is trying to prop it up at times but sometimes it makes it hurt even more.  She was also having swelling in the right leg but feels like that has gone down more than the left side has.      Allergies:  Contrast dye (echo or unknown ct/mr), Iodinated contrast media, Iodine, Penicillins, Shellfish allergy, Spinach, and Sulfa antibiotics      Current Outpatient Medications:     acetaminophen (TYLENOL) 325 MG tablet, 2 tablets As Needed., Disp: , Rfl:     albuterol sulfate  (90 Base) MCG/ACT inhaler, USE 2 PUFFS BY MOUTH EVERY 4 HOURS AS NEEDED FOR WHEEZING (Patient taking differently: Inhale 2 puffs Every 4 (Four) Hours As Needed.), Disp: 6.7 g, Rfl: 1    aspirin 81 MG EC tablet, Take 1 tablet by mouth Daily., Disp: , Rfl:     atorvastatin (LIPITOR) 40 MG tablet, Take 1 tablet by mouth Daily., Disp: 90 tablet, Rfl: 3    bumetanide (BUMEX) 2 MG tablet, TAKE 1 TABLET BY MOUTH DAILY, Disp: 90 tablet, Rfl: 1    Calcium Carbonate-Vitamin D 600-200 MG-UNIT capsule, Take 1 capsule by mouth 2 (Two) Times a Day., Disp: , Rfl:     carvedilol (COREG) 6.25 MG tablet, TAKE 1 TABLET BY MOUTH  TWICE DAILY WITH FOOD (Patient taking differently: 2 (Two) Times a Day With Meals.), Disp: 60 tablet, Rfl: 6    diphenhydrAMINE HCl (BENADRYL ALLERGY PO), Take  by mouth., Disp: , Rfl:     doxepin (SINEquan) 10 MG capsule, Take 1 capsule by mouth Every Night., Disp: 30 capsule, Rfl: 2    Eliquis 2.5 MG tablet tablet, Take 1 tablet by mouth Every 12 (Twelve) Hours., Disp: , Rfl:     ergocalciferol (ERGOCALCIFEROL) 1.25 MG (70269 UT) capsule, One every 2 weeks, Disp: 6 capsule, Rfl: 3    FREESTYLE LITE test strip, USE AS DIRECTED TO TEST BLOOD SUGAR EVERY DAY, Disp: , Rfl:     guaiFENesin (MUCINEX) 600 MG 12 hr tablet, Take 1 tablet by mouth 2 (Two) Times a Day., Disp: , Rfl:     hydrocortisone-bacitracin-zinc oxide-nystatin (MAGIC BARRIER), Apply 1 application topically to the appropriate area as directed As Needed (due on bottom)., Disp: 60 g, Rfl: 1    iron polysaccharides (Ferrex 150) 150 MG capsule, 1 tablet p.o. twice daily, Disp: 60 capsule, Rfl: 0    Lancets (freestyle) lancets, 1 each by Other route Daily., Disp: , Rfl:     latanoprost (XALATAN) 0.005 % ophthalmic solution, INSTILL 1 DROP IN RIGHT EYE AT BEDTIME, Disp: , Rfl:     montelukast (SINGULAIR) 10 MG tablet, TAKE 1 TABLET(10 MG) BY MOUTH EVERY DAY IN THE EVENING (Patient taking differently: Take 1 tablet by mouth Every Night.), Disp: 90 tablet, Rfl: 3    multivitamin with minerals tablet tablet, Take 1 tablet by mouth Daily., Disp: , Rfl:     potassium chloride 10 MEQ CR tablet, Take 1 tablet by mouth Daily., Disp: 90 tablet, Rfl: 3    Probiotic Product (Risaquad-2) capsule capsule, Take 1 capsule by mouth Daily., Disp: , Rfl:     raloxifene (EVISTA) 60 MG tablet, Take 1 tablet by mouth Daily., Disp: 90 tablet, Rfl: 3    spironolactone (Aldactone) 25 MG tablet, Take 1/2 tablet every day (Patient taking differently: Take 0.5 tablets by mouth Daily. Taking one whole tablet every day), Disp: 45 tablet, Rfl: 3    Symbicort 80-4.5 MCG/ACT inhaler, Inhale  2 puffs 2 (Two) Times a Day., Disp: 10.2 g, Rfl: 2    Zinc 50 MG tablet, Take 1 tablet by mouth Daily., Disp: , Rfl:     doxycycline (VIBRAMYCIN) 100 MG capsule, Take 1 capsule by mouth 2 (Two) Times a Day for 7 days. Do not take at the same time as any vitamin or mineral supplements., Disp: 14 capsule, Rfl: 0    levoFLOXacin (LEVAQUIN) 500 MG tablet, Take 1 tablet by mouth Daily. (Patient not taking: Reported on 9/14/2023), Disp: , Rfl:     Past Medical History:   Diagnosis Date    Acute congestive heart failure     improved    Arthritis     Atrial fibrillation, persistent 09/16/2021    Bladder cancer     Bleeding disorder     (CONDITION DUE TO BLOOD THINNERS)    Chronic atrial fibrillation     Chronic heart failure with preserved ejection fraction (HFpEF) 09/23/2021    High cholesterol     Hyperlipidemia LDL goal <100 06/16/2021    Hypertension     Lymphoma     Moderate to severe aortic stenosis     Patient declines to have transcatheter    Non Hodgkin's lymphoma     2008 treated 2008 2009.    Rectus sheath hematoma     Right rectus sheath hematoma, off anticoagulation because of the hematoma.    Skin cancer     Type 2 diabetes mellitus      Past Surgical History:   Procedure Laterality Date    BLADDER SURGERY      CATARACT EXTRACTION      DILATION AND CURETTAGE, DIAGNOSTIC / THERAPEUTIC      LEFT VENTRICULAR ASSIST DEVICE      LYMPH NODE BIOPSY      Biopsy of retroperitoneal lymph node or mass     SKIN CANCER DESTRUCTION      TONSILLECTOMY       Social History     Socioeconomic History    Marital status:     Number of children: 2   Tobacco Use    Smoking status: Never    Smokeless tobacco: Never   Vaping Use    Vaping Use: Never used   Substance and Sexual Activity    Alcohol use: Never    Drug use: Never    Sexual activity: Defer           Objective     Vitals:    09/14/23 1306   BP: 98/58   BP Location: Left arm   Patient Position: Sitting   Pulse: 79   Resp: 18   Temp: 97.1 °F (36.2 °C)   TempSrc:  Temporal   PainSc:   5   PainLoc: Leg     There is no height or weight on file to calculate BMI.    STEADI Fall Risk Assessment has not been completed.     Review of Systems     ROS:  Per HPI.     I have reviewed the HPI and ROS as documented by MA/RN. Brisa Ferreira MD    Physical Exam     NAD  AAOx3, pleasant, cooperative  Laceration with underlying and adjacent hematoma and old drainage present on the anterolateral mid left lower extremity.  There is 1+ pitting edema RLE and 2+ pitting edema LLE with stasis changes bilaterally left greater than right.  Dopplerable pedal pulses with feet warm and dry with brisk capillary refill. Warmth, erythema, and swelling of the mid left lower leg.  Lidocaine gel applied for 30 minutes to allow for improved cleaning.  10 interrupted nylon sutures removed.  11 suture not found within wound, I suspect it has already fallen out as several of the sutures have pulled through but we will monitor the wound closely for an embedded suture.  Hematoma present, full dehiscence of the wound edges present.  Deep wound culture obtained.    See photos for details.          Result Review :  The following data was reviewed by: Brisa Ferreira MD on 09/14/2023:    Prior notes and images.  ED note, CBC, CMP, BNP             Assessment and Plan   Diagnoses and all orders for this visit:    1. Traumatic open wound of left lower leg, initial encounter (Primary)  -     Wound Culture - Wound, Leg, Left    2. Edema of both lower extremities due to peripheral venous insufficiency    3. Cellulitis of left lower extremity  -     doxycycline (VIBRAMYCIN) 100 MG capsule; Take 1 capsule by mouth 2 (Two) Times a Day for 7 days. Do not take at the same time as any vitamin or mineral supplements.  Dispense: 14 capsule; Refill: 0        Patient with traumatic open wound LLE with wound dehiscence and evidence of cellulitis.  Doxycycline prescribed, wound culture performed.  We have recommended that they packed  the wound with iodoform gauze packing strips.  They should gently wrap her legs with Ace bandages to assist with edema and she is instructed to elevate her lower extremities at all times when seated to help with the swelling and wound healing.    Recheck 1 week.    Patient was given instructions and counseling regarding their condition or for health maintenance advice, as well as the wound care plan and recommendations. They understand and agree with the plan.  They will follow back up here in the clinic but are instructed to contact us in the interim should they have any new, returning, or worsening symptoms or concerns. Please see specific information pulled into the AVS if appropriate.     Dragon Dictation utilized for chart completion.    Follow Up   Return in about 1 week (around 9/21/2023).      Brisa Ferreira MD

## 2023-09-15 NOTE — TELEPHONE ENCOUNTER
[facility] is still waiting to receive a response from [facility] on whether or not they are doing in home visits or only telehealth. Went ahead and faxed the referral to intake to review in the interim and advised that Mrs. Aparicio cannot participate in telehealth visits. Requested they contact me directly to confirm whether or not they provide in home visits. [facility] support remains available.    Resources/Referrals Provided: In home PCP - [facility]

## 2023-09-17 LAB
BACTERIA SPEC AEROBE CULT: NORMAL
GRAM STN SPEC: NORMAL
GRAM STN SPEC: NORMAL

## 2023-09-21 ENCOUNTER — OFFICE VISIT (OUTPATIENT)
Dept: WOUND CARE | Facility: HOSPITAL | Age: 88
End: 2023-09-21
Payer: MEDICARE

## 2023-09-21 VITALS
DIASTOLIC BLOOD PRESSURE: 60 MMHG | HEART RATE: 64 BPM | TEMPERATURE: 97.3 F | SYSTOLIC BLOOD PRESSURE: 102 MMHG | RESPIRATION RATE: 16 BRPM

## 2023-09-21 DIAGNOSIS — S81.802A TRAUMATIC OPEN WOUND OF LEFT LOWER LEG, INITIAL ENCOUNTER: Primary | ICD-10-CM

## 2023-09-21 DIAGNOSIS — I87.2 EDEMA OF BOTH LOWER EXTREMITIES DUE TO PERIPHERAL VENOUS INSUFFICIENCY: ICD-10-CM

## 2023-09-21 PROCEDURE — G0463 HOSPITAL OUTPT CLINIC VISIT: HCPCS | Performed by: EMERGENCY MEDICINE

## 2023-09-21 PROCEDURE — 1160F RVW MEDS BY RX/DR IN RCRD: CPT | Performed by: EMERGENCY MEDICINE

## 2023-09-21 PROCEDURE — 1159F MED LIST DOCD IN RCRD: CPT | Performed by: EMERGENCY MEDICINE

## 2023-09-21 NOTE — PROGRESS NOTES
Chief Complaint  Wound Check (FOLLOW-UP ON LEFT LEG ULCER)    Subjective      History of Present Illness    Radha Aparicio  is a 89 y.o. female who bumped her leg on a metal bed frame on 09/01/2023 and sustained a large laceration.  She was not able to get the bleeding stopped as she is on Eliquis so she went to the ED for evaluation.  She had 11 sutures placed and was started on a 7-day course of Keflex which she completed.  They have been putting triple antibiotic ointment on it and keeping it covered.  The area is quite sensitive and she is having a lot of swelling in her leg.  She says she is trying to prop it up at times but sometimes it makes it hurt even more.      At her initial visit we removed sutures that had pulled through and they have been packing the wound with iodoform gauze and she thinks it is getting better but is not able to see it very well so is not sure.  A wound culture performed at her initial visit was negative.      Allergies:  Contrast dye (echo or unknown ct/mr), Iodinated contrast media, Iodine, Penicillins, Shellfish allergy, Spinach, and Sulfa antibiotics      Current Outpatient Medications:     acetaminophen (TYLENOL) 325 MG tablet, 2 tablets As Needed., Disp: , Rfl:     albuterol sulfate  (90 Base) MCG/ACT inhaler, USE 2 PUFFS BY MOUTH EVERY 4 HOURS AS NEEDED FOR WHEEZING (Patient taking differently: Inhale 2 puffs Every 4 (Four) Hours As Needed.), Disp: 6.7 g, Rfl: 1    aspirin 81 MG EC tablet, Take 1 tablet by mouth Daily., Disp: , Rfl:     atorvastatin (LIPITOR) 40 MG tablet, Take 1 tablet by mouth Daily., Disp: 90 tablet, Rfl: 3    bumetanide (BUMEX) 2 MG tablet, TAKE 1 TABLET BY MOUTH DAILY, Disp: 90 tablet, Rfl: 1    Calcium Carbonate-Vitamin D 600-200 MG-UNIT capsule, Take 1 capsule by mouth 2 (Two) Times a Day., Disp: , Rfl:     carvedilol (COREG) 6.25 MG tablet, TAKE 1 TABLET BY MOUTH TWICE DAILY WITH FOOD (Patient taking differently: 2 (Two) Times a Day With  Meals.), Disp: 60 tablet, Rfl: 6    diphenhydrAMINE HCl (BENADRYL ALLERGY PO), Take  by mouth., Disp: , Rfl:     doxepin (SINEquan) 10 MG capsule, Take 1 capsule by mouth Every Night., Disp: 30 capsule, Rfl: 2    doxycycline (VIBRAMYCIN) 100 MG capsule, Take 1 capsule by mouth 2 (Two) Times a Day for 7 days. Do not take at the same time as any vitamin or mineral supplements., Disp: 14 capsule, Rfl: 0    Eliquis 2.5 MG tablet tablet, Take 1 tablet by mouth Every 12 (Twelve) Hours., Disp: , Rfl:     ergocalciferol (ERGOCALCIFEROL) 1.25 MG (67514 UT) capsule, One every 2 weeks, Disp: 6 capsule, Rfl: 3    FREESTYLE LITE test strip, USE AS DIRECTED TO TEST BLOOD SUGAR EVERY DAY, Disp: , Rfl:     guaiFENesin (MUCINEX) 600 MG 12 hr tablet, Take 1 tablet by mouth 2 (Two) Times a Day., Disp: , Rfl:     hydrocortisone-bacitracin-zinc oxide-nystatin (MAGIC BARRIER), Apply 1 application topically to the appropriate area as directed As Needed (due on bottom)., Disp: 60 g, Rfl: 1    iron polysaccharides (Ferrex 150) 150 MG capsule, 1 tablet p.o. twice daily, Disp: 60 capsule, Rfl: 0    Lancets (freestyle) lancets, 1 each by Other route Daily., Disp: , Rfl:     latanoprost (XALATAN) 0.005 % ophthalmic solution, INSTILL 1 DROP IN RIGHT EYE AT BEDTIME, Disp: , Rfl:     levoFLOXacin (LEVAQUIN) 500 MG tablet, Take 1 tablet by mouth Daily. (Patient not taking: Reported on 9/14/2023), Disp: , Rfl:     montelukast (SINGULAIR) 10 MG tablet, TAKE 1 TABLET(10 MG) BY MOUTH EVERY DAY IN THE EVENING (Patient taking differently: Take 1 tablet by mouth Every Night.), Disp: 90 tablet, Rfl: 3    multivitamin with minerals tablet tablet, Take 1 tablet by mouth Daily., Disp: , Rfl:     potassium chloride 10 MEQ CR tablet, Take 1 tablet by mouth Daily., Disp: 90 tablet, Rfl: 3    Probiotic Product (Risaquad-2) capsule capsule, Take 1 capsule by mouth Daily., Disp: , Rfl:     raloxifene (EVISTA) 60 MG tablet, Take 1 tablet by mouth Daily., Disp: 90  tablet, Rfl: 3    spironolactone (Aldactone) 25 MG tablet, Take 1/2 tablet every day (Patient taking differently: Take 0.5 tablets by mouth Daily. Taking one whole tablet every day), Disp: 45 tablet, Rfl: 3    Symbicort 80-4.5 MCG/ACT inhaler, Inhale 2 puffs 2 (Two) Times a Day., Disp: 10.2 g, Rfl: 2    Zinc 50 MG tablet, Take 1 tablet by mouth Daily., Disp: , Rfl:     Past Medical History:   Diagnosis Date    Acute congestive heart failure     improved    Arthritis     Atrial fibrillation, persistent 09/16/2021    Bladder cancer     Bleeding disorder     (CONDITION DUE TO BLOOD THINNERS)    Chronic atrial fibrillation     Chronic heart failure with preserved ejection fraction (HFpEF) 09/23/2021    High cholesterol     Hyperlipidemia LDL goal <100 06/16/2021    Hypertension     Lymphoma     Moderate to severe aortic stenosis     Patient declines to have transcatheter    Non Hodgkin's lymphoma     2008 treated 2008 2009.    Rectus sheath hematoma     Right rectus sheath hematoma, off anticoagulation because of the hematoma.    Skin cancer     Type 2 diabetes mellitus      Past Surgical History:   Procedure Laterality Date    BLADDER SURGERY      CATARACT EXTRACTION      DILATION AND CURETTAGE, DIAGNOSTIC / THERAPEUTIC      LEFT VENTRICULAR ASSIST DEVICE      LYMPH NODE BIOPSY      Biopsy of retroperitoneal lymph node or mass     SKIN CANCER DESTRUCTION      TONSILLECTOMY       Social History     Socioeconomic History    Marital status:     Number of children: 2   Tobacco Use    Smoking status: Never    Smokeless tobacco: Never   Vaping Use    Vaping Use: Never used   Substance and Sexual Activity    Alcohol use: Never    Drug use: Never    Sexual activity: Defer           Objective     Vitals:    09/21/23 1407   BP: 102/60   BP Location: Left arm   Patient Position: Sitting   Pulse: 64   Resp: 16   Temp: 97.3 °F (36.3 °C)   TempSrc: Temporal     There is no height or weight on file to calculate BMI.    KAILEE  Fall Risk Assessment has not been completed.     Review of Systems     ROS:  Per HPI.     I have reviewed the HPI and ROS as documented by MA/RN. Brisa Ferreira MD    Physical Exam     NAD  AAOx3, pleasant, cooperative  Significant improvement in the LLE laceration.  Wound bed is much healthier and starting to fill in.  There is a dry black eschar centrally which is firm and tightly adhered.  No evidence of large underlying hematoma and no evidence of wound infection.    See photos for details.          Result Review :  The following data was reviewed by: Brisa Ferreira MD on 09/21/2023:    Prior notes and images.  ED note, CBC, CMP, BNP             Assessment and Plan   Diagnoses and all orders for this visit:    1. Traumatic open wound of left lower leg, initial encounter (Primary)    2. Edema of both lower extremities due to peripheral venous insufficiency        Wound is significantly improved in just 1 week.  She should continue packing the wound with iodoform gauze packing strips 1-2 times daily.  They should so gently wrap her legs with Ace bandages to assist with edema and she is instructed to elevate her lower extremities at all times when seated to help with the swelling and wound healing.    Recheck 2 weeks.    Patient was given instructions and counseling regarding their condition or for health maintenance advice, as well as the wound care plan and recommendations. They understand and agree with the plan.  They will follow back up here in the clinic but are instructed to contact us in the interim should they have any new, returning, or worsening symptoms or concerns. Please see specific information pulled into the AVS if appropriate.     Dragon Dictation utilized for chart completion.    Follow Up   Return in about 2 weeks (around 10/5/2023).      Brisa Ferreira MD

## 2023-10-05 ENCOUNTER — OFFICE VISIT (OUTPATIENT)
Dept: WOUND CARE | Facility: HOSPITAL | Age: 88
End: 2023-10-05
Payer: MEDICARE

## 2023-10-05 VITALS
DIASTOLIC BLOOD PRESSURE: 54 MMHG | HEART RATE: 69 BPM | SYSTOLIC BLOOD PRESSURE: 118 MMHG | TEMPERATURE: 97.2 F | RESPIRATION RATE: 18 BRPM

## 2023-10-05 DIAGNOSIS — I87.2 EDEMA OF BOTH LOWER EXTREMITIES DUE TO PERIPHERAL VENOUS INSUFFICIENCY: ICD-10-CM

## 2023-10-05 DIAGNOSIS — S81.802A TRAUMATIC OPEN WOUND OF LEFT LOWER LEG, INITIAL ENCOUNTER: Primary | ICD-10-CM

## 2023-10-05 PROCEDURE — G0463 HOSPITAL OUTPT CLINIC VISIT: HCPCS | Performed by: EMERGENCY MEDICINE

## 2023-10-05 PROCEDURE — 1159F MED LIST DOCD IN RCRD: CPT | Performed by: EMERGENCY MEDICINE

## 2023-10-05 PROCEDURE — 1160F RVW MEDS BY RX/DR IN RCRD: CPT | Performed by: EMERGENCY MEDICINE

## 2023-10-05 NOTE — PROGRESS NOTES
Chief Complaint  Wound Check (Wound check to left lower leg, currently treating wound with iodoform packing )    Subjective      History of Present Illness    Radha Aparicio  is a 89 y.o. female who bumped her leg on a metal bed frame on 09/01/2023 and sustained a large laceration.  She was not able to get the bleeding stopped as she is on Eliquis so she went to the ED for evaluation.  She had 11 sutures placed and was started on a 7-day course of Keflex which she completed.      They have been packing the wound with iodoform gauze once a day and she thinks it is getting better but is not able to see it very well so is not sure.  She has also tried to prop her legs up more to help the swelling.  She has not noticed any drainage and the discomfort and sensitivity in the wound have improved significantly.    Allergies:  Contrast dye (echo or unknown ct/mr), Iodinated contrast media, Iodine, Penicillins, Shellfish allergy, Spinach, and Sulfa antibiotics      Current Outpatient Medications:     acetaminophen (TYLENOL) 325 MG tablet, 2 tablets As Needed., Disp: , Rfl:     albuterol sulfate  (90 Base) MCG/ACT inhaler, USE 2 PUFFS BY MOUTH EVERY 4 HOURS AS NEEDED FOR WHEEZING (Patient taking differently: Inhale 2 puffs Every 4 (Four) Hours As Needed.), Disp: 6.7 g, Rfl: 1    aspirin 81 MG EC tablet, Take 1 tablet by mouth Daily., Disp: , Rfl:     atorvastatin (LIPITOR) 40 MG tablet, Take 1 tablet by mouth Daily., Disp: 90 tablet, Rfl: 3    bumetanide (BUMEX) 2 MG tablet, TAKE 1 TABLET BY MOUTH DAILY, Disp: 90 tablet, Rfl: 1    Calcium Carbonate-Vitamin D 600-200 MG-UNIT capsule, Take 1 capsule by mouth 2 (Two) Times a Day., Disp: , Rfl:     carvedilol (COREG) 6.25 MG tablet, TAKE 1 TABLET BY MOUTH TWICE DAILY WITH FOOD (Patient taking differently: 2 (Two) Times a Day With Meals.), Disp: 60 tablet, Rfl: 6    diphenhydrAMINE HCl (BENADRYL ALLERGY PO), Take  by mouth., Disp: , Rfl:     doxepin (SINEquan) 10 MG  capsule, Take 1 capsule by mouth Every Night., Disp: 30 capsule, Rfl: 2    Eliquis 2.5 MG tablet tablet, Take 1 tablet by mouth Every 12 (Twelve) Hours., Disp: , Rfl:     ergocalciferol (ERGOCALCIFEROL) 1.25 MG (63530 UT) capsule, One every 2 weeks, Disp: 6 capsule, Rfl: 3    FREESTYLE LITE test strip, USE AS DIRECTED TO TEST BLOOD SUGAR EVERY DAY, Disp: , Rfl:     guaiFENesin (MUCINEX) 600 MG 12 hr tablet, Take 1 tablet by mouth 2 (Two) Times a Day., Disp: , Rfl:     hydrocortisone-bacitracin-zinc oxide-nystatin (MAGIC BARRIER), Apply 1 application topically to the appropriate area as directed As Needed (due on bottom)., Disp: 60 g, Rfl: 1    iron polysaccharides (Ferrex 150) 150 MG capsule, 1 tablet p.o. twice daily, Disp: 60 capsule, Rfl: 0    Lancets (freestyle) lancets, 1 each by Other route Daily., Disp: , Rfl:     latanoprost (XALATAN) 0.005 % ophthalmic solution, INSTILL 1 DROP IN RIGHT EYE AT BEDTIME, Disp: , Rfl:     levoFLOXacin (LEVAQUIN) 500 MG tablet, Take 1 tablet by mouth Daily. (Patient not taking: Reported on 9/14/2023), Disp: , Rfl:     montelukast (SINGULAIR) 10 MG tablet, TAKE 1 TABLET(10 MG) BY MOUTH EVERY DAY IN THE EVENING (Patient taking differently: Take 1 tablet by mouth Every Night.), Disp: 90 tablet, Rfl: 3    multivitamin with minerals tablet tablet, Take 1 tablet by mouth Daily., Disp: , Rfl:     potassium chloride 10 MEQ CR tablet, Take 1 tablet by mouth Daily., Disp: 90 tablet, Rfl: 3    Probiotic Product (Risaquad-2) capsule capsule, Take 1 capsule by mouth Daily., Disp: , Rfl:     raloxifene (EVISTA) 60 MG tablet, Take 1 tablet by mouth Daily., Disp: 90 tablet, Rfl: 3    spironolactone (Aldactone) 25 MG tablet, Take 1/2 tablet every day (Patient taking differently: Take 0.5 tablets by mouth Daily. Taking one whole tablet every day), Disp: 45 tablet, Rfl: 3    Symbicort 80-4.5 MCG/ACT inhaler, Inhale 2 puffs 2 (Two) Times a Day., Disp: 10.2 g, Rfl: 2    Zinc 50 MG tablet, Take 1  tablet by mouth Daily., Disp: , Rfl:     Past Medical History:   Diagnosis Date    Acute congestive heart failure     improved    Arthritis     Atrial fibrillation, persistent 09/16/2021    Bladder cancer     Bleeding disorder     (CONDITION DUE TO BLOOD THINNERS)    Chronic atrial fibrillation     Chronic heart failure with preserved ejection fraction (HFpEF) 09/23/2021    High cholesterol     Hyperlipidemia LDL goal <100 06/16/2021    Hypertension     Lymphoma     Moderate to severe aortic stenosis     Patient declines to have transcatheter    Non Hodgkin's lymphoma     2008 treated 2008 2009.    Rectus sheath hematoma     Right rectus sheath hematoma, off anticoagulation because of the hematoma.    Skin cancer     Type 2 diabetes mellitus      Past Surgical History:   Procedure Laterality Date    BLADDER SURGERY      CATARACT EXTRACTION      DILATION AND CURETTAGE, DIAGNOSTIC / THERAPEUTIC      LEFT VENTRICULAR ASSIST DEVICE      LYMPH NODE BIOPSY      Biopsy of retroperitoneal lymph node or mass     SKIN CANCER DESTRUCTION      TONSILLECTOMY       Social History     Socioeconomic History    Marital status:     Number of children: 2   Tobacco Use    Smoking status: Never    Smokeless tobacco: Never   Vaping Use    Vaping Use: Never used   Substance and Sexual Activity    Alcohol use: Never    Drug use: Never    Sexual activity: Defer           Objective     Vitals:    10/05/23 1336   BP: 118/54   BP Location: Left arm   Patient Position: Sitting   Pulse: 69   Resp: 18   Temp: 97.2 °F (36.2 °C)   TempSrc: Temporal   PainSc: 0-No pain     There is no height or weight on file to calculate BMI.    STEADI Fall Risk Assessment has not been completed.     Review of Systems     ROS:  Per HPI.     I have reviewed the HPI and ROS as documented by MA/RN. Brisa Ferreira MD    Physical Exam     NAD  AAOx3, pleasant, cooperative  Continued improvement in the LLE laceration.  Wound is getting smaller but the base is  somewhat dry.  There is a dry black eschar centrally which is firm and harshly elevated which I was able to peel away.  There is some old hematoma underneath it which I irrigated and cleaned away.  No evidence of wound infection.    See photos for details.          Result Review :  The following data was reviewed by: Brisa Ferreira MD on 10/05/2023:    Prior notes and images.               Assessment and Plan   Diagnoses and all orders for this visit:    1. Traumatic open wound of left lower leg, initial encounter (Primary)    2. Edema of both lower extremities due to peripheral venous insufficiency        Wound is continuing to improve.  She should continue packing the wound with iodoform gauze packing strips 1-2 times daily but we will have them start using saline on the strips for wet-to-dry dressing changes to help debride the slough and old hematoma and prevent the wound bed from becoming so dry.  They should also gently wrap her legs with Ace bandages to assist with edema and she is once again encouraged to elevate her lower extremities at all times when seated to help with the swelling and wound healing.    Recheck 3 weeks.    Patient was given instructions and counseling regarding their condition or for health maintenance advice, as well as the wound care plan and recommendations. They understand and agree with the plan.  They will follow back up here in the clinic but are instructed to contact us in the interim should they have any new, returning, or worsening symptoms or concerns. Please see specific information pulled into the AVS if appropriate.     Dragon Dictation utilized for chart completion.    Follow Up   Return in about 3 weeks (around 10/26/2023).      Brisa Ferreira MD

## 2023-10-17 ENCOUNTER — TELEPHONE (OUTPATIENT)
Dept: WOUND CARE | Facility: HOSPITAL | Age: 88
End: 2023-10-17
Payer: MEDICARE

## 2023-10-17 NOTE — TELEPHONE ENCOUNTER
"Patients daughter called and stated the new[facility] visiting house calls practitioner came to the house today and thought Radha's wound \"had something growing in it\" so they took a wound culture.     Juana stated the new NP, Laura Silva, was going to send us the culture and results when they result.   "

## 2023-10-26 ENCOUNTER — OFFICE VISIT (OUTPATIENT)
Dept: WOUND CARE | Facility: HOSPITAL | Age: 88
End: 2023-10-26
Payer: MEDICARE

## 2023-10-26 VITALS
RESPIRATION RATE: 18 BRPM | SYSTOLIC BLOOD PRESSURE: 90 MMHG | DIASTOLIC BLOOD PRESSURE: 56 MMHG | HEART RATE: 77 BPM | TEMPERATURE: 97.5 F

## 2023-10-26 DIAGNOSIS — S81.802A TRAUMATIC OPEN WOUND OF LEFT LOWER LEG, INITIAL ENCOUNTER: Primary | ICD-10-CM

## 2023-10-26 DIAGNOSIS — I87.2 VENOUS STASIS DERMATITIS OF BOTH LOWER EXTREMITIES: ICD-10-CM

## 2023-10-26 DIAGNOSIS — I87.2 EDEMA OF BOTH LOWER EXTREMITIES DUE TO PERIPHERAL VENOUS INSUFFICIENCY: ICD-10-CM

## 2023-10-26 PROCEDURE — G0463 HOSPITAL OUTPT CLINIC VISIT: HCPCS | Performed by: EMERGENCY MEDICINE

## 2023-10-26 PROCEDURE — 1160F RVW MEDS BY RX/DR IN RCRD: CPT | Performed by: EMERGENCY MEDICINE

## 2023-10-26 PROCEDURE — 1159F MED LIST DOCD IN RCRD: CPT | Performed by: EMERGENCY MEDICINE

## 2023-10-26 NOTE — PROGRESS NOTES
Chief Complaint  Wound Check (Wound check to left lower leg )    Subjective      History of Present Illness    Radha Aparicio  is a 89 y.o. female who bumped her leg on a metal bed frame on 09/01/2023 and sustained a large laceration.  She was not able to get the bleeding stopped as she is on Eliquis so she went to the ED for evaluation.  She had 11 sutures placed and was started on a 7-day course of Keflex which she completed.      They have been packing the wound with iodoform gauze once a day and she thinks it is getting better but is not able to see it very well so is not sure.  She is not having any pain or drainage from the wound.  Unfortunately the patient had another fall several days ago and sustained a large hematoma to the left hip.  She did not go to get checked out because she was able to stand up and walk right away and did not hit her head.    Allergies:  Contrast dye (echo or unknown ct/mr), Iodinated contrast media, Iodine, Penicillins, Shellfish allergy, Spinach, and Sulfa antibiotics      Current Outpatient Medications:     acetaminophen (TYLENOL) 325 MG tablet, 2 tablets As Needed., Disp: , Rfl:     albuterol sulfate  (90 Base) MCG/ACT inhaler, USE 2 PUFFS BY MOUTH EVERY 4 HOURS AS NEEDED FOR WHEEZING (Patient taking differently: Inhale 2 puffs Every 4 (Four) Hours As Needed.), Disp: 6.7 g, Rfl: 1    aspirin 81 MG EC tablet, Take 1 tablet by mouth Daily., Disp: , Rfl:     atorvastatin (LIPITOR) 40 MG tablet, Take 1 tablet by mouth Daily., Disp: 90 tablet, Rfl: 3    bumetanide (BUMEX) 2 MG tablet, TAKE 1 TABLET BY MOUTH DAILY, Disp: 90 tablet, Rfl: 1    Calcium Carbonate-Vitamin D 600-200 MG-UNIT capsule, Take 1 capsule by mouth 2 (Two) Times a Day., Disp: , Rfl:     carvedilol (COREG) 6.25 MG tablet, TAKE 1 TABLET BY MOUTH TWICE DAILY WITH FOOD (Patient taking differently: 2 (Two) Times a Day With Meals.), Disp: 60 tablet, Rfl: 6    diphenhydrAMINE HCl (BENADRYL ALLERGY PO), Take  by  mouth., Disp: , Rfl:     doxepin (SINEquan) 10 MG capsule, Take 1 capsule by mouth Every Night., Disp: 30 capsule, Rfl: 2    Eliquis 2.5 MG tablet tablet, Take 1 tablet by mouth Every 12 (Twelve) Hours., Disp: , Rfl:     ergocalciferol (ERGOCALCIFEROL) 1.25 MG (78300 UT) capsule, One every 2 weeks, Disp: 6 capsule, Rfl: 3    FREESTYLE LITE test strip, USE AS DIRECTED TO TEST BLOOD SUGAR EVERY DAY, Disp: , Rfl:     guaiFENesin (MUCINEX) 600 MG 12 hr tablet, Take 1 tablet by mouth 2 (Two) Times a Day., Disp: , Rfl:     hydrocortisone-bacitracin-zinc oxide-nystatin (MAGIC BARRIER), Apply 1 application topically to the appropriate area as directed As Needed (due on bottom)., Disp: 60 g, Rfl: 1    iron polysaccharides (Ferrex 150) 150 MG capsule, 1 tablet p.o. twice daily, Disp: 60 capsule, Rfl: 0    Lancets (freestyle) lancets, 1 each by Other route Daily., Disp: , Rfl:     latanoprost (XALATAN) 0.005 % ophthalmic solution, INSTILL 1 DROP IN RIGHT EYE AT BEDTIME, Disp: , Rfl:     levoFLOXacin (LEVAQUIN) 500 MG tablet, Take 1 tablet by mouth Daily. (Patient not taking: Reported on 9/14/2023), Disp: , Rfl:     montelukast (SINGULAIR) 10 MG tablet, TAKE 1 TABLET(10 MG) BY MOUTH EVERY DAY IN THE EVENING (Patient taking differently: Take 1 tablet by mouth Every Night.), Disp: 90 tablet, Rfl: 3    multivitamin with minerals tablet tablet, Take 1 tablet by mouth Daily., Disp: , Rfl:     potassium chloride 10 MEQ CR tablet, Take 1 tablet by mouth Daily., Disp: 90 tablet, Rfl: 3    Probiotic Product (Risaquad-2) capsule capsule, Take 1 capsule by mouth Daily., Disp: , Rfl:     raloxifene (EVISTA) 60 MG tablet, Take 1 tablet by mouth Daily., Disp: 90 tablet, Rfl: 3    spironolactone (Aldactone) 25 MG tablet, Take 1/2 tablet every day (Patient taking differently: Take 0.5 tablets by mouth Daily. Taking one whole tablet every day), Disp: 45 tablet, Rfl: 3    Symbicort 80-4.5 MCG/ACT inhaler, Inhale 2 puffs 2 (Two) Times a Day.,  Disp: 10.2 g, Rfl: 2    Zinc 50 MG tablet, Take 1 tablet by mouth Daily., Disp: , Rfl:     Past Medical History:   Diagnosis Date    Acute congestive heart failure     improved    Arthritis     Atrial fibrillation, persistent 09/16/2021    Bladder cancer     Bleeding disorder     (CONDITION DUE TO BLOOD THINNERS)    Chronic atrial fibrillation     Chronic heart failure with preserved ejection fraction (HFpEF) 09/23/2021    High cholesterol     Hyperlipidemia LDL goal <100 06/16/2021    Hypertension     Lymphoma     Moderate to severe aortic stenosis     Patient declines to have transcatheter    Non Hodgkin's lymphoma     2008 treated 2008 2009.    Rectus sheath hematoma     Right rectus sheath hematoma, off anticoagulation because of the hematoma.    Skin cancer     Type 2 diabetes mellitus      Past Surgical History:   Procedure Laterality Date    BLADDER SURGERY      CATARACT EXTRACTION      DILATION AND CURETTAGE, DIAGNOSTIC / THERAPEUTIC      LEFT VENTRICULAR ASSIST DEVICE      LYMPH NODE BIOPSY      Biopsy of retroperitoneal lymph node or mass     SKIN CANCER DESTRUCTION      TONSILLECTOMY       Social History     Socioeconomic History    Marital status:     Number of children: 2   Tobacco Use    Smoking status: Never    Smokeless tobacco: Never   Vaping Use    Vaping Use: Never used   Substance and Sexual Activity    Alcohol use: Never    Drug use: Never    Sexual activity: Defer           Objective     Vitals:    10/26/23 1338   BP: 90/56   BP Location: Left arm   Patient Position: Sitting   Pulse: 77   Resp: 18   Temp: 97.5 °F (36.4 °C)   TempSrc: Temporal   PainSc: 0-No pain     There is no height or weight on file to calculate BMI.    STEADI Fall Risk Assessment has not been completed.     Review of Systems     ROS:  Per HPI.     I have reviewed the HPI and ROS as documented by MA/RN. Brisa Ferreira MD    Physical Exam     NAD  AAOx3, pleasant, cooperative  Significant improvement in the LLE wound.   Wound is much smaller and healthier and there is a significant amount of good granulation tissue throughout the base.  Very small area of slough present.  No maceration and no evidence of infection.  Mild edema BLE with dry skin and stasis changes.  Left anterior and lateral hip and inguinal crease with resolving ecchymoses and hematoma as well as appropriate tenderness.  No pain with range of motion, no deformity or bony crepitus.    See photos for details.          Result Review :  The following data was reviewed by: Brisa Ferreira MD on 10/26/2023:    Prior notes and images.               Assessment and Plan   Diagnoses and all orders for this visit:    1. Traumatic open wound of left lower leg, initial encounter (Primary)    2. Edema of both lower extremities due to peripheral venous insufficiency    3. Venous stasis dermatitis of both lower extremities          Wound is doing well and is much healthier today.  We have advised them to start applying PolyMem silver to the wound and changing it daily or every other day depending on the amount of drainage.    Moisturize BLE aggressively 1-2 times daily to improve skin health, and elevate lower extremities for improved edema control.    Recheck 3 weeks.    Patient was given instructions and counseling regarding their condition or for health maintenance advice, as well as the wound care plan and recommendations. They understand and agree with the plan.  They will follow back up here in the clinic but are instructed to contact us in the interim should they have any new, returning, or worsening symptoms or concerns. Please see specific information pulled into the AVS if appropriate.     Dragon Dictation utilized for chart completion.    Follow Up   Return in about 3 weeks (around 11/16/2023).      Brisa Ferreira MD

## 2023-10-26 NOTE — PATIENT INSTRUCTIONS
Left Lower Leg: Cleanse wound with normal saline, pat dry. Cut Polymem Ag to size of the wound, apply to wound bed. Wrap left leg from base of toes to just below the knee with rolled gauze and ace wrap.     You may change dressing every other day if there is NO drainage. Change Daily if there IS drainage.

## 2023-11-02 ENCOUNTER — APPOINTMENT (OUTPATIENT)
Dept: CT IMAGING | Facility: HOSPITAL | Age: 88
End: 2023-11-02
Payer: MEDICARE

## 2023-11-02 ENCOUNTER — HOSPITAL ENCOUNTER (EMERGENCY)
Facility: HOSPITAL | Age: 88
Discharge: HOME | End: 2023-11-02
Attending: EMERGENCY MEDICINE
Payer: MEDICARE

## 2023-11-02 ENCOUNTER — APPOINTMENT (OUTPATIENT)
Dept: GENERAL RADIOLOGY | Facility: HOSPITAL | Age: 88
End: 2023-11-02
Payer: MEDICARE

## 2023-11-02 VITALS
SYSTOLIC BLOOD PRESSURE: 66 MMHG | HEART RATE: 67 BPM | BODY MASS INDEX: 22.11 KG/M2 | RESPIRATION RATE: 18 BRPM | TEMPERATURE: 98.3 F | HEIGHT: 66 IN | OXYGEN SATURATION: 97 % | DIASTOLIC BLOOD PRESSURE: 49 MMHG | WEIGHT: 137.57 LBS

## 2023-11-02 DIAGNOSIS — R06.09 CHRONIC DYSPNEA: ICD-10-CM

## 2023-11-02 DIAGNOSIS — S61.411A SKIN TEAR OF RIGHT HAND WITHOUT COMPLICATION, INITIAL ENCOUNTER: ICD-10-CM

## 2023-11-02 DIAGNOSIS — S09.90XA MINOR CLOSED HEAD INJURY: Primary | ICD-10-CM
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
## 2023-11-02 LAB
ALBUMIN SERPL-MCNC: 4.4 G/DL (ref 3.5–5.2)
ALBUMIN/GLOB SERPL: 1.2 G/DL
ALP SERPL-CCNC: 96 U/L (ref 39–117)
ALT SERPL W P-5'-P-CCNC: 23 U/L (ref 1–33)
ANION GAP SERPL CALCULATED.3IONS-SCNC: 8.8 MMOL/L (ref 5–15)
AST SERPL-CCNC: 40 U/L (ref 1–32)
BASOPHILS # BLD AUTO: 0.06 10*3/MM3 (ref 0–0.2)
BASOPHILS NFR BLD AUTO: 0.6 % (ref 0–1.5)
BILIRUB SERPL-MCNC: 1.1 MG/DL (ref 0–1.2)
BUN SERPL-MCNC: 44 MG/DL (ref 8–23)
BUN/CREAT SERPL: 42.7 (ref 7–25)
CALCIUM SPEC-SCNC: 11 MG/DL (ref 8.2–9.6)
CHLORIDE SERPL-SCNC: 98 MMOL/L (ref 98–107)
CO2 SERPL-SCNC: 32.2 MMOL/L (ref 22–29)
CREAT SERPL-MCNC: 1.03 MG/DL (ref 0.57–1)
DEPRECATED RDW RBC AUTO: 55.5 FL (ref 37–54)
EGFRCR SERPLBLD CKD-EPI 2021: 50.2 ML/MIN/1.73
EOSINOPHIL # BLD AUTO: 0.76 10*3/MM3 (ref 0–0.4)
EOSINOPHIL NFR BLD AUTO: 7.8 % (ref 0.3–6.2)
ERYTHROCYTE [DISTWIDTH] IN BLOOD BY AUTOMATED COUNT: 16 % (ref 12.3–15.4)
GEN 5 2HR TROPONIN T REFLEX: 68 NG/L
GLOBULIN UR ELPH-MCNC: 3.6 GM/DL
GLUCOSE SERPL-MCNC: 123 MG/DL (ref 65–99)
HCT VFR BLD AUTO: 43.6 % (ref 34–46.6)
HGB BLD-MCNC: 14 G/DL (ref 12–15.9)
HOLD SPECIMEN: NORMAL
HOLD SPECIMEN: NORMAL
IMM GRANULOCYTES # BLD AUTO: 0.03 10*3/MM3 (ref 0–0.05)
IMM GRANULOCYTES NFR BLD AUTO: 0.3 % (ref 0–0.5)
LYMPHOCYTES # BLD AUTO: 1.3 10*3/MM3 (ref 0.7–3.1)
LYMPHOCYTES NFR BLD AUTO: 13.4 % (ref 19.6–45.3)
MCH RBC QN AUTO: 30.8 PG (ref 26.6–33)
MCHC RBC AUTO-ENTMCNC: 32.1 G/DL (ref 31.5–35.7)
MCV RBC AUTO: 96 FL (ref 79–97)
MONOCYTES # BLD AUTO: 0.72 10*3/MM3 (ref 0.1–0.9)
MONOCYTES NFR BLD AUTO: 7.4 % (ref 5–12)
NEUTROPHILS NFR BLD AUTO: 6.86 10*3/MM3 (ref 1.7–7)
NEUTROPHILS NFR BLD AUTO: 70.5 % (ref 42.7–76)
NRBC BLD AUTO-RTO: 0 /100 WBC (ref 0–0.2)
NT-PROBNP SERPL-MCNC: ABNORMAL PG/ML (ref 0–1800)
PLATELET # BLD AUTO: 207 10*3/MM3 (ref 140–450)
PMV BLD AUTO: 10.4 FL (ref 6–12)
POTASSIUM SERPL-SCNC: 4 MMOL/L (ref 3.5–5.2)
PROT SERPL-MCNC: 8 G/DL (ref 6–8.5)
QT INTERVAL: 472 MS
QTC INTERVAL: 492 MS
RBC # BLD AUTO: 4.54 10*6/MM3 (ref 3.77–5.28)
SODIUM SERPL-SCNC: 139 MMOL/L (ref 136–145)
TROPONIN T DELTA: -8 NG/L
TROPONIN T SERPL HS-MCNC: 76 NG/L
WBC NRBC COR # BLD: 9.73 10*3/MM3 (ref 3.4–10.8)
WHOLE BLOOD HOLD COAG: NORMAL
WHOLE BLOOD HOLD SPECIMEN: NORMAL

## 2023-11-02 PROCEDURE — 84484 ASSAY OF TROPONIN QUANT: CPT | Performed by: EMERGENCY MEDICINE

## 2023-11-02 PROCEDURE — 73130 X-RAY EXAM OF HAND: CPT

## 2023-11-02 PROCEDURE — 83880 ASSAY OF NATRIURETIC PEPTIDE: CPT | Performed by: EMERGENCY MEDICINE

## 2023-11-02 PROCEDURE — 99284 EMERGENCY DEPT VISIT MOD MDM: CPT

## 2023-11-02 PROCEDURE — 93005 ELECTROCARDIOGRAM TRACING: CPT | Performed by: EMERGENCY MEDICINE

## 2023-11-02 PROCEDURE — 80053 COMPREHEN METABOLIC PANEL: CPT | Performed by: EMERGENCY MEDICINE

## 2023-11-02 PROCEDURE — 36415 COLL VENOUS BLD VENIPUNCTURE: CPT

## 2023-11-02 PROCEDURE — 70450 CT HEAD/BRAIN W/O DYE: CPT

## 2023-11-02 PROCEDURE — 93005 ELECTROCARDIOGRAM TRACING: CPT

## 2023-11-02 PROCEDURE — 85025 COMPLETE CBC W/AUTO DIFF WBC: CPT | Performed by: EMERGENCY MEDICINE

## 2023-11-02 PROCEDURE — 71045 X-RAY EXAM CHEST 1 VIEW: CPT

## 2023-11-02 RX ORDER — DOXYCYCLINE 100 MG/1
100 CAPSULE ORAL 2 TIMES DAILY
Qty: 20 CAPSULE | Refills: 0 | Status: SHIPPED | OUTPATIENT
Start: 2023-11-02

## 2023-11-02 RX ORDER — SODIUM CHLORIDE 0.9 % (FLUSH) 0.9 %
10 SYRINGE (ML) INJECTION AS NEEDED
Status: DISCONTINUED | OUTPATIENT
Start: 2023-11-02 | End: 2023-11-02 | Stop reason: HOSPADM

## 2023-11-02 RX ORDER — HYDROXYZINE HYDROCHLORIDE 25 MG/1
12.5 TABLET, FILM COATED ORAL 3 TIMES DAILY PRN
Qty: 10 TABLET | Refills: 0 | Status: SHIPPED | OUTPATIENT
Start: 2023-11-02

## 2023-11-02 NOTE — ED PROVIDER NOTES
Time: 3:40 PM EDT  Date of encounter:  11/2/2023  Independent Historian/Clinical History and Information was obtained by:   Patient and Family  Chief Complaint: Possible head injury after fall    History is limited by: N/A    History of Present Illness:  Patient is a 89 y.o. year old female who presents to the emergency department for evaluation of possible head injury after fall.  Patient has follicular lymphoma.  Patient was at the cancer center to receive her typical infusion.  Patient reports she has chronic dyspnea.  Patient was going to her infusion chair when she became short of breath.  This caused her to get weak and she fell.  Patient reportedly hit her head.  Patient also struck her right hand.  Patient reports that her breathing is at her baseline and she is not here for any concerns regarding her breathing.    HPI    Patient Care Team  Primary Care Provider: Laura Silva APRN    Past Medical History:     Allergies   Allergen Reactions    Contrast Dye (Echo Or Unknown Ct/Mr) Unknown - Low Severity    Iodinated Contrast Media Hives and Other (See Comments)     IODINATED CONTRAST MEDIA (Verified  Allergy, Unknown, HIVES,SNEEZING,ITCHY EYES)    Iodine Unknown - Low Severity    Penicillins Rash    Shellfish Allergy Unknown - Low Severity    Spinach Other (See Comments)      SPINACH (Verified  Allergy, Unknown, 2/22/21)      SHOWED UP ON ALLERGY TESTING    Sulfa Antibiotics Other (See Comments)     (Verified  Allergy, Unknown, RASH)     Past Medical History:   Diagnosis Date    Acute congestive heart failure     improved    Arthritis     Atrial fibrillation, persistent 09/16/2021    Bladder cancer     Bleeding disorder     (CONDITION DUE TO BLOOD THINNERS)    Chronic atrial fibrillation     Chronic heart failure with preserved ejection fraction (HFpEF) 09/23/2021    High cholesterol     Hyperlipidemia LDL goal <100 06/16/2021    Hypertension     Lymphoma     Moderate to severe aortic stenosis     Patient  declines to have transcatheter    Non Hodgkin's lymphoma     2008 treated 2008 2009.    Rectus sheath hematoma     Right rectus sheath hematoma, off anticoagulation because of the hematoma.    Skin cancer     Type 2 diabetes mellitus      Past Surgical History:   Procedure Laterality Date    BLADDER SURGERY      CATARACT EXTRACTION      DILATION AND CURETTAGE, DIAGNOSTIC / THERAPEUTIC      LEFT VENTRICULAR ASSIST DEVICE      LYMPH NODE BIOPSY      Biopsy of retroperitoneal lymph node or mass     SKIN CANCER DESTRUCTION      TONSILLECTOMY       Family History   Problem Relation Age of Onset    Heart attack Father     Heart attack Brother     Stomach cancer Paternal Great-Grandfather        Home Medications:  Prior to Admission medications    Medication Sig Start Date End Date Taking? Authorizing Provider   aspirin 81 MG EC tablet Take 1 tablet by mouth Daily.   Yes Mary Kate Perea MD   atorvastatin (LIPITOR) 40 MG tablet Take 1 tablet by mouth Daily. 10/6/22  Yes Lily Byrd MD PhD   bumetanide (BUMEX) 2 MG tablet TAKE 1 TABLET BY MOUTH DAILY 9/8/23  Yes Veronica Mckeon APRN   Eliquis 2.5 MG tablet tablet Take 1 tablet by mouth Every 12 (Twelve) Hours. 1/4/23  Yes Mary Kate Perea MD   ergocalciferol (ERGOCALCIFEROL) 1.25 MG (17929 UT) capsule One every 2 weeks 8/8/22  Yes Beata Lancaster MD   latanoprost (XALATAN) 0.005 % ophthalmic solution INSTILL 1 DROP IN RIGHT EYE AT BEDTIME 1/12/23  Yes Mary Kate Perea MD   montelukast (SINGULAIR) 10 MG tablet TAKE 1 TABLET(10 MG) BY MOUTH EVERY DAY IN THE EVENING  Patient taking differently: Take 1 tablet by mouth Every Night. 4/8/22  Yes Gala Rao MD   multivitamin with minerals tablet tablet Take 1 tablet by mouth Daily.   Yes Mary Kate Perea MD   potassium chloride 10 MEQ CR tablet Take 1 tablet by mouth Daily. 10/6/22  Yes Lily Byrd MD PhD   Probiotic Product (Risaquad-2) capsule capsule Take 1 capsule  by mouth Daily. 1/19/23  Yes Mary Kate Perea MD   raloxifene (EVISTA) 60 MG tablet Take 1 tablet by mouth Daily. 1/11/22  Yes Gala Rao MD   spironolactone (Aldactone) 25 MG tablet Take 1/2 tablet every day  Patient taking differently: Take 0.5 tablets by mouth Daily. Taking one whole tablet every day 2/7/22  Yes Gala Rao MD   Symbicort 80-4.5 MCG/ACT inhaler Inhale 2 puffs 2 (Two) Times a Day. 9/13/22  Yes Beata Lancaster MD   Zinc 50 MG tablet Take 1 tablet by mouth Daily.   Yes Mary Kate Perea MD   acetaminophen (TYLENOL) 325 MG tablet 2 tablets As Needed. 3/9/22   Mary Kate Perea MD   albuterol sulfate  (90 Base) MCG/ACT inhaler USE 2 PUFFS BY MOUTH EVERY 4 HOURS AS NEEDED FOR WHEEZING  Patient taking differently: Inhale 2 puffs Every 4 (Four) Hours As Needed. 12/21/21   Gala Rao MD   Calcium Carbonate-Vitamin D 600-200 MG-UNIT capsule Take 1 capsule by mouth 2 (Two) Times a Day.    ProviderMary Kate MD   carvedilol (COREG) 6.25 MG tablet TAKE 1 TABLET BY MOUTH TWICE DAILY WITH FOOD  Patient taking differently: 2 (Two) Times a Day With Meals. 6/16/22   Gala Rao MD   diphenhydrAMINE HCl (BENADRYL ALLERGY PO) Take  by mouth.    Mary Kate Perea MD   doxepin (SINEquan) 10 MG capsule Take 1 capsule by mouth Every Night. 5/18/23   Lily Byrd MD PhD   FREESTYLE LITE test strip USE AS DIRECTED TO TEST BLOOD SUGAR EVERY DAY 2/22/23   Mary Kate Perea MD   guaiFENesin (MUCINEX) 600 MG 12 hr tablet Take 1 tablet by mouth 2 (Two) Times a Day.    Mary Kate Perea MD   hydrocortisone-bacitracin-zinc oxide-nystatin (MAGIC BARRIER) Apply 1 application topically to the appropriate area as directed As Needed (due on bottom). 6/16/22   Musa Hughes MD   iron polysaccharides (Ferrex 150) 150 MG capsule 1 tablet p.o. twice daily 1/13/23   Beata Lancaster MD   Lancets (freestyle) lancets 1 each by Other route Daily.  "12/28/22   ProviderMary Kate MD   levoFLOXacin (LEVAQUIN) 500 MG tablet Take 1 tablet by mouth Daily.  Patient not taking: Reported on 9/14/2023 1/17/23   ProviderMary Kate MD        Social History:   Social History     Tobacco Use    Smoking status: Never    Smokeless tobacco: Never   Vaping Use    Vaping Use: Never used   Substance Use Topics    Alcohol use: Never    Drug use: Never         Review of Systems:  Review of Systems   Constitutional:  Negative for chills and fever.   HENT:  Negative for congestion, ear pain and sore throat.    Eyes:  Negative for pain.   Respiratory:  Negative for cough, chest tightness and shortness of breath.    Cardiovascular:  Negative for chest pain.   Gastrointestinal:  Negative for abdominal pain, diarrhea, nausea and vomiting.   Genitourinary:  Negative for flank pain and hematuria.   Musculoskeletal:  Negative for joint swelling.        Bruise to her right hand   Skin:  Negative for pallor.        Skin tear right hand   Neurological:  Negative for seizures and headaches.   All other systems reviewed and are negative.       Physical Exam:  BP (!) 66/49   Pulse 67   Temp 98.3 °F (36.8 °C) (Oral)   Resp 18   Ht 167.6 cm (65.98\")   Wt 62.4 kg (137 lb 9.1 oz)   SpO2 97%   BMI 22.22 kg/m²     Physical Exam    Vital signs were reviewed under triage note.  General appearance - Patient appears well-developed and well-nourished.  Patient is in no acute distress.  Head - Normocephalic, atraumatic.  Pupils - Equal, round, reactive to light.  Extraocular muscles are intact.  Conjunctiva is clear.  Nasal - Normal inspection.  No evidence of trauma or epistaxis.  Tympanic membranes - Gray, intact without erythema or retractions.  Oral mucosa - Pink and moist without lesions or erythema.  Uvula is midline.  Chest wall - Atraumatic.  Chest wall is nontender.  There are no vesicular rashes noted.  Neck - Supple.  Trachea was midline.  There is no palpable lymphadenopathy or " thyromegaly.  There are no meningeal signs  Lungs - Auscultation   Heart -  irregularly irregular rate and rhythm.  Patient has a 5/6 systolic murmur noted.  Abdomen - Soft.  Bowel sounds are present.  There is no palpable tenderness.  There is no rebound, guarding, or rigidity.  There are no palpable masses.  There are no pulsatile masses.  Back - Spine is straight and midline.  There is no CVA tenderness.  Extremities - Intact x4 with full range of motion.  There is no palpable edema.  Pulses are intact x4 and equal.  Neurologic - Patient is awake, alert, and oriented x3.  Cranial nerves II through XII are grossly intact.  Motor and sensory functions grossly intact.  Cerebellar function was normal.  Integument - There is bilateral lower extremity erythema that appears to be more of a chronic venous stasis appearance.  Patient does have a dressing over a small wound left lower extremity.  Patient has cracked and peeling skin on her left lower extremity as well..  There are no petechia or purpura lesions noted.  There are no vesicular lesions noted.  There is a small superficial 4 mm skin tear on the dorsum of the right hand just before the second MCP joint.  There is also associated contusion there.           Procedures:  Procedures      Medical Decision Making:      Comorbidities that affect care:    Follicular lymphoma, severe aortic stenosis, diabetes, high cholesterol, hypertension, CHF, chronic atrial fibrillation, bladder cancer    External Notes reviewed:    Previous Clinic Note: Clinic visit with Dr. Ferreira dated 10/26/2023 for left lower extremity wound was reviewed by me.      The following orders were placed and all results were independently analyzed by me:  Orders Placed This Encounter   Procedures    XR Chest 1 View    XR Hand 3+ View Right    CT Head Without Contrast    [place] Draw    Comprehensive Metabolic Panel    BNP    Single High Sensitivity Troponin T    CBC Auto Differential    High  Sensitivity Troponin T 2Hr    NPO Diet NPO Type: Strict NPO    Undress & Gown    Continuous Pulse Oximetry    Vital Signs    Oxygen Therapy- Nasal Cannula; Titrate 1-6 LPM Per SpO2; 90 - 95%    ECG 12 Lead ED Triage Standing Order; SOA    Insert Peripheral IV    CBC & Differential    Green Top (Gel)    Lavender Top    Gold Top - SST    Light Blue Top       Medications Given in the Emergency Department:  Medications   sodium chloride 0.9 % flush 10 mL (has no administration in time range)        ED Course:    ED Course as of 11/02/23 2325   Thu Nov 02, 2023   2313 EKG performed on 1119 was not by me show atrial fibrillation with a ventricular rate of 65 bpm.  There is no discernible P waves.  QRS interval is widened 177 ms.  Patient is a left bundle branch block morphology.  Axis is leftward at -59 degrees.  There is no acute ischemic ST or T wave change identified.  QT corrected was 416 ms. [TB]      ED Course User Index  [TB] Roberto Carlos Amaya DO   The patient was seen and evaluated in the ED by me.  The above history and physical examination was performed as documented.  Diagnostic data was obtained.  Results reviewed.  Patient was very sustained and that her breathing is not any reason for why she is in the ER and that her breathing is at her baseline status.  Patient's injury work-up for the fall did a noncontrast head CT which was negative and her hand x-ray which was also negative.  The skin tear was dressed.  Patient still for discharge home with outpatient treatment follow-up.    Admit regarding the patient's elevated troponin, it is important to note that high-sensitivity troponin tests have higher sensitivity but lower specificity compared to conventional troponin tests. This means that while high-sensitivity troponin tests can detect smaller levels of troponin in the blood, they may also produce more false positive results. Therefore, the results of a high-sensitivity troponin test should be interpreted in  conjunction with the patient's clinical history and other diagnostic tests.    In this case, the patient's repeat troponin has decreased, which may suggest that the initial elevation was a false positive or a result of a transient cardiac event. However, further evaluation by a cardiologist may be warranted to determine the underlying cause and rule out any potential cardiac conditions.    Labs:    Lab Results (last 24 hours)       Procedure Component Value Units Date/Time    CBC & Differential [[id number]]  (Abnormal) Collected: 11/02/23 1224    Specimen: Blood Updated: 11/02/23 1231    Narrative:      The following orders were created for panel order CBC & Differential.  Procedure                               Abnormality         Status                     ---------                               -----------         ------                     CBC Auto Differential[[id number]]        Abnormal            Final result                 Please view results for these tests on the individual orders.    Comprehensive Metabolic Panel [[id number]]  (Abnormal) Collected: 11/02/23 1224    Specimen: Blood Updated: 11/02/23 1249     Glucose 123 mg/dL      BUN 44 mg/dL      Creatinine 1.03 mg/dL      Sodium 139 mmol/L      Potassium 4.0 mmol/L      Chloride 98 mmol/L      CO2 32.2 mmol/L      Calcium 11.0 mg/dL      Total Protein 8.0 g/dL      Albumin 4.4 g/dL      ALT (SGPT) 23 U/L      AST (SGOT) 40 U/L      Alkaline Phosphatase 96 U/L      Total Bilirubin 1.1 mg/dL      Globulin 3.6 gm/dL      A/G Ratio 1.2 g/dL      BUN/Creatinine Ratio 42.7     Anion Gap 8.8 mmol/L      eGFR 50.2 mL/min/1.73     Narrative:      GFR Normal >60  Chronic Kidney Disease <60  Kidney Failure <15    The GFR formula is only valid for adults with stable renal function between ages 18 and 70.    BNP [[id number]]  (Abnormal) Collected: 11/02/23 1224    Specimen: Blood Updated: 11/02/23 1256     proBNP 15,399.0 pg/mL     Narrative:      This assay is used as  an aid in the diagnosis of individuals suspected of having heart failure. It can be used as an aid in the diagnosis of acute decompensated heart failure (ADHF) in patients presenting with signs and symptoms of ADHF to the emergency department (ED). In addition, NT-proBNP of <300 pg/mL indicates ADHF is not likely.    Age Range Result Interpretation  NT-proBNP Concentration (pg/mL:      <50             Positive            >450                   Gray                 300-450                    Negative             <300    50-75           Positive            >900                  Gray                300-900                  Negative            <300      >75             Positive            >1800                  Gray                300-1800                  Negative            <300    Single High Sensitivity Troponin T [[id number]]  (Abnormal) Collected: 11/02/23 1224    Specimen: Blood Updated: 11/02/23 1331     HS Troponin T 76 ng/L     Narrative:      High Sensitive Troponin T Reference Range:  <10.0 ng/L- Negative Female for AMI  <15.0 ng/L- Negative Male for AMI  >=10 - Abnormal Female indicating possible myocardial injury.  >=15 - Abnormal Male indicating possible myocardial injury.   Clinicians would have to utilize clinical acumen, EKG, Troponin, and serial changes to determine if it is an Acute Myocardial Infarction or myocardial injury due to an underlying chronic condition.         CBC Auto Differential [[id number]]  (Abnormal) Collected: 11/02/23 1224    Specimen: Blood Updated: 11/02/23 1231     WBC 9.73 10*3/mm3      RBC 4.54 10*6/mm3      Hemoglobin 14.0 g/dL      Hematocrit 43.6 %      MCV 96.0 fL      MCH 30.8 pg      MCHC 32.1 g/dL      RDW 16.0 %      RDW-SD 55.5 fl      MPV 10.4 fL      Platelets 207 10*3/mm3      Neutrophil % 70.5 %      Lymphocyte % 13.4 %      Monocyte % 7.4 %      Eosinophil % 7.8 %      Basophil % 0.6 %      Immature Grans % 0.3 %      Neutrophils, Absolute 6.86 10*3/mm3       Lymphocytes, Absolute 1.30 10*3/mm3      Monocytes, Absolute 0.72 10*3/mm3      Eosinophils, Absolute 0.76 10*3/mm3      Basophils, Absolute 0.06 10*3/mm3      Immature Grans, Absolute 0.03 10*3/mm3      nRBC 0.0 /100 WBC     High Sensitivity Troponin T 2Hr [[id number]] Collected: 11/02/23 1504    Specimen: Blood Updated: 11/02/23 1504             Imaging:    XR Hand 3+ View Right    Result Date: 11/2/2023  PROCEDURE: XR HAND 3+ VW RIGHT  COMPARISON: [facility], CR, BONE SURVEY, 8/19/2008, 21:09.  INDICATIONS: RIGHT POSTERIOR HAND PAIN S/P FALL TODAY  FINDINGS:   Degenerative changes are present in the 1st carpometacarpal joint and distal radioulnar joint.  There are degenerative changes in the IP joints and 2nd and 3rd MCP joints.  The bones are osteopenic.  No acute fractures or dislocations are identified.  IMPRESSION: Degenerative change.  Osteopenia.  No acute osseous abnormalities are identified.   AGUSTÍN LEACH MD       Electronically Signed and Approved By: AGUSTÍN LEACH MD on 11/02/2023 at 13:50             CT Head Without Contrast    Result Date: 11/2/2023  PROCEDURE: CT HEAD WO CONTRAST  COMPARISON:  [facility], CT, HEAD W/O CONTRAST, 3/21/2021, 21:22. INDICATIONS: Fall with head injury, left frontal bone pain  PROTOCOL:   Standard imaging protocol performed    RADIATION:   DLP: 1017.2mGy*cm   MA and/or KV was adjusted to minimize radiation dose.     TECHNIQUE: After obtaining the patient's consent, CT images were obtained without non-ionic intravenous contrast material.  FINDINGS:  There is cerebral atrophy.  There are periventricular and deep white matter changes which could reflect more chronic small vessel ischemic change.  There additionally are changes in the subinsular/right basal ganglia region which have been suggested and could relate to old insult in this area.  Paranasal sinuses and mastoids seem clear.        1. There are white matter changes involving both  cerebral hemispheres as well as changes in the subinsular/right basal ganglia region that could reflect more chronic small vessel ischemic change. 2. Evidence for some underlying cerebral atrophy.     PEACE ESPINAL MD       Electronically Signed and Approved By: PEACE ESPINAL MD on 11/02/2023 at 13:06             XR Chest 1 View    Result Date: 11/2/2023  PROCEDURE: XR CHEST 1 VW  COMPARISON: [facility], CT, CT CHEST WO CONTRAST DIAGNOSTIC, 1/05/2022, 12:36.  [facility], CR, XR CHEST 1 VW, 1/19/2023, 17:51.  [facility], CT, CT CHEST WO CONTRAST DIAGNOSTIC, 6/13/2023, 13:10.  INDICATIONS: Shortness of air  FINDINGS:   Cardiomegaly is present.  There is mild pulmonary edema.  There are small pleural effusions right greater than left.  Right IJ Port-A-Cath tip is in the SVC.  No evidence of pneumothorax.  Surgical clips are present in the left axilla.  There is patchy airspace opacity in the mid and lower lung fields likely atelectasis.  Pneumonia is also possible.  IMPRESSION: Findings suggest congestive heart failure.  Superimposed pneumonia is possible.   AGUSTÍN LEACH MD       Electronically Signed and Approved By: AGUSTÍN LEACH MD on 11/02/2023 at 12:05                Differential Diagnosis and Discussion:    Trauma:  Differential diagnosis considered but not limited to were subarachnoid hemorrhage, intracranial bleeding, pneumothorax, cardiac contusion, lung contusion, intra-abdominal bleeding, and compartment syndrome of any extremity or other significant traumatic pathology    All labs were reviewed and interpreted by me.  All X-rays impressions were independently interpreted by me.  EKG was interpreted by me.  CT scan radiology impression was interpreted by me.    MDM     Amount and/or Complexity of Data Reviewed  Decide to obtain previous medical records or to obtain history from someone other than the patient: yes             Patient Care  Considerations:    ANTIBIOTICS: I considered prescribing antibiotics as an outpatient however there is no evidence of acute bacterial infection identified.      Consultants/Shared Management Plan:    None    Social Determinants of Health:    Patient has presented with family members who are responsible, reliable and will ensure follow up care.      Disposition and Care Coordination:    Discharged: I considered escalation of care by admitting this patient for observation, however the patient has improved and is suitable and  stable for discharge.    I have explained the patient´s condition, diagnoses and treatment plan based on the information available to me at this time. I have answered questions and addressed any concerns. The patient has a good  understanding of the patient´s diagnosis, condition, and treatment plan as can be expected at this point. The vital signs have been stable. The patient´s condition is stable and appropriate for discharge from the emergency department.      The patient will pursue further outpatient evaluation with the primary care physician or other designated or consulting physician as outlined in the discharge instructions. They are agreeable to this plan of care and follow-up instructions have been explained in detail. The patient has received these instructions in written format and have expressed an understanding of the discharge instructions. The patient is aware that any significant change in condition or worsening of symptoms should prompt an immediate return to this or the closest emergency department or call to 911.  I have explained discharge medications and the need for follow up with the patient/caretakers. This was also printed in the discharge instructions. Patient was discharged with the following medications and follow up:      Medication List        New Prescriptions      doxycycline 100 MG capsule  Commonly known as: MONODOX  Take 1 capsule by mouth 2 (Two) Times a  Day.     hydrOXYzine 25 MG tablet  Commonly known as: ATARAX  Take 0.5 tablets by mouth 3 (Three) Times a Day As Needed for Itching.            Changed      albuterol sulfate  (90 Base) MCG/ACT inhaler  Commonly known as: PROVENTIL HFA;VENTOLIN HFA;PROAIR HFA  USE 2 PUFFS BY MOUTH EVERY 4 HOURS AS NEEDED FOR WHEEZING  What changed: See the new instructions.     carvedilol 6.25 MG tablet  Commonly known as: COREG  TAKE 1 TABLET BY MOUTH TWICE DAILY WITH FOOD  What changed:   how much to take  how to take this     montelukast 10 MG tablet  Commonly known as: SINGULAIR  TAKE 1 TABLET(10 MG) BY MOUTH EVERY DAY IN THE EVENING  What changed: See the new instructions.     spironolactone 25 MG tablet  Commonly known as: Aldactone  Take 1/2 tablet every day  What changed:   how much to take  how to take this  when to take this  additional instructions               Where to Get Your Medications        These medications were sent to [id number] - [place], [place] - [address]  AT [facility] [address] - [phone]  - [phone]   [address] [facility] [address]      Phone: [phone]   doxycycline 100 MG capsule  hydrOXYzine 25 MG tablet      Laura Silva APRN  [address] [facility] [address] [phone]    In 1 week        Final diagnoses:   Minor closed head injury   Skin tear of right hand without complication, initial encounter   Fall, initial encounter   Chronic dyspnea        ED Disposition       ED Disposition   Discharge    Condition   Stable    Comment   --               This medical record created using voice recognition software.             Roberto Carlos Amaya DO  11/02/23 [id number]

## 2023-11-02 NOTE — DISCHARGE INSTRUCTIONS
I would take an extra dose of your water pill for the next 2 days to pull off a little extra fluid to see if that will help with your breathing.  Take the prescriptions as prescribed.  Follow-up with wound care clinic as scheduled.  Follow with your primary care provider in 5 to 7 days.  Return to the ER for any other concerns issues arise.

## 2023-11-07 ENCOUNTER — TELEPHONE (OUTPATIENT)
Dept: ONCOLOGY | Facility: HOSPITAL | Age: 88
End: 2023-11-07
Payer: MEDICARE

## 2023-11-07 NOTE — TELEPHONE ENCOUNTER
Caller: ROYCE MURRIETA    Relationship: Emergency Contact    Best call back number: [phone]    What is the best time to reach you: ANY    Who are you requesting to speak with (clinical staff, provider,  specific staff member): CLINICAL     What was the call regarding: ROYCE IS CALLING WANTING TO RESCHEDULE QUEENIE'S APPOINTMENT WITH DR TADEO AND INFUSION THAT SHE HAD ON 11-2.    SHE HAD FALLEN  THAT DAY AND WAS SENT TO THE ER     PLEASE CALL TO RESCHEDULE     Is it okay if the provider responds through MyChart: NO

## 2023-11-09 NOTE — TELEPHONE ENCOUNTER
Caller: ROYCE MURRIETA    Relationship: Emergency Contact    Best call back number: [phone]    What is the best time to reach you: ANYTIME    Who are you requesting to speak with (clinical staff, provider,  specific staff member): SCHEDULING     What was the call regarding: CALLING TO CHECK ON RE-SCHEDULING INFUSION FROM 11-2  PLEASE CALL TO ADVISE     Is it okay if the provider responds through MyChart: N/A

## 2023-11-10 NOTE — TELEPHONE ENCOUNTER
LEFT MESSAGE FOR PATIENTS DAUGHTER (ROYCE), WE HAVE RESCHEDULED  FOR INFUSION ON 11/21/2023 AT 10:15 AM. LEFT APPOINTMENT INFORMATION.

## 2023-11-16 ENCOUNTER — OFFICE VISIT (OUTPATIENT)
Dept: WOUND CARE | Facility: HOSPITAL | Age: 88
End: 2023-11-16
Payer: MEDICARE

## 2023-11-16 VITALS
TEMPERATURE: 97.1 F | DIASTOLIC BLOOD PRESSURE: 60 MMHG | HEART RATE: 72 BPM | SYSTOLIC BLOOD PRESSURE: 102 MMHG | RESPIRATION RATE: 16 BRPM

## 2023-11-16 DIAGNOSIS — I87.2 EDEMA OF BOTH LOWER EXTREMITIES DUE TO PERIPHERAL VENOUS INSUFFICIENCY: ICD-10-CM

## 2023-11-16 DIAGNOSIS — L97.821 VENOUS STASIS ULCER OF OTHER PART OF LEFT LOWER LEG LIMITED TO BREAKDOWN OF SKIN WITHOUT VARICOSE VEINS: ICD-10-CM

## 2023-11-16 DIAGNOSIS — I87.2 VENOUS STASIS ULCER OF OTHER PART OF LEFT LOWER LEG LIMITED TO BREAKDOWN OF SKIN WITHOUT VARICOSE VEINS: ICD-10-CM

## 2023-11-16 DIAGNOSIS — S81.802D TRAUMATIC OPEN WOUND OF LEFT LOWER LEG, SUBSEQUENT ENCOUNTER: Primary | ICD-10-CM

## 2023-11-16 DIAGNOSIS — I87.2 VENOUS STASIS DERMATITIS OF BOTH LOWER EXTREMITIES: ICD-10-CM

## 2023-11-16 PROCEDURE — 1159F MED LIST DOCD IN RCRD: CPT | Performed by: EMERGENCY MEDICINE

## 2023-11-16 PROCEDURE — 87076 CULTURE ANAEROBE IDENT EACH: CPT | Performed by: EMERGENCY MEDICINE

## 2023-11-16 PROCEDURE — 87077 CULTURE AEROBIC IDENTIFY: CPT | Performed by: EMERGENCY MEDICINE

## 2023-11-16 PROCEDURE — 87070 CULTURE OTHR SPECIMN AEROBIC: CPT | Performed by: EMERGENCY MEDICINE

## 2023-11-16 PROCEDURE — 1160F RVW MEDS BY RX/DR IN RCRD: CPT | Performed by: EMERGENCY MEDICINE

## 2023-11-16 PROCEDURE — 87205 SMEAR GRAM STAIN: CPT | Performed by: EMERGENCY MEDICINE

## 2023-11-16 PROCEDURE — 87186 SC STD MICRODIL/AGAR DIL: CPT | Performed by: EMERGENCY MEDICINE

## 2023-11-16 NOTE — PROGRESS NOTES
Chief Complaint  Wound Check (FOLLOW-UP ON LEFT LEG WOUND)    Subjective      History of Present Illness    Radha Aparicio  is a 89 y.o. female who bumped her leg on a metal bed frame on 09/01/2023 and sustained a large laceration.  She was not able to get the bleeding stopped as she is on Eliquis so she went to the ED for evaluation.  She had 11 sutures placed and was started on a 7-day course of Keflex which she completed.  Patient has a history of follicular lymphoma for which she receives rituximab every 8 weeks.    They have been applying PolyMem silver to her wound.  She is not having any pain or drainage from the wound.  Unfortunately the patient has continued to have frequent falls.  She has a lot of chronic shortness of breath due to heart failure and aortic stenosis.  Recently she has developed a lot of swelling in her lower extremities.  She has a few small wounds and has noticed some drainage.  She is wondering if the patchy areas on her left leg are blisters that are going to break open.  She does not recall seeing blisters there before but says that her memory is not as good as it used to be.    Allergies:  Contrast dye (echo or unknown ct/mr), Iodinated contrast media, Iodine, Penicillins, Shellfish allergy, Spinach, and Sulfa antibiotics      Current Outpatient Medications:     acetaminophen (TYLENOL) 325 MG tablet, 2 tablets As Needed., Disp: , Rfl:     albuterol sulfate  (90 Base) MCG/ACT inhaler, USE 2 PUFFS BY MOUTH EVERY 4 HOURS AS NEEDED FOR WHEEZING (Patient taking differently: Inhale 2 puffs Every 4 (Four) Hours As Needed.), Disp: 6.7 g, Rfl: 1    aspirin 81 MG EC tablet, Take 1 tablet by mouth Daily., Disp: , Rfl:     atorvastatin (LIPITOR) 40 MG tablet, Take 1 tablet by mouth Daily., Disp: 90 tablet, Rfl: 3    bumetanide (BUMEX) 2 MG tablet, TAKE 1 TABLET BY MOUTH DAILY, Disp: 90 tablet, Rfl: 1    Calcium Carbonate-Vitamin D 600-200 MG-UNIT capsule, Take 1 capsule by mouth 2 (Two)  Times a Day., Disp: , Rfl:     carvedilol (COREG) 6.25 MG tablet, TAKE 1 TABLET BY MOUTH TWICE DAILY WITH FOOD (Patient taking differently: 2 (Two) Times a Day With Meals.), Disp: 60 tablet, Rfl: 6    doxepin (SINEquan) 10 MG capsule, Take 1 capsule by mouth Every Night., Disp: 30 capsule, Rfl: 2    doxycycline (MONODOX) 100 MG capsule, Take 1 capsule by mouth 2 (Two) Times a Day., Disp: 20 capsule, Rfl: 0    Eliquis 2.5 MG tablet tablet, Take 1 tablet by mouth Every 12 (Twelve) Hours., Disp: , Rfl:     ergocalciferol (ERGOCALCIFEROL) 1.25 MG (90277 UT) capsule, One every 2 weeks, Disp: 6 capsule, Rfl: 3    FREESTYLE LITE test strip, USE AS DIRECTED TO TEST BLOOD SUGAR EVERY DAY, Disp: , Rfl:     guaiFENesin (MUCINEX) 600 MG 12 hr tablet, Take 1 tablet by mouth 2 (Two) Times a Day., Disp: , Rfl:     hydrocortisone-bacitracin-zinc oxide-nystatin (MAGIC BARRIER), Apply 1 application topically to the appropriate area as directed As Needed (due on bottom)., Disp: 60 g, Rfl: 1    hydrOXYzine (ATARAX) 25 MG tablet, Take 0.5 tablets by mouth 3 (Three) Times a Day As Needed for Itching., Disp: 10 tablet, Rfl: 0    iron polysaccharides (Ferrex 150) 150 MG capsule, 1 tablet p.o. twice daily, Disp: 60 capsule, Rfl: 0    Lancets (freestyle) lancets, 1 each by Other route Daily., Disp: , Rfl:     latanoprost (XALATAN) 0.005 % ophthalmic solution, INSTILL 1 DROP IN RIGHT EYE AT BEDTIME, Disp: , Rfl:     levoFLOXacin (LEVAQUIN) 500 MG tablet, Take 1 tablet by mouth Daily. (Patient not taking: Reported on 9/14/2023), Disp: , Rfl:     montelukast (SINGULAIR) 10 MG tablet, TAKE 1 TABLET(10 MG) BY MOUTH EVERY DAY IN THE EVENING (Patient taking differently: Take 1 tablet by mouth Every Night.), Disp: 90 tablet, Rfl: 3    multivitamin with minerals tablet tablet, Take 1 tablet by mouth Daily., Disp: , Rfl:     potassium chloride 10 MEQ CR tablet, Take 1 tablet by mouth Daily., Disp: 90 tablet, Rfl: 3    Probiotic Product (Risaquad-2)  capsule capsule, Take 1 capsule by mouth Daily., Disp: , Rfl:     raloxifene (EVISTA) 60 MG tablet, Take 1 tablet by mouth Daily., Disp: 90 tablet, Rfl: 3    spironolactone (Aldactone) 25 MG tablet, Take 1/2 tablet every day (Patient taking differently: Take 0.5 tablets by mouth Daily. Taking one whole tablet every day), Disp: 45 tablet, Rfl: 3    Symbicort 80-4.5 MCG/ACT inhaler, Inhale 2 puffs 2 (Two) Times a Day., Disp: 10.2 g, Rfl: 2    Zinc 50 MG tablet, Take 1 tablet by mouth Daily., Disp: , Rfl:     Past Medical History:   Diagnosis Date    Acute congestive heart failure     improved    Arthritis     Atrial fibrillation, persistent 09/16/2021    Bladder cancer     Bleeding disorder     (CONDITION DUE TO BLOOD THINNERS)    Chronic atrial fibrillation     Chronic heart failure with preserved ejection fraction (HFpEF) 09/23/2021    High cholesterol     Hyperlipidemia LDL goal <100 06/16/2021    Hypertension     Lymphoma     Moderate to severe aortic stenosis     Patient declines to have transcatheter    Non Hodgkin's lymphoma     2008 treated 2008 2009.    Rectus sheath hematoma     Right rectus sheath hematoma, off anticoagulation because of the hematoma.    Skin cancer     Type 2 diabetes mellitus      Past Surgical History:   Procedure Laterality Date    BLADDER SURGERY      CATARACT EXTRACTION      DILATION AND CURETTAGE, DIAGNOSTIC / THERAPEUTIC      LEFT VENTRICULAR ASSIST DEVICE      LYMPH NODE BIOPSY      Biopsy of retroperitoneal lymph node or mass     SKIN CANCER DESTRUCTION      TONSILLECTOMY       Social History     Socioeconomic History    Marital status:     Number of children: 2   Tobacco Use    Smoking status: Never    Smokeless tobacco: Never   Vaping Use    Vaping Use: Never used   Substance and Sexual Activity    Alcohol use: Never    Drug use: Never    Sexual activity: Defer           Objective     Vitals:    11/16/23 1315   BP: 102/60   BP Location: Left arm   Patient Position:  Sitting   Pulse: 72   Resp: 16  Comment: O2: 96% ON RA   Temp: 97.1 °F (36.2 °C)   TempSrc: Temporal   PainSc:   3   PainLoc: Leg     There is no height or weight on file to calculate BMI.    STEADI Fall Risk Assessment has not been completed.     Review of Systems     ROS:  Per HPI.     I have reviewed the HPI and ROS as documented by MA/RN. Brisa Ferreira MD    Physical Exam     NAD, mild dyspnea with speaking longer sentences  Pleasant, cooperative  Significant improvement in the traumatic lateral LLE wound.  Wound is much smaller and healthier and the the base is now very small and consists of healthy granulation tissue.  Thin elevated periwound callus removed.    2+ edema BLE with dry skin and worsening stasis and inflammatory changes L>R.    2 shallow ulcerations posterior left lower leg which appear to be healing.    Palpable pedal pulses BLE    See photos for details.                        RLE:            Result Review :  The following data was reviewed by: Brisa Ferreira MD on 11/16/2023:    Prior notes and images.  ED note, oncology note, CBC, CMP, BNP             Assessment and Plan   Diagnoses and all orders for this visit:    1. Traumatic open wound of left lower leg, subsequent encounter (Primary)    2. Edema of both lower extremities due to peripheral venous insufficiency    3. Venous stasis dermatitis of both lower extremities    4. Venous stasis ulcer of other part of left lower leg limited to breakdown of skin without varicose veins  -     Wound Culture - Wound, Leg, Left        The traumatic LLE wound is doing well and is much smaller today.  Unfortunately, she has worsening edema and stasis changes as well as new shallow ulcerations posterior left leg.  Recommend Unna boots applied to BLE and changed once weekly by home health.  Areas over wounds and any areas of drainage should be reinforced with ABD pads.    Patient just finished a 10-day course of doxycycline from the ED a few days ago.   Wound culture obtained, we have discussed it and opted to hold off on antibiotics until culture results are finalized.    I have also recommended that she elevate her lower extremities is much as possible for improved edema control.    Recheck 3 weeks.    Patient was given instructions and counseling regarding their condition or for health maintenance advice, as well as the wound care plan and recommendations. They understand and agree with the plan.  They will follow back up here in the clinic but are instructed to contact us in the interim should they have any new, returning, or worsening symptoms or concerns. Please see specific information pulled into the AVS if appropriate.     Dragon Dictation utilized for chart completion.    Follow Up   Return in about 3 weeks (around 12/7/2023).      Brisa Ferreira MD

## 2023-11-21 ENCOUNTER — HOSPITAL ENCOUNTER (OUTPATIENT)
Dept: ONCOLOGY | Facility: HOSPITAL | Age: 88
Discharge: HOME | End: 2023-11-21
Admitting: INTERNAL MEDICINE
Payer: MEDICARE

## 2023-11-21 ENCOUNTER — OFFICE VISIT (OUTPATIENT)
Dept: ONCOLOGY | Facility: HOSPITAL | Age: 88
End: 2023-11-21
Payer: MEDICARE

## 2023-11-21 ENCOUNTER — TELEPHONE (OUTPATIENT)
Dept: CARDIOLOGY | Facility: CLINIC | Age: 88
End: 2023-11-21
Payer: MEDICARE

## 2023-11-21 VITALS
SYSTOLIC BLOOD PRESSURE: 97 MMHG | WEIGHT: 131.61 LBS | BODY MASS INDEX: 21.26 KG/M2 | RESPIRATION RATE: 22 BRPM | TEMPERATURE: 97.1 F | DIASTOLIC BLOOD PRESSURE: 55 MMHG | HEART RATE: 64 BPM | OXYGEN SATURATION: 95 %

## 2023-11-21 VITALS
OXYGEN SATURATION: 95 % | SYSTOLIC BLOOD PRESSURE: 99 MMHG | WEIGHT: 131.61 LBS | TEMPERATURE: 97.1 F | HEART RATE: 65 BPM | RESPIRATION RATE: 22 BRPM | DIASTOLIC BLOOD PRESSURE: 54 MMHG | BODY MASS INDEX: 21.26 KG/M2

## 2023-11-21 DIAGNOSIS — C82.08 FOLLICULAR LYMPHOMA GRADE I OF LYMPH NODES OF MULTIPLE SITES: ICD-10-CM

## 2023-11-21 DIAGNOSIS — Z45.2 ADJUSTMENT AND MANAGEMENT OF VASCULAR ACCESS DEVICE: ICD-10-CM

## 2023-11-21 DIAGNOSIS — I50.32 CHRONIC HEART FAILURE WITH PRESERVED EJECTION FRACTION (HFPEF): ICD-10-CM

## 2023-11-21 DIAGNOSIS — I50.32 CHRONIC HEART FAILURE WITH PRESERVED EJECTION FRACTION (HFPEF): Primary | ICD-10-CM
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
## 2023-11-21 DIAGNOSIS — C82.08 FOLLICULAR LYMPHOMA GRADE I OF LYMPH NODES OF MULTIPLE SITES: Primary | ICD-10-CM

## 2023-11-21 LAB
ALBUMIN SERPL-MCNC: 3.9 G/DL (ref 3.5–5.2)
ALBUMIN/GLOB SERPL: 1.3 G/DL
ALP SERPL-CCNC: 85 U/L (ref 39–117)
ALT SERPL W P-5'-P-CCNC: 21 U/L (ref 1–33)
ANION GAP SERPL CALCULATED.3IONS-SCNC: 8.3 MMOL/L (ref 5–15)
AST SERPL-CCNC: 33 U/L (ref 1–32)
BASOPHILS # BLD AUTO: 0.03 10*3/MM3 (ref 0–0.2)
BASOPHILS NFR BLD AUTO: 0.4 % (ref 0–1.5)
BILIRUB SERPL-MCNC: 0.5 MG/DL (ref 0–1.2)
BUN SERPL-MCNC: 47 MG/DL (ref 8–23)
BUN/CREAT SERPL: 43.1 (ref 7–25)
CALCIUM SPEC-SCNC: 9.5 MG/DL (ref 8.2–9.6)
CHLORIDE SERPL-SCNC: 101 MMOL/L (ref 98–107)
CO2 SERPL-SCNC: 30.7 MMOL/L (ref 22–29)
CREAT SERPL-MCNC: 1.09 MG/DL (ref 0.57–1)
DEPRECATED RDW RBC AUTO: 56.2 FL (ref 37–54)
EGFRCR SERPLBLD CKD-EPI 2021: 46.9 ML/MIN/1.73
EOSINOPHIL # BLD AUTO: 0.38 10*3/MM3 (ref 0–0.4)
EOSINOPHIL NFR BLD AUTO: 5.6 % (ref 0.3–6.2)
ERYTHROCYTE [DISTWIDTH] IN BLOOD BY AUTOMATED COUNT: 15.4 % (ref 12.3–15.4)
GLOBULIN UR ELPH-MCNC: 2.9 GM/DL
GLUCOSE SERPL-MCNC: 164 MG/DL (ref 65–99)
HCT VFR BLD AUTO: 37.5 % (ref 34–46.6)
HGB BLD-MCNC: 12 G/DL (ref 12–15.9)
IMM GRANULOCYTES # BLD AUTO: 0.02 10*3/MM3 (ref 0–0.05)
IMM GRANULOCYTES NFR BLD AUTO: 0.3 % (ref 0–0.5)
LYMPHOCYTES # BLD AUTO: 0.95 10*3/MM3 (ref 0.7–3.1)
LYMPHOCYTES NFR BLD AUTO: 14.1 % (ref 19.6–45.3)
MCH RBC QN AUTO: 31.6 PG (ref 26.6–33)
MCHC RBC AUTO-ENTMCNC: 32 G/DL (ref 31.5–35.7)
MCV RBC AUTO: 98.7 FL (ref 79–97)
MONOCYTES # BLD AUTO: 0.76 10*3/MM3 (ref 0.1–0.9)
MONOCYTES NFR BLD AUTO: 11.3 % (ref 5–12)
NEUTROPHILS NFR BLD AUTO: 4.59 10*3/MM3 (ref 1.7–7)
NEUTROPHILS NFR BLD AUTO: 68.3 % (ref 42.7–76)
NT-PROBNP SERPL-MCNC: ABNORMAL PG/ML (ref 0–1800)
PLATELET # BLD AUTO: 203 10*3/MM3 (ref 140–450)
PMV BLD AUTO: 10.3 FL (ref 6–12)
POTASSIUM SERPL-SCNC: 3.9 MMOL/L (ref 3.5–5.2)
PROT SERPL-MCNC: 6.8 G/DL (ref 6–8.5)
RBC # BLD AUTO: 3.8 10*6/MM3 (ref 3.77–5.28)
SODIUM SERPL-SCNC: 140 MMOL/L (ref 136–145)
WBC NRBC COR # BLD AUTO: 6.73 10*3/MM3 (ref 3.4–10.8)

## 2023-11-21 PROCEDURE — 25810000003 SODIUM CHLORIDE 0.9 % SOLUTION: Performed by: INTERNAL MEDICINE

## 2023-11-21 PROCEDURE — 96375 TX/PRO/DX INJ NEW DRUG ADDON: CPT

## 2023-11-21 PROCEDURE — 80053 COMPREHEN METABOLIC PANEL: CPT | Performed by: INTERNAL MEDICINE

## 2023-11-21 PROCEDURE — 96413 CHEMO IV INFUSION 1 HR: CPT

## 2023-11-21 PROCEDURE — 83880 ASSAY OF NATRIURETIC PEPTIDE: CPT | Performed by: INTERNAL MEDICINE

## 2023-11-21 PROCEDURE — 25010000002 HEPARIN LOCK FLUSH PER 10 UNITS: Performed by: INTERNAL MEDICINE

## 2023-11-21 PROCEDURE — 25810000003 SODIUM CHLORIDE 0.9 % SOLUTION 250 ML FLEX CONT: Performed by: INTERNAL MEDICINE

## 2023-11-21 PROCEDURE — 25010000002 DIPHENHYDRAMINE PER 50 MG: Performed by: INTERNAL MEDICINE

## 2023-11-21 PROCEDURE — 85025 COMPLETE CBC W/AUTO DIFF WBC: CPT | Performed by: INTERNAL MEDICINE

## 2023-11-21 PROCEDURE — 63710000001 ACETAMINOPHEN 325 MG TABLET: Performed by: INTERNAL MEDICINE

## 2023-11-21 PROCEDURE — 25010000002 RITUXIMAB 10 MG/ML SOLUTION 50 ML VIAL: Performed by: INTERNAL MEDICINE

## 2023-11-21 PROCEDURE — A9270 NON-COVERED ITEM OR SERVICE: HCPCS | Performed by: INTERNAL MEDICINE

## 2023-11-21 PROCEDURE — 25010000002 RITUXIMAB 10 MG/ML SOLUTION 10 ML VIAL: Performed by: INTERNAL MEDICINE

## 2023-11-21 RX ORDER — APIXABAN 2.5 MG/1
2.5 TABLET, FILM COATED ORAL EVERY 12 HOURS SCHEDULED
Qty: 60 TABLET | Refills: 5 | Status: SHIPPED | OUTPATIENT
Start: 2023-11-21

## 2023-11-21 RX ORDER — MEPERIDINE HYDROCHLORIDE 25 MG/ML
25 INJECTION INTRAMUSCULAR; INTRAVENOUS; SUBCUTANEOUS
Status: ACTIVE | OUTPATIENT
Start: 2023-11-21 | End: 2023-11-21

## 2023-11-21 RX ORDER — FAMOTIDINE 10 MG/ML
20 INJECTION, SOLUTION INTRAVENOUS AS NEEDED
Status: DISCONTINUED | OUTPATIENT
Start: 2023-11-21 | End: 2023-11-22 | Stop reason: HOSPADM

## 2023-11-21 RX ORDER — HEPARIN SODIUM (PORCINE) LOCK FLUSH IV SOLN 100 UNIT/ML 100 UNIT/ML
500 SOLUTION INTRAVENOUS AS NEEDED
Status: DISCONTINUED | OUTPATIENT
Start: 2023-11-21 | End: 2023-11-22 | Stop reason: HOSPADM

## 2023-11-21 RX ORDER — FAMOTIDINE 10 MG/ML
20 INJECTION, SOLUTION INTRAVENOUS AS NEEDED
Status: CANCELLED | OUTPATIENT
Start: 2023-11-21

## 2023-11-21 RX ORDER — SODIUM CHLORIDE 9 MG/ML
250 INJECTION, SOLUTION INTRAVENOUS ONCE
Status: COMPLETED | OUTPATIENT
Start: 2023-11-21 | End: 2023-11-21

## 2023-11-21 RX ORDER — SODIUM CHLORIDE 0.9 % (FLUSH) 0.9 %
20 SYRINGE (ML) INJECTION AS NEEDED
Status: DISCONTINUED | OUTPATIENT
Start: 2023-11-21 | End: 2023-11-22 | Stop reason: HOSPADM

## 2023-11-21 RX ORDER — ACETAMINOPHEN 325 MG/1
650 TABLET ORAL ONCE
Status: COMPLETED | OUTPATIENT
Start: 2023-11-21 | End: 2023-11-21

## 2023-11-21 RX ORDER — DIPHENHYDRAMINE HYDROCHLORIDE 50 MG/ML
50 INJECTION INTRAMUSCULAR; INTRAVENOUS AS NEEDED
Status: CANCELLED | OUTPATIENT
Start: 2023-11-21

## 2023-11-21 RX ORDER — MEPERIDINE HYDROCHLORIDE 25 MG/ML
25 INJECTION INTRAMUSCULAR; INTRAVENOUS; SUBCUTANEOUS
Status: CANCELLED | OUTPATIENT
Start: 2023-11-21

## 2023-11-21 RX ORDER — ACETAMINOPHEN 325 MG/1
650 TABLET ORAL ONCE
Status: CANCELLED | OUTPATIENT
Start: 2023-11-21

## 2023-11-21 RX ORDER — SODIUM CHLORIDE 9 MG/ML
250 INJECTION, SOLUTION INTRAVENOUS ONCE
Status: CANCELLED | OUTPATIENT
Start: 2023-11-21

## 2023-11-21 RX ORDER — HEPARIN SODIUM (PORCINE) LOCK FLUSH IV SOLN 100 UNIT/ML 100 UNIT/ML
500 SOLUTION INTRAVENOUS AS NEEDED
OUTPATIENT
Start: 2023-11-21

## 2023-11-21 RX ORDER — DIPHENHYDRAMINE HYDROCHLORIDE 50 MG/ML
50 INJECTION INTRAMUSCULAR; INTRAVENOUS AS NEEDED
Status: DISCONTINUED | OUTPATIENT
Start: 2023-11-21 | End: 2023-11-22 | Stop reason: HOSPADM

## 2023-11-21 RX ORDER — SODIUM CHLORIDE 0.9 % (FLUSH) 0.9 %
20 SYRINGE (ML) INJECTION AS NEEDED
OUTPATIENT
Start: 2023-11-21

## 2023-11-21 RX ADMIN — HEPARIN SODIUM (PORCINE) LOCK FLUSH IV SOLN 100 UNIT/ML 500 UNITS: 100 SOLUTION at 14:07

## 2023-11-21 RX ADMIN — ACETAMINOPHEN 650 MG: 325 TABLET ORAL at 11:51

## 2023-11-21 RX ADMIN — DIPHENHYDRAMINE HYDROCHLORIDE 25 MG: 50 INJECTION, SOLUTION INTRAMUSCULAR; INTRAVENOUS at 11:53

## 2023-11-21 RX ADMIN — SODIUM CHLORIDE 250 ML: 9 INJECTION, SOLUTION INTRAVENOUS at 11:47

## 2023-11-21 RX ADMIN — Medication 20 ML: at 14:07

## 2023-11-21 RX ADMIN — RITUXIMAB 600 MG: 10 INJECTION, SOLUTION INTRAVENOUS at 12:20

## 2023-11-21 NOTE — TELEPHONE ENCOUNTER
Increase bumex to 2 mg twice daily  Repeat BMP and BNP in 1 week  Reduce fluid/sodium intake (68 ounces per day/2000 mg per day)  Use compression socks for edema  Advise daily weight and notify office of weight gain 5 lb or more in 5 day span

## 2023-11-21 NOTE — TELEPHONE ENCOUNTER
Received call from patients daughter. Patient had BNP drawn today which was elevated. Patient has chronic SOA  however little worse with exertion. Patient taking Bumex 2 mg daily    Please advise

## 2023-11-21 NOTE — PROGRESS NOTES
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 Radha Aparicio is a pleasant 89 year old female who presents with NHL, follicular type diagnosed in 2019.  Ms. Aparicio has been receiving her oncological care with Dr. Jenn Zaragoza who recently retired.  Ms. Aparicio initially presented RLQ pain to her PCP, Dr. Gala Rao.  In light of her bladder cancer, CT of Abdomen and Pelvis was obtained on January 16, 2019.  Findings indicated retroperitoneal adenopathy measuring up to 4 cm x 2cm, thought either to be lymphoma or metastatic disease.  Incidently mild bilateral hydronephrosis was noted without urolithiasis.     CT guided biopsy of the retroperitoneal lymph node was obtained.  Pathology (S-) indicated non-hodgkins's b-cell lymphoma; Follicular lymphoma (grade 1).  Flow cytometryrevealed diffuse positivity with CD 20, BCL2, CD10 and patchy decoration with BCL-6.  A few days after biopsy she was admitted to hospital with acute respiratory hypoxemia secondary to influenza.  Repeat imaging with CT Abdomen and Pelvis with heterogeneous hyperdense mass in the anterior abdominal wass musculature 8 cm x 12.6 cm consistent wtih rectus sheath hematoma.  Retroperitoneal adenopathy is redemonstrated.  Index left periaortic mass measured 3.1 x 2.2 cm, previously 3.9 x 2.4. Mildly enlarged retrocrural lymph nodes.  She was discharged from the hospital on 2/20/2019.     Ms. Aparicio was evaluated by Dr. Jenn Zaragoza on March 13, 2019. PET CT was ordered and obtained.  It indicated metabolic activity within the lymph node at the base of upper chest and neck (SUV 3.85)  as well as the left axilla (SUV 5.4) in addition to 2 lymph nodes within the retroperitoneal area with SUV (8.76).  .  Staging for her follicular carcinoma grade 1 is a clinical stage III with no B symptoms no significant laboratory abnormalities. At this time it was decided pursue active surveillance and close follow.     On October 17, 2019, PETCT  indicated interval progression of disease with previous noted lymph adenopathy larger and more metabolic. The left periaortic lymph node mass noted to be compressing not only the left renal vein but resulting in mild left hydronephrosis which is new. .   Single agent RITUXAN was initiated on October 9, 2019 and completed 4 weekly doses of RITUXAN.  Ms. Aparicio continues to have no B-symptoms.     Repeat PETCT obtained December 11, 2019 indicated overall improvement from previous study.  The areas of hypermetabolic lymphadenopathy supraclavicular on the left and left paraaortic have diminshed both in size and degree of metabolic activity with no new areas noted. .      PETCT done on July 2, 2020 indicated enlargement of left infraclavicular / subpectoral lynph node measuring up to 1.8 cm with SUV 5.7, increase in size and SUV of left axillary lymph node, small left pleural effusion slightly larger than prior PET, left paraaortic lymph node conglomerate slightly larger and slightly increased in SUV.  .  It was decided at that time to restart RITUXAN every 8 weeks.  Ms. Aparicio continues to have no B-symptoms.     Maintenance rituximab started November 2020, every 8 weeks.    CT CAP on 12/16/2020 showed decrease in the size of left retropectoral lymph nodes and left periaortic lymph nodes.  No new or enlarging lymph nodes in the chest, abdomen, or pelvis.  There are stable subcentimeter noncalcified pulmonary nodules as well.       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  She does seem to be tolerating treatment without any complications.  She was supposed to see me a couple of weeks ago but she had a fall in the waiting room and hit her head. She was evaluated in the ED and had negative CT head. She scratched her arm as well but it has healed. She is feeling better. She is chronically short of breath, worse on exertion. She remains weak. She is following with wound care for a wound on her leg that has been making progress in regards to healing. She is on bumex daily managed by cardiology.     Review of Systems   Constitutional:  Positive for fatigue. Negative for appetite change, diaphoresis, fever, unexpected weight gain and unexpected weight loss.   HENT:  Negative for hearing loss, mouth sores, sore throat, swollen glands, trouble swallowing and voice change.    Eyes:  Negative for blurred vision.   Respiratory:  Negative for cough, shortness of breath and wheezing.    Cardiovascular:  Negative for chest pain and palpitations.   Gastrointestinal:  Negative for abdominal pain, blood in stool, constipation, diarrhea, nausea and vomiting.   Endocrine: Negative for cold intolerance and heat intolerance.   Genitourinary:  Negative for difficulty urinating, dysuria, frequency, hematuria and urinary incontinence.   Musculoskeletal:  Negative for arthralgias, back pain and myalgias.   Skin:  Positive for wound (LLL). Negative for rash and skin lesions.   Neurological:  Positive for weakness. Negative for dizziness, seizures, numbness and headache.   Hematological:  Does not bruise/bleed easily.   Psychiatric/Behavioral:  Negative for depressed  mood. The patient is not nervous/anxious.    All other systems reviewed and are negative.      Past Medical History:   Diagnosis Date    Acute congestive heart failure     improved    Arthritis     Atrial fibrillation, persistent 09/16/2021    Bladder cancer     Bleeding disorder     (CONDITION DUE TO BLOOD THINNERS)    Chronic atrial fibrillation     Chronic heart failure with preserved ejection fraction (HFpEF) 09/23/2021    High cholesterol     Hyperlipidemia LDL goal <100 06/16/2021    Hypertension     Lymphoma     Moderate to severe aortic stenosis     Patient declines to have transcatheter    Non Hodgkin's lymphoma     2008 treated 2008 2009.    Rectus sheath hematoma     Right rectus sheath hematoma, off anticoagulation because of the hematoma.    Skin cancer     Type 2 diabetes mellitus      Past Surgical History:   Procedure Laterality Date    BLADDER SURGERY      CATARACT EXTRACTION      DILATION AND CURETTAGE, DIAGNOSTIC / THERAPEUTIC      LEFT VENTRICULAR ASSIST DEVICE      LYMPH NODE BIOPSY      Biopsy of retroperitoneal lymph node or mass     SKIN CANCER DESTRUCTION      TONSILLECTOMY       Social History     Socioeconomic History    Marital status:     Number of children: 2   Tobacco Use    Smoking status: Never    Smokeless tobacco: Never   Vaping Use    Vaping Use: Never used   Substance and Sexual Activity    Alcohol use: Never    Drug use: Never    Sexual activity: Defer     Family History   Problem Relation Age of Onset    Heart attack Father     Heart attack Brother     Stomach cancer Paternal Great-Grandfather        Objective   Physical Exam  General: Alert, cooperative, no acute distress  Eyes: Anicteric sclera, PERRLA  Respiratory: normal respiratory effort  Cardiovascular: no lower extremity edema  Skin: Both LE with ACE wrapping, they do appear to be swollen  Psychiatric: Appropriate affect, intact judgment  Neurologic: No focal sensory or motor deficits, normal cognition  "  Musculoskeletal: Reduced muscle strength and tone, in wheelchair      Vitals:    11/21/23 1102   BP: 97/55   Pulse: 64   Resp: 22   Temp: 97.1 °F (36.2 °C)   TempSrc: Temporal   SpO2: 95%   Weight: 59.7 kg (131 lb 9.8 oz)   PainSc: 0-No pain                 PHQ-9 Total Score:         Result Review :   The following data was reviewed by: Jovani Ellington MD on 11/21/23:  Lab Results   Component Value Date    HGB 12.0 11/21/2023    HCT 37.5 11/21/2023    MCV 98.7 (H) 11/21/2023     11/21/2023    WBC 6.73 11/21/2023    NEUTROABS 4.59 11/21/2023    LYMPHSABS 0.95 11/21/2023    MONOSABS 0.76 11/21/2023    EOSABS 0.38 11/21/2023    BASOSABS 0.03 11/21/2023     Lab Results   Component Value Date    GLUCOSE 123 (H) 11/02/2023    BUN 44 (H) 11/02/2023    CREATININE 1.03 (H) 11/02/2023     11/02/2023    K 4.0 11/02/2023    CL 98 11/02/2023    CO2 32.2 (H) 11/02/2023    CALCIUM 11.0 (H) 11/02/2023    PROTEINTOT 8.0 11/02/2023    ALBUMIN 4.4 11/02/2023    BILITOT 1.1 11/02/2023    ALKPHOS 96 11/02/2023    AST 40 (H) 11/02/2023    ALT 23 11/02/2023     Lab Results   Component Value Date    IRON 86 06/02/2022    LABIRON 20 06/02/2022    TRANSFERRIN 287 06/02/2022    TIBC 428 06/02/2022     Lab Results   Component Value Date     (H) 03/11/2020    FERRITIN 135.00 06/02/2022    KWLVCAOI95 1,436 (H) 06/02/2022    FOLATE >20.00 06/02/2022     No results found for: \"PSA\", \"CEA\", \"AFP\", \"\", \"\"    Labs personally reviewed and CBC normal. Flow cytometry from 7/2023 normal.     ED note personally reviewed    Wound care note personally reviewed    CT head personally reviewed and and per my independent read with no intracranial bleed.       Assessment and Plan    Diagnoses and all orders for this visit:    1. Chronic heart failure with preserved ejection fraction (HFpEF) (Primary)  -     N-Terminal proBNP; Future    2. 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 Fall, subsequent " encounter    Other orders  -     Eliquis 2.5 MG tablet tablet; Take 1 tablet by mouth Every 12 (Twelve) Hours.  Dispense: 60 tablet; Refill: 5  -     Cancel: sodium chloride 0.9 % infusion 250 mL  -     Cancel: acetaminophen (TYLENOL) tablet 650 mg  -     Cancel: diphenhydrAMINE (BENADRYL) IVPB 25 mg  -     Cancel: riTUXimab (RITUXAN) 600 mg in sodium chloride 0.9 % 310 mL IVPB Rapid Infusion  -     Cancel: hydrocortisone sodium succinate (Solu-CORTEF) injection 100 mg  -     Cancel: diphenhydrAMINE (BENADRYL) injection 50 mg  -     Cancel: famotidine (PEPCID) injection 20 mg  -     Cancel: meperidine (DEMEROL) injection 25 mg          Recurrent follicular lymphoma: Patient has no evidence of toxicity with ongoing rituximab.  She will continue treatment as planned every 8 weeks, indefinitely.  Last staging scans with CT CAP on 6/13/23 with JEIMY, plan to repeat annually.     Recent fall  CT head negative.     CHF  ProBNP trending up. Recommend to discuss with cardiology but likely will need to go to [facility] twice daily.     I spent 30 minutes caring for Radha on this date of service. This time includes time spent by me in the following activities: preparing for the visit, reviewing tests, performing a medically appropriate examination and/or evaluation, counseling and educating the patient/family/caregiver, ordering medications, tests, or procedures, referring and communicating with other health care professionals, and documenting information in the medical record     Patient was given instructions and counseling regarding her condition or for health maintenance advice. Please see specific information pulled into the AVS if appropriate.

## 2023-11-22 ENCOUNTER — TELEPHONE (OUTPATIENT)
Dept: CARDIOLOGY | Facility: CLINIC | Age: 88
End: 2023-11-22
Payer: MEDICARE

## 2023-11-22 DIAGNOSIS — T14.8XXA WOUND INFECTION: Primary | ICD-10-CM

## 2023-11-22 DIAGNOSIS — L08.9 WOUND INFECTION: Primary | ICD-10-CM

## 2023-11-22 LAB
BACTERIA SPEC AEROBE CULT: ABNORMAL
BACTERIA SPEC AEROBE CULT: ABNORMAL
GRAM STN SPEC: ABNORMAL
GRAM STN SPEC: ABNORMAL

## 2023-11-22 RX ORDER — LEVOFLOXACIN 250 MG/1
250 TABLET ORAL DAILY
Qty: 8 TABLET | Refills: 0 | Status: SHIPPED | OUTPATIENT
Start: 2023-11-22

## 2023-11-22 RX ORDER — BUMETANIDE 2 MG/1
2 TABLET ORAL 2 TIMES DAILY
Qty: 180 TABLET | Refills: 3 | Status: SHIPPED | OUTPATIENT
Start: 2023-11-22

## 2023-11-22 NOTE — TELEPHONE ENCOUNTER
Caller: ROYCE MURRIETA    Relationship: Emergency Contact    Best call back number: [phone]    What is the best time to reach you: ANYTIME    Who are you requesting to speak with (clinical staff, provider,  specific staff member): CLINICAL      What was the call regarding: DAUGHTER STATES HER MOTHERS BMP IS OVER 17,000 BEFORE INFUSION.DAUGHTER WANTS TO  KNOW WHAT CAN BE DONE TO HELP THIS.HER DAUGHTER STATES THAT HER MOTHER MAY HAVE SPOKE WITH SOMEONE FROM THE OFFICE BUT HER MOTHER IS CONFUSED ON THE DIRECTIONS GIVEN SO, SHE WOULD LIKE FOR SOMEONE TO CALL HER AND CLARIFY WHAT HER MOTHER IS SUPPOSED TO BE DOING FOR THE ISSUE.    Is it okay if the provider responds through [employer]: YES

## 2023-11-29 ENCOUNTER — TELEPHONE (OUTPATIENT)
Dept: CARDIOLOGY | Facility: CLINIC | Age: 88
End: 2023-11-29
Payer: MEDICARE

## 2023-11-29 NOTE — TELEPHONE ENCOUNTER
Caller: ROYCE MURRIETA    Relationship: Emergency Contact    Best call back number: [phone]     What orders are you requesting (i.e. lab or imaging): BMP - PROBNP (ALREADY ORDERED)    In what timeframe would the patient need to come in: ASAP    Where will you receive your lab/imaging services: [facility]     Additional notes: PATIENT HAS A HARD TIME GETTING OUT AND WISHES TO HAVE HER BLOOD DRAWN BY HOME HEALTH NURSE.

## 2023-11-30 NOTE — TELEPHONE ENCOUNTER
PATIENT'S DAUGHTER WANTS SOMEONE FROM THE OFFICE TO CALL RICK NAVARRO NURSE @ [phone] SO THE OFFICE CAN GIVE LAB ORDER TO HER DIRECTLY .

## 2023-12-04 ENCOUNTER — TELEPHONE (OUTPATIENT)
Dept: CARDIOLOGY | Facility: CLINIC | Age: 88
End: 2023-12-04
Payer: MEDICARE

## 2023-12-04 NOTE — TELEPHONE ENCOUNTER
Received Vm from patient daughter.    Attempted to german. Goes straight to VM. Unable to leave VM due to mailbox full

## 2023-12-05 ENCOUNTER — TELEPHONE (OUTPATIENT)
Dept: CARDIOLOGY | Facility: CLINIC | Age: 88
End: 2023-12-05
Payer: MEDICARE

## 2023-12-05 DIAGNOSIS — I50.32 CHRONIC HEART FAILURE WITH PRESERVED EJECTION FRACTION (HFPEF): ICD-10-CM

## 2023-12-05 DIAGNOSIS — I50.32 CHRONIC HEART FAILURE WITH PRESERVED EJECTION FRACTION (HFPEF): Primary | ICD-10-CM

## 2023-12-05 RX ORDER — METOLAZONE 2.5 MG/1
2.5 TABLET ORAL DAILY
Qty: 3 TABLET | Refills: 0 | Status: ON HOLD | OUTPATIENT
Start: 2023-12-05

## 2023-12-05 NOTE — TELEPHONE ENCOUNTER
Per daughter SOA no better no worse. Patient is having BLE edema and abdominal bloating. Patient is taking Bumex 2 mg BID

## 2023-12-05 NOTE — TELEPHONE ENCOUNTER
----- Message from FANY Amor sent at 12/5/2023  2:01 PM EST -----  Renal function slightly declined  Electrolytes are good  BNP is improved but still elevated, find out if symptoms have improved

## 2023-12-08 ENCOUNTER — OFFICE VISIT (OUTPATIENT)
Dept: WOUND CARE | Facility: HOSPITAL | Age: 88
End: 2023-12-08
Payer: MEDICARE

## 2023-12-08 VITALS
TEMPERATURE: 97.7 F | DIASTOLIC BLOOD PRESSURE: 68 MMHG | HEART RATE: 70 BPM | RESPIRATION RATE: 18 BRPM | SYSTOLIC BLOOD PRESSURE: 102 MMHG

## 2023-12-08 DIAGNOSIS — I87.2 EDEMA OF BOTH LOWER EXTREMITIES DUE TO PERIPHERAL VENOUS INSUFFICIENCY: Primary | ICD-10-CM

## 2023-12-08 DIAGNOSIS — I87.2 VENOUS STASIS ULCER OF OTHER PART OF LEFT LOWER LEG LIMITED TO BREAKDOWN OF SKIN WITHOUT VARICOSE VEINS: ICD-10-CM

## 2023-12-08 DIAGNOSIS — L97.821 VENOUS STASIS ULCER OF OTHER PART OF LEFT LOWER LEG LIMITED TO BREAKDOWN OF SKIN WITHOUT VARICOSE VEINS: ICD-10-CM

## 2023-12-08 NOTE — PROGRESS NOTES
Chief Complaint  Wound Check (FOLLOW-UP ON BILATERAL LOWER LEG ULCERS)    Subjective            Objective     Vitals:    12/08/23 1325   BP: 102/68   BP Location: Left arm   Patient Position: Sitting   Pulse: 70   Resp: 18   Temp: 97.7 °F (36.5 °C)   TempSrc: Temporal   PainSc: 0-No pain         I have reviewed the HPI and ROS as documented by MA/RN. Zion Fair MD    Physical Exam     Wound Description:  The patient has bilateral lymphedematous changes with stasis ulcerations.  The right leg is completely cleared of any ulcers.  The left leg has several small punctate ulcers.  She still needs to the Unna boot application weekly on the left lower extremity.  I do not think that she needs to have an Unna boot on the right leg.  She has been asked to return to see Dr. Lopez in 3 weeks.    Result Review :   The following data was reviewed by: Zion Fair MD on 12/08/2023:               Assessment and Plan    Diagnoses and all orders for this visit:    1. Edema of both lower extremities due to peripheral venous insufficiency (Primary)    2. Venous stasis ulcer of other part of left lower leg limited to breakdown of skin without varicose veins            Follow Up   Return in about 3 weeks (around 12/29/2023).  Patient was given instructions and counseling regarding her condition or for health maintenance advice. Please see specific information pulled into the AVS if appropriate.

## 2023-12-10 ENCOUNTER — HOSPITAL ENCOUNTER (INPATIENT)
Facility: HOSPITAL | Age: 88
LOS: 6 days | Discharge: HOSPICE | DRG: 291 | End: 2023-12-16
Attending: EMERGENCY MEDICINE | Admitting: FAMILY MEDICINE
Payer: MEDICARE

## 2023-12-10 ENCOUNTER — APPOINTMENT (OUTPATIENT)
Dept: GENERAL RADIOLOGY | Facility: HOSPITAL | Age: 88
DRG: 291 | End: 2023-12-10
Payer: MEDICARE

## 2023-12-10 ENCOUNTER — APPOINTMENT (OUTPATIENT)
Dept: CARDIOLOGY | Facility: HOSPITAL | Age: 88
DRG: 291 | End: 2023-12-10
Payer: MEDICARE

## 2023-12-10 ENCOUNTER — APPOINTMENT (OUTPATIENT)
Dept: CT IMAGING | Facility: HOSPITAL | Age: 88
DRG: 291 | End: 2023-12-10
Payer: MEDICARE

## 2023-12-10 DIAGNOSIS — E87.6 HYPOKALEMIA: ICD-10-CM

## 2023-12-10 DIAGNOSIS — N28.9 ACUTE RENAL INSUFFICIENCY: ICD-10-CM

## 2023-12-10 DIAGNOSIS — Z78.9 DECREASED ACTIVITIES OF DAILY LIVING (ADL): ICD-10-CM

## 2023-12-10 DIAGNOSIS — R26.2 DIFFICULTY WALKING: ICD-10-CM

## 2023-12-10 DIAGNOSIS — Z51.5 HOSPICE CARE PATIENT: ICD-10-CM

## 2023-12-10 DIAGNOSIS — R55 SYNCOPE, UNSPECIFIED SYNCOPE TYPE: Primary | ICD-10-CM

## 2023-12-10 LAB
ALBUMIN SERPL-MCNC: 4 G/DL (ref 3.5–5.2)
ALBUMIN SERPL-MCNC: 4.1 G/DL (ref 3.5–5.2)
ALBUMIN/GLOB SERPL: 1.3 G/DL
ALP SERPL-CCNC: 86 U/L (ref 39–117)
ALT SERPL W P-5'-P-CCNC: 22 U/L (ref 1–33)
ANION GAP SERPL CALCULATED.3IONS-SCNC: 11.9 MMOL/L (ref 5–15)
ANION GAP SERPL CALCULATED.3IONS-SCNC: 13.9 MMOL/L (ref 5–15)
ANION GAP SERPL CALCULATED.3IONS-SCNC: 15.3 MMOL/L (ref 5–15)
ANION GAP SERPL CALCULATED.3IONS-SCNC: 15.3 MMOL/L (ref 5–15)
ANISOCYTOSIS BLD QL: NORMAL
ANISOCYTOSIS BLD QL: NORMAL
ARTERIAL PATENCY WRIST A: POSITIVE
AST SERPL-CCNC: 43 U/L (ref 1–32)
BASE EXCESS BLDA CALC-SCNC: 1.3 MMOL/L (ref -2–2)
BASOPHILS # BLD AUTO: 0.07 10*3/MM3 (ref 0–0.2)
BASOPHILS # BLD AUTO: 0.09 10*3/MM3 (ref 0–0.2)
BASOPHILS NFR BLD AUTO: 0.8 % (ref 0–1.5)
BASOPHILS NFR BLD AUTO: 0.8 % (ref 0–1.5)
BDY SITE: ABNORMAL
BILIRUB SERPL-MCNC: 0.8 MG/DL (ref 0–1.2)
BUN SERPL-MCNC: 64 MG/DL (ref 8–23)
BUN SERPL-MCNC: 64 MG/DL (ref 8–23)
BUN SERPL-MCNC: 69 MG/DL (ref 8–23)
BUN SERPL-MCNC: 79 MG/DL (ref 8–23)
BUN/CREAT SERPL: 56.6 (ref 7–25)
BUN/CREAT SERPL: 56.6 (ref 7–25)
BUN/CREAT SERPL: 62.7 (ref 7–25)
BUN/CREAT SERPL: 69 (ref 7–25)
CA-I BLDA-SCNC: 1.22 MMOL/L (ref 1.13–1.32)
CALCIUM SPEC-SCNC: 10.5 MG/DL (ref 8.2–9.6)
CALCIUM SPEC-SCNC: 9.3 MG/DL (ref 8.2–9.6)
CALCIUM SPEC-SCNC: 9.9 MG/DL (ref 8.2–9.6)
CALCIUM SPEC-SCNC: 9.9 MG/DL (ref 8.2–9.6)
CHLORIDE BLDA-SCNC: 97 MMOL/L (ref 98–106)
CHLORIDE SERPL-SCNC: 89 MMOL/L (ref 98–107)
CHLORIDE SERPL-SCNC: 97 MMOL/L (ref 98–107)
CHLORIDE SERPL-SCNC: 97 MMOL/L (ref 98–107)
CHLORIDE SERPL-SCNC: 98 MMOL/L (ref 98–107)
CO2 SERPL-SCNC: 26.7 MMOL/L (ref 22–29)
CO2 SERPL-SCNC: 26.7 MMOL/L (ref 22–29)
CO2 SERPL-SCNC: 29.1 MMOL/L (ref 22–29)
CO2 SERPL-SCNC: 33.1 MMOL/L (ref 22–29)
COHGB MFR BLD: 0.7 % (ref 0–1.5)
CREAT SERPL-MCNC: 1 MG/DL (ref 0.57–1)
CREAT SERPL-MCNC: 1.13 MG/DL (ref 0.57–1)
CREAT SERPL-MCNC: 1.13 MG/DL (ref 0.57–1)
CREAT SERPL-MCNC: 1.26 MG/DL (ref 0.57–1)
D-LACTATE SERPL-SCNC: 1.6 MMOL/L (ref 0.5–2)
D-LACTATE SERPL-SCNC: 1.6 MMOL/L (ref 0.5–2)
DACRYOCYTES BLD QL SMEAR: NORMAL
DEPRECATED RDW RBC AUTO: 49.2 FL (ref 37–54)
DEPRECATED RDW RBC AUTO: 49.7 FL (ref 37–54)
DEPRECATED RDW RBC AUTO: 51.6 FL (ref 37–54)
EGFRCR SERPLBLD CKD-EPI 2021: 39.4 ML/MIN/1.73
EGFRCR SERPLBLD CKD-EPI 2021: 44.9 ML/MIN/1.73
EGFRCR SERPLBLD CKD-EPI 2021: 44.9 ML/MIN/1.73
EGFRCR SERPLBLD CKD-EPI 2021: 52 ML/MIN/1.73
EOSINOPHIL # BLD AUTO: 0.06 10*3/MM3 (ref 0–0.4)
EOSINOPHIL # BLD AUTO: 0.13 10*3/MM3 (ref 0–0.4)
EOSINOPHIL NFR BLD AUTO: 0.6 % (ref 0.3–6.2)
EOSINOPHIL NFR BLD AUTO: 1.2 % (ref 0.3–6.2)
ERYTHROCYTE [DISTWIDTH] IN BLOOD BY AUTOMATED COUNT: 14.3 % (ref 12.3–15.4)
ERYTHROCYTE [DISTWIDTH] IN BLOOD BY AUTOMATED COUNT: 14.4 % (ref 12.3–15.4)
ERYTHROCYTE [DISTWIDTH] IN BLOOD BY AUTOMATED COUNT: 14.7 % (ref 12.3–15.4)
FHHB: 4.3 % (ref 0–5)
GAS FLOW AIRWAY: 2 LPM
GEN 5 2HR TROPONIN T REFLEX: 76 NG/L
GLOBULIN UR ELPH-MCNC: 3.1 GM/DL
GLUCOSE BLDA-MCNC: 125 MG/DL (ref 65–99)
GLUCOSE SERPL-MCNC: 133 MG/DL (ref 65–99)
GLUCOSE SERPL-MCNC: 142 MG/DL (ref 65–99)
GLUCOSE SERPL-MCNC: 145 MG/DL (ref 65–99)
GLUCOSE SERPL-MCNC: 145 MG/DL (ref 65–99)
HCO3 BLDA-SCNC: 25.7 MMOL/L (ref 22–26)
HCT VFR BLD AUTO: 36.6 % (ref 34–46.6)
HCT VFR BLD AUTO: 39.3 % (ref 34–46.6)
HCT VFR BLD AUTO: 39.7 % (ref 34–46.6)
HGB BLD-MCNC: 12 G/DL (ref 12–15.9)
HGB BLD-MCNC: 12.9 G/DL (ref 12–15.9)
HGB BLD-MCNC: 13 G/DL (ref 12–15.9)
HGB BLDA-MCNC: 14 G/DL (ref 11.7–14.6)
HOLD SPECIMEN: NORMAL
HOLD SPECIMEN: NORMAL
IMM GRANULOCYTES # BLD AUTO: 0.53 10*3/MM3 (ref 0–0.05)
IMM GRANULOCYTES # BLD AUTO: 0.78 10*3/MM3 (ref 0–0.05)
IMM GRANULOCYTES NFR BLD AUTO: 5.7 % (ref 0–0.5)
IMM GRANULOCYTES NFR BLD AUTO: 6.9 % (ref 0–0.5)
INHALED O2 CONCENTRATION: 28 %
LACTATE BLDA-SCNC: 1.54 MMOL/L (ref 0.5–2)
LARGE PLATELETS: NORMAL
LYMPHOCYTES # BLD AUTO: 1.25 10*3/MM3 (ref 0.7–3.1)
LYMPHOCYTES # BLD AUTO: 1.47 10*3/MM3 (ref 0.7–3.1)
LYMPHOCYTES NFR BLD AUTO: 11.1 % (ref 19.6–45.3)
LYMPHOCYTES NFR BLD AUTO: 15.8 % (ref 19.6–45.3)
MACROCYTES BLD QL SMEAR: NORMAL
MACROCYTES BLD QL SMEAR: NORMAL
MAGNESIUM SERPL-MCNC: 2 MG/DL (ref 1.7–2.3)
MAGNESIUM SERPL-MCNC: 2.2 MG/DL (ref 1.7–2.3)
MCH RBC QN AUTO: 30.8 PG (ref 26.6–33)
MCH RBC QN AUTO: 30.9 PG (ref 26.6–33)
MCH RBC QN AUTO: 31.2 PG (ref 26.6–33)
MCHC RBC AUTO-ENTMCNC: 32.7 G/DL (ref 31.5–35.7)
MCHC RBC AUTO-ENTMCNC: 32.8 G/DL (ref 31.5–35.7)
MCHC RBC AUTO-ENTMCNC: 32.8 G/DL (ref 31.5–35.7)
MCV RBC AUTO: 93.8 FL (ref 79–97)
MCV RBC AUTO: 94.3 FL (ref 79–97)
MCV RBC AUTO: 94.9 FL (ref 79–97)
METHGB BLD QL: 0.2 % (ref 0–1.5)
MICROCYTES BLD QL: NORMAL
MODALITY: ABNORMAL
MONOCYTES # BLD AUTO: 0.8 10*3/MM3 (ref 0.1–0.9)
MONOCYTES # BLD AUTO: 1.11 10*3/MM3 (ref 0.1–0.9)
MONOCYTES NFR BLD AUTO: 8.6 % (ref 5–12)
MONOCYTES NFR BLD AUTO: 9.8 % (ref 5–12)
NEUTROPHILS NFR BLD AUTO: 6.36 10*3/MM3 (ref 1.7–7)
NEUTROPHILS NFR BLD AUTO: 68.5 % (ref 42.7–76)
NEUTROPHILS NFR BLD AUTO: 7.92 10*3/MM3 (ref 1.7–7)
NEUTROPHILS NFR BLD AUTO: 70.2 % (ref 42.7–76)
NOTE: ABNORMAL
NRBC BLD AUTO-RTO: 0 /100 WBC (ref 0–0.2)
NRBC BLD AUTO-RTO: 0 /100 WBC (ref 0–0.2)
NT-PROBNP SERPL-MCNC: ABNORMAL PG/ML (ref 0–1800)
OVALOCYTES BLD QL SMEAR: NORMAL
OVALOCYTES BLD QL SMEAR: NORMAL
OXYHGB MFR BLDV: 94.8 % (ref 94–99)
PCO2 BLDA: 40.2 MM HG (ref 35–45)
PH BLDA: 7.42 PH UNITS (ref 7.35–7.45)
PHOSPHATE SERPL-MCNC: 3.4 MG/DL (ref 2.5–4.5)
PLATELET # BLD AUTO: 195 10*3/MM3 (ref 140–450)
PLATELET # BLD AUTO: 208 10*3/MM3 (ref 140–450)
PLATELET # BLD AUTO: 218 10*3/MM3 (ref 140–450)
PMV BLD AUTO: 10.4 FL (ref 6–12)
PMV BLD AUTO: 10.6 FL (ref 6–12)
PMV BLD AUTO: 10.7 FL (ref 6–12)
PO2 BLD: 308 MM[HG] (ref 0–500)
PO2 BLDA: 86.3 MM HG (ref 80–100)
POTASSIUM BLDA-SCNC: 3.82 MMOL/L (ref 3.5–5)
POTASSIUM SERPL-SCNC: 2.8 MMOL/L (ref 3.5–5.2)
POTASSIUM SERPL-SCNC: 2.9 MMOL/L (ref 3.5–5.2)
POTASSIUM SERPL-SCNC: 4 MMOL/L (ref 3.5–5.2)
POTASSIUM SERPL-SCNC: 4 MMOL/L (ref 3.5–5.2)
PROT SERPL-MCNC: 7.2 G/DL (ref 6–8.5)
QT INTERVAL: 487 MS
QTC INTERVAL: 570 MS
RBC # BLD AUTO: 3.9 10*6/MM3 (ref 3.77–5.28)
RBC # BLD AUTO: 4.14 10*6/MM3 (ref 3.77–5.28)
RBC # BLD AUTO: 4.21 10*6/MM3 (ref 3.77–5.28)
SAO2 % BLDCOA: 95.7 % (ref 95–99)
SMALL PLATELETS BLD QL SMEAR: ADEQUATE
SMALL PLATELETS BLD QL SMEAR: ADEQUATE
SODIUM BLDA-SCNC: 137.5 MMOL/L (ref 136–146)
SODIUM SERPL-SCNC: 136 MMOL/L (ref 136–145)
SODIUM SERPL-SCNC: 139 MMOL/L (ref 136–145)
STOMATOCYTES BLD QL SMEAR: NORMAL
TARGETS BLD QL SMEAR: NORMAL
TROPONIN T DELTA: -4 NG/L
TROPONIN T SERPL HS-MCNC: 80 NG/L
WBC MORPH BLD: NORMAL
WBC MORPH BLD: NORMAL
WBC NRBC COR # BLD AUTO: 11.28 10*3/MM3 (ref 3.4–10.8)
WBC NRBC COR # BLD AUTO: 9.29 10*3/MM3 (ref 3.4–10.8)
WBC NRBC COR # BLD AUTO: 9.68 10*3/MM3 (ref 3.4–10.8)
WHOLE BLOOD HOLD COAG: NORMAL
WHOLE BLOOD HOLD SPECIMEN: NORMAL

## 2023-12-10 PROCEDURE — 83735 ASSAY OF MAGNESIUM: CPT | Performed by: EMERGENCY MEDICINE

## 2023-12-10 PROCEDURE — 94799 UNLISTED PULMONARY SVC/PX: CPT

## 2023-12-10 PROCEDURE — 72125 CT NECK SPINE W/O DYE: CPT

## 2023-12-10 PROCEDURE — 36600 WITHDRAWAL OF ARTERIAL BLOOD: CPT | Performed by: INTERNAL MEDICINE

## 2023-12-10 PROCEDURE — 71045 X-RAY EXAM CHEST 1 VIEW: CPT

## 2023-12-10 PROCEDURE — 93005 ELECTROCARDIOGRAM TRACING: CPT | Performed by: FAMILY MEDICINE

## 2023-12-10 PROCEDURE — 93306 TTE W/DOPPLER COMPLETE: CPT

## 2023-12-10 PROCEDURE — 93010 ELECTROCARDIOGRAM REPORT: CPT | Performed by: INTERNAL MEDICINE

## 2023-12-10 PROCEDURE — 25010000002 CEFTRIAXONE PER 250 MG: Performed by: EMERGENCY MEDICINE

## 2023-12-10 PROCEDURE — 83880 ASSAY OF NATRIURETIC PEPTIDE: CPT | Performed by: INTERNAL MEDICINE

## 2023-12-10 PROCEDURE — 70450 CT HEAD/BRAIN W/O DYE: CPT

## 2023-12-10 PROCEDURE — 83605 ASSAY OF LACTIC ACID: CPT | Performed by: INTERNAL MEDICINE

## 2023-12-10 PROCEDURE — 82375 ASSAY CARBOXYHB QUANT: CPT | Performed by: INTERNAL MEDICINE

## 2023-12-10 PROCEDURE — 84484 ASSAY OF TROPONIN QUANT: CPT | Performed by: EMERGENCY MEDICINE

## 2023-12-10 PROCEDURE — 87040 BLOOD CULTURE FOR BACTERIA: CPT | Performed by: EMERGENCY MEDICINE

## 2023-12-10 PROCEDURE — 85027 COMPLETE CBC AUTOMATED: CPT | Performed by: FAMILY MEDICINE

## 2023-12-10 PROCEDURE — 80053 COMPREHEN METABOLIC PANEL: CPT | Performed by: EMERGENCY MEDICINE

## 2023-12-10 PROCEDURE — 85025 COMPLETE CBC W/AUTO DIFF WBC: CPT | Performed by: EMERGENCY MEDICINE

## 2023-12-10 PROCEDURE — 36415 COLL VENOUS BLD VENIPUNCTURE: CPT

## 2023-12-10 PROCEDURE — 83050 HGB METHEMOGLOBIN QUAN: CPT | Performed by: INTERNAL MEDICINE

## 2023-12-10 PROCEDURE — 93005 ELECTROCARDIOGRAM TRACING: CPT | Performed by: EMERGENCY MEDICINE

## 2023-12-10 PROCEDURE — 83605 ASSAY OF LACTIC ACID: CPT | Performed by: EMERGENCY MEDICINE

## 2023-12-10 PROCEDURE — 25010000002 METHYLPREDNISOLONE PER 125 MG: Performed by: FAMILY MEDICINE

## 2023-12-10 PROCEDURE — 93306 TTE W/DOPPLER COMPLETE: CPT | Performed by: INTERNAL MEDICINE

## 2023-12-10 PROCEDURE — 99285 EMERGENCY DEPT VISIT HI MDM: CPT

## 2023-12-10 PROCEDURE — P9047 ALBUMIN (HUMAN), 25%, 50ML: HCPCS | Performed by: FAMILY MEDICINE

## 2023-12-10 PROCEDURE — 99222 1ST HOSP IP/OBS MODERATE 55: CPT | Performed by: FAMILY MEDICINE

## 2023-12-10 PROCEDURE — 84100 ASSAY OF PHOSPHORUS: CPT | Performed by: EMERGENCY MEDICINE

## 2023-12-10 PROCEDURE — 83735 ASSAY OF MAGNESIUM: CPT | Performed by: FAMILY MEDICINE

## 2023-12-10 PROCEDURE — 85025 COMPLETE CBC W/AUTO DIFF WBC: CPT | Performed by: FAMILY MEDICINE

## 2023-12-10 PROCEDURE — 25010000002 ALBUMIN HUMAN 25% PER 50 ML: Performed by: FAMILY MEDICINE

## 2023-12-10 PROCEDURE — 85007 BL SMEAR W/DIFF WBC COUNT: CPT | Performed by: EMERGENCY MEDICINE

## 2023-12-10 PROCEDURE — 82805 BLOOD GASES W/O2 SATURATION: CPT | Performed by: INTERNAL MEDICINE

## 2023-12-10 PROCEDURE — 94761 N-INVAS EAR/PLS OXIMETRY MLT: CPT

## 2023-12-10 PROCEDURE — 85007 BL SMEAR W/DIFF WBC COUNT: CPT | Performed by: FAMILY MEDICINE

## 2023-12-10 PROCEDURE — 25010000002 FUROSEMIDE PER 20 MG: Performed by: FAMILY MEDICINE

## 2023-12-10 PROCEDURE — 25810000003 SODIUM CHLORIDE 0.9 % SOLUTION: Performed by: EMERGENCY MEDICINE

## 2023-12-10 RX ORDER — SODIUM CHLORIDE 0.9 % (FLUSH) 0.9 %
10 SYRINGE (ML) INJECTION AS NEEDED
Status: DISCONTINUED | OUTPATIENT
Start: 2023-12-10 | End: 2023-12-16 | Stop reason: HOSPADM

## 2023-12-10 RX ORDER — ACETAMINOPHEN 650 MG/1
650 SUPPOSITORY RECTAL EVERY 4 HOURS PRN
Status: DISCONTINUED | OUTPATIENT
Start: 2023-12-10 | End: 2023-12-16 | Stop reason: HOSPADM

## 2023-12-10 RX ORDER — IRON POLYSACCHARIDE COMPLEX 150 MG
150 CAPSULE ORAL 2 TIMES DAILY
COMMUNITY
End: 2023-12-16

## 2023-12-10 RX ORDER — METHYLPREDNISOLONE SODIUM SUCCINATE 125 MG/2ML
60 INJECTION, POWDER, LYOPHILIZED, FOR SOLUTION INTRAMUSCULAR; INTRAVENOUS ONCE
Status: DISCONTINUED | OUTPATIENT
Start: 2023-12-10 | End: 2023-12-10

## 2023-12-10 RX ORDER — SODIUM CHLORIDE 0.9 % (FLUSH) 0.9 %
10 SYRINGE (ML) INJECTION EVERY 12 HOURS SCHEDULED
Status: DISCONTINUED | OUTPATIENT
Start: 2023-12-10 | End: 2023-12-16 | Stop reason: HOSPADM

## 2023-12-10 RX ORDER — ALBUTEROL SULFATE 2.5 MG/3ML
2.5 SOLUTION RESPIRATORY (INHALATION) EVERY 4 HOURS PRN
Status: DISCONTINUED | OUTPATIENT
Start: 2023-12-10 | End: 2023-12-16 | Stop reason: HOSPADM

## 2023-12-10 RX ORDER — ACETAMINOPHEN 160 MG/5ML
650 SOLUTION ORAL EVERY 4 HOURS PRN
Status: DISCONTINUED | OUTPATIENT
Start: 2023-12-10 | End: 2023-12-16 | Stop reason: HOSPADM

## 2023-12-10 RX ORDER — CARVEDILOL 6.25 MG/1
6.25 TABLET ORAL 2 TIMES DAILY WITH MEALS
COMMUNITY
End: 2023-12-16

## 2023-12-10 RX ORDER — BISACODYL 5 MG/1
5 TABLET, DELAYED RELEASE ORAL DAILY PRN
Status: DISCONTINUED | OUTPATIENT
Start: 2023-12-10 | End: 2023-12-16 | Stop reason: HOSPADM

## 2023-12-10 RX ORDER — BUMETANIDE 2 MG/1
2 TABLET ORAL 2 TIMES DAILY
COMMUNITY
End: 2023-12-16

## 2023-12-10 RX ORDER — SODIUM CHLORIDE 9 MG/ML
40 INJECTION, SOLUTION INTRAVENOUS AS NEEDED
Status: DISCONTINUED | OUTPATIENT
Start: 2023-12-10 | End: 2023-12-13

## 2023-12-10 RX ORDER — POTASSIUM CHLORIDE 750 MG/1
40 CAPSULE, EXTENDED RELEASE ORAL ONCE
Status: DISCONTINUED | OUTPATIENT
Start: 2023-12-10 | End: 2023-12-11

## 2023-12-10 RX ORDER — ONDANSETRON 2 MG/ML
4 INJECTION INTRAMUSCULAR; INTRAVENOUS EVERY 6 HOURS PRN
Status: DISCONTINUED | OUTPATIENT
Start: 2023-12-10 | End: 2023-12-16 | Stop reason: HOSPADM

## 2023-12-10 RX ORDER — ALBUTEROL SULFATE 90 UG/1
2 AEROSOL, METERED RESPIRATORY (INHALATION) EVERY 4 HOURS PRN
COMMUNITY
End: 2023-12-16

## 2023-12-10 RX ORDER — POTASSIUM CHLORIDE 750 MG/1
40 CAPSULE, EXTENDED RELEASE ORAL ONCE
Status: COMPLETED | OUTPATIENT
Start: 2023-12-10 | End: 2023-12-10

## 2023-12-10 RX ORDER — BISACODYL 10 MG
10 SUPPOSITORY, RECTAL RECTAL DAILY PRN
Status: DISCONTINUED | OUTPATIENT
Start: 2023-12-10 | End: 2023-12-16 | Stop reason: HOSPADM

## 2023-12-10 RX ORDER — ALBUMIN (HUMAN) 12.5 G/50ML
25 SOLUTION INTRAVENOUS ONCE
Status: COMPLETED | OUTPATIENT
Start: 2023-12-10 | End: 2023-12-10

## 2023-12-10 RX ORDER — PANTOPRAZOLE SODIUM 40 MG/1
40 TABLET, DELAYED RELEASE ORAL
Status: DISCONTINUED | OUTPATIENT
Start: 2023-12-11 | End: 2023-12-16 | Stop reason: HOSPADM

## 2023-12-10 RX ORDER — DOXYCYCLINE 100 MG/1
100 CAPSULE ORAL ONCE
Status: COMPLETED | OUTPATIENT
Start: 2023-12-10 | End: 2023-12-10

## 2023-12-10 RX ORDER — ACETAMINOPHEN 500 MG
1 TABLET ORAL DAILY
COMMUNITY
End: 2023-12-16

## 2023-12-10 RX ORDER — DOXEPIN HYDROCHLORIDE 10 MG/1
10-20 CAPSULE ORAL NIGHTLY
COMMUNITY
End: 2023-12-16

## 2023-12-10 RX ORDER — CEFTRIAXONE SODIUM 1 G/50ML
1000 INJECTION, SOLUTION INTRAVENOUS ONCE
Status: COMPLETED | OUTPATIENT
Start: 2023-12-10 | End: 2023-12-10

## 2023-12-10 RX ORDER — ATORVASTATIN CALCIUM 40 MG/1
40 TABLET, FILM COATED ORAL DAILY
Status: DISCONTINUED | OUTPATIENT
Start: 2023-12-10 | End: 2023-12-16 | Stop reason: HOSPADM

## 2023-12-10 RX ORDER — SPIRONOLACTONE 25 MG/1
25 TABLET ORAL DAILY
COMMUNITY
End: 2023-12-16

## 2023-12-10 RX ORDER — POLYETHYLENE GLYCOL 3350 17 G/17G
17 POWDER, FOR SOLUTION ORAL DAILY PRN
Status: DISCONTINUED | OUTPATIENT
Start: 2023-12-10 | End: 2023-12-16 | Stop reason: HOSPADM

## 2023-12-10 RX ORDER — SODIUM CHLORIDE 0.9 % (FLUSH) 0.9 %
10 SYRINGE (ML) INJECTION AS NEEDED
Status: DISCONTINUED | OUTPATIENT
Start: 2023-12-10 | End: 2023-12-10

## 2023-12-10 RX ORDER — FUROSEMIDE 10 MG/ML
60 INJECTION INTRAMUSCULAR; INTRAVENOUS ONCE
Status: COMPLETED | OUTPATIENT
Start: 2023-12-10 | End: 2023-12-10

## 2023-12-10 RX ORDER — NITROGLYCERIN 0.4 MG/1
0.4 TABLET SUBLINGUAL
Status: DISCONTINUED | OUTPATIENT
Start: 2023-12-10 | End: 2023-12-10

## 2023-12-10 RX ORDER — METHYLPREDNISOLONE SODIUM SUCCINATE 125 MG/2ML
60 INJECTION, POWDER, LYOPHILIZED, FOR SOLUTION INTRAMUSCULAR; INTRAVENOUS ONCE
Status: COMPLETED | OUTPATIENT
Start: 2023-12-10 | End: 2023-12-10

## 2023-12-10 RX ORDER — DOXEPIN HYDROCHLORIDE 10 MG/1
10 CAPSULE ORAL NIGHTLY
Status: DISCONTINUED | OUTPATIENT
Start: 2023-12-10 | End: 2023-12-12

## 2023-12-10 RX ORDER — MONTELUKAST SODIUM 10 MG/1
10 TABLET ORAL NIGHTLY
Status: DISCONTINUED | OUTPATIENT
Start: 2023-12-10 | End: 2023-12-16 | Stop reason: HOSPADM

## 2023-12-10 RX ORDER — SODIUM CHLORIDE 9 MG/ML
125 INJECTION, SOLUTION INTRAVENOUS CONTINUOUS
Status: DISCONTINUED | OUTPATIENT
Start: 2023-12-10 | End: 2023-12-10

## 2023-12-10 RX ORDER — ACETAMINOPHEN 325 MG/1
650 TABLET ORAL EVERY 4 HOURS PRN
Status: DISCONTINUED | OUTPATIENT
Start: 2023-12-10 | End: 2023-12-16 | Stop reason: HOSPADM

## 2023-12-10 RX ORDER — AMOXICILLIN 250 MG
2 CAPSULE ORAL 2 TIMES DAILY
Status: DISCONTINUED | OUTPATIENT
Start: 2023-12-10 | End: 2023-12-16 | Stop reason: HOSPADM

## 2023-12-10 RX ORDER — MONTELUKAST SODIUM 10 MG/1
10 TABLET ORAL NIGHTLY
COMMUNITY
End: 2023-12-16

## 2023-12-10 RX ADMIN — SODIUM CHLORIDE 1000 ML: 9 INJECTION, SOLUTION INTRAVENOUS at 04:14

## 2023-12-10 RX ADMIN — DOXYCYCLINE 100 MG: 100 CAPSULE ORAL at 04:59

## 2023-12-10 RX ADMIN — SODIUM CHLORIDE 125 ML/HR: 9 INJECTION, SOLUTION INTRAVENOUS at 05:49

## 2023-12-10 RX ADMIN — Medication 10 ML: at 20:49

## 2023-12-10 RX ADMIN — METHYLPREDNISOLONE SODIUM SUCCINATE 60 MG: 125 INJECTION INTRAMUSCULAR; INTRAVENOUS at 20:49

## 2023-12-10 RX ADMIN — CEFTRIAXONE SODIUM 1000 MG: 1 INJECTION, SOLUTION INTRAVENOUS at 04:59

## 2023-12-10 RX ADMIN — ATORVASTATIN CALCIUM 40 MG: 40 TABLET, FILM COATED ORAL at 14:02

## 2023-12-10 RX ADMIN — BISACODYL 5 MG: 5 TABLET, COATED ORAL at 20:48

## 2023-12-10 RX ADMIN — DOCUSATE SODIUM 50 MG AND SENNOSIDES 8.6 MG 2 TABLET: 8.6; 5 TABLET, FILM COATED ORAL at 10:33

## 2023-12-10 RX ADMIN — ALBUMIN (HUMAN) 25 G: 0.25 INJECTION, SOLUTION INTRAVENOUS at 20:49

## 2023-12-10 RX ADMIN — POTASSIUM CHLORIDE 40 MEQ: 10 CAPSULE, COATED, EXTENDED RELEASE ORAL at 14:03

## 2023-12-10 RX ADMIN — ALBUTEROL SULFATE 2.5 MG: 2.5 SOLUTION RESPIRATORY (INHALATION) at 23:55

## 2023-12-10 RX ADMIN — APIXABAN 2.5 MG: 2.5 TABLET, FILM COATED ORAL at 20:48

## 2023-12-10 RX ADMIN — APIXABAN 2.5 MG: 2.5 TABLET, FILM COATED ORAL at 14:02

## 2023-12-10 RX ADMIN — POTASSIUM CHLORIDE 40 MEQ: 10 CAPSULE, COATED, EXTENDED RELEASE ORAL at 04:11

## 2023-12-10 RX ADMIN — Medication 10 ML: at 11:05

## 2023-12-10 RX ADMIN — MONTELUKAST SODIUM 10 MG: 10 TABLET, FILM COATED ORAL at 20:48

## 2023-12-10 RX ADMIN — DOCUSATE SODIUM 50 MG AND SENNOSIDES 8.6 MG 2 TABLET: 8.6; 5 TABLET, FILM COATED ORAL at 20:52

## 2023-12-10 RX ADMIN — DOXEPIN HYDROCHLORIDE 10 MG: 10 CAPSULE ORAL at 20:48

## 2023-12-10 RX ADMIN — FUROSEMIDE 60 MG: 10 INJECTION, SOLUTION INTRAMUSCULAR; INTRAVENOUS at 20:48

## 2023-12-10 NOTE — PROGRESS NOTES
Patient seen and examined.  Radiographic findings and exam consistent with CHF, antibiotics discontinued.  Daughter at bedside, states that the last several times her mother had pulmonary issues ammonia was suggested on x-ray but she improved with treatment of CHF.  Patient also had C. difficile earlier this year so family would like to be cautious with antibiotics.    -Echocardiogram pending, depending on results may consult cardiology  -Antibiotics discontinued  -Home Eliquis resumed, hematoma on left scalp stable  -PT OT consulted  -Continue cardiac monitoring, holding Coreg and antihypertensives at this time likely resume tomorrow  -P.o. potassium replacement, recheck electrolytes tomorrow  -Wound care consult placed, left leg Unna boot may be removed while inpatient  -Hold diuretics and further IVF for now

## 2023-12-10 NOTE — ED NOTES
Pt arrived via ems from home. Pt had a syncope episode at home, pt fell and has hematoma to L side of head. Pt positive for LOC, pt on blood thinner. Pt had several syncope episodes

## 2023-12-10 NOTE — ED PROVIDER NOTES
"Time: 2:43 AM EST  Date of encounter:  12/10/2023  Independent Historian/Clinical History and Information was obtained by:   Patient and EMS    History is limited by: N/A    Chief Complaint: Syncope      History of Present Illness:  Patient is a 89 y.o. year old female who presents to the emergency department for evaluation of syncope    Patient states she has had multiple episodes over the past several days of feeling lightheaded and possibly passing out with falls.  She states she is being treated with a \"strong\" diuretic due to fluid retention.  She states this is a new treatment over the past several days.  She is uncertain of the circumstances tonight but believes that she fell and struck her head.  She is uncertain if she lost consciousness.  She has pain at her left forehead with some radiation posteriorly and down her neck.  She denies any numbness or weakness.  She has no other complaints at this time.    HPI    Patient Care Team  Primary Care Provider: Laura Silva APRN    Past Medical History:     Allergies   Allergen Reactions    Contrast Dye (Echo Or Unknown Ct/Mr) Unknown - Low Severity    Iodinated Contrast Media Hives and Other (See Comments)     IODINATED CONTRAST MEDIA (Verified  Allergy, Unknown, HIVES,SNEEZING,ITCHY EYES)    Iodine Unknown - Low Severity    Penicillins Rash    Shellfish Allergy Unknown - Low Severity    Spinach Other (See Comments)      SPINACH (Verified  Allergy, Unknown, 2/22/21)      SHOWED UP ON ALLERGY TESTING    Sulfa Antibiotics Other (See Comments)     (Verified  Allergy, Unknown, RASH)     Past Medical History:   Diagnosis Date    Acute congestive heart failure     improved    Arthritis     Atrial fibrillation, persistent 09/16/2021    Bladder cancer     Bleeding disorder     (CONDITION DUE TO BLOOD THINNERS)    Chronic atrial fibrillation     Chronic heart failure with preserved ejection fraction (HFpEF) 09/23/2021    High cholesterol     Hyperlipidemia LDL goal " <100 06/16/2021    Hypertension     Lymphoma     Moderate to severe aortic stenosis     Patient declines to have transcatheter    Non Hodgkin's lymphoma     2008 treated 2008 2009.    Rectus sheath hematoma     Right rectus sheath hematoma, off anticoagulation because of the hematoma.    Skin cancer     Type 2 diabetes mellitus      Past Surgical History:   Procedure Laterality Date    BLADDER SURGERY      CATARACT EXTRACTION      DILATION AND CURETTAGE, DIAGNOSTIC / THERAPEUTIC      LEFT VENTRICULAR ASSIST DEVICE      LYMPH NODE BIOPSY      Biopsy of retroperitoneal lymph node or mass     SKIN CANCER DESTRUCTION      TONSILLECTOMY       Family History   Problem Relation Age of Onset    Heart attack Father     Heart attack Brother     Stomach cancer Paternal Great-Grandfather        Home Medications:  Prior to Admission medications    Medication Sig Start Date End Date Taking? Authorizing Provider   acetaminophen (TYLENOL) 325 MG tablet 2 tablets As Needed. 3/9/22   ProviderMary Kate MD   albuterol sulfate  (90 Base) MCG/ACT inhaler USE 2 PUFFS BY MOUTH EVERY 4 HOURS AS NEEDED FOR WHEEZING  Patient taking differently: Inhale 2 puffs Every 4 (Four) Hours As Needed. 12/21/21   Gala Rao MD   aspirin 81 MG EC tablet Take 1 tablet by mouth Daily.    Provider, MD Mary Kate   atorvastatin (LIPITOR) 40 MG tablet Take 1 tablet by mouth Daily. 10/6/22   Lily Byrd MD PhD   bumetanide (BUMEX) 2 MG tablet Take 1 tablet by mouth 2 (Two) Times a Day. 11/22/23   Veronica Mckeon APRN   Calcium Carbonate-Vitamin D 600-200 MG-UNIT capsule Take 1 capsule by mouth 2 (Two) Times a Day.    Provider, MD Mary Kate   carvedilol (COREG) 6.25 MG tablet TAKE 1 TABLET BY MOUTH TWICE DAILY WITH FOOD  Patient taking differently: 2 (Two) Times a Day With Meals. 6/16/22   Gala Rao MD   doxepin (SINEquan) 10 MG capsule Take 1 capsule by mouth Every Night. 5/18/23   Lily Byrd MD PhD   doxycycline  (MONODOX) 100 MG capsule Take 1 capsule by mouth 2 (Two) Times a Day. 11/2/23   Roberto Carlos Amaya DO   Eliquis 2.5 MG tablet tablet Take 1 tablet by mouth Every 12 (Twelve) Hours. 11/21/23   Jovani Ellingotn MD   ergocalciferol (ERGOCALCIFEROL) 1.25 MG (93912 UT) capsule One every 2 weeks 8/8/22   Beata Lancaster MD   FREESTYLE LITE test strip USE AS DIRECTED TO TEST BLOOD SUGAR EVERY DAY 2/22/23   Mary Kate Perea MD   guaiFENesin (MUCINEX) 600 MG 12 hr tablet Take 1 tablet by mouth 2 (Two) Times a Day.    Mary Kate Perea MD   hydrocortisone-bacitracin-zinc oxide-nystatin (MAGIC BARRIER) Apply 1 application topically to the appropriate area as directed As Needed (due on bottom). 6/16/22   Musa Hughes MD   hydrOXYzine (ATARAX) 25 MG tablet Take 0.5 tablets by mouth 3 (Three) Times a Day As Needed for Itching. 11/2/23   Roberto Carlos Amaya DO   iron polysaccharides (Ferrex 150) 150 MG capsule 1 tablet p.o. twice daily 1/13/23   Beata Lancaster MD   Lancets (freestyle) lancets 1 each by Other route Daily. 12/28/22   Mary Kate Perea MD   latanoprost (XALATAN) 0.005 % ophthalmic solution INSTILL 1 DROP IN RIGHT EYE AT BEDTIME 1/12/23   Mary Kate Perea MD   levoFLOXacin (Levaquin) 250 MG tablet Take 1 tablet by mouth Daily. Take TWO tablets the first day, then ONE tablet daily starting on day two, 7 day treatment. 11/22/23   Brisa Ferreira MD   metOLazone (ZAROXOLYN) 2.5 MG tablet Take 1 tablet by mouth Daily. 12/5/23   Veronica Mckeon APRN   montelukast (SINGULAIR) 10 MG tablet TAKE 1 TABLET(10 MG) BY MOUTH EVERY DAY IN THE EVENING  Patient taking differently: Take 1 tablet by mouth Every Night. 4/8/22   Gala Rao MD   multivitamin with minerals tablet tablet Take 1 tablet by mouth Daily.    Mary Kate Perea MD   potassium chloride 10 MEQ CR tablet Take 1 tablet by mouth Daily. 10/6/22   Lily Byrd MD PhD   Probiotic Product (Risaquad-2)  "capsule capsule Take 1 capsule by mouth Daily. 1/19/23   Provider, MD Mary Kate   raloxifene (EVISTA) 60 MG tablet Take 1 tablet by mouth Daily. 1/11/22   Gala Rao MD   spironolactone (Aldactone) 25 MG tablet Take 1/2 tablet every day  Patient taking differently: Take 0.5 tablets by mouth Daily. Taking one whole tablet every day 2/7/22   Gala Rao MD   Symbicort 80-4.5 MCG/ACT inhaler Inhale 2 puffs 2 (Two) Times a Day. 9/13/22   TaniyapillBeata trevino MD   Zinc 50 MG tablet Take 1 tablet by mouth Daily.    Provider, MD Mary Kate        Social History:   Social History     Tobacco Use    Smoking status: Never    Smokeless tobacco: Never   Vaping Use    Vaping Use: Never used   Substance Use Topics    Alcohol use: Never    Drug use: Never         Review of Systems:  Review of Systems   Constitutional:  Negative for chills and fever.   HENT:  Negative for congestion, ear pain and sore throat.    Eyes:  Negative for pain.   Respiratory:  Negative for cough, chest tightness and shortness of breath.    Cardiovascular:  Negative for chest pain.   Gastrointestinal:  Negative for abdominal pain, diarrhea, nausea and vomiting.   Genitourinary:  Negative for flank pain and hematuria.   Musculoskeletal:  Negative for joint swelling.   Skin:  Negative for pallor.   Neurological:  Positive for syncope, light-headedness and headaches. Negative for seizures.   All other systems reviewed and are negative.       Physical Exam:  BP 91/61   Pulse 80   Temp 98.3 °F (36.8 °C)   Resp 18   Ht 160 cm (63\")   Wt 63.5 kg (139 lb 15.9 oz)   SpO2 95%   BMI 24.80 kg/m²     Physical Exam  Vitals and nursing note reviewed.   Constitutional:       General: She is not in acute distress.     Appearance: Normal appearance. She is not toxic-appearing.   HENT:      Head: Normocephalic.      Jaw: There is normal jaw occlusion.      Comments: Left frontal cephalhematoma with associated tenderness small punctate area " with dried blood, currently hemostatic, no evidence of laceration requiring closure.  Eyes:      General: Lids are normal.      Extraocular Movements: Extraocular movements intact.      Conjunctiva/sclera: Conjunctivae normal.      Pupils: Pupils are equal, round, and reactive to light.   Cardiovascular:      Rate and Rhythm: Normal rate and regular rhythm.      Pulses: Normal pulses.      Heart sounds: Normal heart sounds.   Pulmonary:      Effort: Pulmonary effort is normal. No respiratory distress.      Breath sounds: Normal breath sounds. No wheezing or rhonchi.   Abdominal:      General: Abdomen is flat.      Palpations: Abdomen is soft.      Tenderness: There is no abdominal tenderness. There is no guarding or rebound.   Musculoskeletal:         General: Normal range of motion.      Cervical back: Normal range of motion and neck supple.      Right lower leg: No edema.      Left lower leg: No edema.      Comments: Severe bilateral lower extremity pitting pedal edema with Ace wrap's in place.,  No evidence of infection.   Skin:     General: Skin is warm and dry.   Neurological:      Mental Status: She is alert and oriented to person, place, and time. Mental status is at baseline.   Psychiatric:         Mood and Affect: Mood normal.               Procedures:  Procedures      Medical Decision Making:      Comorbidities that affect care:    Congestive heart failure, non-Hodgkin's lymphoma, diabetes, hypertension, atrial fibrillation, chronic anticoagulation    External Notes reviewed:    Previous Clinic Note: Outpatient wound care visit 12/8/2023 and outpatient oncology visit 11/21/2023      The following orders were placed and all results were independently analyzed by me:  Orders Placed This Encounter   Procedures    Blood Culture - Blood,    Blood Culture - Blood,    XR Chest 1 View    CT Head Without Contrast    CT Cervical Spine Without Contrast    [place] Draw    Comprehensive Metabolic Panel    Single High  Sensitivity Troponin T    Magnesium    Urinalysis With Microscopic If Indicated (No Culture) - Urine, Clean Catch    CBC Auto Differential    Scan Slide    High Sensitivity Troponin T 2Hr    Lactic Acid, Plasma    NPO Diet NPO Type: Strict NPO    Undress & Gown    Continuous Pulse Oximetry    Vital Signs    Orthostatic Blood Pressure    Code Status and Medical Interventions:    Hospitalist (on-call MD unless specified)    Oxygen Therapy- Nasal Cannula; Titrate 1-6 LPM Per SpO2; 90 - 95%    POC Glucose Once    ECG 12 Lead ED Triage Standing Order; Weak / Dizzy / AMS    Insert Peripheral IV    Initiate Observation Status    Fall Precautions    CBC & Differential    Green Top (Gel)    Lavender Top    Gold Top - SST    Light Blue Top       Medications Given in the Emergency Department:  Medications   sodium chloride 0.9 % flush 10 mL (has no administration in time range)   sodium chloride 0.9 % infusion (125 mL/hr Intravenous New Bag 12/10/23 0549)   potassium chloride (MICRO-K/KLOR-CON) CR capsule (40 mEq Oral Given 12/10/23 0411)   sodium chloride 0.9 % bolus 1,000 mL (0 mL Intravenous Stopped 12/10/23 0549)   cefTRIAXone (ROCEPHIN) IVPB 1,000 mg (0 mg Intravenous Stopped 12/10/23 0549)   doxycycline (MONODOX) capsule 100 mg (100 mg Oral Given 12/10/23 0459)        ED Course:    ED Course as of 12/10/23 0755   Sun Dec 10, 2023   0255 My interpretation of EKG: Atrial fibrillation 80, left bundle branch block pattern, no evidence of acute ischemia, unchanged from previous. [JS]      ED Course User Index  [JS] Baljinder Howard MD       Labs:    Lab Results (last 24 hours)       Procedure Component Value Units Date/Time    CBC & Differential [[id number]]  (Abnormal) Collected: 12/10/23 0256    Specimen: Blood Updated: 12/10/23 0332    Narrative:      The following orders were created for panel order CBC & Differential.  Procedure                               Abnormality         Status                     ---------                                -----------         ------                     CBC Auto Differential[[id number]]        Abnormal            Final result               Scan Slide[[id number]]                                       Final result                 Please view results for these tests on the individual orders.    Comprehensive Metabolic Panel [[id number]]  (Abnormal) Collected: 12/10/23 0256    Specimen: Blood Updated: 12/10/23 0320     Glucose 133 mg/dL      BUN 79 mg/dL      Creatinine 1.26 mg/dL      Sodium 136 mmol/L      Potassium 2.8 mmol/L      Chloride 89 mmol/L      CO2 33.1 mmol/L      Calcium 10.5 mg/dL      Total Protein 7.2 g/dL      Albumin 4.1 g/dL      ALT (SGPT) 22 U/L      AST (SGOT) 43 U/L      Alkaline Phosphatase 86 U/L      Total Bilirubin 0.8 mg/dL      Globulin 3.1 gm/dL      A/G Ratio 1.3 g/dL      BUN/Creatinine Ratio 62.7     Anion Gap 13.9 mmol/L      eGFR 39.4 mL/min/1.73     Narrative:      GFR Normal >60  Chronic Kidney Disease <60  Kidney Failure <15    The GFR formula is only valid for adults with stable renal function between ages 18 and 70.    Single High Sensitivity Troponin T [[id number]]  (Abnormal) Collected: 12/10/23 0256    Specimen: Blood Updated: 12/10/23 0331     HS Troponin T 80 ng/L     Narrative:      High Sensitive Troponin T Reference Range:  <14.0 ng/L- Negative Female for AMI  <22.0 ng/L- Negative Male for AMI  >=14 - Abnormal Female indicating possible myocardial injury.  >=22 - Abnormal Male indicating possible myocardial injury.   Clinicians would have to utilize clinical acumen, EKG, Troponin, and serial changes to determine if it is an Acute Myocardial Infarction or myocardial injury due to an underlying chronic condition.         Magnesium [[id number]]  (Normal) Collected: 12/10/23 0256    Specimen: Blood Updated: 12/10/23 0320     Magnesium 2.2 mg/dL     CBC Auto Differential [[id number]]  (Abnormal) Collected: 12/10/23 0256    Specimen: Blood Updated: 12/10/23 0332      WBC 11.28 10*3/mm3      RBC 4.21 10*6/mm3      Hemoglobin 13.0 g/dL      Hematocrit 39.7 %      MCV 94.3 fL      MCH 30.9 pg      MCHC 32.7 g/dL      RDW 14.3 %      RDW-SD 49.2 fl      MPV 10.7 fL      Platelets 218 10*3/mm3      Neutrophil % 70.2 %      Lymphocyte % 11.1 %      Monocyte % 9.8 %      Eosinophil % 1.2 %      Basophil % 0.8 %      Immature Grans % 6.9 %      Neutrophils, Absolute 7.92 10*3/mm3      Lymphocytes, Absolute 1.25 10*3/mm3      Monocytes, Absolute 1.11 10*3/mm3      Eosinophils, Absolute 0.13 10*3/mm3      Basophils, Absolute 0.09 10*3/mm3      Immature Grans, Absolute 0.78 10*3/mm3      nRBC 0.0 /100 WBC     Scan Slide [[id number]] Collected: 12/10/23 0256    Specimen: Blood Updated: 12/10/23 0332     Anisocytosis Mod/2+     Dacrocytes Slight/1+     Microcytes Slight/1+     Macrocytes Slight/1+     Ovalocytes Slight/1+     Stomatocytes Slight/1+     Target Cells Slight/1+     WBC Morphology Normal     Platelet Estimate Adequate     Large Platelets Slight/1+    Lactic Acid, Plasma [[id number]]  (Normal) Collected: 12/10/23 0454    Specimen: Blood Updated: 12/10/23 0520     Lactate 1.6 mmol/L     Blood Culture - Blood, Arm, Left [[id number]] Collected: 12/10/23 0454    Specimen: Blood from Arm, Left Updated: 12/10/23 0503    Blood Culture - Blood, Arm, Right [[id number]] Collected: 12/10/23 0457    Specimen: Blood from Arm, Right Updated: 12/10/23 0503    High Sensitivity Troponin T 2Hr [[id number]]  (Abnormal) Collected: 12/10/23 0549    Specimen: Blood Updated: 12/10/23 0643     HS Troponin T 76 ng/L      Troponin T Delta -4 ng/L     Narrative:      High Sensitive Troponin T Reference Range:  <14.0 ng/L- Negative Female for AMI  <22.0 ng/L- Negative Male for AMI  >=14 - Abnormal Female indicating possible myocardial injury.  >=22 - Abnormal Male indicating possible myocardial injury.   Clinicians would have to utilize clinical acumen, EKG, Troponin, and serial changes to determine if it is an  Acute Myocardial Infarction or myocardial injury due to an underlying chronic condition.                  Imaging:    XR Chest 1 View    Result Date: 12/10/2023  PROCEDURE: XR CHEST 1 VW  COMPARISON: 11/2/2023.  INDICATIONS: WEAKNESS, NOT OTHERWISE SPECIFIED; SYNCOPE/COLLAPSE; H/O FALL.  FINDINGS: A single AP upright portable chest radiograph is provided for review.  There is obscuration of the left lung base, which may be artifactual.  Atelectasis and/or pneumonia cannot be excluded.  There are probably small-to-moderate bilateral pleural effusions, larger on left.  There is moderate cardiomegaly, as before.  There is a right-sided internal jugular (IJ) central venous line in place with its distal tip in the expected mid-to-lower superior vena cava (SVC).  The thoracic aorta is atherosclerotic.  External artifacts obscure detail.  Chronic calcified granulomatous disease involves the chest.  Degenerative changes are seen throughout the imaged spine.  There is generalized osteopenia.  No pneumothorax is suspected.       Atelectasis and/or pneumonia may involve the left lung base.  Small-to-moderate bilateral pleural effusions are seen.  There is moderate cardiomegaly.  Please see above comments for further detail.      Please note that portions of this note were completed with a voice recognition program.  SANTOSH CHOUDHURY JR, MD       Electronically Signed and Approved By: SANTOSH CHOUDHURY JR, MD on 12/10/2023 at 3:34              CT Head Without Contrast    Result Date: 12/10/2023  PROCEDURE: CT HEAD WO CONTRAST  COMPARISON: 11/2/2023.  INDICATIONS: Fall; head trauma/injury; left forehead laceration; neck pain.  PROTOCOL:   Standard CT imaging protocol performed.    RADIATION:   Total DLP: 1,449.2 mGy*cm   MA and/or KV were/was adjusted to minimize radiation dose.    TECHNIQUE: After obtaining the patient's consent, 139 CT images were obtained without non-ionic intravenous contrast material.  DISCUSSION: A routine  nonenhanced head CT was performed. No acute brain abnormality is identified. No acute intracranial hemorrhage. No acute infarction. No acute skull fracture. No midline shift or acute intracranial mass effect is seen.  Severe chronic small vessel ischemia/infarction is suspected. There are arterial calcifications. The extra-axial spaces and the ventricular system are prominent, suggesting global atrophy.  The patient has undergone bilateral cataract extractions with intra-ocular lens implants.  Minimal age-indeterminate mucosal thickening involves the imaged paranasal sinuses.  No air-fluid interfaces are seen within the imaged paranasal sinuses.  Benign external auditory canal debris is suspected.  Degenerative changes involve the partially imaged cervical spine and the bilateral temporomandibular joints (TMJs).  Acute soft tissue contusion involves the lateral left frontal scalp.  The study is motion-limited.       No acute brain abnormality is seen.    Please note that portions of this note were completed with a voice recognition program.  SANTOSH CHOUDHURY JR, MD       Electronically Signed and Approved By: SANTOSH CHOUDHURY JR, MD on 12/10/2023 at 3:30              CT Cervical Spine Without Contrast    Result Date: 12/10/2023  PROCEDURE: CT CERVICAL SPINE WO CONTRAST  COMPARISON: 6/13/2023.  INDICATIONS: Fall.  PROTOCOL:   Standard CT imaging protocol performed.    RADIATION:   Total DLP: 1,449.2 mGy*cm   MA and/or KV were/was adjusted to minimize radiation dose.    TECHNIQUE:  After obtaining the patient's consent, multi-planar CT images were created without contrast material.  Despite best efforts, poor image quality limits interpretation of the study, especially due to patient motion artifact.     EXAM FINDINGS: A routine nonenhanced cervical spine CT was performed. Sagittal and coronal two-dimensional reformations are provided for review. No acute cervical spine fracture or acute malalignment is identified. Small  nonspecific bilateral cervical lymph nodes are seen.  There is an old superior T3 endplate vertebral compression fracture, seen previously, not significantly changed.  Moderate-to-severe degenerative changes are present throughout the imaged cervicothoracic spine.  Mild chronic malalignment is appreciated.  Streak artifact from dental amalgam obscures detail.  There is a partially visualized right-sided central (internal jugular, IJ) venous line in place.  Arterial calcifications are seen.  Degenerative changes involve the bilateral temporomandibular joints (TMJs).  There are bilateral pleural effusions.  Lateral curvature involves the cervical spine with the convexity to the right, which may be fixed or positional in nature.        No acute cervical spine fracture is seen.  No acute subluxation abnormality is seen.  The study is motion-limited.    Please note that portions of this note were completed with a voice recognition program.  SANTOSH CHOUDHURY JR, MD       Electronically Signed and Approved By: SANTOSH CHOUDHURY JR, MD on 12/10/2023 at 3:27                 Differential Diagnosis and Discussion:    Syncope: Differential diagnosis includes but is not limited to TIA, hyperventilation, aortic stenosis, pulmonary emboli, myocardial disease, bradycardia arrhythmia, heart block, tachyarrhythmia, vasovagal, orthostatic hypotension, ruptured AAA, aortic dissection, subarachnoid hemorrhage, seizure, hypoglycemia.    All labs were reviewed and interpreted by me.  EKG was interpreted by me.  CT scan radiology impression was interpreted by me.    MDM     Amount and/or Complexity of Data Reviewed  Decide to obtain previous medical records or to obtain history from someone other than the patient: yes                 Patient Care Considerations:    SEPSIS was considered but is NOT present in the emergency department as SIRS criteria is not present.      Consultants/Shared Management Plan:    Hospitalist: I have discussed the  case with the hospitalist who agrees to accept the patient for admission.    Social Determinants of Health:    Patient is independent, reliable, and has access to care.       Disposition and Care Coordination:    Admit:   Through independent evaluation of the patient's history, physical, and imperical data, the patient meets criteria for observation/admission to the hospital.        Final diagnoses:   Syncope, unspecified syncope type   Hypokalemia   Acute renal insufficiency        ED Disposition       ED Disposition   Decision to Admit    Condition   --    Comment   Level of Care: Telemetry [5]   Diagnosis: Syncope [[id number]]   Admitting Physician: /FROILAN BARRERA [[id number]]   Attending Physician: /FROILAN BARRERA [[id number]]                 This medical record created using voice recognition software.             Baljinder Howard MD  12/10/23 0757

## 2023-12-10 NOTE — H&P
[facility]  Date: 12/10/2023   Patient Name: Radha Aparicio  : 1928  MRN: [id number]  Primary Care Physician:  Laura Silva APRN  Date of admission: 12/10/2023    Subjective   Subjective     Chief Complaint: Passing out    HPI:    Radha Aparicio is a 89 y.o. female brought to the emergency department for evaluation of syncope.  Patient reports multiple episodes of the left several days.  Patient reports lightheadedness and dizziness intermittently, especially with changes in positioning.  Patient states she has been increasing her dose of diuretic due to bilateral lower extremity fluid retention.  Patient reports she was instructed to take 3 doses per day for 3 days, up from 2 doses per day.  Patient states she had passed out and struck her head on the floor.  She was not sure if she lost consciousness.  She reports pain at her left forehead with radiation down her posterior neck.  Denies any recent travel history, recent sick contacts.  Denies any fevers, chills, numbness, tingling, weakness, chest pain, dyspnea, abdominal pain.      Personal History     Past Medical History:  Past Medical History:   Diagnosis Date    Acute congestive heart failure     improved    Arthritis     Atrial fibrillation, persistent 2021    Bladder cancer     Bleeding disorder     (CONDITION DUE TO BLOOD THINNERS)    Chronic atrial fibrillation     Chronic heart failure with preserved ejection fraction (HFpEF) 2021    High cholesterol     Hyperlipidemia LDL goal <100 2021    Hypertension     Lymphoma     Moderate to severe aortic stenosis     Patient declines to have transcatheter    Non Hodgkin's lymphoma      treated 2008.    Rectus sheath hematoma     Right rectus sheath hematoma, off anticoagulation because of the hematoma.    Skin cancer     Type 2 diabetes mellitus          Past Surgical History:  Past Surgical History:   Procedure Laterality Date     BLADDER SURGERY      CATARACT EXTRACTION      DILATION AND CURETTAGE, DIAGNOSTIC / THERAPEUTIC      LEFT VENTRICULAR ASSIST DEVICE      LYMPH NODE BIOPSY      Biopsy of retroperitoneal lymph node or mass     SKIN CANCER DESTRUCTION      TONSILLECTOMY           Family History:   Family History   Problem Relation Age of Onset    Heart attack Father     Heart attack Brother     Stomach cancer Paternal Great-Grandfather          Social History:   Social History     Socioeconomic History    Marital status:     Number of children: 2   Tobacco Use    Smoking status: Never    Smokeless tobacco: Never   Vaping Use    Vaping Use: Never used   Substance and Sexual Activity    Alcohol use: Never    Drug use: Never    Sexual activity: Defer         Home Medications:  Calcium Carbonate-Vitamin D, Risaquad-2, Zinc, acetaminophen, albuterol sulfate HFA, apixaban, aspirin, atorvastatin, budesonide-formoterol, bumetanide, carvedilol, doxepin, doxycycline, ergocalciferol, freestyle, glucose blood, guaiFENesin, hydrOXYzine, hydrocortisone-bacitracin-zinc oxide-nystatin, iron polysaccharides, latanoprost, levoFLOXacin, metOLazone, montelukast, multivitamin with minerals, potassium chloride, raloxifene, and spironolactone    Allergies:  Allergies   Allergen Reactions    Contrast Dye (Echo Or Unknown Ct/Mr) Unknown - Low Severity    Iodinated Contrast Media Hives and Other (See Comments)     IODINATED CONTRAST MEDIA (Verified  Allergy, Unknown, HIVES,SNEEZING,ITCHY EYES)    Iodine Unknown - Low Severity    Penicillins Rash    Shellfish Allergy Unknown - Low Severity    Spinach Other (See Comments)      SPINACH (Verified  Allergy, Unknown, 2/22/21)      SHOWED UP ON ALLERGY TESTING    Sulfa Antibiotics Other (See Comments)     (Verified  Allergy, Unknown, RASH)       Review of Systems   All systems were reviewed and negative except for: Syncope, forehead trauma    Objective   Objective     Vitals:   Temp:  [98.3 °F (36.8 °C)] 98.3  °F (36.8 °C)  Heart Rate:  [58-79] 79  Resp:  [18] 18  BP: ()/(36-66) 100/66    Physical Exam    Constitutional: Awake, alert, no acute distress, chronically ill-appearing.   Eyes: Pupils equal, sclerae anicteric, no conjunctival injection   HENT: NCAT, mucous membranes moist   Neck: Supple, no thyromegaly, no lymphadenopathy, trachea midline   Respiratory: Clear to auscultation bilaterally, nonlabored respirations    Cardiovascular: RRR, no murmurs, rubs, or gallops, palpable pedal pulses bilaterally   Gastrointestinal: Positive bowel sounds, soft, nontender, nondistended   Musculoskeletal: 4+ bilateral ankle edema, no clubbing or cyanosis to extremities   Psychiatric: Appropriate affect, cooperative   Neurologic: Oriented x 3, strength symmetric in all extremities, Cranial Nerves grossly intact to confrontation, speech clear   Skin: No rashes     Result Review    Result Review:  I have personally reviewed the results from the time of this admission to 12/10/2023 05:05 EST and agree with these findings:  [x]  Laboratory  [x]  Microbiology  [x]  Radiology  [x]  EKG/Telemetry   [x]  Cardiology/Vascular   []  Pathology  [x]  Old records  []  Other:      Assessment & Plan   Assessment / Plan     Assessment/Plan:   Syncope-likely due to intravascular depletion from overdiuresis.  1 L normal saline administered in the ED.  Will provide 2 doses of albumin to increase oncotic pressure, attempt to mobilize third spaced fluid.  Blood pressure is improved in the emergency department from 80 systolic to 100 systolic with fluid administration.  No pressors at this time.  CT head and CT C-spine without contrast both negative.  Obtain 2D echo, bilateral carotid ultrasounds.  Patient has a history of severe aortic stenosis, possible progression, Consider outpatient cardiology follow-up.  Community-acquired pneumonia-patient is not septic.  Identified in left lower lung on chest x-ray.  IV Rocephin, IV doxycycline.   Supportive care.  Left forehead hematoma-no sutures were required.  Supportive care, pain control.  Small pleural effusion-secondary to pneumonia.  Treat underlying pneumonia.  Recheck chest x-ray in 6 weeks to assure resolution of pneumonia and effusion.  Supportive oxygen.  Hypokalemia-replete, recheck.  Magnesium is greater than 2, goal potassium greater than 4.  Contraction alkalosis-secondary to overdiuresis.  Chronic congestive heart failure-no exacerbation at this time.  Hold diuretics at this time, will resume when euvolemic.  Chronic atrial fibrillation-will hold Eliquis due to hematoma, continue carvedilol.  Hypertension  Hyperlipidemia  History of severe aortic stenosis      DVT prophylaxis: SCDs    CODE STATUS: Full code    Admission Status:  I believe this patient meets observation status.    Electronically signed by Curtis Merchant DO, 12/10/23, 5:05 AM EST.

## 2023-12-11 ENCOUNTER — APPOINTMENT (OUTPATIENT)
Dept: GENERAL RADIOLOGY | Facility: HOSPITAL | Age: 88
DRG: 291 | End: 2023-12-11
Payer: MEDICARE

## 2023-12-11 PROBLEM — I50.21 ACUTE HFREF (HEART FAILURE WITH REDUCED EJECTION FRACTION): Status: ACTIVE | Noted: 2021-09-23

## 2023-12-11 LAB
ANION GAP SERPL CALCULATED.3IONS-SCNC: 16.4 MMOL/L (ref 5–15)
AORTIC DIMENSIONLESS INDEX: 0.12 (DI)
BASOPHILS # BLD AUTO: 0.05 10*3/MM3 (ref 0–0.2)
BASOPHILS NFR BLD AUTO: 0.6 % (ref 0–1.5)
BH CV ECHO MEAS - AO MAX PG: 104 MMHG
BH CV ECHO MEAS - AO MEAN PG: 64 MMHG
BH CV ECHO MEAS - AO ROOT DIAM: 2.9 CM
BH CV ECHO MEAS - AO V2 MAX: 509 CM/SEC
BH CV ECHO MEAS - AO V2 VTI: 120 CM
BH CV ECHO MEAS - AVA(I,D): 0.31 CM2
BH CV ECHO MEAS - EDV(CUBED): 88.7 ML
BH CV ECHO MEAS - EDV(MOD-SP2): 73.1 ML
BH CV ECHO MEAS - EDV(MOD-SP4): 48.5 ML
BH CV ECHO MEAS - EF(MOD-BP): 17.8 %
BH CV ECHO MEAS - EF(MOD-SP2): 25.7 %
BH CV ECHO MEAS - EF(MOD-SP4): 16.7 %
BH CV ECHO MEAS - ESV(CUBED): 64 ML
BH CV ECHO MEAS - ESV(MOD-SP2): 54.3 ML
BH CV ECHO MEAS - ESV(MOD-SP4): 40.4 ML
BH CV ECHO MEAS - FS: 10.3 %
BH CV ECHO MEAS - IVS/LVPW: 0.88 CM
BH CV ECHO MEAS - IVSD: 1.23 CM
BH CV ECHO MEAS - LA DIMENSION: 4.6 CM
BH CV ECHO MEAS - LAT PEAK E' VEL: 5.7 CM/SEC
BH CV ECHO MEAS - LV DIASTOLIC VOL/BSA (35-75): 29.8 CM2
BH CV ECHO MEAS - LV MASS(C)D: 222.1 GRAMS
BH CV ECHO MEAS - LV MAX PG: 2.02 MMHG
BH CV ECHO MEAS - LV MEAN PG: 1 MMHG
BH CV ECHO MEAS - LV SYSTOLIC VOL/BSA (12-30): 24.8 CM2
BH CV ECHO MEAS - LV V1 MAX: 71.1 CM/SEC
BH CV ECHO MEAS - LV V1 VTI: 14.7 CM
BH CV ECHO MEAS - LVIDD: 4.5 CM
BH CV ECHO MEAS - LVIDS: 4 CM
BH CV ECHO MEAS - LVOT AREA: 2.5 CM2
BH CV ECHO MEAS - LVOT DIAM: 1.8 CM
BH CV ECHO MEAS - LVPWD: 1.39 CM
BH CV ECHO MEAS - MED PEAK E' VEL: 2.9 CM/SEC
BH CV ECHO MEAS - MR MAX PG: 158.3 MMHG
BH CV ECHO MEAS - MR MAX VEL: 629 CM/SEC
BH CV ECHO MEAS - MR MEAN PG: 100 MMHG
BH CV ECHO MEAS - MR MEAN VEL: 462 CM/SEC
BH CV ECHO MEAS - MR VTI: 212 CM
BH CV ECHO MEAS - MV DEC TIME: 0.16 SEC
BH CV ECHO MEAS - RVDD: 2.5 CM
BH CV ECHO MEAS - SI(MOD-SP2): 11.6 ML/M2
BH CV ECHO MEAS - SI(MOD-SP4): 5 ML/M2
BH CV ECHO MEAS - SV(LVOT): 37.4 ML
BH CV ECHO MEAS - SV(MOD-SP2): 18.8 ML
BH CV ECHO MEAS - SV(MOD-SP4): 8.1 ML
BH CV ECHO MEAS - TAPSE (>1.6): 1.01 CM
BUN SERPL-MCNC: 66 MG/DL (ref 8–23)
BUN/CREAT SERPL: 53.7 (ref 7–25)
CALCIUM SPEC-SCNC: 10.1 MG/DL (ref 8.2–9.6)
CHLORIDE SERPL-SCNC: 95 MMOL/L (ref 98–107)
CO2 SERPL-SCNC: 28.6 MMOL/L (ref 22–29)
CREAT SERPL-MCNC: 1.23 MG/DL (ref 0.57–1)
DEPRECATED RDW RBC AUTO: 51.2 FL (ref 37–54)
EGFRCR SERPLBLD CKD-EPI 2021: 40.6 ML/MIN/1.73
EOSINOPHIL # BLD AUTO: 0 10*3/MM3 (ref 0–0.4)
EOSINOPHIL NFR BLD AUTO: 0 % (ref 0.3–6.2)
ERYTHROCYTE [DISTWIDTH] IN BLOOD BY AUTOMATED COUNT: 14.5 % (ref 12.3–15.4)
GLUCOSE SERPL-MCNC: 174 MG/DL (ref 65–99)
HCT VFR BLD AUTO: 38.9 % (ref 34–46.6)
HGB BLD-MCNC: 12.5 G/DL (ref 12–15.9)
IMM GRANULOCYTES # BLD AUTO: 0.51 10*3/MM3 (ref 0–0.05)
IMM GRANULOCYTES NFR BLD AUTO: 6.2 % (ref 0–0.5)
IVRT: 61 MS
LEFT ATRIUM VOLUME INDEX: 48.2 ML/M2
LV EF 2D ECHO EST: 20 %
LYMPHOCYTES # BLD AUTO: 0.79 10*3/MM3 (ref 0.7–3.1)
LYMPHOCYTES NFR BLD AUTO: 9.6 % (ref 19.6–45.3)
MAGNESIUM SERPL-MCNC: 2.1 MG/DL (ref 1.7–2.3)
MCH RBC QN AUTO: 30.6 PG (ref 26.6–33)
MCHC RBC AUTO-ENTMCNC: 32.1 G/DL (ref 31.5–35.7)
MCV RBC AUTO: 95.3 FL (ref 79–97)
MONOCYTES # BLD AUTO: 0.13 10*3/MM3 (ref 0.1–0.9)
MONOCYTES NFR BLD AUTO: 1.6 % (ref 5–12)
NEUTROPHILS NFR BLD AUTO: 6.74 10*3/MM3 (ref 1.7–7)
NEUTROPHILS NFR BLD AUTO: 82 % (ref 42.7–76)
NRBC BLD AUTO-RTO: 0 /100 WBC (ref 0–0.2)
PLATELET # BLD AUTO: 189 10*3/MM3 (ref 140–450)
PMV BLD AUTO: 10.7 FL (ref 6–12)
POTASSIUM SERPL-SCNC: 3 MMOL/L (ref 3.5–5.2)
PROCALCITONIN SERPL-MCNC: 0.27 NG/ML (ref 0–0.25)
QT INTERVAL: 404 MS
QTC INTERVAL: 517 MS
RBC # BLD AUTO: 4.08 10*6/MM3 (ref 3.77–5.28)
SODIUM SERPL-SCNC: 140 MMOL/L (ref 136–145)
WBC NRBC COR # BLD AUTO: 8.22 10*3/MM3 (ref 3.4–10.8)

## 2023-12-11 PROCEDURE — 25010000002 CEFTRIAXONE PER 250 MG: Performed by: FAMILY MEDICINE

## 2023-12-11 PROCEDURE — 99233 SBSQ HOSP IP/OBS HIGH 50: CPT | Performed by: INTERNAL MEDICINE

## 2023-12-11 PROCEDURE — 94799 UNLISTED PULMONARY SVC/PX: CPT

## 2023-12-11 PROCEDURE — 85025 COMPLETE CBC W/AUTO DIFF WBC: CPT | Performed by: FAMILY MEDICINE

## 2023-12-11 PROCEDURE — 80048 BASIC METABOLIC PNL TOTAL CA: CPT | Performed by: FAMILY MEDICINE

## 2023-12-11 PROCEDURE — 94660 CPAP INITIATION&MGMT: CPT

## 2023-12-11 PROCEDURE — 87040 BLOOD CULTURE FOR BACTERIA: CPT | Performed by: FAMILY MEDICINE

## 2023-12-11 PROCEDURE — 84145 PROCALCITONIN (PCT): CPT | Performed by: INTERNAL MEDICINE

## 2023-12-11 PROCEDURE — 83735 ASSAY OF MAGNESIUM: CPT | Performed by: FAMILY MEDICINE

## 2023-12-11 PROCEDURE — 94761 N-INVAS EAR/PLS OXIMETRY MLT: CPT

## 2023-12-11 PROCEDURE — 71045 X-RAY EXAM CHEST 1 VIEW: CPT

## 2023-12-11 PROCEDURE — 97165 OT EVAL LOW COMPLEX 30 MIN: CPT

## 2023-12-11 PROCEDURE — 99222 1ST HOSP IP/OBS MODERATE 55: CPT | Performed by: INTERNAL MEDICINE

## 2023-12-11 RX ORDER — BUMETANIDE 0.25 MG/ML
1 INJECTION INTRAMUSCULAR; INTRAVENOUS EVERY 12 HOURS
Status: DISCONTINUED | OUTPATIENT
Start: 2023-12-11 | End: 2023-12-11

## 2023-12-11 RX ORDER — POTASSIUM CHLORIDE 750 MG/1
40 CAPSULE, EXTENDED RELEASE ORAL 2 TIMES DAILY WITH MEALS
Status: COMPLETED | OUTPATIENT
Start: 2023-12-11 | End: 2023-12-11

## 2023-12-11 RX ORDER — METOLAZONE 5 MG/1
5 TABLET ORAL ONCE
Status: COMPLETED | OUTPATIENT
Start: 2023-12-11 | End: 2023-12-11

## 2023-12-11 RX ORDER — TRIAMCINOLONE ACETONIDE 1 MG/G
1 OINTMENT TOPICAL
Status: DISCONTINUED | OUTPATIENT
Start: 2023-12-11 | End: 2023-12-16 | Stop reason: HOSPADM

## 2023-12-11 RX ORDER — BUMETANIDE 0.25 MG/ML
2 INJECTION INTRAMUSCULAR; INTRAVENOUS ONCE
Status: COMPLETED | OUTPATIENT
Start: 2023-12-11 | End: 2023-12-11

## 2023-12-11 RX ORDER — CEFTRIAXONE SODIUM 1 G/50ML
1000 INJECTION, SOLUTION INTRAVENOUS EVERY 24 HOURS
Status: DISCONTINUED | OUTPATIENT
Start: 2023-12-11 | End: 2023-12-11

## 2023-12-11 RX ORDER — BUMETANIDE 0.25 MG/ML
2 INJECTION INTRAMUSCULAR; INTRAVENOUS EVERY 12 HOURS
Status: DISCONTINUED | OUTPATIENT
Start: 2023-12-11 | End: 2023-12-16 | Stop reason: HOSPADM

## 2023-12-11 RX ORDER — CEFTRIAXONE SODIUM 1 G/50ML
1000 INJECTION, SOLUTION INTRAVENOUS EVERY 24 HOURS
Status: DISCONTINUED | OUTPATIENT
Start: 2023-12-11 | End: 2023-12-12

## 2023-12-11 RX ORDER — BENZONATATE 100 MG/1
100 CAPSULE ORAL EVERY 4 HOURS PRN
Status: DISCONTINUED | OUTPATIENT
Start: 2023-12-11 | End: 2023-12-16 | Stop reason: HOSPADM

## 2023-12-11 RX ORDER — POTASSIUM CHLORIDE 1.5 G/1.58G
20 POWDER, FOR SOLUTION ORAL 2 TIMES DAILY
Status: DISCONTINUED | OUTPATIENT
Start: 2023-12-12 | End: 2023-12-16 | Stop reason: HOSPADM

## 2023-12-11 RX ADMIN — METOLAZONE 5 MG: 5 TABLET ORAL at 14:23

## 2023-12-11 RX ADMIN — BUMETANIDE 2 MG: 0.25 INJECTION INTRAMUSCULAR; INTRAVENOUS at 00:20

## 2023-12-11 RX ADMIN — APIXABAN 2.5 MG: 2.5 TABLET, FILM COATED ORAL at 09:38

## 2023-12-11 RX ADMIN — MONTELUKAST SODIUM 10 MG: 10 TABLET, FILM COATED ORAL at 21:12

## 2023-12-11 RX ADMIN — POTASSIUM CHLORIDE 40 MEQ: 10 CAPSULE, COATED, EXTENDED RELEASE ORAL at 17:08

## 2023-12-11 RX ADMIN — TRIAMCINOLONE ACETONIDE 1 APPLICATION: 1 OINTMENT TOPICAL at 21:13

## 2023-12-11 RX ADMIN — DOXEPIN HYDROCHLORIDE 10 MG: 10 CAPSULE ORAL at 21:12

## 2023-12-11 RX ADMIN — BUMETANIDE 1 MG: 0.25 INJECTION INTRAMUSCULAR; INTRAVENOUS at 09:38

## 2023-12-11 RX ADMIN — DOCUSATE SODIUM 50 MG AND SENNOSIDES 8.6 MG 2 TABLET: 8.6; 5 TABLET, FILM COATED ORAL at 21:15

## 2023-12-11 RX ADMIN — BUMETANIDE 2 MG: 0.25 INJECTION INTRAMUSCULAR; INTRAVENOUS at 21:13

## 2023-12-11 RX ADMIN — Medication 10 ML: at 09:38

## 2023-12-11 RX ADMIN — DOCUSATE SODIUM 50 MG AND SENNOSIDES 8.6 MG 2 TABLET: 8.6; 5 TABLET, FILM COATED ORAL at 09:38

## 2023-12-11 RX ADMIN — PANTOPRAZOLE SODIUM 40 MG: 40 TABLET, DELAYED RELEASE ORAL at 05:22

## 2023-12-11 RX ADMIN — CEFTRIAXONE SODIUM 1000 MG: 1 INJECTION, SOLUTION INTRAVENOUS at 17:08

## 2023-12-11 RX ADMIN — POTASSIUM CHLORIDE 40 MEQ: 10 CAPSULE, COATED, EXTENDED RELEASE ORAL at 09:37

## 2023-12-11 RX ADMIN — Medication 10 ML: at 21:13

## 2023-12-11 RX ADMIN — ATORVASTATIN CALCIUM 40 MG: 40 TABLET, FILM COATED ORAL at 09:38

## 2023-12-11 RX ADMIN — APIXABAN 2.5 MG: 2.5 TABLET, FILM COATED ORAL at 21:12

## 2023-12-11 NOTE — PLAN OF CARE
Goal Outcome Evaluation:   Patient returned from echo SOA and put on 2L nc. RRT nurse called for labs and critical care orders. MD on call notified. PO k pills held d/t increased dyspnea and SOA. Yaritza Lopez RN

## 2023-12-11 NOTE — PLAN OF CARE
Goal Outcome Evaluation:   Rapid Response was called on patient for work of breathing. Patient RR in upper 40s. MD ordered BIPAP 12/5 with Rate:20 for fluid overload. Patient tolerated well. Patient on 3L NC when not on BIPAP.

## 2023-12-11 NOTE — PLAN OF CARE
Problem: Adult Inpatient Plan of Care  Goal: Plan of Care Review  Outcome: Ongoing, Progressing  Flowsheets (Taken 12/11/2023 7683)  Plan of Care Reviewed With: patient  Outcome Evaluation: Patient alert and oriented throughout shift. Patient on 2 liters nasal cannula throughout shift with no signs of distress. Patient plan of care discussed at bedside. Will continue with patient plan of care.   Goal Outcome Evaluation:  Plan of Care Reviewed With: patient           Outcome Evaluation: Patient alert and oriented throughout shift. Patient on 2 liters nasal cannula throughout shift with no signs of distress. Patient plan of care discussed at bedside. Will continue with patient plan of care.

## 2023-12-11 NOTE — PROGRESS NOTES
[facility]   Hospitalist Progress Note  Date: 2023  Patient Name: Radha Aparicio  : 1928  MRN: [id number]  Date of admission: 12/10/2023      Subjective   Subjective     Chief Complaint: Syncope    Summary: 89 y.o. female brought to the emergency department for evaluation of syncope.  Patient reports multiple episodes of the left several days.  Patient reports lightheadedness and dizziness intermittently, especially with changes in positioning.  Patient states she has been increasing her dose of diuretic due to bilateral lower extremity fluid retention.  Patient reports she was instructed to take 3 doses per day for 3 days, up from 2 doses per day.  Patient states she had passed out and struck her head on the floor.  In the ED, no fractures or bleeding noted on imaging.  Initial concern for dehydration, she was started on IV fluids.  However, BNP elevated at 29,000, had worsening respiratory status on the evening of 12/10 necessitating IV diuretics and BiPAP.  CODE STATUS changed to DNR.  Concerned that she has end-stage aortic stenosis and heart failure exacerbation.  Cardiology consulted on , diuresing with IV Bumex, palliative care consulted.    Interval Followup: Patient had acute worsening respiratory status overnight requiring RRT.  Found to have elevated BNP and evidence of volume overload on chest x-ray.  She was given IV diuretics and started on BiPAP with significant improvement.  She does still have significant cough, very short of breath and her chest feels heavy.  She is having good urine output with IV Bumex.  She discussed with the team overnight and confirmed again that she like to change her CODE STATUS to DNR.  She has a history of severe aortic stenosis, she was offered TAVR 15 years ago but declined at that time.  She knew that this condition would continue to worsen without surgical intervention.    Objective   Objective     Vitals:   Temp:  [97.3 °F (36.3 °C)-98.1 °F  (36.7 °C)] 98.1 °F (36.7 °C)  Heart Rate:  [] 88  Resp:  [18-55] 18  BP: ()/(58-90) 128/67  Flow (L/min):  [2-3] 2  Physical Exam    Constitutional: Elderly female, awake, alert, sitting in bedside chair, coughs during exam, very short of breath   Respiratory: Poor aeration, diffuse scattered rhonchi, nasal cannula in place nonlabored respirations    Cardiovascular: RRR,  systolic murmur noted throughout    Gastrointestinal: Positive bowel sounds, soft, nontender, nondistended   Neurologic: Oriented x 3, globally weak, strength symmetric in all extremities, Cranial Nerves grossly intact to confrontation, speech clear    Result Review    Result Review:  I have personally reviewed the results below:  [x]  Laboratory personally reviewed BMP, CBC, lactic acid level, BNP  []  Microbiology  [x]  Radiology overnight chest x-ray personally reviewed overnight chest x-ray personally reviewed with evidence of bilateral pulmonary edema  []  EKG/Telemetry   []  Cardiology/Vascular   []  Pathology  []  Old records  []  Other:  CBC          11/21/2023    10:34 12/10/2023    02:56 12/10/2023    09:04 12/10/2023    18:58 12/11/2023    05:25   CBC   WBC 6.73  11.28  9.29  9.68  8.22    RBC 3.80  4.21  3.90  4.14  4.08    Hemoglobin 12.0  13.0  12.0  12.9  12.5    Hematocrit 37.5  39.7  36.6  39.3  38.9    MCV 98.7  94.3  93.8  94.9  95.3    MCH 31.6  30.9  30.8  31.2  30.6    MCHC 32.0  32.7  32.8  32.8  32.1    RDW 15.4  14.3  14.4  14.7  14.5    Platelets 203  218  195  208  189      CMP          11/21/2023    10:34 12/10/2023    02:56 12/10/2023    09:04 12/10/2023    18:30 12/10/2023    18:33 12/11/2023    05:25   CMP   Glucose 164  133  142  125  145     145  174    BUN 47  79  69   64     64  66    Creatinine 1.09  1.26  1.00   1.13     1.13  1.23    EGFR 46.9  39.4  52.0   44.9     44.9  40.6    Sodium 140  136  139  137.5  139     139  140    Potassium 3.9  2.8  2.9   4.0     4.0  3.0    Chloride 101  89  98   97      97  95    Calcium 9.5  10.5  9.3   9.9     9.9  10.1    Total Protein 6.8  7.2        Albumin 3.9  4.1    4.0     Globulin 2.9  3.1        Total Bilirubin 0.5  0.8        Alkaline Phosphatase 85  86        AST (SGOT) 33  43        ALT (SGPT) 21  22        Albumin/Globulin Ratio 1.3  1.3        BUN/Creatinine Ratio 43.1  62.7  69.0   56.6     56.6  53.7    Anion Gap 8.3  13.9  11.9   15.3     15.3  16.4        Assessment & Plan   Assessment / Plan     Assessment/Plan:  Acute hypoxic respiratory failure requiring NIPPV  Acute on chronic combined systolic and diastolic congestive heart failure with acute exacerbation  Acute cardiogenic pulmonary edema  Severe aortic stenosis  Syncope, concern this is due to severe AS and CHF  Small pleural effusion  Questionable community-acquired pneumonia due to unknown bacterial source, presumed gram-negative  Hypokalemia  Chronic atrial fibrillation  Hypertension  Hyperlipidemia    Continue to monitor in the hospital on telemetry for management of the above  Consult patient's primary cardiologist Dr. Harden due to concern for worsening AMS causing syncope and CHF exacerbation  Start Bumex 2 mg IV every 12 hours (ordered by hospitalist service).  Trend renal function electrolytes closely while on IV diuretics  Replace potassium and monitor BMP closely  Wean O2 as tolerated keep sats greater than 90%  Initially started on Rocephin and doxycycline due to concern for community-acquired pneumonia  Will obtain procalcitonin, strep and Legionella antigen, sputum culture  Continue IV Rocephin for now  Repeat 2D echo pending  Consult palliative care for goals of care discussion  Confirmed the discussed with the patient, she does confirm that she elected to change her CODE STATUS to DNR  Trend renal function and electrolytes with a.m. BMP, magnesium   Trend Hgb and WBC with a.m. CBC     Discussed plan with RN, cardiology    DVT prophylaxis:  Medical DVT prophylaxis orders are  present.    CODE STATUS:   Medical Intervention Limits: NO intubation (DNI)  Level Of Support Discussed With: Patient  Code Status (Patient has no pulse and is not breathing): No CPR (Do Not Attempt to Resuscitate)  Medical Interventions (Patient has pulse or is breathing): Limited Support    Electronically signed by Gavino Samuel MD, 12/11/23, 12:03 PM EST.

## 2023-12-11 NOTE — PLAN OF CARE
Goal Outcome Evaluation:  Plan of Care Reviewed With: patient        Progress: no change  Outcome Evaluation: Patient presents with limitations in self-care, functional transfers, balance, and endurance. She would benefit from continued skilled occupational therapy services to maximize independence with ADLs/functional transfers.      Anticipated Discharge Disposition (OT): skilled nursing facility

## 2023-12-11 NOTE — CONSULTS
12/11/23 1445   Coping/Psychosocial   Observed Emotional State calm;pleasant   Verbalized Emotional State hopefulness;acceptance   Additional Documentation Spiritual Care (Group)   Spiritual Care   Use of Spiritual Resources spirituality for coping, indicated strong use of;prayer   Spiritual Care Source patient request (describe)  (prayer)   Spiritual Care Follow-Up follow-up planned regularly for general support   Response to Spiritual Care emotion expressed;engaged in conversation;receptive of support;thanks expressed;visit helpful   Spiritual Care Interventions prayer support provided;supportive conversation provided   Spiritual Care Visit Type initial   Spiritual Care Request coping/stress of illness support;decision-making support;prayer support;spiritual/moral support   Receptivity to Spiritual Care visit welcomed   Visit made with pt on 4MTU at her request for prayer. Introductions made and role explained. Pt in a pleasant mood and welcomed  at bedside. Prayer provided for pt according to her tradition.

## 2023-12-11 NOTE — CONSULTS
"Purpose of the visit was to evaluate for: goals of care/advanced care planning. Spoke with patient and family and discussed palliative care, advanced care planning, and determination of decision maker.      Assessment:The patient is on 4MTU, on room air, sitting up in a chair, SOA at rest, stated feels bloated and a little nausea, external cath, heart monitor and nasal canula in use. No reports of pain at this time.    Tasks Completed: Emotional Support.    Other Comments: The patient lives with her daughter Juana, whom the patient stated \"has good memory however due to her being overweight, is breaking down her feet and legs and is handicapped.\" I reviewed the patients living will with her that is dated 2012 and the patient reports she wants to be a DNR/DNI at all times, No Peg and No dialysis. I explained that we could re-do her living will and she asked that I reach out to her daughters to ensure they were updated on her code change and discuss the Living Will. I called and spoke with both daughters and the are accepting of the diagnosis and understand that a [facility] consult is appropriate. I called and made a referral to [facility] and they will reach out to the daughters to discuss. I asked that they update me as needed. The patient asked for prayers and I updated the chaplains and Dariana with touch base with the patient today.    -Shae VICTORIA, RN, [facility]   Palliative Care    12/11/2023 13:26 EST    "

## 2023-12-11 NOTE — CONSULTS
Cardiology Consult Note  Psychiatric 4TH FLOOR MEDICAL TELEMETRY UNIT          Patient Identification:  Radha Aparicio      [id number]  89 y.o.        female  8/17/1928           Reason for Consultation: Aortic stenosis and CHF    PCP: Laura Silva APRN    History of Present Illness:     Patient is a 89-year-old female with a history of severe aortic stenosis, HFpEF, essential hypertension, diabetes type 2, chronic atrial fibrillation who previously had decided to against intervention for arctic stenosis and has been experiencing increasing problems with shortness of breath as outpatient she was given Zaroxolyn and came in because of increased weakness shortness of breath and fall/possible syncopal episodes.  Last night she had a very significant shortness of breath symptoms and RRT was called this morning she states she is feeling better but is quite tachypneic and difficulty carry on a conversation due to her breathlessness.  She is denies any chest discomfort issues    Past History:  Past Medical History:   Diagnosis Date    Acute congestive heart failure     improved    Arthritis     Atrial fibrillation, persistent 09/16/2021    Bladder cancer     Bleeding disorder     (CONDITION DUE TO BLOOD THINNERS)    Chronic atrial fibrillation     Chronic heart failure with preserved ejection fraction (HFpEF) 09/23/2021    High cholesterol     Hyperlipidemia LDL goal <100 06/16/2021    Hypertension     Lymphoma     Moderate to severe aortic stenosis     Patient declines to have transcatheter    Non Hodgkin's lymphoma     2008 treated 2008 2009.    Rectus sheath hematoma     Right rectus sheath hematoma, off anticoagulation because of the hematoma.    Skin cancer     Type 2 diabetes mellitus      Past Surgical History:   Procedure Laterality Date    BLADDER SURGERY      CATARACT EXTRACTION      DILATION AND CURETTAGE, DIAGNOSTIC / THERAPEUTIC      LEFT VENTRICULAR ASSIST DEVICE      LYMPH NODE BIOPSY       Biopsy of retroperitoneal lymph node or mass     SKIN CANCER DESTRUCTION      TONSILLECTOMY       Allergies   Allergen Reactions    Contrast Dye (Echo Or Unknown Ct/Mr) Unknown - Low Severity    Iodinated Contrast Media Hives and Other (See Comments)     IODINATED CONTRAST MEDIA (Verified  Allergy, Unknown, HIVES,SNEEZING,ITCHY EYES)    Iodine Unknown - Low Severity    Penicillins Rash    Shellfish Allergy Unknown - Low Severity    Spinach Other (See Comments)      SPINACH (Verified  Allergy, Unknown, 2/22/21)      SHOWED UP ON ALLERGY TESTING    Sulfa Antibiotics Other (See Comments)     (Verified  Allergy, Unknown, RASH)     Social History     Socioeconomic History    Marital status:     Number of children: 2   Tobacco Use    Smoking status: Never    Smokeless tobacco: Never   Vaping Use    Vaping Use: Never used   Substance and Sexual Activity    Alcohol use: Never    Drug use: Never    Sexual activity: Defer     Family History   Problem Relation Age of Onset    Heart attack Father     Heart attack Brother     Stomach cancer Paternal Great-Grandfather        Medications:  Prior to Admission medications    Medication Sig Start Date End Date Taking? Authorizing Provider   acetaminophen (TYLENOL) 325 MG tablet Take 2 tablets by mouth Every 6 (Six) Hours As Needed. 3/9/22  Yes Mary Kate Perea MD   albuterol sulfate  (90 Base) MCG/ACT inhaler Inhale 2 puffs Every 4 (Four) Hours As Needed for Wheezing.   Yes Mary Kate Perea MD   aspirin 81 MG EC tablet Take 1 tablet by mouth Daily.   Yes Mary Kate Perea MD   atorvastatin (LIPITOR) 40 MG tablet Take 1 tablet by mouth Daily. 10/6/22  Yes Lily Byrd MD PhD   bumetanide (BUMEX) 2 MG tablet Take 1 tablet by mouth 2 (Two) Times a Day.   Yes Mary Kate Perea MD   Calcium Carb-Cholecalciferol (SUPER CALCIUM 600 + D3 PO) Take 1 tablet by mouth 2 (Two) Times a Day.   Yes Mary Kate Perea MD   carvedilol (COREG) 6.25 MG tablet  Take 1 tablet by mouth 2 (Two) Times a Day With Meals.   Yes Mary Kate Perea MD   doxepin (SINEquan) 10 MG capsule Take 1-2 capsules by mouth Every Night.   Yes Mary Kate Perea MD   Eliquis 2.5 MG tablet tablet Take 1 tablet by mouth Every 12 (Twelve) Hours. 11/21/23  Yes Jovani Ellington MD   guaiFENesin (MUCINEX) 600 MG 12 hr tablet Take 1 tablet by mouth 2 (Two) Times a Day.   Yes Mary Kate Perea MD   iron polysaccharides (NIFEREX) 150 MG capsule Take 1 capsule by mouth 2 (Two) Times a Day.   Yes Mary Kate Perea MD   latanoprost (XALATAN) 0.005 % ophthalmic solution Administer 1 drop to the right eye Every Night. 1/12/23  Yes Mary Kate Perea MD   metOLazone (ZAROXOLYN) 2.5 MG tablet Take 1 tablet by mouth Daily. 12/5/23  Yes Veronica Mckeon APRN   montelukast (SINGULAIR) 10 MG tablet Take 1 tablet by mouth Every Night.   Yes Mary Kate Perea MD   multivitamin with minerals tablet tablet Take 1 tablet by mouth Daily.   Yes Mary Kate Perea MD   potassium chloride 10 MEQ CR tablet Take 1 tablet by mouth Daily. 10/6/22  Yes Lily Byrd MD PhD   Probiotic, Lactobacillus, capsule Take 1 Capful by mouth Daily.   Yes Mary Kate Perea MD   raloxifene (EVISTA) 60 MG tablet Take 1 tablet by mouth Daily. 1/11/22  Yes Gala Rao MD   spironolactone (ALDACTONE) 25 MG tablet Take 1 tablet by mouth Daily.   Yes Mary Kate Perea MD   Zinc 50 MG tablet Take 1 tablet by mouth Daily.   Yes Mary Kate Perea MD      Current medications:  apixaban, 2.5 mg, Oral, Q12H  atorvastatin, 40 mg, Oral, Daily  bumetanide, 2 mg, Intravenous, Q12H  doxepin, 10 mg, Oral, Nightly  montelukast, 10 mg, Oral, Nightly  pantoprazole, 40 mg, Oral, Q AM  potassium chloride, 40 mEq, Oral, BID With Meals  senna-docusate sodium, 2 tablet, Oral, BID  sodium chloride, 10 mL, Intravenous, Q12H      Current IV drips:       Review of Systems   Constitutional: Negative for chills,  "fever and weight loss.   HENT:  Negative for congestion and nosebleeds.    Cardiovascular:  Positive for syncope. Negative for orthopnea and paroxysmal nocturnal dyspnea.   Respiratory:  Positive for shortness of breath. Negative for cough.    Endocrine: Negative for cold intolerance and heat intolerance.   Skin:  Negative for rash.   Musculoskeletal:  Positive for muscle weakness. Negative for back pain.   Gastrointestinal:  Negative for abdominal pain, nausea and vomiting.   Genitourinary:  Negative for dysuria and nocturia.   Neurological:  Negative for dizziness and light-headedness.   Psychiatric/Behavioral:  Negative for altered mental status and hallucinations.          Physical Exam    /67 (BP Location: Right arm, Patient Position: Sitting)   Pulse 88   Temp 98.1 °F (36.7 °C) (Oral)   Resp 18   Ht 160 cm (62.99\")   Wt 63 kg (138 lb 14.2 oz)   SpO2 96%   BMI 24.61 kg/m²  Body mass index is 24.61 kg/m².   Oxygen saturation   @FLOWAN(10::1)@ SpO2  Min: 91 %  Max: 100 %    General Appearance:   Tachypneic in mild respiratory distress  Alert and oriented x3  HENT:   lips not cyanotic  Atraumatic  Neck:  thyroid not enlarged  supple  Respiratory:  no respiratory distress  normal breath sounds  no rales  Cardiovascular:  no jugular venous distention  regular rhythm  apical impulse normal  S1 normal, S2 normal  no S3, no S4   2/6 late peaking systolic murmur  no rub, no thrill  no carotid bruit  pedal pulses normal  lower extremity edema: Mild  Gastrointestinal:   bowel sounds normal  non-tender  no hepatomegaly, no splenomegaly  Musculoskeletal:  no clubbing of fingers.   normocephalic, head atraumatic  Skin:   warm, dry  No rashes  Neuro/Psychiatric:  normal mood and affect  judgement and insight appropriate      Cardiographics:     ECG  (personally reviewed)          Telemetry:  (personally reviewed) atrial fibrillation primarily with controlled rate   Results for orders placed during the hospital " encounter of 12/10/23    Adult Transthoracic Echo Complete W/ Cont if Necessary Per Protocol    Interpretation Summary    Left ventricular systolic function is severely decreased. Estimated left ventricular EF = 20%    Left ventricular wall thickness is consistent with moderate concentric hypertrophy.    Left ventricular diastolic dysfunction is noted.    The left atrial cavity is moderately dilated.    The right atrial cavity is mild to moderately  dilated.    Severe/critical aortic valve stenosis is present.    Pleural effusion         No results found for this or any previous visit.     Cardiolite (Tc-99m Sestamibi) stress test                  Lab Review:       CBC          11/21/2023    10:34 12/10/2023    02:56 12/10/2023    09:04 12/10/2023    18:58 12/11/2023    05:25   CBC   WBC 6.73  11.28  9.29  9.68  8.22    RBC 3.80  4.21  3.90  4.14  4.08    Hemoglobin 12.0  13.0  12.0  12.9  12.5    Hematocrit 37.5  39.7  36.6  39.3  38.9    MCV 98.7  94.3  93.8  94.9  95.3    MCH 31.6  30.9  30.8  31.2  30.6    MCHC 32.0  32.7  32.8  32.8  32.1    RDW 15.4  14.3  14.4  14.7  14.5    Platelets 203  218  195  208  189        CMP          11/21/2023    10:34 12/10/2023    02:56 12/10/2023    09:04 12/10/2023    18:30 12/10/2023    18:33 12/11/2023    05:25   CMP   Glucose 164  133  142  125  145     145  174    BUN 47  79  69   64     64  66    Creatinine 1.09  1.26  1.00   1.13     1.13  1.23    EGFR 46.9  39.4  52.0   44.9     44.9  40.6    Sodium 140  136  139  137.5  139     139  140    Potassium 3.9  2.8  2.9   4.0     4.0  3.0    Chloride 101  89  98   97     97  95    Calcium 9.5  10.5  9.3   9.9     9.9  10.1    Total Protein 6.8  7.2        Albumin 3.9  4.1    4.0     Globulin 2.9  3.1        Total Bilirubin 0.5  0.8        Alkaline Phosphatase 85  86        AST (SGOT) 33  43        ALT (SGPT) 21  22        Albumin/Globulin Ratio 1.3  1.3        BUN/Creatinine Ratio 43.1  62.7  69.0   56.6     56.6  53.7   "  Anion Gap 8.3  13.9  11.9   15.3     15.3  16.4         CARDIAC LABS:      Lab 12/10/23  1833 12/10/23  0549 12/10/23  0256   PROBNP 29,971.0*  --   --    HSTROP T  --  76* 80*      No results found for: \"DIGOXIN\"   Lab Results   Component Value Date    TSH 1.240 06/02/2022           Invalid input(s): \"LDLCALC\"  Lab Results   Component Value Date    POCTROP 0.02 03/21/2021     No components found for: \"DDIMERQUAN\"  Lab Results   Component Value Date    MG 2.1 12/11/2023             CARDIAC LABS:      Lab 12/10/23  1833 12/10/23  0549 12/10/23  0256   PROBNP 29,971.0*  --   --    HSTROP T  --  76* 80*        Imaging:  CXR  IMPRESSION:               Atelectasis and/or pneumonia may involve the left lung base.  Small-to-moderate   bilateral pleural effusions are seen.  There is moderate cardiomegaly.  Please see above comments   for further detail.   Results for orders placed during the hospital encounter of 12/10/23    Adult Transthoracic Echo Complete W/ Cont if Necessary Per Protocol    Interpretation Summary    Left ventricular systolic function is severely decreased. Estimated left ventricular EF = 20%    Left ventricular wall thickness is consistent with moderate concentric hypertrophy.    Left ventricular diastolic dysfunction is noted.    The left atrial cavity is moderately dilated.    The right atrial cavity is mild to moderately  dilated.    Severe/critical aortic valve stenosis is present.    Pleural effusion       Assessment:    Severe aortic stenosis    Atrial fibrillation, persistent    Acute HFrEF (heart failure with reduced ejection fraction)    Syncope      HFrEF acute new onset significantly reduced ejection fraction may be related to underlying valvular disease patient with worsening heart failure symptoms volume overload and now syncopal episodes for likely related to her underlying aortic valvular stenosis which now appears to be critical in nature.  Patient has not in the past nor does she now " wish to have any intervention for the aortic valve we will continue with aggressive IV diuresis for symptomatic relief patient has decided on DNR in addition discussed with her and she was willing to consider and talk to the palliative care team which certainly feel would be reasonable treatment plan given her severity of symptoms her ejection fraction now in the degree of very stenosis that she has.    Aortic stenosis severe now critical in nature with worsening clinical deterioration overall the prognosis is poor we will go ahead and refer for evaluation for palliative care      Plan:  1.  IV Bumex 2 mg twice daily  2.  Zaroxolyn 5 mg p.o. x 1  3.  Palliative care consult      Thank you for allowing us to share in Radha leung.            Paulie Harden MD   12/11/2023    13:27 EST

## 2023-12-11 NOTE — PLAN OF CARE
Goal Outcome Evaluation:Patient found off at [facility] this AM and on 2lpm N/C.  Patient states that breathing is better this AM.

## 2023-12-11 NOTE — THERAPY EVALUATION
Patient Name: Radha Aparicio  : 1928    MRN: [id number]                              Today's Date: 2023       Admit Date: 12/10/2023    Visit Dx:     ICD-10-CM ICD-9-CM   1. Syncope, unspecified syncope type  R55 780.2   2. Hypokalemia  E87.6 276.8   3. Acute renal insufficiency  N28.9 593.9   4. 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    Adjustment and management of vascular access device    Hematuria    Hyperlipidemia LDL goal <100    Ingrowing toenail    Pressure ulcer of foot, stage 1    Tinea unguium    Type 2 diabetes mellitus without complication    Bladder cancer    Severe aortic stenosis    Atrial fibrillation, persistent    Osteopenia of hip    Essential hypertension    Chronic heart failure with preserved ejection fraction (HFpEF)    Moderate asthma with exacerbation    Cellulitis of right lower extremity without foot    Immunosuppressed due to chemotherapy    Cytokine release syndrome, grade 1    Abnormal gait    Hypercalcemia due to a drug    Decreased hearing    Syncope     Past Medical History:   Diagnosis Date    Acute congestive heart failure     improved    Arthritis     Atrial fibrillation, persistent 2021    Bladder cancer     Bleeding disorder     (CONDITION DUE TO BLOOD THINNERS)    Chronic atrial fibrillation     Chronic heart failure with preserved ejection fraction (HFpEF) 2021    High cholesterol     Hyperlipidemia LDL goal <100 2021    Hypertension     Lymphoma     Moderate to severe aortic stenosis     Patient declines to have transcatheter    Non Hodgkin's lymphoma      treated 2008.    Rectus sheath hematoma     Right rectus sheath hematoma, off anticoagulation because of the hematoma.    Skin cancer     Type 2 diabetes mellitus      Past Surgical History:   Procedure Laterality Date    BLADDER SURGERY      CATARACT  EXTRACTION      DILATION AND CURETTAGE, DIAGNOSTIC / THERAPEUTIC      LEFT VENTRICULAR ASSIST DEVICE      LYMPH NODE BIOPSY      Biopsy of retroperitoneal lymph node or mass     SKIN CANCER DESTRUCTION      TONSILLECTOMY        General Information       Row Name 12/11/23 1138          OT Time and Intention    Document Type evaluation  -     Mode of Treatment individual therapy;occupational therapy  -       Row Name 12/11/23 1138          General Information    Patient Profile Reviewed yes  -     Prior Level of Function --  (I) with ADLs, daughter assisted with IADLs, ambulated with a RW, sponge bathes, has an elevated commode, stands to groom, at the sink, and no home O2.  -     Existing Precautions/Restrictions fall  -     Barriers to Rehab none identified  -       Row Name 12/11/23 1138          Occupational Profile    Reason for Services/Referral (Occupational Profile) Patient is a 89 year old female admitted to [facility] for syncope on December 10th, 2023. Occupational therapy consulted due to recent decline in ADLs/functional transfers. No previous occupational therapy services for current condition.  -       Row Name 12/11/23 1138          Living Environment    People in Home child(kaela), adult  Daughter  -       Row Name 12/11/23 1138          Home Main Entrance    Number of Stairs, Main Entrance none  -       Row Name 12/11/23 1138          Stairs Within Home, Primary    Number of Stairs, Within Home, Primary one  -       Row Name 12/11/23 1138          Cognition    Orientation Status (Cognition) oriented x 4  -       Row Name 12/11/23 1138          Safety Issues, Functional Mobility    Impairments Affecting Function (Mobility) balance;endurance/activity tolerance;strength  -               User Key  (r) = Recorded By, (t) = Taken By, (c) = Cosigned By      Initials Name Provider Type    LF Alisson Shah OT Occupational Therapist                     Mobility/ADL's        Row Name 12/11/23 1142          Bed Mobility    Bed Mobility supine-sit  -     Supine-Sit [place] (Bed Mobility) minimum assist (75% patient effort)  -       Row Name 12/11/23 1142          Transfers    Transfers sit-stand transfer;stand-sit transfer;bed-chair transfer  -       Row Name 12/11/23 1142          Bed-Chair Transfer    Bed-Chair [place] (Transfers) minimum assist (75% patient effort)  -     Assistive Device (Bed-Chair Transfers) walker, front-wheeled  -       Row Name 12/11/23 1142          Sit-Stand Transfer    Sit-Stand [place] (Transfers) minimum assist (75% patient effort)  -     Assistive Device (Sit-Stand Transfers) walker, front-wheeled  -LF       Row Name 12/11/23 1142          Stand-Sit Transfer    Stand-Sit [place] (Transfers) minimum assist (75% patient effort)  -     Assistive Device (Stand-Sit Transfers) walker, front-wheeled  -       Row Name 12/11/23 1142          Activities of Daily Living    BADL Assessment/Intervention bathing;upper body dressing;lower body dressing;grooming;feeding;toileting  -       Row Name 12/11/23 1142          Bathing Assessment/Intervention    [place] Level (Bathing) bathing skills;upper body;standby assist;lower body;maximum assist (25% patient effort)  -       Row Name 12/11/23 1142          Upper Body Dressing Assessment/Training    [place] Level (Upper Body Dressing) upper body dressing skills;standby assist  -       Row Name 12/11/23 1142          Lower Body Dressing Assessment/Training    [place] Level (Lower Body Dressing) lower body dressing skills;maximum assist (25% patient effort)  -       Row Name 12/11/23 1142          Grooming Assessment/Training    [place] Level (Grooming) grooming skills;standby assist  -       Row Name 12/11/23 1142          Self-Feeding Assessment/Training    [place] Level (Feeding) feeding skills;set up  -       Row Name 12/11/23 1142          Toileting  Assessment/Training    [place] Level (Toileting) toileting skills;dependent (less than 25% patient effort)  -     Comment, (Toileting) Female purewick currently in place.  -               User Key  (r) = Recorded By, (t) = Taken By, (c) = Cosigned By      Initials Name Provider Type    LF Alisson Shah OT Occupational Therapist                   Obj/Interventions       Row Name 12/11/23 1144          Sensory Assessment (Somatosensory)    Sensory Assessment (Somatosensory) UE sensation intact  -[facility] Name 12/11/23 1144          Vision Assessment/Intervention    Visual Impairment/Limitations WFL  -LF       Row Name 12/11/23 1144          Range of Motion Comprehensive    General Range of Motion bilateral upper extremity ROM WFL  -       Row Name 12/11/23 1144          Strength Comprehensive (MMT)    Comment, General Manual Muscle Testing (MMT) Assessment 4-/5 BUEs  -       Row Name 12/11/23 1144          Motor Skills    Motor Skills coordination;functional endurance  -LF     Coordination bilateral;upper extremity;WFL  -     Functional Endurance Fair-  -LF       Row Name 12/11/23 1144          Balance    Balance Assessment sitting dynamic balance;standing dynamic balance  -LF     Dynamic Sitting Balance standby assist  -LF     Position, Sitting Balance supported;sitting edge of bed  -LF     Dynamic Standing Balance contact guard  -LF     Position/Device Used, Standing Balance supported;walker, front-wheeled  -LF               User Key  (r) = Recorded By, (t) = Taken By, (c) = Cosigned By      Initials Name Provider Type    LF Alisson Shah OT Occupational Therapist                   Goals/Plan       Row Name 12/11/23 1155          Bed Mobility Goal 1 (OT)    Activity/Assistive Device (Bed Mobility Goal 1, OT) bed mobility activities, all  -LF     [place] Level/Cues Needed (Bed Mobility Goal 1, OT) modified independence  -LF     Time Frame (Bed Mobility Goal 1, OT) long term goal  (LTG);10 days  -LF       Row Name 12/11/23 1155          Transfer Goal 1 (OT)    Activity/Assistive Device (Transfer Goal 1, OT) transfers, all  -LF     [place] Level/Cues Needed (Transfer Goal 1, OT) modified independence  -LF     Time Frame (Transfer Goal 1, OT) long term goal (LTG);10 days  -LF       Row Name 12/11/23 1155          Bathing Goal 1 (OT)    Activity/Device (Bathing Goal 1, OT) bathing skills, all  -LF     [place] Level/Cues Needed (Bathing Goal 1, OT) modified independence  -LF     Time Frame (Bathing Goal 1, OT) long term goal (LTG);10 days  -LF       Row Name 12/11/23 1155          Dressing Goal 1 (OT)    Activity/Device (Dressing Goal 1, OT) dressing skills, all  -LF     [place]/Cues Needed (Dressing Goal 1, OT) modified independence  -LF     Time Frame (Dressing Goal 1, OT) long term goal (LTG);10 days  -LF       Row Name 12/11/23 1155          Toileting Goal 1 (OT)    Activity/Device (Toileting Goal 1, OT) toileting skills, all  -LF     [place] Level/Cues Needed (Toileting Goal 1, OT) modified independence  -LF     Time Frame (Toileting Goal 1, OT) long term goal (LTG);10 days  -LF       Row Name 12/11/23 1156          Problem Specific Goal 1 (OT)    Problem Specific Goal 1 (OT) Patient will demonstrate fair+ endurance to support ADLs/functional transfers.  -LF     Time Frame (Problem Specific Goal 1, OT) long term goal (LTG);10 days  -LF       Row Name 12/11/23 1154          Therapy Assessment/Plan (OT)    Planned Therapy Interventions (OT) activity tolerance training;BADL retraining;functional balance retraining;occupation/activity based interventions;patient/caregiver education/training;strengthening exercise;transfer/mobility retraining  -LF               User Key  (r) = Recorded By, (t) = Taken By, (c) = Cosigned By      Initials Name Provider Type    LF Alisson Shah OT Occupational Therapist                   Clinical Impression       Row Name 12/11/23 2312           Pain Assessment    Additional Documentation Pain Scale: FACES Pre/Post-Treatment (Group)  -       Row Name 12/11/23 1146          Pain Scale: FACES Pre/Post-Treatment    Pain: FACES Scale, Pretreatment 0-->no hurt  -     Posttreatment Pain Rating 0-->no hurt  -       Row Name 12/11/23 1146          Plan of Care Review    Plan of Care Reviewed With patient  -     Progress no change  -     Outcome Evaluation Patient presents with limitations in self-care, functional transfers, balance, and endurance. She would benefit from continued skilled occupational therapy services to maximize independence with ADLs/functional transfers.  -       Row Name 12/11/23 1146          Therapy Assessment/Plan (OT)    Patient/Family Therapy Goal Statement (OT) To maximize independence.  -     Rehab Potential (OT) good, to achieve stated therapy goals  -     Criteria for Skilled Therapeutic Interventions Met (OT) yes;meets criteria;skilled treatment is necessary  -     Therapy Frequency (OT) 5 times/wk  -       Row Name 12/11/23 1146          Therapy Plan Review/Discharge Plan (OT)    Anticipated Discharge Disposition (OT) skilled nursing facility  -[facility] Name 12/11/23 1146          Vital Signs    O2 Delivery Pre Treatment nasal cannula  -     O2 Delivery Intra Treatment nasal cannula  -     O2 Delivery Post Treatment nasal cannula  -[facility] Name 12/11/23 1146          Positioning and Restraints    Pre-Treatment Position in bed  -     Post Treatment Position chair  -     In Chair reclined;call light within reach;encouraged to call for assist;exit alarm on  -               User Key  (r) = Recorded By, (t) = Taken By, (c) = Cosigned By      Initials Name Provider Type    Alisson Bowden, OT Occupational Therapist                   Outcome Measures       Row Name 12/11/23 1155          How much help from another is currently needed...    Putting on and taking off regular lower body clothing? 2   -LF     Bathing (including washing, rinsing, and drying) 2  -LF     Toileting (which includes using toilet bed pan or urinal) 1  -LF     Putting on and taking off regular upper body clothing 3  -LF     Taking care of personal grooming (such as brushing teeth) 3  -LF     Eating meals 4  -LF     AM-PAC 6 Clicks Score (OT) 15  -LF       Row Name 12/11/23 0935          How much help from another person do you currently need...    Turning from your back to your side while in flat bed without using bedrails? 3  -TA     Moving from lying on back to sitting on the side of a flat bed without bedrails? 3  -TA     Moving to and from a bed to a chair (including a wheelchair)? 3  -TA     Standing up from a chair using your arms (e.g., wheelchair, bedside chair)? 3  -TA     Climbing 3-5 steps with a railing? 2  -TA     To walk in hospital room? 3  -TA     AM-PAC 6 Clicks Score (PT) 17  -TA     Highest Level of Mobility Goal 5 --> Static standing  -TA       Row Name 12/11/23 1155          Functional Assessment    Outcome Measure Options AM-PAC 6 Clicks Daily Activity (OT);Optimal Instrument  -LF       Row Name 12/11/23 1155          Optimal Instrument    Optimal Instrument Optimal - 3  -LF     Bending/Stooping 4  -LF     Standing 2  -LF     Reaching 1  -LF     From the list, choose the 3 activities you would most like to be able to do without any difficulty Bending/stooping;Standing;Reaching  -LF     Total Score Optimal - 3 7  -LF               User Key  (r) = Recorded By, (t) = Taken By, (c) = Cosigned By      Initials Name Provider Type    LF Alisson Shah, OT Occupational Therapist    Leandra rBaden, RN Registered Nurse                    Occupational Therapy Education       Title: PT OT SLP Therapies (Done)       Topic: Occupational Therapy (Done)       Point: ADL training (Done)       Description:   Instruct learner(s) on proper safety adaptation and remediation techniques during self care or transfers.   Instruct in  proper use of assistive devices.                  Learning Progress Summary             Patient Acceptance, E,TB, VU by  at 12/11/2023 1156                         Point: Precautions (Done)       Description:   Instruct learner(s) on prescribed precautions during self-care and functional transfers.                  Learning Progress Summary             Patient Acceptance, E,TB, VU by  at 12/11/2023 1156                         Point: Body mechanics (Done)       Description:   Instruct learner(s) on proper positioning and spine alignment during self-care, functional mobility activities and/or exercises.                  Learning Progress Summary             Patient Acceptance, E,TB, VU by  at 12/11/2023 1156                                         User Key       Initials Effective Dates Name Provider Type Discipline     06/16/21 -  Alisson Shah OT Occupational Therapist OT                  OT Recommendation and Plan  Planned Therapy Interventions (OT): activity tolerance training, BADL retraining, functional balance retraining, occupation/activity based interventions, patient/caregiver education/training, strengthening exercise, transfer/mobility retraining  Therapy Frequency (OT): 5 times/wk  Plan of Care Review  Plan of Care Reviewed With: patient  Progress: no change  Outcome Evaluation: Patient presents with limitations in self-care, functional transfers, balance, and endurance. She would benefit from continued skilled occupational therapy services to maximize independence with ADLs/functional transfers.     Time Calculation:   Evaluation Complexity (OT)  Review Occupational Profile/Medical/Therapy History Complexity: brief/low complexity  Assessment, Occupational Performance/Identification of Deficit Complexity: 3-5 performance deficits  Clinical Decision Making Complexity (OT): problem focused assessment/low complexity  Overall Complexity of Evaluation (OT): low complexity     Time Calculation- OT        Row Name 12/11/23 1156             Time Calculation- OT    OT Received On 12/11/23  -LF      OT Goal Re-Cert Due Date 12/20/23  -LF         Untimed Charges    OT Eval/Re-eval Minutes 40  -LF         Total Minutes    Untimed Charges Total Minutes 40  -LF       Total Minutes 40  -LF                User Key  (r) = Recorded By, (t) = Taken By, (c) = Cosigned By      Initials Name Provider Type     Alisson Shah OT Occupational Therapist                  Therapy Charges for Today       Code Description Service Date Service Provider Modifiers Qty    39293568002 HC OT EVAL LOW COMPLEXITY 3 12/11/2023 Alisson Shah OT GO 1                 Alisson Shah OT  12/11/2023

## 2023-12-11 NOTE — PLAN OF CARE
Problem: Adult Inpatient Plan of Care  Goal: Plan of Care Review  Outcome: Ongoing, Progressing  Flowsheets (Taken 12/11/2023 0543)  Progress: improving  Plan of Care Reviewed With: patient  Outcome Evaluation: Patient alert and oriented throughout the shift. Patient began to have trouble breathing at 0000. RRT called at that time. Pt placed Bipap, and given Bumex per MD order. Patient decided to become a DNR during RRT once asked. Pt is doing much better. She is tolerating being off the bipap with 2L NC. Continue with plan of care.   Goal Outcome Evaluation:  Plan of Care Reviewed With: patient        Progress: improving  Outcome Evaluation: Patient alert and oriented throughout the shift. Patient began to have trouble breathing at 0000. RRT called at that time. Pt placed Bipap, and given Bumex per MD order. Patient decided to become a DNR during RRT once asked. Pt is doing much better. She is tolerating being off the bipap with 2L NC. Continue with plan of care.

## 2023-12-12 LAB
ANION GAP SERPL CALCULATED.3IONS-SCNC: 16.1 MMOL/L (ref 5–15)
BASOPHILS # BLD AUTO: 0.13 10*3/MM3 (ref 0–0.2)
BASOPHILS NFR BLD AUTO: 0.8 % (ref 0–1.5)
BUN SERPL-MCNC: 74 MG/DL (ref 8–23)
BUN/CREAT SERPL: 58.7 (ref 7–25)
CALCIUM SPEC-SCNC: 9.9 MG/DL (ref 8.2–9.6)
CHLORIDE SERPL-SCNC: 93 MMOL/L (ref 98–107)
CO2 SERPL-SCNC: 29.9 MMOL/L (ref 22–29)
CREAT SERPL-MCNC: 1.26 MG/DL (ref 0.57–1)
DEPRECATED RDW RBC AUTO: 51.8 FL (ref 37–54)
EGFRCR SERPLBLD CKD-EPI 2021: 39.4 ML/MIN/1.73
EOSINOPHIL # BLD AUTO: 0 10*3/MM3 (ref 0–0.4)
EOSINOPHIL NFR BLD AUTO: 0 % (ref 0.3–6.2)
ERYTHROCYTE [DISTWIDTH] IN BLOOD BY AUTOMATED COUNT: 14.7 % (ref 12.3–15.4)
GLUCOSE SERPL-MCNC: 176 MG/DL (ref 65–99)
HCT VFR BLD AUTO: 39.9 % (ref 34–46.6)
HGB BLD-MCNC: 12.9 G/DL (ref 12–15.9)
IMM GRANULOCYTES # BLD AUTO: 1.52 10*3/MM3 (ref 0–0.05)
IMM GRANULOCYTES NFR BLD AUTO: 9.4 % (ref 0–0.5)
LYMPHOCYTES # BLD AUTO: 0.65 10*3/MM3 (ref 0.7–3.1)
LYMPHOCYTES NFR BLD AUTO: 4 % (ref 19.6–45.3)
MAGNESIUM SERPL-MCNC: 1.9 MG/DL (ref 1.7–2.3)
MCH RBC QN AUTO: 31 PG (ref 26.6–33)
MCHC RBC AUTO-ENTMCNC: 32.3 G/DL (ref 31.5–35.7)
MCV RBC AUTO: 95.9 FL (ref 79–97)
MONOCYTES # BLD AUTO: 1.76 10*3/MM3 (ref 0.1–0.9)
MONOCYTES NFR BLD AUTO: 10.9 % (ref 5–12)
NEUTROPHILS NFR BLD AUTO: 12.16 10*3/MM3 (ref 1.7–7)
NEUTROPHILS NFR BLD AUTO: 74.9 % (ref 42.7–76)
NRBC BLD AUTO-RTO: 0 /100 WBC (ref 0–0.2)
PHOSPHATE SERPL-MCNC: 2.8 MG/DL (ref 2.5–4.5)
PLATELET # BLD AUTO: 196 10*3/MM3 (ref 140–450)
PMV BLD AUTO: 10.8 FL (ref 6–12)
POTASSIUM SERPL-SCNC: 3.2 MMOL/L (ref 3.5–5.2)
RBC # BLD AUTO: 4.16 10*6/MM3 (ref 3.77–5.28)
SODIUM SERPL-SCNC: 139 MMOL/L (ref 136–145)
WBC NRBC COR # BLD AUTO: 16.22 10*3/MM3 (ref 3.4–10.8)

## 2023-12-12 PROCEDURE — 94640 AIRWAY INHALATION TREATMENT: CPT

## 2023-12-12 PROCEDURE — 94799 UNLISTED PULMONARY SVC/PX: CPT

## 2023-12-12 PROCEDURE — 80048 BASIC METABOLIC PNL TOTAL CA: CPT | Performed by: INTERNAL MEDICINE

## 2023-12-12 PROCEDURE — 99232 SBSQ HOSP IP/OBS MODERATE 35: CPT | Performed by: INTERNAL MEDICINE

## 2023-12-12 PROCEDURE — 97161 PT EVAL LOW COMPLEX 20 MIN: CPT

## 2023-12-12 PROCEDURE — 25010000002 MORPHINE PER 10 MG: Performed by: FAMILY MEDICINE

## 2023-12-12 PROCEDURE — 83735 ASSAY OF MAGNESIUM: CPT | Performed by: FAMILY MEDICINE

## 2023-12-12 PROCEDURE — 85025 COMPLETE CBC W/AUTO DIFF WBC: CPT | Performed by: INTERNAL MEDICINE

## 2023-12-12 PROCEDURE — 84100 ASSAY OF PHOSPHORUS: CPT | Performed by: INTERNAL MEDICINE

## 2023-12-12 RX ORDER — METOLAZONE 5 MG/1
5 TABLET ORAL ONCE
Status: COMPLETED | OUTPATIENT
Start: 2023-12-12 | End: 2023-12-12

## 2023-12-12 RX ORDER — MORPHINE SULFATE 2 MG/ML
1 INJECTION, SOLUTION INTRAMUSCULAR; INTRAVENOUS ONCE
Status: COMPLETED | OUTPATIENT
Start: 2023-12-12 | End: 2023-12-12

## 2023-12-12 RX ADMIN — ATORVASTATIN CALCIUM 40 MG: 40 TABLET, FILM COATED ORAL at 11:26

## 2023-12-12 RX ADMIN — MORPHINE SULFATE 1 MG: 2 INJECTION, SOLUTION INTRAMUSCULAR; INTRAVENOUS at 06:38

## 2023-12-12 RX ADMIN — APIXABAN 2.5 MG: 2.5 TABLET, FILM COATED ORAL at 20:46

## 2023-12-12 RX ADMIN — Medication 10 ML: at 20:47

## 2023-12-12 RX ADMIN — Medication 10 ML: at 11:26

## 2023-12-12 RX ADMIN — ALBUTEROL SULFATE 2.5 MG: 2.5 SOLUTION RESPIRATORY (INHALATION) at 00:45

## 2023-12-12 RX ADMIN — ACETAMINOPHEN 650 MG: 325 TABLET ORAL at 11:26

## 2023-12-12 RX ADMIN — TRIAMCINOLONE ACETONIDE 1 APPLICATION: 1 OINTMENT TOPICAL at 22:28

## 2023-12-12 RX ADMIN — DOCUSATE SODIUM 50 MG AND SENNOSIDES 8.6 MG 2 TABLET: 8.6; 5 TABLET, FILM COATED ORAL at 11:26

## 2023-12-12 RX ADMIN — METOLAZONE 5 MG: 5 TABLET ORAL at 17:40

## 2023-12-12 RX ADMIN — MONTELUKAST SODIUM 10 MG: 10 TABLET, FILM COATED ORAL at 20:46

## 2023-12-12 RX ADMIN — ALBUTEROL SULFATE 2.5 MG: 2.5 SOLUTION RESPIRATORY (INHALATION) at 05:23

## 2023-12-12 RX ADMIN — POTASSIUM CHLORIDE 20 MEQ: 1.5 POWDER, FOR SOLUTION ORAL at 20:46

## 2023-12-12 RX ADMIN — APIXABAN 2.5 MG: 2.5 TABLET, FILM COATED ORAL at 11:26

## 2023-12-12 RX ADMIN — BUMETANIDE 2 MG: 0.25 INJECTION INTRAMUSCULAR; INTRAVENOUS at 11:26

## 2023-12-12 RX ADMIN — DOCUSATE SODIUM 50 MG AND SENNOSIDES 8.6 MG 2 TABLET: 8.6; 5 TABLET, FILM COATED ORAL at 20:46

## 2023-12-12 RX ADMIN — TRIAMCINOLONE ACETONIDE 1 APPLICATION: 1 OINTMENT TOPICAL at 09:00

## 2023-12-12 RX ADMIN — POTASSIUM CHLORIDE 20 MEQ: 1.5 POWDER, FOR SOLUTION ORAL at 09:30

## 2023-12-12 RX ADMIN — PANTOPRAZOLE SODIUM 40 MG: 40 TABLET, DELAYED RELEASE ORAL at 05:01

## 2023-12-12 RX ADMIN — BENZONATATE 100 MG: 100 CAPSULE ORAL at 20:46

## 2023-12-12 NOTE — PLAN OF CARE
Goal Outcome Evaluation:   Pt complains of SOB, pt has tachypnea at points. PRN nebulizer treatments given per MAR and BiPAP placed for portion of night. Sats remain WDL. No further complaints voiced. VSS, no acute changes, POC ongoing.

## 2023-12-12 NOTE — NURSING NOTE
I spoke with the daughter Milli via phone and she is coming up tonight and will discuss comfort with the patient and decide if she is ready to stop all meds and focus on comfort or keep basic meds and work on getting home. The is looking for caregivers to be able to take the patient home. The daughter Juana, that the patient lives with is unable to care for her due to her own health issues. I asked Milli to update the nurse tonight when she comes about what they want to do. Milli has been in contact with [facility] and will just need to let them know DC date for DME and have caregivers ready. Provider updated.    -Shae VICTORIA, RN, PN   Palliative Care    12/12/2023 16:08 EST

## 2023-12-12 NOTE — NURSING NOTE
"Palliative care nurse visited the patient at the bedside on the 4th floor. The patient is up in a chair, nasal canula, heart monitor in use, external cath and reports \"uneasy feeling\". The patient is open to taking nausea medication to see if this helps. Primary nurse notified. I have also reached out to [facility] to get updated on if they have reached out to family and the outcome of any conversations. Currently waiting on a response. I also placed a call to daughter Milli who is the HCS and left a message asking for a return call.    -Shae VICTORIA, RN, [facility]   Palliative Care    12/12/2023 12:59 EST    "

## 2023-12-12 NOTE — THERAPY EVALUATION
Acute Care - Physical Therapy Initial Evaluation  LENORE Haddad     Patient Name: Radha Aparicio  : 1928  MRN: [id number]  Today's Date: 2023      Visit Dx:     ICD-10-CM ICD-9-CM   1. Syncope, unspecified syncope type  R55 780.2   2. Hypokalemia  E87.6 276.8   3. Acute renal insufficiency  N28.9 593.9   4. Decreased activities of daily living (ADL)  Z78.9 V49.89   5. 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    Adjustment and management of vascular access device    Hematuria    Hyperlipidemia LDL goal <100    Ingrowing toenail    Pressure ulcer of foot, stage 1    Tinea unguium    Type 2 diabetes mellitus without complication    Bladder cancer    Severe aortic stenosis    Atrial fibrillation, persistent    Osteopenia of hip    Essential hypertension    Acute HFrEF (heart failure with reduced ejection fraction)    Moderate asthma with exacerbation    Cellulitis of right lower extremity without foot    Immunosuppressed due to chemotherapy    Cytokine release syndrome, grade 1    Abnormal gait    Hypercalcemia due to a drug    Decreased hearing    Syncope     Past Medical History:   Diagnosis Date    Acute congestive heart failure     improved    Arthritis     Atrial fibrillation, persistent 2021    Bladder cancer     Bleeding disorder     (CONDITION DUE TO BLOOD THINNERS)    Chronic atrial fibrillation     Chronic heart failure with preserved ejection fraction (HFpEF) 2021    High cholesterol     Hyperlipidemia LDL goal <100 2021    Hypertension     Lymphoma     Moderate to severe aortic stenosis     Patient declines to have transcatheter    Non Hodgkin's lymphoma      treated 2008.    Rectus sheath hematoma     Right rectus sheath hematoma, off anticoagulation because of the hematoma.    Skin cancer     Type 2 diabetes mellitus      Past Surgical History:   Procedure Laterality  Date    BLADDER SURGERY      CATARACT EXTRACTION      DILATION AND CURETTAGE, DIAGNOSTIC / THERAPEUTIC      LEFT VENTRICULAR ASSIST DEVICE      LYMPH NODE BIOPSY      Biopsy of retroperitoneal lymph node or mass     SKIN CANCER DESTRUCTION      TONSILLECTOMY       PT Assessment (last 12 hours)       PT Evaluation and Treatment       Row Name 12/12/23 1100          Physical Therapy Time and Intention    Subjective Information no complaints  -DP     Document Type evaluation  -DP     Mode of Treatment individual therapy;physical therapy  -DP     Patient Effort good  -DP       Row Name 12/12/23 1100          General Information    Patient Profile Reviewed yes  -DP     Patient Observations alert;cooperative  -DP     Prior Level of Function independent:;gait;transfer;bed mobility;ADL's  -DP     Equipment Currently Used at Home cane, straight  -DP     Existing Precautions/Restrictions fall  -DP     Barriers to Rehab none identified  -DP       Row Name 12/12/23 1100          Living Environment    Current Living Arrangements home  -DP     Home Accessibility stairs to enter home  -DP     People in Home child(kaela), adult  -DP     Primary Care Provided by self  -DP       Row Name 12/12/23 1100          Home Main Entrance    Number of Stairs, Main Entrance none  -DP       Row Name 12/12/23 1100          Range of Motion (ROM)    Range of Motion bilateral lower extremities;ROM is WFL  -DP       Row Name 12/12/23 1100          Strength Comprehensive (MMT)    General Manual Muscle Testing (MMT) Assessment lower extremity strength deficits identified  -DP     Comment, General Manual Muscle Testing (MMT) Assessment BLE: 4-/5  -DP       Row Name 12/12/23 1100          Bed Mobility    Bed Mobility supine-sit-supine  -DP     Supine-Sit-Supine [place] (Bed Mobility) moderate assist (50% patient effort)  -DP     Assistive Device (Bed Mobility) bed rails;draw sheet;head of bed elevated  -DP       Row Name 12/12/23 1100           Transfers    Transfers sit-stand transfer  -DP       Row Name 12/12/23 1100          Sit-Stand Transfer    Sit-Stand [place] (Transfers) minimum assist (75% patient effort)  -DP     Assistive Device (Sit-Stand Transfers) walker, front-wheeled  -DP       Row Name 12/12/23 1100          Gait/Stairs (Locomotion)    Gait/Stairs Locomotion gait/ambulation assistive device  -DP     [place] Level (Gait) minimum assist (75% patient effort)  -DP     Assistive Device (Gait) walker, front-wheeled  -DP     Patient was able to Ambulate yes  -DP     Distance in Feet (Gait) 4  -DP       Row Name 12/12/23 1100          Safety Issues, Functional Mobility    Impairments Affecting Function (Mobility) balance;endurance/activity tolerance;strength  -DP       Row Name 12/12/23 1100          Balance    Dynamic Standing Balance minimal assist  -DP     Position/Device Used, Standing Balance walker, front-wheeled  -DP       Row Name             Wound 12/10/23 0847 Left anterior scalp Other (comment)    Wound - Properties Group Placement Date: 12/10/23  -CM Placement Time: 0847  -CM Present on Original Admission: Y  -CM Side: Left  -CM Orientation: anterior  -CM Location: scalp  -CM Primary Wound Type: Other  -CM    Retired Wound - Properties Group Placement Date: 12/10/23  -CM Placement Time: 0847  -CM Present on Original Admission: Y  -CM Side: Left  -CM Orientation: anterior  -CM Location: scalp  -CM Primary Wound Type: Other  -CM    Retired Wound - Properties Group Date first assessed: 12/10/23  -CM Time first assessed: 0847  -CM Present on Original Admission: Y  -CM Side: Left  -CM Location: scalp  -CM Primary Wound Type: Other  -CM      Row Name             Wound 09/14/23 1347 Left leg    Wound - Properties Group Placement Date: 09/14/23  -KH Placement Time: 1347  -KH Present on Original Admission: Y  -KH Side: Left  -KH Location: leg  -KH    Retired Wound - Properties Group Placement Date: 09/14/23  -KH Placement Time: 1347   -KH Present on Original Admission: Y  -KH Side: Left  -KH Location: leg  -KH    Retired Wound - Properties Group Date first assessed: 09/14/23  -KH Time first assessed: 1347  -KH Present on Original Admission: Y  -KH Side: Left  -KH Location: leg  -KH      Row Name             Wound 11/16/23 1413 Right lower leg    Wound - Properties Group Placement Date: 11/16/23  -MIMI Placement Time: 1413  -MIMI Present on Original Admission: Y  -MIMI Side: Right  -MIMI Orientation: lower  -MIMI Location: leg  -MIMI    Retired Wound - Properties Group Placement Date: 11/16/23  -MIMI Placement Time: 1413  -MIMI Present on Original Admission: Y  -MIMI Side: Right  -MIMI Orientation: lower  -MIMI Location: leg  -MIMI    Retired Wound - Properties Group Date first assessed: 11/16/23  -MIMI Time first assessed: 1413  -MIMI Present on Original Admission: Y  -MIMI Side: Right  -MIMI Location: leg  -MIMI      Row Name 12/12/23 1100          Plan of Care Review    Plan of Care Reviewed With patient  -DP     Outcome Evaluation Pt presents with decreased strength, transfers and functional mobility. Will benefit from inpatient PT services and continued PT services upon discharge.  -DP       Row Name 12/12/23 1100          Positioning and Restraints    Pre-Treatment Position in bed  -DP     Post Treatment Position chair  -DP     In Chair encouraged to call for assist;exit alarm on;call light within reach  -DP       Row Name 12/12/23 1100          Therapy Assessment/Plan (PT)    Rehab Potential (PT) good, to achieve stated therapy goals  -DP     Criteria for Skilled Interventions Met (PT) yes;meets criteria  -DP     Therapy Frequency (PT) daily  -DP     Predicted Duration of Therapy Intervention (PT) 10 days  -DP     Problem List (PT) problems related to;mobility  -DP     Activity Limitations Related to Problem List (PT) unable to transfer safely;unable to ambulate safely  -DP       Row Name 12/12/23 1100          PT Evaluation Complexity    History, PT Evaluation Complexity  no personal factors and/or comorbidities  -DP     Examination of Body Systems (PT Eval Complexity) total of 4 or more elements  -DP     Clinical Presentation (PT Evaluation Complexity) stable  -DP     Clinical Decision Making (PT Evaluation Complexity) low complexity  -DP     Overall Complexity (PT Evaluation Complexity) low complexity  -DP       Row Name 12/12/23 1100          Physical Therapy Goals    Transfer Goal Selection (PT) transfer, PT goal 1  -DP     Gait Training Goal Selection (PT) gait training, PT goal 1  -DP       Row Name 12/12/23 1100          Transfer Goal 1 (PT)    Activity/Assistive Device (Transfer Goal 1, PT) sit-to-stand/stand-to-sit  -DP     [place] Level/Cues Needed (Transfer Goal 1, PT) supervision required  -DP     Time Frame (Transfer Goal 1, PT) 10 days  -DP       Row Name 12/12/23 1100          Gait Training Goal 1 (PT)    Activity/Assistive Device (Gait Training Goal 1, PT) assistive device use;walker, rolling  -DP     [place] Level (Gait Training Goal 1, PT) supervision required  -DP     Distance (Gait Training Goal 1, PT) 200  -DP     Time Frame (Gait Training Goal 1, PT) 10 days  -DP               User Key  (r) = Recorded By, (t) = Taken By, (c) = Cosigned By      Initials Name Provider Type    CM Yaritza Lopez, RN Registered Nurse    Baylee Gonzalez RN Registered Nurse    Bri Shaw RN Registered Nurse    Mariely Christina, PT Physical Therapist                      PT Recommendation and Plan  Anticipated Discharge Disposition (PT): sub acute care setting  Planned Therapy Interventions (PT): balance training, bed mobility training, gait training, strengthening, transfer training  Therapy Frequency (PT): daily  Plan of Care Reviewed With: patient  Outcome Evaluation: Pt presents with decreased strength, transfers and functional mobility. Will benefit from inpatient PT services and continued PT services upon discharge.   Outcome Measures       Row Name  12/12/23 1100             How much help from another person do you currently need...    Turning from your back to your side while in flat bed without using bedrails? 3  -DP      Moving from lying on back to sitting on the side of a flat bed without bedrails? 2  -DP      Moving to and from a bed to a chair (including a wheelchair)? 3  -DP      Standing up from a chair using your arms (e.g., wheelchair, bedside chair)? 3  -DP      Climbing 3-5 steps with a railing? 2  -DP      To walk in hospital room? 2  -DP      AM-PAC 6 Clicks Score (PT) 15  -DP      Highest Level of Mobility Goal 4 --> Transfer to chair/commode  -DP         Functional Assessment    Outcome Measure Options AM-PAC 6 Clicks Basic Mobility (PT)  -DP                User Key  (r) = Recorded By, (t) = Taken By, (c) = Cosigned By      Initials Name Provider Type    Mariely Christina, PT Physical Therapist                     Time Calculation:    PT Charges       Row Name 12/12/23 1133             Time Calculation    PT Goal Re-Cert Due Date 12/21/23  -DP         Untimed Charges    PT Eval/Re-eval Minutes 40  -DP         Total Minutes    Untimed Charges Total Minutes 40  -DP       Total Minutes 40  -DP                User Key  (r) = Recorded By, (t) = Taken By, (c) = Cosigned By      Initials Name Provider Type    Mariely Christina, PT Physical Therapist                      PT G-Codes  Outcome Measure Options: AM-PAC 6 Clicks Basic Mobility (PT)  AM-PAC 6 Clicks Score (PT): 15  AM-PAC 6 Clicks Score (OT): 15    Mariely Mejia, PT  12/12/2023

## 2023-12-12 NOTE — PLAN OF CARE
Goal Outcome Evaluation:  Plan of Care Reviewed With: patient         Patient is alert and oriented x 4. On 3L NC. Afib. Up to chair for entire shift. Patient given tylenol for headache. No complaints of SOA throughout shift. Will continue plan of care.

## 2023-12-12 NOTE — PLAN OF CARE
Goal Outcome Evaluation:  Plan of Care Reviewed With: patient        Progress: no change  Outcome Evaluation: Pt is not currently on bipap. Pt is currently wearing a 2L nasal cannula. Will continue to monitor pt throughout shift.

## 2023-12-12 NOTE — PROGRESS NOTES
[facility]   Hospitalist Progress Note  Date: 2023  Patient Name: Radha Aparicio  : 1928  MRN: [id number]  Date of admission: 12/10/2023      Subjective   Subjective     Chief Complaint: Syncope    Summary: 89 y.o. female brought to the emergency department for evaluation of syncope.  Patient reports multiple episodes of the left several days.  Patient reports lightheadedness and dizziness intermittently, especially with changes in positioning.  Patient states she has been increasing her dose of diuretic due to bilateral lower extremity fluid retention.  Patient reports she was instructed to take 3 doses per day for 3 days, up from 2 doses per day.  Patient states she had passed out and struck her head on the floor.  In the ED, no fractures or bleeding noted on imaging.  Initial concern for dehydration, she was started on IV fluids.  However, BNP elevated at 29,000, had worsening respiratory status on the evening of 12/10 necessitating IV diuretics and BiPAP.  CODE STATUS changed to DNR.  Concerned that she has end-stage aortic stenosis and heart failure exacerbation.  Cardiology consulted on , diuresing with IV Bumex, palliative care consulted.    Interval Followup:   Patient states that she is feeling very poorly, she has no appetite she is just drinking cold liquids, patient feels very short of breath.  At this time we are waiting on hospice meeting with patient and her daughters    She has a history of severe aortic stenosis, she was offered TAVR 15 years ago but declined at that time.  She knew that this condition would continue to worsen without surgical intervention.    Objective   Objective     Vitals:   Temp:  [97.2 °F (36.2 °C)-98.2 °F (36.8 °C)] 98.2 °F (36.8 °C)  Heart Rate:  [] 79  Resp:  [18-24] 24  BP: ()/(55-84) 102/58  Flow (L/min):  [2-3.5] 3.5  Physical Exam    Constitutional: Elderly female, awake, alert, sitting in bedside chair,appears ill and short of air     Respiratory: Poor aeration, diffuse scattered rhonchi, nasal cannula in place nonlabored respirations    Cardiovascular: RRR,  systolic murmur noted throughout    Gastrointestinal: Positive bowel sounds, soft, nontender, nondistended   Neurologic: Oriented x 3, globally weak, strength symmetric in all extremities, Cranial Nerves grossly intact to confrontation, speech clear    Result Review    Result Review:  I have personally reviewed the results below:  [x]  Laboratory personally reviewed BMP, CBC, lactic acid level, BNP  []  Microbiology  [x]  Radiology overnight chest x-ray personally reviewed overnight chest x-ray personally reviewed with evidence of bilateral pulmonary edema  []  EKG/Telemetry   []  Cardiology/Vascular   []  Pathology  []  Old records  []  Other:  CBC          12/10/2023    02:56 12/10/2023    09:04 12/10/2023    18:58 12/11/2023    05:25 12/12/2023    04:59   CBC   WBC 11.28  9.29  9.68  8.22  16.22    RBC 4.21  3.90  4.14  4.08  4.16    Hemoglobin 13.0  12.0  12.9  12.5  12.9    Hematocrit 39.7  36.6  39.3  38.9  39.9    MCV 94.3  93.8  94.9  95.3  95.9    MCH 30.9  30.8  31.2  30.6  31.0    MCHC 32.7  32.8  32.8  32.1  32.3    RDW 14.3  14.4  14.7  14.5  14.7    Platelets 218  195  208  189  196      CMP          12/10/2023    02:56 12/10/2023    09:04 12/10/2023    18:30 12/10/2023    18:33 12/11/2023    05:25 12/12/2023    04:59   CMP   Glucose 133  142  125  145     145  174  176    BUN 79  69   64     64  66  74    Creatinine 1.26  1.00   1.13     1.13  1.23  1.26    EGFR 39.4  52.0   44.9     44.9  40.6  39.4    Sodium 136  139  137.5  139     139  140  139    Potassium 2.8  2.9   4.0     4.0  3.0  3.2    Chloride 89  98   97     97  95  93    Calcium 10.5  9.3   9.9     9.9  10.1  9.9    Total Protein 7.2         Albumin 4.1    4.0      Globulin 3.1         Total Bilirubin 0.8         Alkaline Phosphatase 86         AST (SGOT) 43         ALT (SGPT) 22         Albumin/Globulin Ratio  1.3         BUN/Creatinine Ratio 62.7  69.0   56.6     56.6  53.7  58.7    Anion Gap 13.9  11.9   15.3     15.3  16.4  16.1        Assessment & Plan   Assessment / Plan     Assessment/Plan:  Acute hypoxic respiratory failure requiring NIPPV  Acute on chronic combined systolic and diastolic congestive heart failure with acute exacerbation  Acute cardiogenic pulmonary edema  Severe aortic stenosis  Syncope, concern this is due to severe AS and CHF  Small pleural effusion  Questionable community-acquired pneumonia due to unknown bacterial source, presumed gram-negative  Hypokalemia  Chronic atrial fibrillation  Hypertension  Hyperlipidemia    PLAN   Dr. Harden following  Continue Bumex 2 mg IV every 12 hours (ordered by hospitalist service).  Trend renal function electrolytes closely while on IV diuretics  Replace potassium and monitor BMP closely  Can stop antibiotics   Repeat echocardiogram showing severe systolic dysfunction with an EF of 20% as well as severe aortic stenosis  Palliative care working with family.  [facility]osp[facility] referral has been sent to [facility].  Palliative will follow-up on this  Trend renal function and electrolytes with a.m. BMP, magnesium   Trend Hgb and WBC with a.m. CBC     Discussed plan with RN, cardiology    DVT prophylaxis:  Medical DVT prophylaxis orders are present.    CODE STATUS:   Medical Intervention Limits: NO intubation (DNI)  Level Of Support Discussed With: Patient  Code Status (Patient has no pulse and is not breathing): No CPR (Do Not Attempt to Resuscitate)  Medical Interventions (Patient has pulse or is breathing): Limited Support

## 2023-12-12 NOTE — SIGNIFICANT NOTE
Wound Eval / Progress Noted    LENORE Haddad     Patient Name: Radha Aparicio  : 1928  MRN: [id number]  Today's Date: 2023                 Admit Date: 12/10/2023    Visit Dx:    ICD-10-CM ICD-9-CM   1. Syncope, unspecified syncope type  R55 780.2   2. Hypokalemia  E87.6 276.8   3. Acute renal insufficiency  N28.9 593.9   4. Decreased activities of daily living (ADL)  Z78.9 V49.89         Severe aortic stenosis    Atrial fibrillation, persistent    Acute HFrEF (heart failure with reduced ejection fraction)    Syncope        Past Medical History:   Diagnosis Date    Acute congestive heart failure     improved    Arthritis     Atrial fibrillation, persistent 2021    Bladder cancer     Bleeding disorder     (CONDITION DUE TO BLOOD THINNERS)    Chronic atrial fibrillation     Chronic heart failure with preserved ejection fraction (HFpEF) 2021    High cholesterol     Hyperlipidemia LDL goal <100 2021    Hypertension     Lymphoma     Moderate to severe aortic stenosis     Patient declines to have transcatheter    Non Hodgkin's lymphoma      treated 2008.    Rectus sheath hematoma     Right rectus sheath hematoma, off anticoagulation because of the hematoma.    Skin cancer     Type 2 diabetes mellitus         Past Surgical History:   Procedure Laterality Date    BLADDER SURGERY      CATARACT EXTRACTION      DILATION AND CURETTAGE, DIAGNOSTIC / THERAPEUTIC      LEFT VENTRICULAR ASSIST DEVICE      LYMPH NODE BIOPSY      Biopsy of retroperitoneal lymph node or mass     SKIN CANCER DESTRUCTION      TONSILLECTOMY           Physical Assessment:  Wound 23 1347 Left leg (Active)   Wound Image    23 1111   Dressing Appearance intact 23 1111   Base dry;red 23 1111   Periwound dry;redness 23 1111   Periwound Temperature warm 23 1111   Periwound Skin Turgor soft 23 1111   Edges open 23 1111   Drainage Characteristics/Odor serous 23 1111    Drainage Amount scant 12/11/23 1111   Care, Wound cleansed with;soap and water 12/11/23 1111   Dressing Care dressing changed;abdominal pad;tubular wrap 12/11/23 1111   Periwound Care absorptive dressing applied 12/11/23 1111       Wound 11/16/23 1413 Right lower leg (Active)   Dressing Appearance open to air 12/11/23 1111   Closure Open to air 12/11/23 1111   Base dry;red 12/11/23 1111   Periwound intact;dry;redness 12/11/23 1111   Periwound Temperature warm 12/11/23 1111   Periwound Skin Turgor soft 12/11/23 1111   Edges rolled/closed 12/11/23 1111   Drainage Amount none 12/11/23 1111   Care, Wound cleansed with;soap and water 12/11/23 1111   Dressing Care open to air 12/11/23 1111       Wound 12/10/23 0847 Left anterior scalp Other (comment) (Active)   Dressing Appearance open to air 12/11/23 1111   Closure Open to air 12/11/23 1111   Base maroon/purple;dry 12/11/23 1111   Periwound intact;dry 12/11/23 1111   Periwound Temperature warm 12/11/23 1111   Periwound Skin Turgor soft 12/11/23 1111   Edges rolled/closed 12/11/23 1111   Drainage Amount none 12/11/23 1111   Dressing Care open to air 12/11/23 1111        Wound Check / Follow-up:  Patient seen today for new wound care consult. Patient with BLE redness with flaky crusted skin. RLE skin remains intact. LLE skin with scattered openings and scant serous drainage. Recommending cleansing BLE with soap and water, pat dry, apply triamcinolone ointment, cover open draining areas with impregnated petroleum gauze, cover with ABD pads and wrap with rolled gauze. Dressing changes to be performed daily.     Patient with left anterior scalp bruising. Skin remains intact. Recommending leaving open to air.     Impression: BLE redness, flaky skin and crusting. Left anterior scalp bruising.    Short term goals: Regain skin integrity, pressure reduction, skin protection, skin care. Daily wound care dressings.     Renae Christianson RN    12/12/2023    08:02 EST

## 2023-12-13 LAB
ANION GAP SERPL CALCULATED.3IONS-SCNC: 15.1 MMOL/L (ref 5–15)
ANISOCYTOSIS BLD QL: NORMAL
BASOPHILS # BLD AUTO: 0.14 10*3/MM3 (ref 0–0.2)
BASOPHILS NFR BLD AUTO: 1.1 % (ref 0–1.5)
BUN SERPL-MCNC: 75 MG/DL (ref 8–23)
BUN/CREAT SERPL: 69.4 (ref 7–25)
CALCIUM SPEC-SCNC: 9.9 MG/DL (ref 8.2–9.6)
CHLORIDE SERPL-SCNC: 92 MMOL/L (ref 98–107)
CO2 SERPL-SCNC: 31.9 MMOL/L (ref 22–29)
CREAT SERPL-MCNC: 1.08 MG/DL (ref 0.57–1)
DEPRECATED RDW RBC AUTO: 50.9 FL (ref 37–54)
EGFRCR SERPLBLD CKD-EPI 2021: 47.4 ML/MIN/1.73
EOSINOPHIL # BLD AUTO: 0.02 10*3/MM3 (ref 0–0.4)
EOSINOPHIL NFR BLD AUTO: 0.2 % (ref 0.3–6.2)
ERYTHROCYTE [DISTWIDTH] IN BLOOD BY AUTOMATED COUNT: 14.5 % (ref 12.3–15.4)
GLUCOSE SERPL-MCNC: 170 MG/DL (ref 65–99)
HCT VFR BLD AUTO: 40 % (ref 34–46.6)
HGB BLD-MCNC: 12.9 G/DL (ref 12–15.9)
HYPOCHROMIA BLD QL: NORMAL
IMM GRANULOCYTES # BLD AUTO: 1.44 10*3/MM3 (ref 0–0.05)
IMM GRANULOCYTES NFR BLD AUTO: 11.1 % (ref 0–0.5)
LYMPHOCYTES # BLD AUTO: 1.02 10*3/MM3 (ref 0.7–3.1)
LYMPHOCYTES NFR BLD AUTO: 7.9 % (ref 19.6–45.3)
MAGNESIUM SERPL-MCNC: 2.1 MG/DL (ref 1.7–2.3)
MCH RBC QN AUTO: 30.9 PG (ref 26.6–33)
MCHC RBC AUTO-ENTMCNC: 32.3 G/DL (ref 31.5–35.7)
MCV RBC AUTO: 95.7 FL (ref 79–97)
MONOCYTES # BLD AUTO: 1.17 10*3/MM3 (ref 0.1–0.9)
MONOCYTES NFR BLD AUTO: 9 % (ref 5–12)
NEUTROPHILS NFR BLD AUTO: 70.7 % (ref 42.7–76)
NEUTROPHILS NFR BLD AUTO: 9.18 10*3/MM3 (ref 1.7–7)
NRBC BLD AUTO-RTO: 0 /100 WBC (ref 0–0.2)
PHOSPHATE SERPL-MCNC: 3 MG/DL (ref 2.5–4.5)
PLATELET # BLD AUTO: 201 10*3/MM3 (ref 140–450)
PMV BLD AUTO: 10.9 FL (ref 6–12)
POTASSIUM SERPL-SCNC: 2.8 MMOL/L (ref 3.5–5.2)
RBC # BLD AUTO: 4.18 10*6/MM3 (ref 3.77–5.28)
SMALL PLATELETS BLD QL SMEAR: ADEQUATE
SODIUM SERPL-SCNC: 139 MMOL/L (ref 136–145)
WBC MORPH BLD: NORMAL
WBC NRBC COR # BLD AUTO: 12.97 10*3/MM3 (ref 3.4–10.8)

## 2023-12-13 PROCEDURE — 85025 COMPLETE CBC W/AUTO DIFF WBC: CPT | Performed by: INTERNAL MEDICINE

## 2023-12-13 PROCEDURE — 80048 BASIC METABOLIC PNL TOTAL CA: CPT | Performed by: INTERNAL MEDICINE

## 2023-12-13 PROCEDURE — 99232 SBSQ HOSP IP/OBS MODERATE 35: CPT | Performed by: STUDENT IN AN ORGANIZED HEALTH CARE EDUCATION/TRAINING PROGRAM

## 2023-12-13 PROCEDURE — 83735 ASSAY OF MAGNESIUM: CPT | Performed by: INTERNAL MEDICINE

## 2023-12-13 PROCEDURE — 84100 ASSAY OF PHOSPHORUS: CPT | Performed by: INTERNAL MEDICINE

## 2023-12-13 PROCEDURE — 85007 BL SMEAR W/DIFF WBC COUNT: CPT | Performed by: INTERNAL MEDICINE

## 2023-12-13 PROCEDURE — 99231 SBSQ HOSP IP/OBS SF/LOW 25: CPT | Performed by: INTERNAL MEDICINE

## 2023-12-13 PROCEDURE — 94799 UNLISTED PULMONARY SVC/PX: CPT

## 2023-12-13 PROCEDURE — 94761 N-INVAS EAR/PLS OXIMETRY MLT: CPT

## 2023-12-13 RX ORDER — CHOLECALCIFEROL (VITAMIN D3) 125 MCG
5 CAPSULE ORAL NIGHTLY
Status: DISCONTINUED | OUTPATIENT
Start: 2023-12-13 | End: 2023-12-16 | Stop reason: HOSPADM

## 2023-12-13 RX ORDER — LORAZEPAM 2 MG/ML
0.5 INJECTION INTRAMUSCULAR EVERY 4 HOURS PRN
Status: DISCONTINUED | OUTPATIENT
Start: 2023-12-13 | End: 2023-12-16 | Stop reason: HOSPADM

## 2023-12-13 RX ORDER — POTASSIUM CHLORIDE 750 MG/1
40 CAPSULE, EXTENDED RELEASE ORAL 2 TIMES DAILY WITH MEALS
Status: COMPLETED | OUTPATIENT
Start: 2023-12-13 | End: 2023-12-13

## 2023-12-13 RX ORDER — LORAZEPAM 2 MG/ML
0.5 INJECTION INTRAMUSCULAR EVERY 4 HOURS PRN
Status: DISCONTINUED | OUTPATIENT
Start: 2023-12-13 | End: 2023-12-13

## 2023-12-13 RX ADMIN — POTASSIUM CHLORIDE 40 MEQ: 10 CAPSULE, COATED, EXTENDED RELEASE ORAL at 18:02

## 2023-12-13 RX ADMIN — BUMETANIDE 2 MG: 0.25 INJECTION INTRAMUSCULAR; INTRAVENOUS at 08:30

## 2023-12-13 RX ADMIN — Medication 10 ML: at 08:25

## 2023-12-13 RX ADMIN — ATORVASTATIN CALCIUM 40 MG: 40 TABLET, FILM COATED ORAL at 08:24

## 2023-12-13 RX ADMIN — APIXABAN 2.5 MG: 2.5 TABLET, FILM COATED ORAL at 08:24

## 2023-12-13 RX ADMIN — PANTOPRAZOLE SODIUM 40 MG: 40 TABLET, DELAYED RELEASE ORAL at 05:07

## 2023-12-13 RX ADMIN — POTASSIUM CHLORIDE 20 MEQ: 1.5 POWDER, FOR SOLUTION ORAL at 21:29

## 2023-12-13 RX ADMIN — APIXABAN 2.5 MG: 2.5 TABLET, FILM COATED ORAL at 21:29

## 2023-12-13 RX ADMIN — TRIAMCINOLONE ACETONIDE 1 APPLICATION: 1 OINTMENT TOPICAL at 21:33

## 2023-12-13 RX ADMIN — Medication 10 ML: at 21:28

## 2023-12-13 RX ADMIN — DOCUSATE SODIUM 50 MG AND SENNOSIDES 8.6 MG 2 TABLET: 8.6; 5 TABLET, FILM COATED ORAL at 08:24

## 2023-12-13 RX ADMIN — TRIAMCINOLONE ACETONIDE 1 APPLICATION: 1 OINTMENT TOPICAL at 09:48

## 2023-12-13 RX ADMIN — POTASSIUM CHLORIDE 20 MEQ: 1.5 POWDER, FOR SOLUTION ORAL at 08:24

## 2023-12-13 RX ADMIN — POTASSIUM CHLORIDE 40 MEQ: 10 CAPSULE, COATED, EXTENDED RELEASE ORAL at 09:48

## 2023-12-13 RX ADMIN — BUMETANIDE 2 MG: 0.25 INJECTION INTRAMUSCULAR; INTRAVENOUS at 21:28

## 2023-12-13 RX ADMIN — MONTELUKAST SODIUM 10 MG: 10 TABLET, FILM COATED ORAL at 21:28

## 2023-12-13 NOTE — ACP (ADVANCE CARE PLANNING)
Family meeting with Palliative care, daughter Juana and the patient at the bedside. I explained comfort measures again and the options of aggressive care. The patient expressed she no longer wants IV fluids, IV ABX or blood drawls and wants to focus on just being comfortable until we can get her home. The family is working on caregivers and moving furniture. The daughters will work with [facility] to deliver DME and oxygen. The patient will remain on current medications and oxygen and have requested Melatonin for sleep and a low dose ativan for anxiety and SOA. The patient does not feel comfortable with Morphine at this time. I have educated about a move off the floor and answered all questions. A sign was placed on the door and primary nurse updated that the patient has milk in the frig that will need to go with her. Provider and primary nurse updated. The patient will DC in a few days, Home with [facility].     -Shae VICTORIA, RN, PN   Palliative Care    12/13/2023 17:10 EST

## 2023-12-13 NOTE — PLAN OF CARE
Goal Outcome Evaluation:  Plan of Care Reviewed With: patient        Progress: no change  Outcome Evaluation: Patient on 3.5 L nasal cannula. Bipap on standby if needed. Will continue to monitor.

## 2023-12-13 NOTE — NURSING NOTE
"Per Cate at [facility], \"I spoke to her daughter Milli and she is suppose to contact me for a conference call with her sister. She stated I gave her too much information and she wanted me to talk with her and her sister at the same time since they were the  caregivers. I also gave her a caregiver list as she requested. I am awaiting her call.\" I also spoke with daughter Milli who reports having trouble talking with the patient and requested I speak with her as she felt the patient was having trouble understanding her. I went to the patients room, she is on 4MTU, nasal canula, continuous pulse ox and expressed her memory and speech. \"Finding my words\" were diminished and stated she needs her daughter to remember. The patient was educated on and completed an EMS DNR. Original to chart and copy to MR and patient. I placed a call to the daughter Juana whom the patient lives with and discussed DC planning and the patient having trouble making decisions. Juana reports the family all spoke with Hospice yesterday at 1 pm and she is pretty sure they want their services however they are still working on moving furniture in the home and setting up caregivers. So far one company reports they can provide 24/7 care in a few days but we are unsure what that time frame is. Family is still calling names on the list Hospice provided. Daughter Milli and I talked about the patient only wanting to eat liquid food and the patient expressed to family that it takes too much energy for her to cut things up to eat and can only use a spoon. This info was passed along to the provider. [facility] was also updated.     -Shae VICTORIA, RN, PN   Palliative Care    12/13/2023 14:40 EST    "

## 2023-12-13 NOTE — PLAN OF CARE
Goal Outcome Evaluation:  Plan of Care Reviewed With: patient        Progress: no change  Outcome Evaluation: Patient is currently resting on 3.5 L NC. BiPAP is on stand by if patient becomes distressed.

## 2023-12-13 NOTE — PROGRESS NOTES
CARDIOLOGY  INPATIENT PROGRESS NOTE                [facility] 4TH FLOOR MEDICAL TELEMETRY UNIT    12/13/2023      PATIENT IDENTIFICATION:   Name:  Radha Aparicio      MRN:  [id number]     89 y.o.  female                 SUBJECTIVE:   Patient still very tachypneic this morning does do appear to be more uncomfortable and in distress  OBJECTIVE:  Vitals:    12/13/23 0500 12/13/23 0708 12/13/23 0725 12/13/23 1100   BP:   132/90 122/84   BP Location:   Right arm Right arm   Patient Position:   Lying Lying   Pulse:   120    Resp: 22 24 22   Temp:   98.2 °F (36.8 °C) 98.6 °F (37 °C)   TempSrc: Oral  Oral Oral   SpO2:  95% 96% 94%   Weight:       Height:               Body mass index is 25.2 kg/m².    Intake/Output Summary (Last 24 hours) at 12/13/2023 1520  Last data filed at 12/12/2023 1700  Gross per 24 hour   Intake 400 ml   Output 300 ml   Net 100 ml         Physical Exam  General Appearance:   Respiratory distress  HENT:   lips not cyanotic    Respiratory:  Diminished breath sounds bilaterally  Cardiovascular:      Over 6 systolic murmur    Skin:   warm, dry  No rashes      Allergies   Allergen Reactions    Contrast Dye (Echo Or Unknown Ct/Mr) Unknown - Low Severity    Iodinated Contrast Media Hives and Other (See Comments)     IODINATED CONTRAST MEDIA (Verified  Allergy, Unknown, HIVES,SNEEZING,ITCHY EYES)    Iodine Unknown - Low Severity    Penicillins Rash    Shellfish Allergy Unknown - Low Severity    Spinach Other (See Comments)      SPINACH (Verified  Allergy, Unknown, 2/22/21)      SHOWED UP ON ALLERGY TESTING    Sulfa Antibiotics Other (See Comments)     (Verified  Allergy, Unknown, RASH)     Scheduled meds:  apixaban, 2.5 mg, Oral, Q12H  atorvastatin, 40 mg, Oral, Daily  bumetanide, 2 mg, Intravenous, Q12H  montelukast, 10 mg, Oral, Nightly  pantoprazole, 40 mg, Oral, Q AM  potassium chloride, 20 mEq, Oral, BID  potassium chloride, 40 mEq, Oral, BID With Meals  senna-docusate sodium, 2 tablet,  "Oral, BID  sodium chloride, 10 mL, Intravenous, Q12H  triamcinolone, 1 application , Topical, 2 times per day      IV meds:                         Data Review:  CBC          12/11/2023    05:25 12/12/2023    04:59 12/13/2023    04:47   CBC   WBC 8.22  16.22  12.97    RBC 4.08  4.16  4.18    Hemoglobin 12.5  12.9  12.9    Hematocrit 38.9  39.9  40.0    MCV 95.3  95.9  95.7    MCH 30.6  31.0  30.9    MCHC 32.1  32.3  32.3    RDW 14.5  14.7  14.5    Platelets 189  196  201      CMP          12/11/2023    05:25 12/12/2023    04:59 12/13/2023    04:48   CMP   Glucose 174  176  170    BUN 66  74  75    Creatinine 1.23  1.26  1.08    EGFR 40.6  39.4  47.4    Sodium 140  139  139    Potassium 3.0  3.2  2.8    Chloride 95  93  92    Calcium 10.1  9.9  9.9    BUN/Creatinine Ratio 53.7  58.7  69.4    Anion Gap 16.4  16.1  15.1       CARDIAC LABS:      Lab 12/10/23  1833 12/10/23  0549 12/10/23  0256   PROBNP 29,971.0*  --   --    HSTROP T  --  76* 80*        No results found for: \"DIGOXIN\"   Lab Results   Component Value Date    TSH 1.240 06/02/2022           Invalid input(s): \"LDLCALC\"  Lab Results   Component Value Date    POCTROP 0.02 03/21/2021     Lab Results   Component Value Date    TROPONINT 76 (C) 12/10/2023   (  Lab Results   Component Value Date    MG 2.1 12/13/2023     Results for orders placed during the hospital encounter of 12/10/23    Adult Transthoracic Echo Complete W/ Cont if Necessary Per Protocol    Interpretation Summary    Left ventricular systolic function is severely decreased. Estimated left ventricular EF = 20%    Left ventricular wall thickness is consistent with moderate concentric hypertrophy.    Left ventricular diastolic dysfunction is noted.    The left atrial cavity is moderately dilated.    The right atrial cavity is mild to moderately  dilated.    Severe/critical aortic valve stenosis is present.    Pleural effusion           ASSESSMENT:    Severe aortic stenosis    Atrial fibrillation, " persistent    Acute HFrEF (heart failure with reduced ejection fraction)    Syncope        PLAN:  1. continue with comfort and supportive measures of IV Bumex if patient has decided upon palliative care would also recommend addition of benzodiazepines and morphine to help with the anxiety and respiratory symptoms          Paulie Harden MD  12/13/2023    15:20 EST

## 2023-12-13 NOTE — PLAN OF CARE
Problem: Adult Inpatient Plan of Care  Goal: Plan of Care Review  Outcome: Ongoing, Progressing  Flowsheets (Taken 12/13/2023 0033)  Progress: no change  Plan of Care Reviewed With: patient  Outcome Evaluation: Patient alert and oriented x 4. Patient on 3.5 L NC. Wound care completed per orders. Cough treated per MAR. Continuing with plan of care.   Goal Outcome Evaluation:  Plan of Care Reviewed With: patient        Progress: no change  Outcome Evaluation: Patient alert and oriented x 4. Patient on 3.5 L NC. Wound care completed per orders. Cough treated per MAR. Continuing with plan of care.

## 2023-12-13 NOTE — PLAN OF CARE
Goal Outcome Evaluation:  Plan of Care Reviewed With: patient        Progress: no change  Outcome Evaluation: VSS.  Pt on 3.5L NC.

## 2023-12-13 NOTE — ACP (ADVANCE CARE PLANNING)
Educated on and completed an EMS DNR with patient. The patient does not want a feeding tube, CPR or the Vent.     -Shae VICTORIA, RN, [facility]   Palliative Care    12/13/2023 14:43 EST

## 2023-12-13 NOTE — PROGRESS NOTES
[facility]   Hospitalist Progress Note  Date: 2023  Patient Name: Radha Aparicio  : 1928  MRN: [id number]  Date of admission: 12/10/2023  Room/Bed: [facility]/      Subjective   Subjective     Chief Complaint: Follow-up for syncope    Summary:Radha Aparicio is a 89 y.o. female with a history of severe aortic stenosis, was offered TAVR 15 years ago but declined who presented for evaluation for syncope.  Patient had multiple episodes of lightheadedness and dizziness especially with changing position.  She had been increasing her dose of diuretic due to lower extremity edema.  On the day of presentation, she passed out and struck her head on the floor. CT head on presentation showed no acute finding including no fractures or bleeding.  There was concern for dehydration for which she was started on IV fluid.  Hospital course complicated by respiratory failure requiring BiPAP and IV diuretics.  CODE STATUS was changed to DNR at this admission.  There is concern for end-stage aortic stenosis and heart failure exacerbation.  Cardiology following the patient.  Currently on Bumex and Zaroxolyn.  Palliative team following.    Interval Followup: No acute events overnight.  She stated she does not feel good and complained of weakness.  She denied any lightheadedness, nausea, vomiting, headache, blurry vision, chest pain, difficulty breathing or abdominal pain.  She was lying on the bed and looks ill.    Review of Systems    All systems reviewed and negative except for what is outlined above.      Objective   Objective     Vitals:   Temp:  [97.6 °F (36.4 °C)-98.6 °F (37 °C)] 98.6 °F (37 °C)  Heart Rate:  [] 120  Resp:  [18-24] 22  BP: ()/(57-90) 122/84  Flow (L/min):  [3.5] 3.5    Physical Exam   General: Awake, alert, NAD; bruise present on her left forehead from recent fall.  Stable.  Cardiovascular: RRR, systolic murmur present in the aortic area.  Pulmonary: CTA bilaterally; no wheezes; no  conversational dyspnea  Gastrointestinal: S/ND/NT, +BS  Neuro: Alert, awake; speech clear; no tremor    Result Review    Result Review:  I have personally reviewed these results:  [x]  Laboratory      Lab 12/13/23  0447 12/12/23  0459 12/11/23  0525 12/10/23  1858 12/10/23  1833 12/10/23  1830 12/10/23  0904 12/10/23  0454   WBC 12.97* 16.22* 8.22   < >  --   --    < >  --    HEMOGLOBIN 12.9 12.9 12.5   < >  --   --    < >  --    HEMATOCRIT 40.0 39.9 38.9   < >  --   --    < >  --    PLATELETS 201 196 189   < >  --   --    < >  --    NEUTROS ABS 9.18* 12.16* 6.74  --   --   --    < >  --    IMMATURE GRANS (ABS) 1.44* 1.52* 0.51*  --   --   --    < >  --    LYMPHS ABS 1.02 0.65* 0.79  --   --   --    < >  --    MONOS ABS 1.17* 1.76* 0.13  --   --   --    < >  --    EOS ABS 0.02 0.00 0.00  --   --   --    < >  --    MCV 95.7 95.9 95.3   < >  --   --    < >  --    PROCALCITONIN  --   --  0.27*  --   --   --   --   --    LACTATE  --   --   --   --  1.6  --   --  1.6   LACTATE, ARTERIAL  --   --   --   --   --  1.54  --   --     < > = values in this interval not displayed.         Lab 12/13/23  0448 12/12/23  0459 12/11/23  0525 12/10/23  1833 12/10/23  1830   SODIUM 139 139 140 139  139  --    SODIUM, ARTERIAL  --   --   --   --  137.5   POTASSIUM 2.8* 3.2* 3.0* 4.0  4.0  --    CHLORIDE 92* 93* 95* 97*  97*  --    CO2 31.9* 29.9* 28.6 26.7  26.7  --    ANION GAP 15.1* 16.1* 16.4* 15.3*  15.3*  --    BUN 75* 74* 66* 64*  64*  --    CREATININE 1.08* 1.26* 1.23* 1.13*  1.13*  --    EGFR 47.4* 39.4* 40.6* 44.9*  44.9*  --    GLUCOSE 170* 176* 174* 145*  145*  --    GLUCOSE, ARTERIAL  --   --   --   --  125*   CALCIUM 9.9* 9.9* 10.1* 9.9*  9.9*  --    IONIZED CALCIUM  --   --   --   --  1.22   MAGNESIUM 2.1 1.9 2.1  --   --    PHOSPHORUS 3.0 2.8  --  3.4  --          Lab 12/10/23  1833 12/10/23  0256   TOTAL PROTEIN  --  7.2   ALBUMIN 4.0 4.1   GLOBULIN  --  3.1   ALT (SGPT)  --  22   AST (SGOT)  --  43*    BILIRUBIN  --  0.8   ALK PHOS  --  86         Lab 12/10/23  1833 12/10/23  0549 12/10/23  0256   PROBNP 29,971.0*  --   --    HSTROP T  --  76* 80*                 Lab 12/10/23  1830   PH, ARTERIAL 7.424   PCO2, ARTERIAL 40.2   PO2 ART 86.3   O2 SATURATION ART 95.7   FIO2 28   HCO3 ART 25.7   BASE EXCESS ART 1.3   CARBOXYHEMOGLOBIN 0.7     Brief Urine Lab Results  (Last result in the past 365 days)        Color   Clarity   Blood   Leuk Est   Nitrite   Protein   CREAT   Urine HCG        01/19/23 2115 Yellow   Clear   Negative   Negative   Negative   Negative                 [x]  Microbiology   Microbiology Results (last 10 days)       Procedure Component Value - Date/Time    Blood Culture - Blood, Arm, Right [[id number]]  (Normal) Collected: 12/11/23 1558    Lab Status: Preliminary result Specimen: Blood from Arm, Right Updated: 12/12/23 1615     Blood Culture No growth at 24 hours    Blood Culture - Blood, Arm, Left [[id number]]  (Normal) Collected: 12/11/23 1553    Lab Status: Preliminary result Specimen: Blood from Arm, Left Updated: 12/12/23 1615     Blood Culture No growth at 24 hours    Blood Culture - Blood, Arm, Right [[id number]]  (Normal) Collected: 12/10/23 0457    Lab Status: Preliminary result Specimen: Blood from Arm, Right Updated: 12/13/23 0516     Blood Culture No growth at 3 days    Blood Culture - Blood, Arm, Left [[id number]]  (Normal) Collected: 12/10/23 0454    Lab Status: Preliminary result Specimen: Blood from Arm, Left Updated: 12/13/23 0516     Blood Culture No growth at 3 days          [x]  Radiology  XR Chest 1 View    Result Date: 12/11/2023   No significant interval change is suggested radiographically since the prior study from 1900 hours on 12/10/2023.      Please note that portions of this note were completed with a voice recognition program.  SANTOSH CHOUDHURY JR, MD       Electronically Signed and Approved By: SANTOSH CHOUDHURY JR, MD on 12/11/2023 at 0:21              XR Chest 1  View    Result Date: 12/10/2023   No significant interval change is suggested radiographically since the prior exam from 12/10/2023 at 0313 hours.      Please note that portions of this note were completed with a voice recognition program.  SANTOSH CHOUDHURY JR, MD       Electronically Signed and Approved By: SANTOSH CHOUDHURY JR, MD on 12/10/2023 at 19:36              XR Chest 1 View    Result Date: 12/10/2023   Atelectasis and/or pneumonia may involve the left lung base.  Small-to-moderate bilateral pleural effusions are seen.  There is moderate cardiomegaly.  Please see above comments for further detail.      Please note that portions of this note were completed with a voice recognition program.  SANTOSH CHOUDHURY JR, MD       Electronically Signed and Approved By: SANTOSH CHOUDHURY JR, MD on 12/10/2023 at 3:34              CT Head Without Contrast    Result Date: 12/10/2023   No acute brain abnormality is seen.    Please note that portions of this note were completed with a voice recognition program.  SANTOSH CHOUDHURY JR, MD       Electronically Signed and Approved By: SANTOSH CHOUDHURY JR, MD on 12/10/2023 at 3:30              CT Cervical Spine Without Contrast    Result Date: 12/10/2023    No acute cervical spine fracture is seen.  No acute subluxation abnormality is seen.  The study is motion-limited.    Please note that portions of this note were completed with a voice recognition program.  SANTOSH CHOUDHURY JR, MD       Electronically Signed and Approved By: SANTOSH CHOUDHURY JR, MD on 12/10/2023 at 3:27             []  EKG/Telemetry   []  Cardiology/Vascular   []  Pathology  []  Old records  []  Other:    Assessment & Plan   Assessment / Plan     Assessment:  Acute hypoxic respiratory failure requiring NIPPV  Acute on chronic combined systolic and diastolic congestive heart failure with acute exacerbation  Acute cardiogenic pulmonary edema  Severe aortic stenosis  Syncope, concern this is due to severe AS and CHF  Small pleural  effusion  Questionable community-acquired pneumonia due to unknown bacterial source, presumed gram-negative  Hypokalemia  Chronic atrial fibrillation  Hypertension  Hyperlipidemia    Plan:  Patient currently being managed in hospitalist service.  Cardiology following the patient, appreciate further input.  Currently on Bumex 2 mg IV every 12 hours and metolazone 5 mg per oral daily.  Continue.  Repeat echo showed EF of 20% as well as severe aortic stenosis.  Palliative team following the patient, hospice referral has been sent to [facility].  Appreciate further input.  Hypokalemic with potassium of 2.8, repleted.  Continue to trend and monitor.  Leukocytosis improving, WBC 12.97 today.  Will repeat CBC and chemistries in AM.  Continue rest of the current management.      DVT prophylaxis:  Medical DVT prophylaxis orders are present.    CODE STATUS:   Medical Intervention Limits: NO intubation (DNI)  Level Of Support Discussed With: Patient  Code Status (Patient has no pulse and is not breathing): No CPR (Do Not Attempt to Resuscitate)  Medical Interventions (Patient has pulse or is breathing): Limited Support      Electronically signed by Srikanth Toledo MD, 12/13/23, 12:37 PM EST.

## 2023-12-14 PROCEDURE — 99231 SBSQ HOSP IP/OBS SF/LOW 25: CPT | Performed by: INTERNAL MEDICINE

## 2023-12-14 PROCEDURE — 99232 SBSQ HOSP IP/OBS MODERATE 35: CPT | Performed by: INTERNAL MEDICINE

## 2023-12-14 PROCEDURE — 94799 UNLISTED PULMONARY SVC/PX: CPT

## 2023-12-14 PROCEDURE — 94760 N-INVAS EAR/PLS OXIMETRY 1: CPT

## 2023-12-14 PROCEDURE — 97110 THERAPEUTIC EXERCISES: CPT

## 2023-12-14 RX ADMIN — APIXABAN 2.5 MG: 2.5 TABLET, FILM COATED ORAL at 23:05

## 2023-12-14 RX ADMIN — Medication 5 MG: at 23:05

## 2023-12-14 RX ADMIN — ALBUTEROL SULFATE 2.5 MG: 2.5 SOLUTION RESPIRATORY (INHALATION) at 05:49

## 2023-12-14 RX ADMIN — TRIAMCINOLONE ACETONIDE 1 APPLICATION: 1 OINTMENT TOPICAL at 23:06

## 2023-12-14 RX ADMIN — BUMETANIDE 2 MG: 0.25 INJECTION INTRAMUSCULAR; INTRAVENOUS at 09:58

## 2023-12-14 RX ADMIN — MONTELUKAST SODIUM 10 MG: 10 TABLET, FILM COATED ORAL at 23:05

## 2023-12-14 RX ADMIN — APIXABAN 2.5 MG: 2.5 TABLET, FILM COATED ORAL at 09:58

## 2023-12-14 RX ADMIN — Medication 10 ML: at 23:06

## 2023-12-14 RX ADMIN — TRIAMCINOLONE ACETONIDE 1 APPLICATION: 1 OINTMENT TOPICAL at 09:59

## 2023-12-14 RX ADMIN — POTASSIUM CHLORIDE 20 MEQ: 1.5 POWDER, FOR SOLUTION ORAL at 10:00

## 2023-12-14 RX ADMIN — PANTOPRAZOLE SODIUM 40 MG: 40 TABLET, DELAYED RELEASE ORAL at 05:28

## 2023-12-14 RX ADMIN — BENZONATATE 100 MG: 100 CAPSULE ORAL at 23:05

## 2023-12-14 RX ADMIN — ATORVASTATIN CALCIUM 40 MG: 40 TABLET, FILM COATED ORAL at 09:58

## 2023-12-14 RX ADMIN — Medication 10 ML: at 09:59

## 2023-12-14 RX ADMIN — DOCUSATE SODIUM 50 MG AND SENNOSIDES 8.6 MG 2 TABLET: 8.6; 5 TABLET, FILM COATED ORAL at 23:05

## 2023-12-14 NOTE — SIGNIFICANT NOTE
12/14/23 0865   Coping/Psychosocial   Observed Emotional State calm;cooperative   Verbalized Emotional State other (see comments)  (Pt is Comfort Care Support)   Trust Relationship/Rapport empathic listening provided   Involvement in Care interacting with patient   Additional Documentation Spiritual Care (Group)   Spiritual Care   Use of Spiritual Resources prayer;spirituality for coping, indicated strong use of   Spiritual Care Source nurse referral   Spiritual Care Follow-Up follow-up, none required as presently assessed   Response to Spiritual Care receptive of support;engaged in conversation;thanks expressed   Spiritual Care Interventions supportive conversation provided;prayer support provided   Spiritual Care Visit Type initial   Spiritual Care Request prayer support   Receptivity to Spiritual Care visit welcomed

## 2023-12-14 NOTE — PROGRESS NOTES
CARDIOLOGY  INPATIENT PROGRESS NOTE                [address]    12/14/2023      PATIENT IDENTIFICATION:   Name:  Radha Aparicio      MRN:  [id number]     89 y.o.  female                 SUBJECTIVE:   Patient resting comfortably this a.m.  OBJECTIVE:  Vitals:    12/13/23 1100 12/13/23 1500 12/13/23 2105 12/14/23 0549   BP: 122/84 119/82 100/67    BP Location: Right arm Right arm Right arm    Patient Position: Lying Lying Lying    Pulse:   79 84   Resp: 22 22 24 24   Temp: 98.6 °F (37 °C) 98.2 °F (36.8 °C) 98.2 °F (36.8 °C)    TempSrc: Oral Oral Oral    SpO2: 94% 94% 100% 98%   Weight:       Height:               Body mass index is 25.2 kg/m².    Intake/Output Summary (Last 24 hours) at 12/14/2023 0855  Last data filed at 12/14/2023 0620  Gross per 24 hour   Intake --   Output 1100 ml   Net -1100 ml         Physical Exam  General Appearance:   Asleep no acute distress        Allergies   Allergen Reactions    Contrast Dye (Echo Or Unknown Ct/Mr) Unknown - Low Severity    Iodinated Contrast Media Hives and Other (See Comments)     IODINATED CONTRAST MEDIA (Verified  Allergy, Unknown, HIVES,SNEEZING,ITCHY EYES)    Iodine Unknown - Low Severity    Penicillins Rash    Shellfish Allergy Unknown - Low Severity    Spinach Other (See Comments)      SPINACH (Verified  Allergy, Unknown, 2/22/21)      SHOWED UP ON ALLERGY TESTING    Sulfa Antibiotics Other (See Comments)     (Verified  Allergy, Unknown, RASH)     Scheduled meds:  apixaban, 2.5 mg, Oral, Q12H  atorvastatin, 40 mg, Oral, Daily  bumetanide, 2 mg, Intravenous, Q12H  melatonin, 5 mg, Oral, Nightly  montelukast, 10 mg, Oral, Nightly  pantoprazole, 40 mg, Oral, Q AM  potassium chloride, 20 mEq, Oral, BID  senna-docusate sodium, 2 tablet, Oral, BID  sodium chloride, 10 mL, Intravenous, Q12H  triamcinolone, 1 application , Topical, 2 times per day      IV meds:                         Data Review:  MARTINA          12/11/2023    05:25 12/12/2023     "04:59 12/13/2023    04:47   CBC   WBC 8.22  16.22  12.97    RBC 4.08  4.16  4.18    Hemoglobin 12.5  12.9  12.9    Hematocrit 38.9  39.9  40.0    MCV 95.3  95.9  95.7    MCH 30.6  31.0  30.9    MCHC 32.1  32.3  32.3    RDW 14.5  14.7  14.5    Platelets 189  196  201      CMP          12/11/2023    05:25 12/12/2023    04:59 12/13/2023    04:48   CMP   Glucose 174  176  170    BUN 66  74  75    Creatinine 1.23  1.26  1.08    EGFR 40.6  39.4  47.4    Sodium 140  139  139    Potassium 3.0  3.2  2.8    Chloride 95  93  92    Calcium 10.1  9.9  9.9    BUN/Creatinine Ratio 53.7  58.7  69.4    Anion Gap 16.4  16.1  15.1       CARDIAC LABS:      Lab 12/10/23  1833 12/10/23  0549 12/10/23  0256   PROBNP 29,971.0*  --   --    HSTROP T  --  76* 80*        No results found for: \"DIGOXIN\"   Lab Results   Component Value Date    TSH 1.240 06/02/2022           Invalid input(s): \"LDLCALC\"  Lab Results   Component Value Date    POCTROP 0.02 03/21/2021     Lab Results   Component Value Date    TROPONINT 76 (C) 12/10/2023   (  Lab Results   Component Value Date    MG 2.1 12/13/2023     Results for orders placed during the hospital encounter of 12/10/23    Adult Transthoracic Echo Complete W/ Cont if Necessary Per Protocol    Interpretation Summary    Left ventricular systolic function is severely decreased. Estimated left ventricular EF = 20%    Left ventricular wall thickness is consistent with moderate concentric hypertrophy.    Left ventricular diastolic dysfunction is noted.    The left atrial cavity is moderately dilated.    The right atrial cavity is mild to moderately  dilated.    Severe/critical aortic valve stenosis is present.    Pleural effusion           ASSESSMENT:    Severe aortic stenosis    Atrial fibrillation, persistent    Acute HFrEF (heart failure with reduced ejection fraction)    Syncope        PLAN:  1.  Patient and family have decided upon comfort care resting comfortably this morning continue with IV diuretics " and pain/sedating medications as needed for comfort we will sign off          Paulie Harden MD  12/14/2023    08:55 EST

## 2023-12-14 NOTE — NURSING NOTE
The patient is on 4NT, nasal canula, HOB elevated, staff report the patient ate a good breakfast, the patient is less SOA today and reports feeling a little better. The patient is now comfort measures, will keep o2 and current medications and has a goal of going home with hospice of [facility] once the family set up caregivers. The family touched base with me today and they are meeting with a company today about caregivers and will update me later today. Unit SW, PCA and nurse updates.    Update: The family have so far spoken to a few agencies and the agencies report a lack of available staff so they are still working on calling around. I discussed [facility] and also private pay at a facility however they do not want to move the patient several times if they don't have to. I updated the family on how the patient is doing and will continue to assist where needed.    -Shae VICTORIA, RN, [facility]   Palliative Care    12/14/2023 10:56 EST

## 2023-12-14 NOTE — PLAN OF CARE
Goal Outcome Evaluation:      Patient resting, no complaints of pain or discomfort, vitals stable.

## 2023-12-14 NOTE — PLAN OF CARE
Goal Outcome Evaluation:           Progress: no change  Outcome Evaluation: pt alert and oriented x4 with confusion. no c/o of pain. c/o of itchiness on hips. no other conerns noted.

## 2023-12-14 NOTE — THERAPY TREATMENT NOTE
Patient Name: Radha Aparicio  : 1928    MRN: [id number]                              Today's Date: 2023       Admit Date: 12/10/2023    Visit Dx:     ICD-10-CM ICD-9-CM   1. Syncope, unspecified syncope type  R55 780.2   2. Hypokalemia  E87.6 276.8   3. Acute renal insufficiency  N28.9 593.9   4. Decreased activities of daily living (ADL)  Z78.9 V49.89   5. 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    Adjustment and management of vascular access device    Hematuria    Hyperlipidemia LDL goal <100    Ingrowing toenail    Pressure ulcer of foot, stage 1    Tinea unguium    Type 2 diabetes mellitus without complication    Bladder cancer    Severe aortic stenosis    Atrial fibrillation, persistent    Osteopenia of hip    Essential hypertension    Acute HFrEF (heart failure with reduced ejection fraction)    Moderate asthma with exacerbation    Cellulitis of right lower extremity without foot    Immunosuppressed due to chemotherapy    Cytokine release syndrome, grade 1    Abnormal gait    Hypercalcemia due to a drug    Decreased hearing    Syncope     Past Medical History:   Diagnosis Date    Acute congestive heart failure     improved    Arthritis     Atrial fibrillation, persistent 2021    Bladder cancer     Bleeding disorder     (CONDITION DUE TO BLOOD THINNERS)    Chronic atrial fibrillation     Chronic heart failure with preserved ejection fraction (HFpEF) 2021    High cholesterol     Hyperlipidemia LDL goal <100 2021    Hypertension     Lymphoma     Moderate to severe aortic stenosis     Patient declines to have transcatheter    Non Hodgkin's lymphoma      treated 2008.    Rectus sheath hematoma     Right rectus sheath hematoma, off anticoagulation because of the hematoma.    Skin cancer     Type 2 diabetes mellitus      Past Surgical History:   Procedure Laterality Date     BLADDER SURGERY      CATARACT EXTRACTION      DILATION AND CURETTAGE, DIAGNOSTIC / THERAPEUTIC      LEFT VENTRICULAR ASSIST DEVICE      LYMPH NODE BIOPSY      Biopsy of retroperitoneal lymph node or mass     SKIN CANCER DESTRUCTION      TONSILLECTOMY        General Information       Row Name 12/14/23 1314          OT Time and Intention    Document Type therapy note (daily note)  -     Mode of Treatment individual therapy;occupational therapy  -       Row Name 12/14/23 1314          General Information    Patient Profile Reviewed yes  -AC     Existing Precautions/Restrictions fall  -     Barriers to Rehab none identified  -       Row Name 12/14/23 1314          Home Main Entrance    Number of Stairs, Main Entrance none  -AC       Row Name 12/14/23 1314          Stairs Within Home, Primary    Number of Stairs, Within Home, Primary one  -AC       Row Name 12/14/23 1314          Cognition    Orientation Status (Cognition) oriented x 4  -AC       Row Name 12/14/23 1314          Safety Issues, Functional Mobility    Impairments Affecting Function (Mobility) balance;endurance/activity tolerance;strength  -               User Key  (r) = Recorded By, (t) = Taken By, (c) = Cosigned By      Initials Name Provider Type    AC Cathy Cheatham OT Occupational Therapist                     Mobility/ADL's    No documentation.                  Obj/Interventions       Row Name 12/14/23 1316          Sensory Assessment (Somatosensory)    Sensory Assessment (Somatosensory) UE sensation intact  -       Row Name 12/14/23 1316          Vision Assessment/Intervention    Visual Impairment/Limitations WFL  -AC       Row Name 12/14/23 1316          Range of Motion Comprehensive    General Range of Motion bilateral upper extremity ROM WFL  -       Row Name 12/14/23 1316          Strength Comprehensive (MMT)    Comment, General Manual Muscle Testing (MMT) Assessment BUE 4-/5  -       Row Name 12/14/23 1316          Shoulder  (Therapeutic Exercise)    Shoulder (Therapeutic Exercise) strengthening exercise  -AC     Shoulder Strengthening (Therapeutic Exercise) bilateral;horizontal aBduction/aDduction;flexion;extension;supine;10 repetitions;yellow;resistance band  -AC       Row Name 12/14/23 1316          Elbow/Forearm (Therapeutic Exercise)    Elbow/Forearm (Therapeutic Exercise) strengthening exercise  -AC     Elbow/Forearm Strengthening (Therapeutic Exercise) bilateral;yellow;10 repetitions;supine;flexion;extension;resistance band  -AC       Row Name 12/14/23 1316          Motor Skills    Motor Skills coordination;functional endurance  -     Coordination WFL  -AC     Functional Endurance f-  -AC     Therapeutic Exercise shoulder;elbow/forearm  -AC               User Key  (r) = Recorded By, (t) = Taken By, (c) = Cosigned By      Initials Name Provider Type    Cathy Rankin OT Occupational Therapist                   Goals/Plan    No documentation.                  Clinical Impression       Row Name 12/14/23 1320          Pain Assessment    Pretreatment Pain Rating 0/10 - no pain  -AC     Posttreatment Pain Rating 0/10 - no pain  -AC       Row Name 12/14/23 1320          Plan of Care Review    Plan of Care Reviewed With patient  -THELMA     Progress no change  -AC     Outcome Evaluation patient participated in upper extremity exercises this date with fair minus activity tolerance. patient to continue with therapy services in order to maximize independence with adls.  -AC               User Key  (r) = Recorded By, (t) = Taken By, (c) = Cosigned By      Initials Name Provider Type    Cathy Rankin OT Occupational Therapist                   Outcome Measures       Row Name 12/14/23 1320          How much help from another is currently needed...    Putting on and taking off regular lower body clothing? 2  -AC     Bathing (including washing, rinsing, and drying) 2  -AC     Toileting (which includes using toilet bed pan or urinal) 1  -AC      Putting on and taking off regular upper body clothing 3  -AC     Taking care of personal grooming (such as brushing teeth) 3  -AC     Eating meals 4  -AC     AM-PAC 6 Clicks Score (OT) 15  -AC       Row Name 12/14/23 0955 12/14/23 0219       How much help from another person do you currently need...    Turning from your back to your side while in flat bed without using bedrails? 3  -HL 3  -AS    Moving from lying on back to sitting on the side of a flat bed without bedrails? 2  -HL 2  -AS    Moving to and from a bed to a chair (including a wheelchair)? 2  -HL 2  -AS    Standing up from a chair using your arms (e.g., wheelchair, bedside chair)? 2  -HL 2  -AS    Climbing 3-5 steps with a railing? 2  -HL 2  -AS    To walk in hospital room? 2  -HL 2  -AS    AM-PAC 6 Clicks Score (PT) 13  -HL 13  -AS    Highest Level of Mobility Goal 4 --> Transfer to chair/commode  -HL 4 --> Transfer to chair/commode  -AS      Row Name 12/14/23 1325          Functional Assessment    Outcome Measure Options AM-PAC 6 Clicks Daily Activity (OT);Optimal Instrument  -AC       Row Name 12/14/23 1325          Optimal Instrument    Optimal Instrument Optimal - 3  -AC     Bending/Stooping 4  -AC     Standing 2  -AC     Reaching 1  -AC               User Key  (r) = Recorded By, (t) = Taken By, (c) = Cosigned By      Initials Name Provider Type     Julia Hemphill, RN Registered Nurse    AC Cathy Cheatham OT Occupational Therapist    AS Anastasia Dhaliwal RN Registered Nurse                    Occupational Therapy Education       Title: PT OT SLP Therapies (Done)       Topic: Occupational Therapy (Done)       Point: ADL training (Done)       Description:   Instruct learner(s) on proper safety adaptation and remediation techniques during self care or transfers.   Instruct in proper use of assistive devices.                  Learning Progress Summary             Patient Acceptance, E,TB, VU by NATE at 12/13/2023 1025    Acceptance, E,TB, VU by LF at  12/11/2023 1156                         Point: Precautions (Done)       Description:   Instruct learner(s) on prescribed precautions during self-care and functional transfers.                  Learning Progress Summary             Patient Acceptance, E,TB, VU by  at 12/13/2023 1025    Acceptance, E,TB, VU by  at 12/11/2023 1156                         Point: Body mechanics (Done)       Description:   Instruct learner(s) on proper positioning and spine alignment during self-care, functional mobility activities and/or exercises.                  Learning Progress Summary             Patient Acceptance, E,TB, VU by BK at 12/13/2023 1025    Acceptance, E,TB, VU by  at 12/11/2023 1156                                         User Key       Initials Effective Dates Name Provider Type Discipline     06/16/21 -  Alisson Shah OT Occupational Therapist OT     10/17/23 -  Jolie Pitts RN Registered Nurse Nurse                  OT Recommendation and Plan     Plan of Care Review  Plan of Care Reviewed With: patient  Progress: no change  Outcome Evaluation: patient participated in upper extremity exercises this date with fair minus activity tolerance. patient to continue with therapy services in order to maximize independence with adls.     Time Calculation:         Time Calculation- OT       Row Name 12/14/23 1326             Time Calculation- OT    OT Received On 12/14/23  -AC         Timed Charges    97545 - OT Therapeutic Exercise Minutes 8  -AC         Total Minutes    Timed Charges Total Minutes 8  -AC       Total Minutes 8  -AC                User Key  (r) = Recorded By, (t) = Taken By, (c) = Cosigned By      Initials Name Provider Type    AC Cathy Cheatham OT Occupational Therapist                  Therapy Charges for Today       Code Description Service Date Service Provider Modifiers Qty    [id number]  OT THER PROC EA 15 MIN 12/14/2023 Cathy Cheatham OT GO 1                 Cathy Cheatham  OT  12/14/2023

## 2023-12-15 LAB
BACTERIA SPEC AEROBE CULT: NORMAL
BACTERIA SPEC AEROBE CULT: NORMAL

## 2023-12-15 PROCEDURE — 25010000002 LORAZEPAM PER 2 MG: Performed by: STUDENT IN AN ORGANIZED HEALTH CARE EDUCATION/TRAINING PROGRAM

## 2023-12-15 PROCEDURE — 99232 SBSQ HOSP IP/OBS MODERATE 35: CPT | Performed by: INTERNAL MEDICINE

## 2023-12-15 RX ADMIN — LORAZEPAM 0.5 MG: 2 INJECTION INTRAMUSCULAR; INTRAVENOUS at 01:54

## 2023-12-15 RX ADMIN — BUMETANIDE 2 MG: 0.25 INJECTION INTRAMUSCULAR; INTRAVENOUS at 21:37

## 2023-12-15 RX ADMIN — Medication 5 MG: at 21:11

## 2023-12-15 RX ADMIN — APIXABAN 2.5 MG: 2.5 TABLET, FILM COATED ORAL at 08:53

## 2023-12-15 RX ADMIN — TRIAMCINOLONE ACETONIDE 1 APPLICATION: 1 OINTMENT TOPICAL at 10:32

## 2023-12-15 RX ADMIN — LORAZEPAM 0.5 MG: 2 INJECTION INTRAMUSCULAR; INTRAVENOUS at 09:21

## 2023-12-15 RX ADMIN — TRIAMCINOLONE ACETONIDE 1 APPLICATION: 1 OINTMENT TOPICAL at 21:39

## 2023-12-15 RX ADMIN — BENZONATATE 100 MG: 100 CAPSULE ORAL at 21:36

## 2023-12-15 RX ADMIN — ACETAMINOPHEN 650 MG: 325 TABLET ORAL at 08:53

## 2023-12-15 RX ADMIN — APIXABAN 2.5 MG: 2.5 TABLET, FILM COATED ORAL at 21:09

## 2023-12-15 RX ADMIN — DOCUSATE SODIUM 50 MG AND SENNOSIDES 8.6 MG 2 TABLET: 8.6; 5 TABLET, FILM COATED ORAL at 21:12

## 2023-12-15 RX ADMIN — BUMETANIDE 2 MG: 0.25 INJECTION INTRAMUSCULAR; INTRAVENOUS at 01:54

## 2023-12-15 RX ADMIN — PANTOPRAZOLE SODIUM 40 MG: 40 TABLET, DELAYED RELEASE ORAL at 05:20

## 2023-12-15 RX ADMIN — POTASSIUM CHLORIDE 20 MEQ: 1.5 POWDER, FOR SOLUTION ORAL at 01:54

## 2023-12-15 RX ADMIN — BUMETANIDE 2 MG: 0.25 INJECTION INTRAMUSCULAR; INTRAVENOUS at 10:32

## 2023-12-15 RX ADMIN — LORAZEPAM 0.5 MG: 2 INJECTION INTRAMUSCULAR; INTRAVENOUS at 23:53

## 2023-12-15 RX ADMIN — Medication 10 ML: at 21:38

## 2023-12-15 RX ADMIN — POTASSIUM CHLORIDE 20 MEQ: 1.5 POWDER, FOR SOLUTION ORAL at 21:11

## 2023-12-15 RX ADMIN — ATORVASTATIN CALCIUM 40 MG: 40 TABLET, FILM COATED ORAL at 08:52

## 2023-12-15 RX ADMIN — MONTELUKAST SODIUM 10 MG: 10 TABLET, FILM COATED ORAL at 21:11

## 2023-12-15 RX ADMIN — Medication 10 ML: at 08:53

## 2023-12-15 RX ADMIN — BENZONATATE 100 MG: 100 CAPSULE ORAL at 08:53

## 2023-12-15 NOTE — PLAN OF CARE
Goal Outcome Evaluation:  Plan of Care Reviewed With: patient        Progress: no change  Outcome Evaluation: pt alert and oriented with some confusion. no c/o of pain/ ativan administered r/t anxiety and restlessness, pt tolerated well. wound care done per orders. Pt had a bloody nose during shift, pt stated her nose itched and when she started itching it, it bleed for approximately 5 minutes. Pt on 3.5L humidified oxygen to help with dryness. No other concerns.

## 2023-12-15 NOTE — NURSING NOTE
The patient is on 4NT on comfort measures with a goal of going back home. I spoke with the daughter Milli today and she reports they have caregivers lined up to start tomorrow and ready for a DC on Sat 12/16. I then called [facility] to update and they are requested an updated note to reflect the need for Hospice, oxygen and a hospital bed. The provider has been updated and this info will be faxed to Hospice once available. I will also leave a DC home with [facility] sheet in the chart for staff over the weekend. EMS DNR on the chart and scripts will be filled at [facility] by the mall. The patient will need a stool softer, melatonin and ativan. All questions answered by family. Unit SW updated.    Update: DME is getting delivered today, Friday 12/15     -Shae VICTORIA, RN, PN   Palliative Care    12/15/2023 13:47 EST

## 2023-12-15 NOTE — PROGRESS NOTES
[facility]   Hospitalist Progress Note  Date: 12/15/2023  Patient Name: Radha Aparicio  : 1928  MRN: [id number]  Date of admission: 12/10/2023  Room/Bed: [facility]      Subjective   Subjective     Chief Complaint: Follow-up for syncope    Summary:Radha Aparicio is a 89 y.o. female with a history of severe aortic stenosis, was offered TAVR 15 years ago but declined who presented for evaluation for syncope.  Patient had multiple episodes of lightheadedness and dizziness especially with changing position.  She had been increasing her dose of diuretic due to lower extremity edema.  On the day of presentation, she passed out and struck her head on the floor. CT head on presentation showed no acute finding including no fractures or bleeding.  There was concern for dehydration for which she was started on IV fluid.  Hospital course complicated by respiratory failure requiring BiPAP and IV diuretics.  CODE STATUS was changed to DNR at this admission.  There is concern for end-stage aortic stenosis and heart failure exacerbation.  Cardiology following the patient.  Currently on Bumex and Zaroxolyn.  Palliative team following.    Interval Followup:   Patient is currently comfort measures.  Patient continues to decline.  Patient is not interactive currently.  Patient's feet are cool to the touch    Patient's daughter is at the bedside and the plan is for patient to discharge home tomorrow with hospice services.  Patient will need a hospital bed as well as home oxygen          Review of Systems    All systems reviewed and negative except for what is outlined above.      Objective   Objective     Vitals:   Temp:  [97.2 °F (36.2 °C)-97.5 °F (36.4 °C)] 97.5 °F (36.4 °C)  Heart Rate:  [] 118  Resp:  [18-20] 18  BP: (103-109)/(72-82) 103/82  Flow (L/min):  [3.5] 3.5    Physical Exam   General: sleeping. No distress. Daughters at the bedside   Cardiovascular: RRR, systolic murmur present in the aortic area.     Pulmonary: CTA bilaterally; no wheezes; no conversational dyspnea  Gastrointestinal: S/ND/NT, +BS  Neuro: Spontaneous movement of extremities    Result Review    Result Review:  I have personally reviewed these results:  [x]  Laboratory      Lab 12/13/23  0447 12/12/23  0459 12/11/23  0525 12/10/23  1858 12/10/23  1833 12/10/23  1830 12/10/23  0904 12/10/23  0454   WBC 12.97* 16.22* 8.22   < >  --   --    < >  --    HEMOGLOBIN 12.9 12.9 12.5   < >  --   --    < >  --    HEMATOCRIT 40.0 39.9 38.9   < >  --   --    < >  --    PLATELETS 201 196 189   < >  --   --    < >  --    NEUTROS ABS 9.18* 12.16* 6.74  --   --   --    < >  --    IMMATURE GRANS (ABS) 1.44* 1.52* 0.51*  --   --   --    < >  --    LYMPHS ABS 1.02 0.65* 0.79  --   --   --    < >  --    MONOS ABS 1.17* 1.76* 0.13  --   --   --    < >  --    EOS ABS 0.02 0.00 0.00  --   --   --    < >  --    MCV 95.7 95.9 95.3   < >  --   --    < >  --    PROCALCITONIN  --   --  0.27*  --   --   --   --   --    LACTATE  --   --   --   --  1.6  --   --  1.6   LACTATE, ARTERIAL  --   --   --   --   --  1.54  --   --     < > = values in this interval not displayed.         Lab 12/13/23  0448 12/12/23  0459 12/11/23  0525 12/10/23  1833 12/10/23  1830   SODIUM 139 139 140 139  139  --    SODIUM, ARTERIAL  --   --   --   --  137.5   POTASSIUM 2.8* 3.2* 3.0* 4.0  4.0  --    CHLORIDE 92* 93* 95* 97*  97*  --    CO2 31.9* 29.9* 28.6 26.7  26.7  --    ANION GAP 15.1* 16.1* 16.4* 15.3*  15.3*  --    BUN 75* 74* 66* 64*  64*  --    CREATININE 1.08* 1.26* 1.23* 1.13*  1.13*  --    EGFR 47.4* 39.4* 40.6* 44.9*  44.9*  --    GLUCOSE 170* 176* 174* 145*  145*  --    GLUCOSE, ARTERIAL  --   --   --   --  125*   CALCIUM 9.9* 9.9* 10.1* 9.9*  9.9*  --    IONIZED CALCIUM  --   --   --   --  1.22   MAGNESIUM 2.1 1.9 2.1  --   --    PHOSPHORUS 3.0 2.8  --  3.4  --          Lab 12/10/23  1833 12/10/23  0256   TOTAL PROTEIN  --  7.2   ALBUMIN 4.0 4.1   GLOBULIN  --  3.1   ALT  (SGPT)  --  22   AST (SGOT)  --  43*   BILIRUBIN  --  0.8   ALK PHOS  --  86         Lab 12/10/23  1833 12/10/23  0549 12/10/23  0256   PROBNP 29,971.0*  --   --    HSTROP T  --  76* 80*                 Lab 12/10/23  1830   PH, ARTERIAL 7.424   PCO2, ARTERIAL 40.2   PO2 ART 86.3   O2 SATURATION ART 95.7   FIO2 28   HCO3 ART 25.7   BASE EXCESS ART 1.3   CARBOXYHEMOGLOBIN 0.7     Brief Urine Lab Results  (Last result in the past 365 days)        Color   Clarity   Blood   Leuk Est   Nitrite   Protein   CREAT   Urine HCG        01/19/23 2115 Yellow   Clear   Negative   Negative   Negative   Negative                 [x]  Microbiology   Microbiology Results (last 10 days)       Procedure Component Value - Date/Time    Blood Culture - Blood, Arm, Right [[id number]]  (Normal) Collected: 12/11/23 1558    Lab Status: Preliminary result Specimen: Blood from Arm, Right Updated: 12/14/23 1616     Blood Culture No growth at 3 days    Blood Culture - Blood, Arm, Left [[id number]]  (Normal) Collected: 12/11/23 1553    Lab Status: Preliminary result Specimen: Blood from Arm, Left Updated: 12/14/23 1616     Blood Culture No growth at 3 days    Blood Culture - Blood, Arm, Right [[id number]]  (Normal) Collected: 12/10/23 0457    Lab Status: Final result Specimen: Blood from Arm, Right Updated: 12/15/23 0515     Blood Culture No growth at 5 days    Blood Culture - Blood, Arm, Left [[id number]]  (Normal) Collected: 12/10/23 0454    Lab Status: Final result Specimen: Blood from Arm, Left Updated: 12/15/23 0515     Blood Culture No growth at 5 days          [x]  Radiology  XR Chest 1 View    Result Date: 12/11/2023   No significant interval change is suggested radiographically since the prior study from 1900 hours on 12/10/2023.      Please note that portions of this note were completed with a voice recognition program.  SANTOSH CHOUDHURY JR, MD       Electronically Signed and Approved By: SANTOSH CHOUDHURY JR, MD on 12/11/2023 at 0:21               XR Chest 1 View    Result Date: 12/10/2023   No significant interval change is suggested radiographically since the prior exam from 12/10/2023 at 0313 hours.      Please note that portions of this note were completed with a voice recognition program.  SANTOSH CHOUDHURY JR, MD       Electronically Signed and Approved By: SANTOSH CHOUDHURY JR, MD on 12/10/2023 at 19:36              XR Chest 1 View    Result Date: 12/10/2023   Atelectasis and/or pneumonia may involve the left lung base.  Small-to-moderate bilateral pleural effusions are seen.  There is moderate cardiomegaly.  Please see above comments for further detail.      Please note that portions of this note were completed with a voice recognition program.  SANTOSH CHOUDHURY JR, MD       Electronically Signed and Approved By: SANTOSH CHOUDHURY JR, MD on 12/10/2023 at 3:34              CT Head Without Contrast    Result Date: 12/10/2023   No acute brain abnormality is seen.    Please note that portions of this note were completed with a voice recognition program.  SANTOSH CHOUDHURY JR, MD       Electronically Signed and Approved By: SANTOSH CHOUDHURY JR, MD on 12/10/2023 at 3:30              CT Cervical Spine Without Contrast    Result Date: 12/10/2023    No acute cervical spine fracture is seen.  No acute subluxation abnormality is seen.  The study is motion-limited.    Please note that portions of this note were completed with a voice recognition program.  SANTOSH CHOUDHURY JR, MD       Electronically Signed and Approved By: SANTOSH CHOUDHURY JR, MD on 12/10/2023 at 3:27             []  EKG/Telemetry   []  Cardiology/Vascular   []  Pathology  []  Old records  []  Other:    Assessment & Plan   Assessment / Plan     Assessment:  Acute hypoxic respiratory failure requiring NIPPV  Acute on chronic combined systolic and diastolic congestive heart failure with acute exacerbation  Acute cardiogenic pulmonary edema  Severe aortic stenosis  Syncope, concern this is due to severe AS and  CHF  Small pleural effusion  Questionable community-acquired pneumonia due to unknown bacterial source, presumed gram-negative  Hypokalemia  Chronic atrial fibrillation  Hypertension  Hyperlipidemia    Plan:  Continue comfort measures.  Plan is for patient to discharge home tomorrow with hospice.  Patient will need a hospital bed as well as oxygen  Patient continues to decline         DVT prophylaxis:  Medical DVT prophylaxis orders are present.    CODE STATUS:   Level Of Support Discussed With: Patient  Code Status (Patient has no pulse and is not breathing): No CPR (Do Not Attempt to Resuscitate)  Medical Interventions (Patient has pulse or is breathing): Comfort Measures

## 2023-12-16 VITALS
WEIGHT: 142.2 LBS | SYSTOLIC BLOOD PRESSURE: 118 MMHG | TEMPERATURE: 97.3 F | OXYGEN SATURATION: 96 % | HEART RATE: 120 BPM | DIASTOLIC BLOOD PRESSURE: 70 MMHG | RESPIRATION RATE: 18 BRPM | BODY MASS INDEX: 25.2 KG/M2 | HEIGHT: 63 IN

## 2023-12-16 LAB
BACTERIA SPEC AEROBE CULT: NORMAL
BACTERIA SPEC AEROBE CULT: NORMAL

## 2023-12-16 PROCEDURE — 25010000002 LORAZEPAM PER 2 MG: Performed by: STUDENT IN AN ORGANIZED HEALTH CARE EDUCATION/TRAINING PROGRAM

## 2023-12-16 PROCEDURE — 99239 HOSP IP/OBS DSCHRG MGMT >30: CPT | Performed by: INTERNAL MEDICINE

## 2023-12-16 RX ORDER — MORPHINE SULFATE 100 MG/5ML
5 SOLUTION ORAL
Qty: 30 ML | Refills: 0 | Status: SHIPPED | OUTPATIENT
Start: 2023-12-16

## 2023-12-16 RX ORDER — LORAZEPAM 2 MG/ML
0.5 CONCENTRATE ORAL
Qty: 30 ML | Refills: 0 | Status: SHIPPED | OUTPATIENT
Start: 2023-12-16

## 2023-12-16 RX ADMIN — Medication 10 ML: at 09:03

## 2023-12-16 RX ADMIN — BUMETANIDE 2 MG: 0.25 INJECTION INTRAMUSCULAR; INTRAVENOUS at 09:03

## 2023-12-16 RX ADMIN — TRIAMCINOLONE ACETONIDE 1 APPLICATION: 1 OINTMENT TOPICAL at 10:24

## 2023-12-16 RX ADMIN — LORAZEPAM 0.5 MG: 2 INJECTION INTRAMUSCULAR; INTRAVENOUS at 06:42

## 2023-12-16 NOTE — DISCHARGE SUMMARY
[facility]         HOSPITALIST  DISCHARGE SUMMARY    Patient Name: Radha Aparicio  : 1928  MRN: [id number]    Date of Admission: 12/10/2023  Date of Discharge:  2023    Primary Care Physician: Laura Silva APRN    Consults       Date and Time Order Name Status Description    2023  8:35 AM Inpatient Cardiology Consult      12/10/2023  4:12 AM Hospitalist (on-call MD unless specified)              Active and Resolved Hospital Problems:  Active Hospital Problems    Diagnosis POA    **Severe aortic stenosis [I35.0] Yes    Syncope [R55] Yes    Acute HFrEF (heart failure with reduced ejection fraction) [I50.21] Yes    Atrial fibrillation, persistent [I48.19] Yes      Resolved Hospital Problems   No resolved problems to display.       Hospital Course     Hospital Course:  Radha Aparicio is a 89 y.o. female with a history of severe aortic stenosis, was offered TAVR 15 years ago but declined who presented for evaluation for syncope.  Patient had multiple episodes of lightheadedness and dizziness especially with changing position.  She had been increasing her dose of diuretic due to lower extremity edema.  On the day of presentation, she passed out and struck her head on the floor. CT head on presentation showed no acute finding including no fractures or bleeding.  There was concern for dehydration for which she was started on IV fluid.  Hospital course complicated by respiratory failure requiring BiPAP and IV diuretics.  Patient has end-stage aortic stenosis with worsening heart failure.  Patient made the decision to focus on comfort care.  Patient's daughter is met with hospice and patient was discharged home with hospice today.      Day of Discharge     Vital Signs:  Temp:  [97.3 °F (36.3 °C)-98.1 °F (36.7 °C)] 97.3 °F (36.3 °C)  Heart Rate:  [116-120] 120  Resp:  [18] 18  BP: (103-118)/(70-73) 118/70  Flow (L/min):  [3.5] 3.5  Physical Exam:   General: sleeping. No distress.  Continues to decline   Cardiovascular: RRR, systolic murmur present in the aortic area.    Pulmonary: CTA bilaterally; no wheezes; no conversational dyspnea  Gastrointestinal: S/ND/NT, +BS  Neuro: Spontaneous movement of extremities  Msk: Mottling of lower extremities      Discharge Details        Discharge Medications        New Medications        Instructions Start Date   LORazepam 2 MG/ML concentrated solution  Commonly known as: LORazepam Intensol   0.5 mg, Oral, Every 3 Hours PRN      morphine 20 MG/ML concentrated solution   5 mg, Oral, Every 2 Hours PRN             Continue These Medications        Instructions Start Date   latanoprost 0.005 % ophthalmic solution  Commonly known as: XALATAN   Administer 1 drop to the right eye Every Night.             Stop These Medications      acetaminophen 325 MG tablet  Commonly known as: TYLENOL     albuterol sulfate  (90 Base) MCG/ACT inhaler  Commonly known as: PROVENTIL HFA;VENTOLIN HFA;PROAIR HFA     aspirin 81 MG EC tablet     atorvastatin 40 MG tablet  Commonly known as: LIPITOR     bumetanide 2 MG tablet  Commonly known as: BUMEX     carvedilol 6.25 MG tablet  Commonly known as: COREG     doxepin 10 MG capsule  Commonly known as: SINEquan     Eliquis 2.5 MG tablet tablet  Generic drug: apixaban     guaiFENesin 600 MG 12 hr tablet  Commonly known as: MUCINEX     iron polysaccharides 150 MG capsule  Commonly known as: NIFEREX     metOLazone 2.5 MG tablet  Commonly known as: ZAROXOLYN     montelukast 10 MG tablet  Commonly known as: SINGULAIR     multivitamin with minerals tablet tablet     potassium chloride 10 MEQ CR tablet     Probiotic (Lactobacillus) capsule     raloxifene 60 MG tablet  Commonly known as: EVISTA     spironolactone 25 MG tablet  Commonly known as: ALDACTONE     SUPER CALCIUM 600 + D3 PO     Zinc 50 MG tablet              Allergies   Allergen Reactions    Contrast Dye (Echo Or Unknown Ct/Mr) Unknown - Low Severity    Iodinated Contrast  Media Hives and Other (See Comments)     IODINATED CONTRAST MEDIA (Verified  Allergy, Unknown, HIVES,SNEEZING,ITCHY EYES)    Iodine Unknown - Low Severity    Penicillins Rash    Shellfish Allergy Unknown - Low Severity    Spinach Other (See Comments)      SPINACH (Verified  Allergy, Unknown, 2/22/21)      SHOWED UP ON ALLERGY TESTING    Sulfa Antibiotics Other (See Comments)     (Verified  Allergy, Unknown, RASH)       Discharge Disposition:  Hospice/Home    Diet:  Hospital:  Diet Order   Procedures    Diet: Cardiac Diets, Diabetic Diets; Healthy Heart (2-3 Na+); Consistent Carbohydrate; Texture: Soft to Chew (NDD 3); Soft to Chew: Chopped Meat; Fluid Consistency: Thin (IDDSI 0)             CODE STATUS:  Code Status and Medical Interventions:   Ordered at: 12/13/23 1703     Level Of Support Discussed With:    Patient     Code Status (Patient has no pulse and is not breathing):    No CPR (Do Not Attempt to Resuscitate)     Medical Interventions (Patient has pulse or is breathing):    Comfort Measures         Future Appointments   Date Time Provider Department Center   12/28/2023  3:30 PM Laura James PA-C  [facility]   1/4/2024  1:30 PM [place]ie [place] [facility] [facility]   1/16/2024 10:15 AM CHAIR 16 [facility] OP [facility]   1/16/2024 11:00 AM Jovani Ellington [place] [facility] [id number]           Pertinent  and/or Most Recent Results     PROCEDURES:   See chart     LAB RESULTS:      Lab 12/13/23  0447 12/12/23  0459 12/11/23  0525 12/10/23  1858 12/10/23  1833 12/10/23  1830 12/10/23  0904 12/10/23  0454 12/10/23  0256   WBC 12.97* 16.22* 8.22 9.68  --   --  9.29  --  11.28*   HEMOGLOBIN 12.9 12.9 12.5 12.9  --   --  12.0  --  13.0   HEMATOCRIT 40.0 39.9 38.9 39.3  --   --  36.6  --  39.7   PLATELETS 201 196 189 208  --   --  195  --  218   NEUTROS ABS 9.18* 12.16* 6.74  --   --   --  6.36  --  7.92*   IMMATURE GRANS (ABS) 1.44* 1.52* 0.51*  --   --   --  0.53*  --  0.78*   LYMPHS ABS 1.02 0.65* 0.79   --   --   --  1.47  --  1.25   MONOS ABS 1.17* 1.76* 0.13  --   --   --  0.80  --  1.11*   EOS ABS 0.02 0.00 0.00  --   --   --  0.06  --  0.13   MCV 95.7 95.9 95.3 94.9  --   --  93.8  --  94.3   PROCALCITONIN  --   --  0.27*  --   --   --   --   --   --    LACTATE  --   --   --   --  1.6  --   --  1.6  --    LACTATE, ARTERIAL  --   --   --   --   --  1.54  --   --   --          Lab 12/13/23  0448 12/12/23  0459 12/11/23  0525 12/10/23  1833 12/10/23  1830 12/10/23  0904 12/10/23  0256   SODIUM 139 139 140 139  139  --  139 136   SODIUM, ARTERIAL  --   --   --   --  137.5  --   --    POTASSIUM 2.8* 3.2* 3.0* 4.0  4.0  --  2.9* 2.8*   CHLORIDE 92* 93* 95* 97*  97*  --  98 89*   CO2 31.9* 29.9* 28.6 26.7  26.7  --  29.1* 33.1*   ANION GAP 15.1* 16.1* 16.4* 15.3*  15.3*  --  11.9 13.9   BUN 75* 74* 66* 64*  64*  --  69* 79*   CREATININE 1.08* 1.26* 1.23* 1.13*  1.13*  --  1.00 1.26*   EGFR 47.4* 39.4* 40.6* 44.9*  44.9*  --  52.0* 39.4*   GLUCOSE 170* 176* 174* 145*  145*  --  142* 133*   GLUCOSE, ARTERIAL  --   --   --   --  125*  --   --    CALCIUM 9.9* 9.9* 10.1* 9.9*  9.9*  --  9.3 10.5*   IONIZED CALCIUM  --   --   --   --  1.22  --   --    MAGNESIUM 2.1 1.9 2.1  --   --  2.0 2.2   PHOSPHORUS 3.0 2.8  --  3.4  --   --   --          Lab 12/10/23  1833 12/10/23  0256   TOTAL PROTEIN  --  7.2   ALBUMIN 4.0 4.1   GLOBULIN  --  3.1   ALT (SGPT)  --  22   AST (SGOT)  --  43*   BILIRUBIN  --  0.8   ALK PHOS  --  86         Lab 12/10/23  1833 12/10/23  0549 12/10/23  0256   PROBNP 29,971.0*  --   --    HSTROP T  --  76* 80*                 Lab 12/10/23  1830   PH, ARTERIAL 7.424   PCO2, ARTERIAL 40.2   PO2 ART 86.3   O2 SATURATION ART 95.7   FIO2 28   HCO3 ART 25.7   BASE EXCESS ART 1.3   CARBOXYHEMOGLOBIN 0.7     Brief Urine Lab Results  (Last result in the past 365 days)        Color   Clarity   Blood   Leuk Est   Nitrite   Protein   CREAT   Urine HCG        01/19/23 2115 Yellow   Clear   Negative    Negative   Negative   Negative                 Microbiology Results (last 10 days)       Procedure Component Value - Date/Time    Blood Culture - Blood, Arm, Right [[id number]]  (Normal) Collected: 12/11/23 1558    Lab Status: Preliminary result Specimen: Blood from Arm, Right Updated: 12/15/23 1617     Blood Culture No growth at 4 days    Blood Culture - Blood, Arm, Left [[id number]]  (Normal) Collected: 12/11/23 1553    Lab Status: Preliminary result Specimen: Blood from Arm, Left Updated: 12/15/23 1617     Blood Culture No growth at 4 days    Blood Culture - Blood, Arm, Right [[id number]]  (Normal) Collected: 12/10/23 0457    Lab Status: Final result Specimen: Blood from Arm, Right Updated: 12/15/23 0515     Blood Culture No growth at 5 days    Blood Culture - Blood, Arm, Left [[id number]]  (Normal) Collected: 12/10/23 0454    Lab Status: Final result Specimen: Blood from Arm, Left Updated: 12/15/23 0515     Blood Culture No growth at 5 days            XR Chest 1 View    Result Date: 12/11/2023   No significant interval change is suggested radiographically since the prior study from 1900 hours on 12/10/2023.      Please note that portions of this note were completed with a voice recognition program.  SANTOSH CHOUDHURY JR, MD       Electronically Signed and Approved By: SANTOSH CHOUDHURY JR, MD on 12/11/2023 at 0:21              XR Chest 1 View    Result Date: 12/10/2023   No significant interval change is suggested radiographically since the prior exam from 12/10/2023 at 0313 hours.      Please note that portions of this note were completed with a voice recognition program.  SANTOSH CHOUDHURY JR, MD       Electronically Signed and Approved By: SANTOSH CHOUDHURY JR, MD on 12/10/2023 at 19:36              XR Chest 1 View    Result Date: 12/10/2023   Atelectasis and/or pneumonia may involve the left lung base.  Small-to-moderate bilateral pleural effusions are seen.  There is moderate cardiomegaly.  Please see above comments for  further detail.      Please note that portions of this note were completed with a voice recognition program.  SANTOSH CHOUDHURY JR, MD       Electronically Signed and Approved By: SANTOSH CHOUDHURY JR, MD on 12/10/2023 at 3:34              CT Head Without Contrast    Result Date: 12/10/2023   No acute brain abnormality is seen.    Please note that portions of this note were completed with a voice recognition program.  SANTOSH CHOUDHURY JR, MD       Electronically Signed and Approved By: SANTOSH CHOUDHURY JR, MD on 12/10/2023 at 3:30              CT Cervical Spine Without Contrast    Result Date: 12/10/2023    No acute cervical spine fracture is seen.  No acute subluxation abnormality is seen.  The study is motion-limited.    Please note that portions of this note were completed with a voice recognition program.  SANTOSH CHOUDHURY JR, MD       Electronically Signed and Approved By: SANTOSH CHOUDHURY JR, MD on 12/10/2023 at 3:27                        Results for orders placed during the hospital encounter of 12/10/23    Adult Transthoracic Echo Complete W/ Cont if Necessary Per Protocol    Interpretation Summary    Left ventricular systolic function is severely decreased. Estimated left ventricular EF = 20%    Left ventricular wall thickness is consistent with moderate concentric hypertrophy.    Left ventricular diastolic dysfunction is noted.    The left atrial cavity is moderately dilated.    The right atrial cavity is mild to moderately  dilated.    Severe/critical aortic valve stenosis is present.    Pleural effusion      Labs Pending at Discharge:  Pending Labs       Order Current Status    Blood Culture - Blood, Arm, Left Preliminary result    Blood Culture - Blood, Arm, Right Preliminary result              Time spent on Discharge including face to face service:  more than 35 minutes    Electronically signed by Joe King DO, 12/16/23, 10:31 AM EST.

## 2023-12-16 NOTE — PLAN OF CARE
Goal Outcome Evaluation:  Plan of Care Reviewed With: patient        Progress: declining  Outcome Evaluation: Medicated for agitation x1 this shift. Suction at bedside for sputum. PO meds crushed in applesauce.

## 2023-12-16 NOTE — PLAN OF CARE
Goal Outcome Evaluation:  Plan of Care Reviewed With: family      Pt asleep this morning. To be discharged home to Hospice care. Daughter called and reported to nurse that the bed and oxygen are ready. MD notified and dc orders are in.